# Patient Record
Sex: MALE | Race: BLACK OR AFRICAN AMERICAN | NOT HISPANIC OR LATINO | Employment: OTHER | URBAN - METROPOLITAN AREA
[De-identification: names, ages, dates, MRNs, and addresses within clinical notes are randomized per-mention and may not be internally consistent; named-entity substitution may affect disease eponyms.]

---

## 2017-01-03 ENCOUNTER — TELEPHONE (OUTPATIENT)
Dept: FAMILY MEDICINE CLINIC | Facility: CLINIC | Age: 42
End: 2017-01-03

## 2017-01-03 NOTE — TELEPHONE ENCOUNTER
Pt needs refill on adderall. Would like to pick this up Wednesday.    They did make an appt to see your Thursday. Last seen 9/16. Altaf said you don't like for him to be without his meds. So she will  Wednesday but he will come see you Thursday. Thanks    Call altaf when its ready [phone]    Altaf notified

## 2017-01-04 RX ORDER — DEXTROAMPHETAMINE SACCHARATE, AMPHETAMINE ASPARTATE, DEXTROAMPHETAMINE SULFATE AND AMPHETAMINE SULFATE 7.5; 7.5; 7.5; 7.5 MG/1; MG/1; MG/1; MG/1
30 TABLET ORAL DAILY
Qty: 30 TABLET | Refills: 0 | Status: SHIPPED | OUTPATIENT
Start: 2017-01-04 | End: 2017-02-14 | Stop reason: SDUPTHER

## 2017-01-05 ENCOUNTER — OFFICE VISIT (OUTPATIENT)
Dept: FAMILY MEDICINE CLINIC | Facility: CLINIC | Age: 42
End: 2017-01-05

## 2017-01-05 VITALS
HEIGHT: 72 IN | HEART RATE: 78 BPM | DIASTOLIC BLOOD PRESSURE: 80 MMHG | SYSTOLIC BLOOD PRESSURE: 118 MMHG | RESPIRATION RATE: 18 BRPM | OXYGEN SATURATION: 98 % | WEIGHT: 192.7 LBS | TEMPERATURE: 98.4 F | BODY MASS INDEX: 26.1 KG/M2

## 2017-01-05 DIAGNOSIS — I10 ESSENTIAL HYPERTENSION: Primary | ICD-10-CM

## 2017-01-05 DIAGNOSIS — E78.2 MIXED HYPERLIPIDEMIA: ICD-10-CM

## 2017-01-05 PROCEDURE — 99213 OFFICE O/P EST LOW 20 MIN: CPT | Performed by: FAMILY MEDICINE

## 2017-01-05 NOTE — PROGRESS NOTES
Subjective   Tye JONES Junior is a 41 y.o. male presenting with   Chief Complaint   Patient presents with   • ADD        HPI Comments: This is a 41-year-old nonsmoker who is here for routine follow-up for traumatic brain injury and high blood pressure and a history of seizure disorder who is apparently doing very well.  He tells me he exercises regularly and also that the diarrhea he had in the past is better.  His blood pressure is very well controlled.  He does not describe any side effects from his medication.    ADD          The following portions of the patient's history were reviewed and updated as appropriate: current medications, past family history, past medical history, past social history, past surgical history and problem list.    Review of Systems   All other systems reviewed and are negative.      Objective   Physical Exam   Constitutional: He is oriented to person, place, and time. He appears well-developed and well-nourished.   HENT:   Head: Normocephalic.   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   Neck: Normal range of motion and phonation normal. Neck supple. No tracheal tenderness present. No tracheal deviation present. No thyromegaly present.       Cardiovascular: Normal rate, regular rhythm, normal heart sounds and intact distal pulses.  Exam reveals no friction rub.    No murmur heard.  Pulmonary/Chest: Effort normal and breath sounds normal. No stridor. No respiratory distress. He has no wheezes.   Abdominal: Soft. Bowel sounds are normal. He exhibits no distension. There is no tenderness.   Musculoskeletal: Normal range of motion. He exhibits no edema or tenderness.   Lymphadenopathy:     He has no cervical adenopathy.   Neurological: He is alert and oriented to person, place, and time. No cranial nerve deficit. Coordination normal.   Skin: Skin is warm and dry.   Psychiatric: He has a normal mood and affect. His behavior is normal.   Nursing note and vitals  reviewed.      Assessment/Plan   Tye was seen today for add.    Diagnoses and all orders for this visit:    Essential hypertension    Mixed hyperlipidemia                   I would like him to return for another visit in 3 month(s)

## 2017-01-05 NOTE — MR AVS SNAPSHOT
Tye ROBERT Christine   1/5/2017 8:15 AM   Office Visit    Dept Phone:  [phone]   Encounter #:  [id number]    Provider:  Efren Martínez MD   Department:  [facility]                Your Full Care Plan              Your Updated Medication List          This list is accurate as of: 1/5/17  8:35 AM.  Always use your most recent med list.                amphetamine-dextroamphetamine 30 MG tablet   Commonly known as:  ADDERALL   Take 1 tablet by mouth Daily.       lisinopril 20 MG tablet   Commonly known as:  PRINIVIL,ZESTRIL   Take 1 tablet by mouth Daily.       lovastatin 20 MG tablet   Commonly known as:  MEVACOR   Take 1 tablet by mouth Every Night.       phenytoin 100 MG ER capsule   Commonly known as:  DILANTIN   TAKE ONE CAPSULE BY MOUTH TWICE DAILY       topiramate 25 MG tablet   Commonly known as:  TOPAMAX   Take 1 tablet by mouth Daily.       vitamin D 53560 UNITS capsule capsule   Commonly known as:  ERGOCALCIFEROL               You Were Diagnosed With        Codes Comments    Essential hypertension    -  Primary ICD-10-CM: I10  ICD-9-CM: 401.9     Mixed hyperlipidemia     ICD-10-CM: E78.2  ICD-9-CM: 272.2       Instructions    This is an extremely nice 41-year-old who is here for routine follow-up.  He appears to be doing well and he just had blood work a few months ago so I will request additional blood work on the next visit.     Patient Instructions History      Upcoming Appointments     Visit Type Date Time Department    OFFICE VISIT 1/5/2017  8:15 AM [employer] Signup     [facility] setObject allows you to send messages to your doctor, view your test results, renew your prescriptions, schedule appointments, and more. To sign up, go to [email or web address] and click on the Sign Up Now link in the New User? box. Enter your [employer] Activation Code exactly as it appears below along with the last four digits of your Social  "Security Number and your Date of Birth () to complete the sign-up process. If you do not sign up before the expiration date, you must request a new code.    [employer] Activation Code: [email or web address]  Expires: 2017  8:35 AM    If you have questions, you can email [email or web address] or call [phone] to talk to our [employer] staff. Remember, [employer] is NOT to be used for urgent needs. For medical emergencies, dial 911.               Other Info from Your Visit           Allergies     No Known Allergies      Reason for Visit     ADD           Vital Signs     Blood Pressure Pulse Temperature Respirations Height Weight    118/80 (BP Location: Left arm) 78 98.4 °F (36.9 °C) (Oral) 18 72\" (182.9 cm) 192 lb 11.2 oz (87.4 kg)    Oxygen Saturation Body Mass Index Smoking Status             98% 26.13 kg/m2 Never Smoker         Problems and Diagnoses Noted     High blood pressure    Mixed hyperlipidemia    Brain injury        "

## 2017-01-05 NOTE — PATIENT INSTRUCTIONS
This is an extremely nice 41-year-old who is here for routine follow-up.  He appears to be doing well and he just had blood work a few months ago so I will request additional blood work on the next visit.

## 2017-02-14 ENCOUNTER — TELEPHONE (OUTPATIENT)
Dept: FAMILY MEDICINE CLINIC | Facility: CLINIC | Age: 42
End: 2017-02-14

## 2017-02-14 RX ORDER — DEXTROAMPHETAMINE SACCHARATE, AMPHETAMINE ASPARTATE, DEXTROAMPHETAMINE SULFATE AND AMPHETAMINE SULFATE 7.5; 7.5; 7.5; 7.5 MG/1; MG/1; MG/1; MG/1
30 TABLET ORAL DAILY
Qty: 30 TABLET | Refills: 0 | Status: SHIPPED | OUTPATIENT
Start: 2017-02-14 | End: 2017-03-20 | Stop reason: SDUPTHER

## 2017-03-17 RX ORDER — TOPIRAMATE 25 MG/1
TABLET ORAL
Qty: 90 TABLET | Refills: 0 | Status: SHIPPED | OUTPATIENT
Start: 2017-03-17 | End: 2017-03-20 | Stop reason: SDUPTHER

## 2017-03-17 RX ORDER — LISINOPRIL 20 MG/1
TABLET ORAL
Qty: 90 TABLET | Refills: 0 | Status: SHIPPED | OUTPATIENT
Start: 2017-03-17 | End: 2017-03-20 | Stop reason: SDUPTHER

## 2017-03-20 ENCOUNTER — OFFICE VISIT (OUTPATIENT)
Dept: FAMILY MEDICINE CLINIC | Facility: CLINIC | Age: 42
End: 2017-03-20

## 2017-03-20 VITALS
HEART RATE: 105 BPM | BODY MASS INDEX: 25.87 KG/M2 | HEIGHT: 72 IN | OXYGEN SATURATION: 96 % | WEIGHT: 191 LBS | TEMPERATURE: 98.1 F | RESPIRATION RATE: 16 BRPM | DIASTOLIC BLOOD PRESSURE: 82 MMHG | SYSTOLIC BLOOD PRESSURE: 110 MMHG

## 2017-03-20 DIAGNOSIS — E78.2 MIXED HYPERLIPIDEMIA: Primary | ICD-10-CM

## 2017-03-20 DIAGNOSIS — I10 ESSENTIAL HYPERTENSION: ICD-10-CM

## 2017-03-20 DIAGNOSIS — S06.9X1D TRAUMATIC BRAIN INJURY, WITH LOSS OF CONSCIOUSNESS OF 30 MINUTES OR LESS, SUBSEQUENT ENCOUNTER: ICD-10-CM

## 2017-03-20 PROCEDURE — 99213 OFFICE O/P EST LOW 20 MIN: CPT | Performed by: FAMILY MEDICINE

## 2017-03-20 RX ORDER — DEXTROAMPHETAMINE SACCHARATE, AMPHETAMINE ASPARTATE, DEXTROAMPHETAMINE SULFATE AND AMPHETAMINE SULFATE 7.5; 7.5; 7.5; 7.5 MG/1; MG/1; MG/1; MG/1
30 TABLET ORAL DAILY
Qty: 30 TABLET | Refills: 0 | Status: SHIPPED | OUTPATIENT
Start: 2017-03-20 | End: 2017-04-18 | Stop reason: SDUPTHER

## 2017-03-20 RX ORDER — LISINOPRIL 20 MG/1
20 TABLET ORAL DAILY
Qty: 90 TABLET | Refills: 1 | Status: SHIPPED | OUTPATIENT
Start: 2017-03-20 | End: 2018-02-05 | Stop reason: SDUPTHER

## 2017-03-20 RX ORDER — TOPIRAMATE 25 MG/1
25 TABLET ORAL DAILY
Qty: 90 TABLET | Refills: 1 | Status: SHIPPED | OUTPATIENT
Start: 2017-03-20 | End: 2018-03-25 | Stop reason: SDUPTHER

## 2017-03-20 RX ORDER — PHENYTOIN SODIUM 100 MG/1
100 CAPSULE, EXTENDED RELEASE ORAL 2 TIMES DAILY
Qty: 60 CAPSULE | Refills: 5 | Status: SHIPPED | OUTPATIENT
Start: 2017-03-20 | End: 2017-06-19

## 2017-03-20 RX ORDER — LOVASTATIN 20 MG/1
20 TABLET ORAL NIGHTLY
Qty: 90 TABLET | Refills: 2 | Status: SHIPPED | OUTPATIENT
Start: 2017-03-20 | End: 2018-05-20 | Stop reason: SDUPTHER

## 2017-03-20 NOTE — PATIENT INSTRUCTIONS
This is an exceedingly nice 41-year-old gentleman who is here for follow-up.  I will request blood work and notify him when the results are available.  I would like him to call if there is a problem.

## 2017-03-20 NOTE — PROGRESS NOTES
Subjective   Tye JONES Junior is a 41 y.o. male presenting with   Chief Complaint   Patient presents with   • Medication Check Up   • Med Refill     written rx for adderall    • Hypertension     check up    • Hyperlipidemia     check up         HPI Comments: This is a 41-year-old gentleman who is here for follow-up for high cholesterol and high blood pressure and traumatic brain injury.  He is doing very well thanks to the care of his sister and his mother.  He is leaving for a two-month stay with his mother in [place].  I understand from talking to his sister that it is an extremely restful place.    He denies any side effects from his medication and he is doing quite well.  His blood pressure is excellent.  He does not have a cough from his lisinopril.    Hypertension     Hyperlipidemia          The following portions of the patient's history were reviewed and updated as appropriate: current medications, past family history, past medical history, past social history, past surgical history and problem list.    Review of Systems   All other systems reviewed and are negative.      Objective   Physical Exam   Constitutional: He is oriented to person, place, and time.   Neck: Normal range of motion. Neck supple. No thyromegaly present.   Cardiovascular: Normal rate, regular rhythm, normal heart sounds and intact distal pulses.  Exam reveals no friction rub.    No murmur heard.  Pulmonary/Chest: Effort normal and breath sounds normal. No respiratory distress. He has no wheezes. He exhibits no tenderness.   Abdominal: Soft. Bowel sounds are normal. He exhibits no distension and no mass. There is no tenderness.   Musculoskeletal: Normal range of motion. He exhibits no edema or tenderness.   Lymphadenopathy:     He has no cervical adenopathy.   Neurological: He is alert and oriented to person, place, and time. 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   Skin: Skin is warm and dry.   Psychiatric: He has a normal mood and affect.  His behavior is normal.   Nursing note and vitals reviewed.      Assessment/Plan   Tye was seen today for medication check up, med refill, hypertension and hyperlipidemia.    Diagnoses and all orders for this visit:    Mixed hyperlipidemia  -     Comprehensive Metabolic Panel    Essential hypertension  -     Comprehensive Metabolic Panel  -     TSH    Traumatic brain injury, with loss of consciousness of 30 minutes or less, subsequent encounter  -     Phenytoin level, total    Other orders  -     amphetamine-dextroamphetamine (ADDERALL) 30 MG tablet; Take 1 tablet by mouth Daily.  -     lisinopril (PRINIVIL,ZESTRIL) 20 MG tablet; Take 1 tablet by mouth Daily.  -     lovastatin (MEVACOR) 20 MG tablet; Take 1 tablet by mouth Every Night.  -     phenytoin (DILANTIN) 100 MG ER capsule; Take 1 capsule by mouth 2 (Two) Times a Day.  -     topiramate (TOPAMAX) 25 MG tablet; Take 1 tablet by mouth Daily.                   I would like him to return for another visit in 3 month(s)

## 2017-03-21 LAB
ALBUMIN SERPL-MCNC: 4.7 G/DL (ref 3.5–5.2)
ALBUMIN/GLOB SERPL: 1.9 G/DL
ALP SERPL-CCNC: 77 U/L (ref 39–117)
ALT SERPL-CCNC: 29 U/L (ref 1–41)
AST SERPL-CCNC: 17 U/L (ref 1–40)
BILIRUB SERPL-MCNC: <0.2 MG/DL (ref 0.1–1.2)
BUN SERPL-MCNC: 9 MG/DL (ref 6–20)
BUN/CREAT SERPL: 9.8 (ref 7–25)
CALCIUM SERPL-MCNC: 9.9 MG/DL (ref 8.6–10.5)
CHLORIDE SERPL-SCNC: 104 MMOL/L (ref 98–107)
CO2 SERPL-SCNC: 27 MMOL/L (ref 22–29)
CREAT SERPL-MCNC: 0.92 MG/DL (ref 0.76–1.27)
GLOBULIN SER CALC-MCNC: 2.5 GM/DL
GLUCOSE SERPL-MCNC: 93 MG/DL (ref 65–99)
PHENYTOIN SERPL-MCNC: 8.6 MCG/ML (ref 10–20)
POTASSIUM SERPL-SCNC: 4.7 MMOL/L (ref 3.5–5.2)
PROT SERPL-MCNC: 7.2 G/DL (ref 6–8.5)
SODIUM SERPL-SCNC: 142 MMOL/L (ref 136–145)
TSH SERPL DL<=0.005 MIU/L-ACNC: 0.88 MIU/ML (ref 0.27–4.2)

## 2017-03-21 NOTE — PROGRESS NOTES
Please call the patient regarding his abnormal result.   UMESHOVBRAYAN INFORMED ROSY OF NANCY'S RESULTS

## 2017-04-18 ENCOUNTER — TELEPHONE (OUTPATIENT)
Dept: FAMILY MEDICINE CLINIC | Facility: CLINIC | Age: 42
End: 2017-04-18

## 2017-04-18 RX ORDER — DEXTROAMPHETAMINE SACCHARATE, AMPHETAMINE ASPARTATE, DEXTROAMPHETAMINE SULFATE AND AMPHETAMINE SULFATE 7.5; 7.5; 7.5; 7.5 MG/1; MG/1; MG/1; MG/1
30 TABLET ORAL DAILY
Qty: 30 TABLET | Refills: 0 | Status: SHIPPED | OUTPATIENT
Start: 2017-04-18 | End: 2017-05-16 | Stop reason: SDUPTHER

## 2017-05-15 ENCOUNTER — TELEPHONE (OUTPATIENT)
Dept: FAMILY MEDICINE CLINIC | Facility: CLINIC | Age: 42
End: 2017-05-15

## 2017-05-16 RX ORDER — DEXTROAMPHETAMINE SACCHARATE, AMPHETAMINE ASPARTATE, DEXTROAMPHETAMINE SULFATE AND AMPHETAMINE SULFATE 7.5; 7.5; 7.5; 7.5 MG/1; MG/1; MG/1; MG/1
30 TABLET ORAL DAILY
Qty: 30 TABLET | Refills: 0 | Status: SHIPPED | OUTPATIENT
Start: 2017-05-16 | End: 2017-05-17 | Stop reason: SDUPTHER

## 2017-05-17 RX ORDER — DEXTROAMPHETAMINE SACCHARATE, AMPHETAMINE ASPARTATE, DEXTROAMPHETAMINE SULFATE AND AMPHETAMINE SULFATE 7.5; 7.5; 7.5; 7.5 MG/1; MG/1; MG/1; MG/1
30 TABLET ORAL DAILY
Qty: 30 TABLET | Refills: 0 | Status: SHIPPED | OUTPATIENT
Start: 2017-05-17 | End: 2017-06-19 | Stop reason: SDUPTHER

## 2017-06-12 RX ORDER — PHENYTOIN SODIUM 100 MG/1
CAPSULE, EXTENDED RELEASE ORAL
Qty: 60 CAPSULE | Refills: 5 | Status: SHIPPED | OUTPATIENT
Start: 2017-06-12 | End: 2017-12-06 | Stop reason: SDUPTHER

## 2017-06-19 ENCOUNTER — OFFICE VISIT (OUTPATIENT)
Dept: FAMILY MEDICINE CLINIC | Facility: CLINIC | Age: 42
End: 2017-06-19

## 2017-06-19 VITALS
HEART RATE: 87 BPM | TEMPERATURE: 98.2 F | HEIGHT: 72 IN | SYSTOLIC BLOOD PRESSURE: 102 MMHG | WEIGHT: 189 LBS | BODY MASS INDEX: 25.6 KG/M2 | DIASTOLIC BLOOD PRESSURE: 80 MMHG | OXYGEN SATURATION: 98 %

## 2017-06-19 DIAGNOSIS — E78.2 MIXED HYPERLIPIDEMIA: Primary | ICD-10-CM

## 2017-06-19 DIAGNOSIS — I10 ESSENTIAL HYPERTENSION: ICD-10-CM

## 2017-06-19 DIAGNOSIS — S06.9X1D TRAUMATIC BRAIN INJURY, WITH LOSS OF CONSCIOUSNESS OF 30 MINUTES OR LESS, SUBSEQUENT ENCOUNTER: ICD-10-CM

## 2017-06-19 PROCEDURE — 99213 OFFICE O/P EST LOW 20 MIN: CPT | Performed by: FAMILY MEDICINE

## 2017-06-19 RX ORDER — DEXTROAMPHETAMINE SACCHARATE, AMPHETAMINE ASPARTATE, DEXTROAMPHETAMINE SULFATE AND AMPHETAMINE SULFATE 7.5; 7.5; 7.5; 7.5 MG/1; MG/1; MG/1; MG/1
30 TABLET ORAL DAILY
Qty: 30 TABLET | Refills: 0 | Status: SHIPPED | OUTPATIENT
Start: 2017-06-19 | End: 2017-07-24 | Stop reason: SDUPTHER

## 2017-06-19 NOTE — PATIENT INSTRUCTIONS
This is an extremely nice 41-year-old who is here for routine follow-up.  He appears to be doing well so I will plan to see him back for another routine visit in 3 months.

## 2017-06-19 NOTE — PROGRESS NOTES
Subjective   Tye JONES Junior is a 41 y.o. male presenting with   Chief Complaint   Patient presents with   • Medication Check Up   • Med Refill     needs written  for ADDERALL         HPI Comments: This is a very pleasant 41-year-old gentleman who is here for follow-up for high blood pressure and high cholesterol and severe traumatic brain injury with blindness.  He continues to have modest loose stools but otherwise is doing extremely well.  He says while he was visiting his mother in [place] his bowel movements were worse, but they have returned to baseline now.  He does not have any known blood in his stool and he does not have any fever or night sweats or weight loss.       The following portions of the patient's history were reviewed and updated as appropriate: current medications, past family history, past medical history, past social history, past surgical history and problem list.    Review of Systems   All other systems reviewed and are negative.      Objective   Physical Exam   Constitutional: He is oriented to person, place, and time. He appears well-developed and well-nourished. No distress.   HENT:   Head: Normocephalic and atraumatic.   Eyes: Right eye exhibits no discharge. Left eye exhibits no discharge.   Neck: Normal range of motion. Neck supple. No thyromegaly present.   Cardiovascular: Normal rate, regular rhythm, normal heart sounds and intact distal pulses.  Exam reveals no friction rub.    No murmur heard.  Pulmonary/Chest: Effort normal and breath sounds normal. No respiratory distress. He has no wheezes.   Abdominal: Soft. Bowel sounds are normal. He exhibits no distension. There is no tenderness.   Musculoskeletal: Normal range of motion. He exhibits no edema or tenderness.   Lymphadenopathy:     He has no cervical adenopathy.   Neurological: He is alert and oriented to person, place, and time.   Skin: Skin is warm and dry. He is not diaphoretic.   Psychiatric: He has a normal mood and  affect. His behavior is normal.   Nursing note and vitals reviewed.      Assessment/Plan   Tye was seen today for medication check up and med refill.    Diagnoses and all orders for this visit:    Mixed hyperlipidemia    Essential hypertension    Traumatic brain injury, with loss of consciousness of 30 minutes or less, subsequent encounter    Other orders  -     amphetamine-dextroamphetamine (ADDERALL) 30 MG tablet; Take 1 tablet by mouth Daily.                   I would like him to return for another visit in 3 month(s)

## 2017-07-24 ENCOUNTER — TELEPHONE (OUTPATIENT)
Dept: FAMILY MEDICINE CLINIC | Facility: CLINIC | Age: 42
End: 2017-07-24

## 2017-07-24 RX ORDER — DEXTROAMPHETAMINE SACCHARATE, AMPHETAMINE ASPARTATE, DEXTROAMPHETAMINE SULFATE AND AMPHETAMINE SULFATE 7.5; 7.5; 7.5; 7.5 MG/1; MG/1; MG/1; MG/1
30 TABLET ORAL DAILY
Qty: 30 TABLET | Refills: 0 | Status: SHIPPED | OUTPATIENT
Start: 2017-07-24 | End: 2017-08-23 | Stop reason: SDUPTHER

## 2017-08-23 ENCOUNTER — TELEPHONE (OUTPATIENT)
Dept: FAMILY MEDICINE CLINIC | Facility: CLINIC | Age: 42
End: 2017-08-23

## 2017-08-23 RX ORDER — DEXTROAMPHETAMINE SACCHARATE, AMPHETAMINE ASPARTATE, DEXTROAMPHETAMINE SULFATE AND AMPHETAMINE SULFATE 7.5; 7.5; 7.5; 7.5 MG/1; MG/1; MG/1; MG/1
30 TABLET ORAL DAILY
Qty: 30 TABLET | Refills: 0 | Status: SHIPPED | OUTPATIENT
Start: 2017-08-23 | End: 2017-09-18 | Stop reason: SDUPTHER

## 2017-09-18 ENCOUNTER — TELEPHONE (OUTPATIENT)
Dept: FAMILY MEDICINE CLINIC | Facility: CLINIC | Age: 42
End: 2017-09-18

## 2017-09-18 RX ORDER — DEXTROAMPHETAMINE SACCHARATE, AMPHETAMINE ASPARTATE, DEXTROAMPHETAMINE SULFATE AND AMPHETAMINE SULFATE 7.5; 7.5; 7.5; 7.5 MG/1; MG/1; MG/1; MG/1
30 TABLET ORAL DAILY
Qty: 30 TABLET | Refills: 0 | Status: SHIPPED | OUTPATIENT
Start: 2017-09-18 | End: 2017-10-23 | Stop reason: SDUPTHER

## 2017-09-22 ENCOUNTER — OFFICE VISIT (OUTPATIENT)
Dept: FAMILY MEDICINE CLINIC | Facility: CLINIC | Age: 42
End: 2017-09-22

## 2017-09-22 VITALS
TEMPERATURE: 98.1 F | BODY MASS INDEX: 26.14 KG/M2 | DIASTOLIC BLOOD PRESSURE: 70 MMHG | WEIGHT: 193 LBS | SYSTOLIC BLOOD PRESSURE: 112 MMHG | HEART RATE: 90 BPM | OXYGEN SATURATION: 98 % | HEIGHT: 72 IN

## 2017-09-22 DIAGNOSIS — E78.2 MIXED HYPERLIPIDEMIA: ICD-10-CM

## 2017-09-22 DIAGNOSIS — I10 ESSENTIAL HYPERTENSION: Primary | ICD-10-CM

## 2017-09-22 DIAGNOSIS — S06.9X1D TRAUMATIC BRAIN INJURY, WITH LOSS OF CONSCIOUSNESS OF 30 MINUTES OR LESS, SUBSEQUENT ENCOUNTER: ICD-10-CM

## 2017-09-22 PROCEDURE — 99213 OFFICE O/P EST LOW 20 MIN: CPT | Performed by: FAMILY MEDICINE

## 2017-09-22 NOTE — PROGRESS NOTES
Subjective   Tye JONES Junior is a 42 y.o. male presenting with   Chief Complaint   Patient presents with   • Medication Check Up     he does NOT need a rx    • Injections     flu shot and pneumonia shot        HPI Comments: Mike is here for routine follow-up for his traumatic brain injury and hyperlipidemia and hypertension.  He tells me that he still has loose bowel movements but they are getting better.  I suggested that he start taking a probiotic.  His father is here with him and will transmit that to his caregivers.    Otherwise he is doing quite well without any current problems.  We have given him his flu shot today.       The following portions of the patient's history were reviewed and updated as appropriate: current medications, past family history, past medical history, past social history, past surgical history and problem list.    Review of Systems   All other systems reviewed and are negative.      Objective   Physical Exam   Constitutional: He is oriented to person, place, and time. He appears well-developed and well-nourished.   HENT:   Head: Normocephalic.   Neck: Normal range of motion. Neck supple. No thyromegaly present.   Cardiovascular: Normal rate, regular rhythm, normal heart sounds and intact distal pulses.  Exam reveals no friction rub.    No murmur heard.  Pulmonary/Chest: Effort normal and breath sounds normal. No respiratory distress. He has no wheezes.   Abdominal: Soft. Bowel sounds are normal. He exhibits no distension. There is no tenderness.   Musculoskeletal: Normal range of motion. He exhibits no edema or tenderness.   Lymphadenopathy:     He has no cervical adenopathy.   Neurological: He is alert and oriented to person, place, and time. Cranial nerve deficit: blind.   Skin: Skin is warm and dry.   Psychiatric: He has a normal mood and affect. His behavior is normal.   Nursing note and vitals reviewed.      Assessment/Plan   Tye was seen today for medication check up and  injections.    Diagnoses and all orders for this visit:    Essential hypertension  -     Comprehensive Metabolic Panel  -     TSH    Mixed hyperlipidemia  -     Comprehensive Metabolic Panel    Traumatic brain injury, with loss of consciousness of 30 minutes or less, subsequent encounter  -     Phenytoin level, free                   I would like him to return for another visit in 3 month(s)

## 2017-09-22 NOTE — PATIENT INSTRUCTIONS
This is an extremely nice 42-year-old who is here for routine follow-up.  I will request blood work and notify him when the results are available.  I would like him to call if there is a problem.

## 2017-09-25 LAB
ALBUMIN SERPL-MCNC: 4.4 G/DL (ref 3.5–5.2)
ALBUMIN/GLOB SERPL: 1.5 G/DL
ALP SERPL-CCNC: 76 U/L (ref 39–117)
ALT SERPL-CCNC: 34 U/L (ref 1–41)
AST SERPL-CCNC: 21 U/L (ref 1–40)
BILIRUB SERPL-MCNC: <0.2 MG/DL (ref 0.1–1.2)
BUN SERPL-MCNC: 8 MG/DL (ref 6–20)
BUN/CREAT SERPL: 8 (ref 7–25)
CALCIUM SERPL-MCNC: 10.1 MG/DL (ref 8.6–10.5)
CHLORIDE SERPL-SCNC: 102 MMOL/L (ref 98–107)
CO2 SERPL-SCNC: 27.2 MMOL/L (ref 22–29)
CREAT SERPL-MCNC: 1 MG/DL (ref 0.76–1.27)
GLOBULIN SER CALC-MCNC: 3 GM/DL
GLUCOSE SERPL-MCNC: 100 MG/DL (ref 65–99)
PHENYTOIN FREE SERPL-MCNC: 0.7 UG/ML (ref 1–2)
POTASSIUM SERPL-SCNC: 4 MMOL/L (ref 3.5–5.2)
PROT SERPL-MCNC: 7.4 G/DL (ref 6–8.5)
SODIUM SERPL-SCNC: 143 MMOL/L (ref 136–145)
TSH SERPL DL<=0.005 MIU/L-ACNC: 1.45 MIU/ML (ref 0.27–4.2)

## 2017-09-28 NOTE — PROGRESS NOTES
Please call the patient regarding his abnormal result.  I spoke to patient's sister Jada informed her of choco's results

## 2017-10-20 ENCOUNTER — TELEPHONE (OUTPATIENT)
Dept: FAMILY MEDICINE CLINIC | Facility: CLINIC | Age: 42
End: 2017-10-20

## 2017-10-23 RX ORDER — DEXTROAMPHETAMINE SACCHARATE, AMPHETAMINE ASPARTATE, DEXTROAMPHETAMINE SULFATE AND AMPHETAMINE SULFATE 7.5; 7.5; 7.5; 7.5 MG/1; MG/1; MG/1; MG/1
30 TABLET ORAL DAILY
Qty: 30 TABLET | Refills: 0 | Status: SHIPPED | OUTPATIENT
Start: 2017-10-23 | End: 2017-11-22 | Stop reason: SDUPTHER

## 2017-11-22 ENCOUNTER — TELEPHONE (OUTPATIENT)
Dept: FAMILY MEDICINE CLINIC | Facility: CLINIC | Age: 42
End: 2017-11-22

## 2017-11-22 RX ORDER — DEXTROAMPHETAMINE SACCHARATE, AMPHETAMINE ASPARTATE, DEXTROAMPHETAMINE SULFATE AND AMPHETAMINE SULFATE 7.5; 7.5; 7.5; 7.5 MG/1; MG/1; MG/1; MG/1
30 TABLET ORAL DAILY
Qty: 30 TABLET | Refills: 0 | Status: SHIPPED | OUTPATIENT
Start: 2017-11-22 | End: 2017-12-18 | Stop reason: SDUPTHER

## 2017-12-06 RX ORDER — PHENYTOIN SODIUM 100 MG/1
CAPSULE, EXTENDED RELEASE ORAL
Qty: 60 CAPSULE | Refills: 5 | Status: SHIPPED | OUTPATIENT
Start: 2017-12-06 | End: 2018-05-20 | Stop reason: SDUPTHER

## 2017-12-18 ENCOUNTER — TELEPHONE (OUTPATIENT)
Dept: FAMILY MEDICINE CLINIC | Facility: CLINIC | Age: 42
End: 2017-12-18

## 2017-12-18 RX ORDER — DEXTROAMPHETAMINE SACCHARATE, AMPHETAMINE ASPARTATE, DEXTROAMPHETAMINE SULFATE AND AMPHETAMINE SULFATE 7.5; 7.5; 7.5; 7.5 MG/1; MG/1; MG/1; MG/1
30 TABLET ORAL DAILY
Qty: 30 TABLET | Refills: 0 | Status: SHIPPED | OUTPATIENT
Start: 2017-12-18 | End: 2018-01-17 | Stop reason: SDUPTHER

## 2018-01-17 ENCOUNTER — OFFICE VISIT (OUTPATIENT)
Dept: FAMILY MEDICINE CLINIC | Facility: CLINIC | Age: 43
End: 2018-01-17

## 2018-01-17 VITALS
HEART RATE: 82 BPM | BODY MASS INDEX: 25.47 KG/M2 | SYSTOLIC BLOOD PRESSURE: 110 MMHG | OXYGEN SATURATION: 96 % | TEMPERATURE: 98.5 F | DIASTOLIC BLOOD PRESSURE: 70 MMHG | WEIGHT: 188 LBS | HEIGHT: 72 IN

## 2018-01-17 DIAGNOSIS — E78.2 MIXED HYPERLIPIDEMIA: ICD-10-CM

## 2018-01-17 DIAGNOSIS — I10 ESSENTIAL HYPERTENSION: ICD-10-CM

## 2018-01-17 DIAGNOSIS — S06.9X1D TRAUMATIC BRAIN INJURY, WITH LOSS OF CONSCIOUSNESS OF 30 MINUTES OR LESS, SUBSEQUENT ENCOUNTER: Primary | ICD-10-CM

## 2018-01-17 PROCEDURE — 99213 OFFICE O/P EST LOW 20 MIN: CPT | Performed by: FAMILY MEDICINE

## 2018-01-17 RX ORDER — DEXTROAMPHETAMINE SACCHARATE, AMPHETAMINE ASPARTATE, DEXTROAMPHETAMINE SULFATE AND AMPHETAMINE SULFATE 7.5; 7.5; 7.5; 7.5 MG/1; MG/1; MG/1; MG/1
30 TABLET ORAL DAILY
Qty: 30 TABLET | Refills: 0 | Status: SHIPPED | OUTPATIENT
Start: 2018-01-17 | End: 2018-02-21 | Stop reason: SDUPTHER

## 2018-01-17 NOTE — PROGRESS NOTES
"Subjective   Tye JONES Junior is a 42 y.o. male presenting with   Chief Complaint   Patient presents with   • ADD   • Hypertension   • Hyperlipidemia   • Vitamin D Deficiency        HPI Comments: 42-year-old nonsmoker here for follow-up for traumatic brain injury with blindness and for high blood pressure and high cholesterol.  He tells me he is doing very well without any problems or side effects from his medication.  [facility]  He is here with his grandmother who is visiting from [place].    ADD     Hypertension     Hyperlipidemia          The following portions of the patient's history were reviewed and updated as appropriate: current medications, past family history, past medical history, past social history, past surgical history and problem list.    Review of Systems   All other systems reviewed and are negative.      Objective   Physical Exam   Constitutional: He is oriented to person, place, and time. He appears well-developed and well-nourished. No distress.   HENT:   Head: Normocephalic.   Mouth/Throat: Oropharynx is clear and moist.   Eyes: Right eye exhibits no discharge. Left eye exhibits no discharge.   Neck: Normal range of motion. Neck supple. No JVD present. No thyromegaly present.   Cardiovascular: Normal rate, regular rhythm and normal heart sounds.    Pulmonary/Chest: Effort normal and breath sounds normal. No respiratory distress.   Musculoskeletal: Normal range of motion. He exhibits no edema or tenderness.   Lymphadenopathy:     He has no cervical adenopathy.   Neurological: He is alert and oriented to person, place, and time.   Skin: Skin is warm and dry. He is not diaphoretic.   Psychiatric: He has a normal mood and affect. His behavior is normal.   Nursing note and vitals reviewed.      Assessment/Plan   Tye was seen today for add, hypertension, hyperlipidemia and vitamin d deficiency.    Diagnoses and all orders for this visit:    Traumatic brain " injury, with loss of consciousness of 30 minutes or less, subsequent encounter    Essential hypertension  -     Comprehensive Metabolic Panel  -     TSH    Mixed hyperlipidemia  -     Comprehensive Metabolic Panel    Other orders  -     amphetamine-dextroamphetamine (ADDERALL) 30 MG tablet; Take 1 tablet by mouth Daily.                   I would like him to return for another visit in 3 month(s)

## 2018-01-18 LAB
ALBUMIN SERPL-MCNC: 4.5 G/DL (ref 3.5–5.2)
ALBUMIN/GLOB SERPL: 1.4 G/DL
ALP SERPL-CCNC: 76 U/L (ref 39–117)
ALT SERPL-CCNC: 26 U/L (ref 1–41)
AST SERPL-CCNC: 17 U/L (ref 1–40)
BILIRUB SERPL-MCNC: <0.2 MG/DL (ref 0.1–1.2)
BUN SERPL-MCNC: 7 MG/DL (ref 6–20)
BUN/CREAT SERPL: 8.6 (ref 7–25)
CALCIUM SERPL-MCNC: 9.8 MG/DL (ref 8.6–10.5)
CHLORIDE SERPL-SCNC: 102 MMOL/L (ref 98–107)
CO2 SERPL-SCNC: 27.5 MMOL/L (ref 22–29)
CREAT SERPL-MCNC: 0.81 MG/DL (ref 0.76–1.27)
GLOBULIN SER CALC-MCNC: 3.2 GM/DL
GLUCOSE SERPL-MCNC: 78 MG/DL (ref 65–99)
POTASSIUM SERPL-SCNC: 4.3 MMOL/L (ref 3.5–5.2)
PROT SERPL-MCNC: 7.7 G/DL (ref 6–8.5)
SODIUM SERPL-SCNC: 142 MMOL/L (ref 136–145)
TSH SERPL DL<=0.005 MIU/L-ACNC: 1.22 MIU/ML (ref 0.27–4.2)

## 2018-02-05 RX ORDER — LISINOPRIL 20 MG/1
TABLET ORAL
Qty: 90 TABLET | Refills: 0 | Status: SHIPPED | OUTPATIENT
Start: 2018-02-05 | End: 2018-05-20 | Stop reason: SDUPTHER

## 2018-02-20 ENCOUNTER — TELEPHONE (OUTPATIENT)
Dept: FAMILY MEDICINE CLINIC | Facility: CLINIC | Age: 43
End: 2018-02-20

## 2018-02-21 RX ORDER — DEXTROAMPHETAMINE SACCHARATE, AMPHETAMINE ASPARTATE, DEXTROAMPHETAMINE SULFATE AND AMPHETAMINE SULFATE 7.5; 7.5; 7.5; 7.5 MG/1; MG/1; MG/1; MG/1
30 TABLET ORAL DAILY
Qty: 30 TABLET | Refills: 0 | Status: SHIPPED | OUTPATIENT
Start: 2018-02-21 | End: 2018-03-19 | Stop reason: SDUPTHER

## 2018-03-19 ENCOUNTER — TELEPHONE (OUTPATIENT)
Dept: FAMILY MEDICINE CLINIC | Facility: CLINIC | Age: 43
End: 2018-03-19

## 2018-03-19 RX ORDER — DEXTROAMPHETAMINE SACCHARATE, AMPHETAMINE ASPARTATE, DEXTROAMPHETAMINE SULFATE AND AMPHETAMINE SULFATE 7.5; 7.5; 7.5; 7.5 MG/1; MG/1; MG/1; MG/1
30 TABLET ORAL DAILY
Qty: 30 TABLET | Refills: 0 | Status: SHIPPED | OUTPATIENT
Start: 2018-03-19 | End: 2018-04-09 | Stop reason: SDUPTHER

## 2018-03-26 RX ORDER — TOPIRAMATE 25 MG/1
TABLET ORAL
Qty: 90 TABLET | Refills: 1 | Status: SHIPPED | OUTPATIENT
Start: 2018-03-26 | End: 2018-06-25 | Stop reason: SDUPTHER

## 2018-04-09 ENCOUNTER — OFFICE VISIT (OUTPATIENT)
Dept: FAMILY MEDICINE CLINIC | Facility: CLINIC | Age: 43
End: 2018-04-09

## 2018-04-09 VITALS
BODY MASS INDEX: 25.47 KG/M2 | HEART RATE: 76 BPM | TEMPERATURE: 98 F | SYSTOLIC BLOOD PRESSURE: 136 MMHG | HEIGHT: 72 IN | DIASTOLIC BLOOD PRESSURE: 80 MMHG | WEIGHT: 188 LBS | RESPIRATION RATE: 16 BRPM

## 2018-04-09 DIAGNOSIS — S06.9X1D TRAUMATIC BRAIN INJURY, WITH LOSS OF CONSCIOUSNESS OF 30 MINUTES OR LESS, SUBSEQUENT ENCOUNTER: ICD-10-CM

## 2018-04-09 DIAGNOSIS — I10 ESSENTIAL HYPERTENSION: ICD-10-CM

## 2018-04-09 DIAGNOSIS — E78.2 MIXED HYPERLIPIDEMIA: Primary | ICD-10-CM

## 2018-04-09 PROCEDURE — 99213 OFFICE O/P EST LOW 20 MIN: CPT | Performed by: FAMILY MEDICINE

## 2018-04-09 RX ORDER — DEXTROAMPHETAMINE SACCHARATE, AMPHETAMINE ASPARTATE, DEXTROAMPHETAMINE SULFATE AND AMPHETAMINE SULFATE 7.5; 7.5; 7.5; 7.5 MG/1; MG/1; MG/1; MG/1
30 TABLET ORAL DAILY
Qty: 30 TABLET | Refills: 0 | Status: SHIPPED | OUTPATIENT
Start: 2018-04-09 | End: 2018-05-23 | Stop reason: SDUPTHER

## 2018-04-09 NOTE — PATIENT INSTRUCTIONS
This is an extremely nice 42-year-old who is here for routine follow-up.  He appears to be doing fairly well so I have given him a prescription.  I would like him to call if there is a problem.

## 2018-04-09 NOTE — PROGRESS NOTES
Subjective   Tye JONES Junior is a 42 y.o. male presenting with   Chief Complaint   Patient presents with   • Hyperlipidemia   • Hypertension   • ADD   • PPD Placement     pt need ppd placement        This is an extremely nice 42-year-old gentleman who suffered a significant traumatic brain injury and has been blind ever since.  He is on Adderall because it was felt by his neurologist that this might help with his ability to focus and to maximize his performance.  He also is on Dilantin to prevent seizures and topiramate.  Because he has shown a tendency towards high blood pressure in the past he is on lisinopril.  He does not have any side effects from his current medication and his blood pressure is normally very well controlled.  He is here for routine 3 month follow-up so he can get his Adderall prescription.    He does need a TB skin test since he is enrolled in an adult Facility for his TBI      Hyperlipidemia     Hypertension     ADD          The following portions of the patient's history were reviewed and updated as appropriate: current medications, past family history, past medical history, past social history, past surgical history and problem list.    Review of Systems   All other systems reviewed and are negative.      Objective   Physical Exam   Constitutional: He is oriented to person, place, and time. He appears well-developed and well-nourished. No distress.   HENT:   Head: Normocephalic.   Right Ear: External ear normal.   Left Ear: External ear normal.   Neck: Normal range of motion. Neck supple.   Cardiovascular: Normal rate, regular rhythm and intact distal pulses.  Exam reveals no gallop and no friction rub.    No murmur heard.  Pulmonary/Chest: Effort normal and breath sounds normal. No respiratory distress. He has no wheezes.   Musculoskeletal: Normal range of motion. He exhibits no edema or tenderness.   Lymphadenopathy:     He has no cervical adenopathy.   Neurological: He is alert and  oriented to person, place, and time.   Skin: Skin is warm and dry. He is not diaphoretic.   Psychiatric: He has a normal mood and affect. His behavior is normal.   Nursing note and vitals reviewed.      Assessment/Plan   Tye was seen today for hyperlipidemia, hypertension, add and ppd placement.    Diagnoses and all orders for this visit:    Mixed hyperlipidemia    Essential hypertension    Traumatic brain injury, with loss of consciousness of 30 minutes or less, subsequent encounter    Other orders  -     amphetamine-dextroamphetamine (ADDERALL) 30 MG tablet; Take 1 tablet by mouth Daily.                   I would like him to return for another visit in 3 month(s)

## 2018-05-21 RX ORDER — LOVASTATIN 20 MG/1
TABLET ORAL
Qty: 90 TABLET | Refills: 2 | Status: SHIPPED | OUTPATIENT
Start: 2018-05-21 | End: 2018-10-11 | Stop reason: SDUPTHER

## 2018-05-21 RX ORDER — PHENYTOIN SODIUM 100 MG/1
CAPSULE, EXTENDED RELEASE ORAL
Qty: 60 CAPSULE | Refills: 5 | Status: SHIPPED | OUTPATIENT
Start: 2018-05-21 | End: 2018-10-11 | Stop reason: SDUPTHER

## 2018-05-21 RX ORDER — LISINOPRIL 20 MG/1
TABLET ORAL
Qty: 90 TABLET | Refills: 3 | Status: SHIPPED | OUTPATIENT
Start: 2018-05-21 | End: 2018-10-11 | Stop reason: SDUPTHER

## 2018-05-21 RX ORDER — TOPIRAMATE 25 MG/1
TABLET ORAL
Qty: 90 TABLET | Refills: 1 | Status: SHIPPED | OUTPATIENT
Start: 2018-05-21 | End: 2018-10-11 | Stop reason: SDUPTHER

## 2018-05-23 RX ORDER — DEXTROAMPHETAMINE SACCHARATE, AMPHETAMINE ASPARTATE, DEXTROAMPHETAMINE SULFATE AND AMPHETAMINE SULFATE 7.5; 7.5; 7.5; 7.5 MG/1; MG/1; MG/1; MG/1
30 TABLET ORAL DAILY
Qty: 30 TABLET | Refills: 0 | Status: SHIPPED | OUTPATIENT
Start: 2018-05-23 | End: 2018-06-25 | Stop reason: SDUPTHER

## 2018-06-25 ENCOUNTER — TELEPHONE (OUTPATIENT)
Dept: FAMILY MEDICINE CLINIC | Facility: CLINIC | Age: 43
End: 2018-06-25

## 2018-06-25 RX ORDER — DEXTROAMPHETAMINE SACCHARATE, AMPHETAMINE ASPARTATE, DEXTROAMPHETAMINE SULFATE AND AMPHETAMINE SULFATE 7.5; 7.5; 7.5; 7.5 MG/1; MG/1; MG/1; MG/1
30 TABLET ORAL DAILY
Qty: 30 TABLET | Refills: 0 | Status: SHIPPED | OUTPATIENT
Start: 2018-06-25 | End: 2018-07-06 | Stop reason: SDUPTHER

## 2018-07-06 ENCOUNTER — TELEPHONE (OUTPATIENT)
Dept: FAMILY MEDICINE CLINIC | Facility: CLINIC | Age: 43
End: 2018-07-06

## 2018-07-06 ENCOUNTER — OFFICE VISIT (OUTPATIENT)
Dept: FAMILY MEDICINE CLINIC | Facility: CLINIC | Age: 43
End: 2018-07-06

## 2018-07-06 VITALS
BODY MASS INDEX: 25.04 KG/M2 | WEIGHT: 184.9 LBS | HEIGHT: 72 IN | HEART RATE: 71 BPM | OXYGEN SATURATION: 99 % | DIASTOLIC BLOOD PRESSURE: 60 MMHG | SYSTOLIC BLOOD PRESSURE: 104 MMHG | TEMPERATURE: 97.8 F

## 2018-07-06 DIAGNOSIS — E78.2 MIXED HYPERLIPIDEMIA: Primary | ICD-10-CM

## 2018-07-06 DIAGNOSIS — I10 ESSENTIAL HYPERTENSION: ICD-10-CM

## 2018-07-06 DIAGNOSIS — J30.9 ACUTE ALLERGIC RHINITIS, UNSPECIFIED SEASONALITY, UNSPECIFIED TRIGGER: ICD-10-CM

## 2018-07-06 PROCEDURE — 99213 OFFICE O/P EST LOW 20 MIN: CPT | Performed by: FAMILY MEDICINE

## 2018-07-06 RX ORDER — DEXTROAMPHETAMINE SACCHARATE, AMPHETAMINE ASPARTATE, DEXTROAMPHETAMINE SULFATE AND AMPHETAMINE SULFATE 7.5; 7.5; 7.5; 7.5 MG/1; MG/1; MG/1; MG/1
30 TABLET ORAL DAILY
Qty: 30 TABLET | Refills: 0 | Status: SHIPPED | OUTPATIENT
Start: 2018-07-06 | End: 2018-09-04 | Stop reason: SDUPTHER

## 2018-07-06 RX ORDER — DEXTROAMPHETAMINE SACCHARATE, AMPHETAMINE ASPARTATE, DEXTROAMPHETAMINE SULFATE AND AMPHETAMINE SULFATE 7.5; 7.5; 7.5; 7.5 MG/1; MG/1; MG/1; MG/1
30 TABLET ORAL DAILY
Qty: 30 TABLET | Refills: 0 | Status: SHIPPED | OUTPATIENT
Start: 2018-07-06 | End: 2018-07-06 | Stop reason: SDUPTHER

## 2018-07-06 NOTE — PATIENT INSTRUCTIONS
This is an extremely nice 42-year-old who is here for follow-up for his blood pressure and also for traumatic brain injury.  I have given him a refill on his prescription but would like him to call if there is any problem.    Additionally he is suffering with hayfever symptoms.  I would like him to try Allegra or Claritin.

## 2018-07-06 NOTE — PROGRESS NOTES
Subjective   Tye JONES Junior is a 42 y.o. male presenting with   Chief Complaint   Patient presents with   • Follow-up     medication follow up   • Cough     congestion, sneezing        This is a 42-year-old gentleman who is here for follow-up for high blood pressure and high cholesterol and traumatic brain injury.  He appears to be doing quite well but has been complaining about a lot of sneezing and head congestion.  He is not running any fever.  He does not have any chest pain or cough or shortness of breath.      Cough   Associated symptoms include postnasal drip and rhinorrhea.        The following portions of the patient's history were reviewed and updated as appropriate: current medications, past family history, past medical history, past social history, past surgical history and problem list.    Review of Systems   HENT: Positive for congestion, postnasal drip and rhinorrhea.    Respiratory: Negative for cough.    All other systems reviewed and are negative.      Objective   Physical Exam   Constitutional: He is oriented to person, place, and time. He appears well-developed and well-nourished. No distress.   HENT:   Head: Normocephalic and atraumatic.   Eyes: EOM are normal. Pupils are equal, round, and reactive to light.   Neck: Normal range of motion. Neck supple. No thyromegaly present.   Cardiovascular: Normal rate and regular rhythm.    Pulmonary/Chest: Effort normal and breath sounds normal.   Musculoskeletal: Normal range of motion.   Lymphadenopathy:     He has no cervical adenopathy.   Neurological: He is alert and oriented to person, place, and time.   Skin: Skin is warm and dry. He is not diaphoretic.   Psychiatric: He has a normal mood and affect. His behavior is normal.   Nursing note and vitals reviewed.      Assessment/Plan   Tye was seen today for follow-up and cough.    Diagnoses and all orders for this visit:    Mixed hyperlipidemia  -     Comprehensive Metabolic Panel    Essential  hypertension  -     Comprehensive Metabolic Panel  -     TSH    Acute allergic rhinitis, unspecified seasonality, unspecified trigger                   I would like him to return for another visit in 3 month(s)

## 2018-07-06 NOTE — TELEPHONE ENCOUNTER
Pt has appointment this afternoon. His sister, Jada is calling to let us know some information because she is unable to come with him to his appointment today. Pt has been sneezing and coughing. He will also be gone for 2 months so will need enough refills on his medications to last until he returns.

## 2018-07-07 LAB
ALBUMIN SERPL-MCNC: 5 G/DL (ref 3.5–5.2)
ALBUMIN/GLOB SERPL: 1.7 G/DL
ALP SERPL-CCNC: 102 U/L (ref 39–117)
ALT SERPL-CCNC: 24 U/L (ref 1–41)
AST SERPL-CCNC: 17 U/L (ref 1–40)
BILIRUB SERPL-MCNC: <0.2 MG/DL (ref 0.1–1.2)
BUN SERPL-MCNC: 11 MG/DL (ref 6–20)
BUN/CREAT SERPL: 11.6 (ref 7–25)
CALCIUM SERPL-MCNC: 9.4 MG/DL (ref 8.6–10.5)
CHLORIDE SERPL-SCNC: 101 MMOL/L (ref 98–107)
CO2 SERPL-SCNC: 30.9 MMOL/L (ref 22–29)
CREAT SERPL-MCNC: 0.95 MG/DL (ref 0.76–1.27)
GLOBULIN SER CALC-MCNC: 3 GM/DL
GLUCOSE SERPL-MCNC: 88 MG/DL (ref 65–99)
POTASSIUM SERPL-SCNC: 4.6 MMOL/L (ref 3.5–5.2)
PROT SERPL-MCNC: 8 G/DL (ref 6–8.5)
SODIUM SERPL-SCNC: 139 MMOL/L (ref 136–145)
TSH SERPL DL<=0.005 MIU/L-ACNC: 1.82 MIU/ML (ref 0.27–4.2)

## 2018-09-04 ENCOUNTER — TELEPHONE (OUTPATIENT)
Dept: FAMILY MEDICINE CLINIC | Facility: CLINIC | Age: 43
End: 2018-09-04

## 2018-09-04 RX ORDER — DEXTROAMPHETAMINE SACCHARATE, AMPHETAMINE ASPARTATE, DEXTROAMPHETAMINE SULFATE AND AMPHETAMINE SULFATE 7.5; 7.5; 7.5; 7.5 MG/1; MG/1; MG/1; MG/1
30 TABLET ORAL DAILY
Qty: 30 TABLET | Refills: 0 | Status: SHIPPED | OUTPATIENT
Start: 2018-09-04 | End: 2018-09-10 | Stop reason: SDUPTHER

## 2018-09-04 NOTE — TELEPHONE ENCOUNTER
Patient is calling to have a prescription for adderall faxed to pharmacy.  Last visit was 7/6/2018  azar up to date     Now uses [facility] at [address]- I have changed it in his chart

## 2018-09-10 RX ORDER — DEXTROAMPHETAMINE SACCHARATE, AMPHETAMINE ASPARTATE, DEXTROAMPHETAMINE SULFATE AND AMPHETAMINE SULFATE 7.5; 7.5; 7.5; 7.5 MG/1; MG/1; MG/1; MG/1
30 TABLET ORAL DAILY
Qty: 30 TABLET | Refills: 0 | Status: SHIPPED | OUTPATIENT
Start: 2018-09-10 | End: 2018-11-29 | Stop reason: SDUPTHER

## 2018-09-12 RX ORDER — DEXTROAMPHETAMINE SACCHARATE, AMPHETAMINE ASPARTATE, DEXTROAMPHETAMINE SULFATE AND AMPHETAMINE SULFATE 7.5; 7.5; 7.5; 7.5 MG/1; MG/1; MG/1; MG/1
30 TABLET ORAL DAILY
Qty: 30 TABLET | Refills: 0 | OUTPATIENT
Start: 2018-11-02

## 2018-10-11 ENCOUNTER — OFFICE VISIT (OUTPATIENT)
Dept: FAMILY MEDICINE CLINIC | Facility: CLINIC | Age: 43
End: 2018-10-11

## 2018-10-11 VITALS
WEIGHT: 186.4 LBS | DIASTOLIC BLOOD PRESSURE: 80 MMHG | SYSTOLIC BLOOD PRESSURE: 120 MMHG | HEART RATE: 75 BPM | HEIGHT: 72 IN | BODY MASS INDEX: 25.25 KG/M2 | TEMPERATURE: 98.5 F | OXYGEN SATURATION: 99 %

## 2018-10-11 DIAGNOSIS — Z23 NEED FOR IMMUNIZATION AGAINST INFLUENZA: ICD-10-CM

## 2018-10-11 DIAGNOSIS — Z79.899 HIGH RISK MEDICATION USE: ICD-10-CM

## 2018-10-11 DIAGNOSIS — S06.9X9S TRAUMATIC BRAIN INJURY WITH LOSS OF CONSCIOUSNESS, SEQUELA (HCC): Primary | ICD-10-CM

## 2018-10-11 DIAGNOSIS — E78.2 MIXED HYPERLIPIDEMIA: ICD-10-CM

## 2018-10-11 DIAGNOSIS — I10 ESSENTIAL HYPERTENSION: ICD-10-CM

## 2018-10-11 PROCEDURE — 90674 CCIIV4 VAC NO PRSV 0.5 ML IM: CPT | Performed by: NURSE PRACTITIONER

## 2018-10-11 PROCEDURE — 99213 OFFICE O/P EST LOW 20 MIN: CPT | Performed by: NURSE PRACTITIONER

## 2018-10-11 PROCEDURE — G0008 ADMIN INFLUENZA VIRUS VAC: HCPCS | Performed by: NURSE PRACTITIONER

## 2018-10-11 RX ORDER — LISINOPRIL 20 MG/1
20 TABLET ORAL DAILY
Qty: 90 TABLET | Refills: 2 | Status: SHIPPED | OUTPATIENT
Start: 2018-10-11 | End: 2019-03-25 | Stop reason: SDUPTHER

## 2018-10-11 RX ORDER — LOVASTATIN 20 MG/1
20 TABLET ORAL
Qty: 90 TABLET | Refills: 2 | Status: SHIPPED | OUTPATIENT
Start: 2018-10-11 | End: 2019-02-15 | Stop reason: SDUPTHER

## 2018-10-11 RX ORDER — TOPIRAMATE 25 MG/1
25 TABLET ORAL DAILY
Qty: 90 TABLET | Refills: 2 | Status: SHIPPED | OUTPATIENT
Start: 2018-10-11 | End: 2019-06-27 | Stop reason: SDUPTHER

## 2018-10-11 RX ORDER — PHENYTOIN SODIUM 100 MG/1
100 CAPSULE, EXTENDED RELEASE ORAL 2 TIMES DAILY
Qty: 180 CAPSULE | Refills: 2 | Status: SHIPPED | OUTPATIENT
Start: 2018-10-11 | End: 2019-03-25 | Stop reason: SDUPTHER
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
## 2018-10-16 LAB
ALBUMIN SERPL-MCNC: 4.8 G/DL (ref 3.5–5.2)
ALBUMIN/GLOB SERPL: 1.5 G/DL
ALP SERPL-CCNC: 80 U/L (ref 39–117)
ALT SERPL-CCNC: 22 U/L (ref 1–41)
AST SERPL-CCNC: 15 U/L (ref 1–40)
BASOPHILS # BLD AUTO: 0.01 10*3/MM3 (ref 0–0.2)
BASOPHILS NFR BLD AUTO: 0.2 % (ref 0–1.5)
BILIRUB SERPL-MCNC: 0.2 MG/DL (ref 0.1–1.2)
BUN SERPL-MCNC: 14 MG/DL (ref 6–20)
BUN/CREAT SERPL: 13.6 (ref 7–25)
CALCIUM SERPL-MCNC: 10.1 MG/DL (ref 8.6–10.5)
CHLORIDE SERPL-SCNC: 103 MMOL/L (ref 98–107)
CO2 SERPL-SCNC: 30.1 MMOL/L (ref 22–29)
CREAT SERPL-MCNC: 1.03 MG/DL (ref 0.76–1.27)
EOSINOPHIL # BLD AUTO: 0.11 10*3/MM3 (ref 0–0.7)
EOSINOPHIL NFR BLD AUTO: 1.8 % (ref 0.3–6.2)
ERYTHROCYTE [DISTWIDTH] IN BLOOD BY AUTOMATED COUNT: 13.2 % (ref 11.5–14.5)
GLOBULIN SER CALC-MCNC: 3.1 GM/DL
GLUCOSE SERPL-MCNC: 99 MG/DL (ref 65–99)
HCT VFR BLD AUTO: 41.8 % (ref 40.4–52.2)
HGB BLD-MCNC: 14.2 G/DL (ref 13.7–17.6)
IMM GRANULOCYTES # BLD: 0.01 10*3/MM3 (ref 0–0.03)
IMM GRANULOCYTES NFR BLD: 0.2 % (ref 0–0.5)
LYMPHOCYTES # BLD AUTO: 2.26 10*3/MM3 (ref 0.9–4.8)
LYMPHOCYTES NFR BLD AUTO: 37.2 % (ref 19.6–45.3)
MCH RBC QN AUTO: 29.7 PG (ref 27–32.7)
MCHC RBC AUTO-ENTMCNC: 34 G/DL (ref 32.6–36.4)
MCV RBC AUTO: 87.4 FL (ref 79.8–96.2)
MONOCYTES # BLD AUTO: 0.48 10*3/MM3 (ref 0.2–1.2)
MONOCYTES NFR BLD AUTO: 7.9 % (ref 5–12)
NEUTROPHILS # BLD AUTO: 3.21 10*3/MM3 (ref 1.9–8.1)
NEUTROPHILS NFR BLD AUTO: 52.9 % (ref 42.7–76)
PHENYTOIN FREE SERPL-MCNC: 1 UG/ML (ref 1–2)
PLATELET # BLD AUTO: 208 10*3/MM3 (ref 140–500)
POTASSIUM SERPL-SCNC: 5 MMOL/L (ref 3.5–5.2)
PROT SERPL-MCNC: 7.9 G/DL (ref 6–8.5)
RBC # BLD AUTO: 4.78 10*6/MM3 (ref 4.6–6)
SODIUM SERPL-SCNC: 141 MMOL/L (ref 136–145)
WBC # BLD AUTO: 6.07 10*3/MM3 (ref 4.5–10.7)

## 2018-11-29 ENCOUNTER — OFFICE VISIT (OUTPATIENT)
Dept: FAMILY MEDICINE CLINIC | Facility: CLINIC | Age: 43
End: 2018-11-29

## 2018-11-29 VITALS
HEART RATE: 89 BPM | OXYGEN SATURATION: 98 % | BODY MASS INDEX: 25.87 KG/M2 | DIASTOLIC BLOOD PRESSURE: 76 MMHG | HEIGHT: 72 IN | TEMPERATURE: 99.6 F | WEIGHT: 191 LBS | SYSTOLIC BLOOD PRESSURE: 112 MMHG | RESPIRATION RATE: 14 BRPM

## 2018-11-29 DIAGNOSIS — Z87.820 HISTORY OF TRAUMATIC BRAIN INJURY: ICD-10-CM

## 2018-11-29 DIAGNOSIS — Z98.2 S/P VENTRICULOPERITONEAL SHUNT: ICD-10-CM

## 2018-11-29 DIAGNOSIS — R51.9 NONINTRACTABLE HEADACHE, UNSPECIFIED CHRONICITY PATTERN, UNSPECIFIED HEADACHE TYPE: Primary | ICD-10-CM

## 2018-11-29 PROCEDURE — 99214 OFFICE O/P EST MOD 30 MIN: CPT | Performed by: FAMILY MEDICINE

## 2018-11-29 RX ORDER — DEXTROAMPHETAMINE SACCHARATE, AMPHETAMINE ASPARTATE, DEXTROAMPHETAMINE SULFATE AND AMPHETAMINE SULFATE 7.5; 7.5; 7.5; 7.5 MG/1; MG/1; MG/1; MG/1
30 TABLET ORAL DAILY
Qty: 30 TABLET | Refills: 0 | Status: SHIPPED | OUTPATIENT
Start: 2018-11-29 | End: 2018-12-21 | Stop reason: SDUPTHER

## 2018-11-29 NOTE — PATIENT INSTRUCTIONS
We will contact you about getting the CT to check on your shunt    Adderall prescription sent to pharmacy    See Dr Martínez in 3 months    See your pharmacist about the second hepatitis a shot

## 2018-11-29 NOTE — PROGRESS NOTES
Subjective   Tye JONES Junior is a 43 y.o. male.     Mr. Christine has been having problems with increasing frequency of headaches for the last few months.  He in the past has had 2 or 3 headaches a month he's getting 2 or 3 headaches weekly now.  He describes them as stabbing frontal pain.  He does have a  shunt after his TBI.  Adderall has been very useful with him and he has no problems with it.  Dr. Martínez has managed his medications.  He no longer follows with a neurologist.  Had his flu shot.  Has had his first hepatitis A vaccine.         The following portions of the patient's history were reviewed and updated as appropriate: allergies, current medications, past family history, past medical history, past social history, past surgical history and problem list.    Review of Systems   Constitutional: Negative for chills and fever.   HENT: Negative for congestion, rhinorrhea and sore throat.    Eyes: Negative for discharge and visual disturbance.   Respiratory: Negative for cough and shortness of breath.    Cardiovascular: Negative for chest pain.   Gastrointestinal: Negative for abdominal pain, constipation, diarrhea, nausea and vomiting.   Endocrine: Negative for polydipsia.   Genitourinary: Negative for dysuria and hematuria.   Musculoskeletal: Negative for arthralgias and myalgias.   Skin: Negative for rash.   Allergic/Immunologic: Negative.    Neurological: Positive for headaches. Negative for weakness and numbness.   Hematological: Does not bruise/bleed easily.   Psychiatric/Behavioral: Negative for dysphoric mood. The patient is not nervous/anxious.    All other systems reviewed and are negative.      Objective   Physical Exam   Constitutional: He is oriented to person, place, and time. He appears well-developed and well-nourished.   HENT:   Head: Normocephalic and atraumatic.   Right Ear: External ear normal.   Left Ear: External ear normal.   Mouth/Throat: No oropharyngeal exudate.   Eyes: Conjunctivae  and lids are normal. Pupils are equal, round, and reactive to light. Right eye exhibits no discharge. Left eye exhibits no discharge. No scleral icterus. Right eye exhibits nystagmus. Left eye exhibits nystagmus.   He is blind.  He has spontaneous nystagmus.   Neck: Trachea normal, normal range of motion and phonation normal. Neck supple. No thyromegaly present.   Palpable shunt right neck   Cardiovascular: Normal rate, regular rhythm and normal heart sounds. Exam reveals no gallop and no friction rub.   No murmur heard.  Pulmonary/Chest: Effort normal and breath sounds normal. No stridor. He has no wheezes. He has no rales.   Abdominal: Soft. He exhibits no distension. There is no tenderness.   Musculoskeletal: Normal range of motion. He exhibits no edema.   Lymphadenopathy:     He has no cervical adenopathy.   Neurological: He is alert and oriented to person, place, and time. He has normal strength. No cranial nerve deficit.   Skin: Skin is warm, dry and intact. No petechiae and no rash noted. No cyanosis. Nails show no clubbing.   Psychiatric: He has a normal mood and affect. His speech is normal and behavior is normal. Judgment and thought content normal. Cognition and memory are normal.   Nursing note and vitals reviewed.    Think first order of business is to make sure that he doesn't have shunt malfunction.  Will order CT  ELMER reviewed; ok; Adderall refilled    Assessment/Plan   Tye was seen today for headache and med refill.    Diagnoses and all orders for this visit:    Nonintractable headache, unspecified chronicity pattern, unspecified headache type  -     CT Head Without Contrast; Future    S/P ventriculoperitoneal shunt  -     CT Head Without Contrast; Future    History of traumatic brain injury  -     amphetamine-dextroamphetamine (ADDERALL) 30 MG tablet; Take 1 tablet by mouth Daily. Do not fill until 10/4/18      Patient Instructions   We will contact you about getting the CT to check on your  shunt    Adderall prescription sent to pharmacy    See Dr Martínez in 3 months    See your pharmacist about the second hepatitis a shot          EMR Dragon/Transcription disclaimer:   Much of this encounter note is an electronic transcription/translation of spoken language to printed text. The electronic translation of spoken language may permit erroneous, or at times, nonsensical words or phrases to be inadvertently transcribed; Although I have reviewed the note for such errors, some may still exist. Please contact me with any questions or concerns about the conduct of this encounter note.

## 2018-12-21 ENCOUNTER — TELEPHONE (OUTPATIENT)
Dept: FAMILY MEDICINE CLINIC | Facility: CLINIC | Age: 43
End: 2018-12-21

## 2018-12-21 DIAGNOSIS — Z87.820 HISTORY OF TRAUMATIC BRAIN INJURY: ICD-10-CM

## 2018-12-21 RX ORDER — DEXTROAMPHETAMINE SACCHARATE, AMPHETAMINE ASPARTATE, DEXTROAMPHETAMINE SULFATE AND AMPHETAMINE SULFATE 7.5; 7.5; 7.5; 7.5 MG/1; MG/1; MG/1; MG/1
30 TABLET ORAL DAILY
Qty: 30 TABLET | Refills: 0 | Status: SHIPPED | OUTPATIENT
Start: 2018-12-21 | End: 2019-01-18 | Stop reason: SDUPTHER

## 2018-12-27 ENCOUNTER — HOSPITAL ENCOUNTER (OUTPATIENT)
Dept: CT IMAGING | Facility: HOSPITAL | Age: 43
Discharge: HOME | End: 2018-12-27
Admitting: FAMILY MEDICINE

## 2018-12-27 DIAGNOSIS — Z98.2 S/P VENTRICULOPERITONEAL SHUNT: ICD-10-CM

## 2018-12-27 DIAGNOSIS — R51.9 NONINTRACTABLE HEADACHE, UNSPECIFIED CHRONICITY PATTERN, UNSPECIFIED HEADACHE TYPE: ICD-10-CM

## 2018-12-27 PROCEDURE — 70450 CT HEAD/BRAIN W/O DYE: CPT

## 2019-01-18 ENCOUNTER — TELEPHONE (OUTPATIENT)
Dept: FAMILY MEDICINE CLINIC | Facility: CLINIC | Age: 44
End: 2019-01-18

## 2019-01-18 DIAGNOSIS — Z87.820 HISTORY OF TRAUMATIC BRAIN INJURY: ICD-10-CM

## 2019-01-18 RX ORDER — DEXTROAMPHETAMINE SACCHARATE, AMPHETAMINE ASPARTATE, DEXTROAMPHETAMINE SULFATE AND AMPHETAMINE SULFATE 7.5; 7.5; 7.5; 7.5 MG/1; MG/1; MG/1; MG/1
30 TABLET ORAL DAILY
Qty: 30 TABLET | Refills: 0 | Status: SHIPPED | OUTPATIENT
Start: 2019-01-18 | End: 2019-02-21 | Stop reason: SDUPTHER

## 2019-02-13 ENCOUNTER — TELEPHONE (OUTPATIENT)
Dept: FAMILY MEDICINE CLINIC | Facility: CLINIC | Age: 44
End: 2019-02-13

## 2019-02-13 NOTE — TELEPHONE ENCOUNTER
Save for dr montanez. Ms. Ugalde is aware of this and that he is not returning until later in Feb    The testing doesn't say anything about ADD or ADHD so Dr King was not able to write the script.  thanks

## 2019-02-13 NOTE — TELEPHONE ENCOUNTER
Pt needs adderall    Last seen 10/18 avi askew 11/18      This is tarik's pt.    His testing is on media the very last one 10/16/2007    I am to call sister altaf garza when ready to  [phone]

## 2019-02-13 NOTE — TELEPHONE ENCOUNTER
I have reviewed neuropsych testing and don't see where ADD or ADHD was diagnosed.  Therefore I can't refill this today.

## 2019-02-15 RX ORDER — LOVASTATIN 20 MG/1
TABLET ORAL
Qty: 90 TABLET | Refills: 0 | Status: SHIPPED | OUTPATIENT
Start: 2019-02-15 | End: 2019-03-25 | Stop reason: SDUPTHER

## 2019-02-19 ENCOUNTER — TELEPHONE (OUTPATIENT)
Dept: FAMILY MEDICINE CLINIC | Facility: CLINIC | Age: 44
End: 2019-02-19

## 2019-02-19 NOTE — TELEPHONE ENCOUNTER
Hold for tarik garza [phone]   Requesting refill for adderall.    The testing does not say has add so stephenie said it would need to wait for you to prescribe

## 2019-02-21 DIAGNOSIS — Z87.820 HISTORY OF TRAUMATIC BRAIN INJURY: ICD-10-CM

## 2019-02-21 RX ORDER — DEXTROAMPHETAMINE SACCHARATE, AMPHETAMINE ASPARTATE, DEXTROAMPHETAMINE SULFATE AND AMPHETAMINE SULFATE 7.5; 7.5; 7.5; 7.5 MG/1; MG/1; MG/1; MG/1
30 TABLET ORAL DAILY
Qty: 30 TABLET | Refills: 0 | Status: SHIPPED | OUTPATIENT
Start: 2019-02-21 | End: 2019-03-13 | Stop reason: SDUPTHER

## 2019-02-25 ENCOUNTER — OFFICE VISIT (OUTPATIENT)
Dept: FAMILY MEDICINE CLINIC | Facility: CLINIC | Age: 44
End: 2019-02-25

## 2019-02-25 VITALS
HEIGHT: 72 IN | OXYGEN SATURATION: 99 % | RESPIRATION RATE: 15 BRPM | SYSTOLIC BLOOD PRESSURE: 104 MMHG | BODY MASS INDEX: 26.14 KG/M2 | DIASTOLIC BLOOD PRESSURE: 72 MMHG | TEMPERATURE: 97.6 F | HEART RATE: 73 BPM | WEIGHT: 193 LBS

## 2019-02-25 DIAGNOSIS — S06.9X9S TRAUMATIC BRAIN INJURY WITH LOSS OF CONSCIOUSNESS, SEQUELA (HCC): Primary | ICD-10-CM

## 2019-02-25 DIAGNOSIS — I10 ESSENTIAL HYPERTENSION: ICD-10-CM

## 2019-02-25 DIAGNOSIS — E78.2 MIXED HYPERLIPIDEMIA: ICD-10-CM

## 2019-02-25 PROCEDURE — 99213 OFFICE O/P EST LOW 20 MIN: CPT | Performed by: FAMILY MEDICINE

## 2019-02-25 NOTE — PROGRESS NOTES
Subjective   Tye JONES Junior is a 43 y.o. male presenting with   Chief Complaint   Patient presents with   • Med Management        43-year-old single nonsmoker comes in today for follow-up for his blood pressure and cholesterol and seizure disorder and traumatic brain injury.  His blood pressure is very well controlled and he has no stated side effects from his medication.  His sister Jada is here with him.  She says that he appears to be doing very well also.    When he came to me several years ago he was already on Adderall for his traumatic brain injury with very poor focus.  This had been started by his psychiatrist that he formally saw.  His sister confirms that without it he is very unfocused but he does not have a formal diagnosis of attention deficit disorder.  Since I will not be here to take care of his medicine needs in the future, I will request psychiatry so that his medication can be adequately addressed.         The following portions of the patient's history were reviewed and updated as appropriate: current medications, past family history, past medical history, past social history, past surgical history and problem list.    Review of Systems   All other systems reviewed and are negative.      Objective   Physical Exam   Constitutional: He is oriented to person, place, and time. He appears well-developed and well-nourished.   HENT:   Mouth/Throat: Oropharynx is clear and moist.   Neck: Normal range of motion. Neck supple.   Cardiovascular: Normal rate, regular rhythm, normal heart sounds and intact distal pulses.   Pulmonary/Chest: Effort normal and breath sounds normal.   Musculoskeletal: Normal range of motion.   Neurological: He is alert and oriented to person, place, and time. Cranial nerve deficit: blind.   Skin: Skin is warm and dry.   Psychiatric: He has a normal mood and affect. His behavior is normal.   Nursing note and vitals reviewed.      Assessment/Plan   Tye was seen today for  med management.    Diagnoses and all orders for this visit:    Traumatic brain injury with loss of consciousness, sequela (CMS/HCC)  -     Ambulatory Referral to Psychiatry    Mixed hyperlipidemia  -     Comprehensive Metabolic Panel    Essential hypertension  -     Comprehensive Metabolic Panel  -     TSH                   I would like him to return for another visit in 6 month(s)

## 2019-02-25 NOTE — PATIENT INSTRUCTIONS
This is an extremely nice 43-year-old who is here for follow-up.  I will request blood work and notify him when the results are available.

## 2019-02-26 LAB
ALBUMIN SERPL-MCNC: 4.5 G/DL (ref 3.5–5.2)
ALBUMIN/GLOB SERPL: 1.7 G/DL
ALP SERPL-CCNC: 73 U/L (ref 39–117)
ALT SERPL-CCNC: 26 U/L (ref 1–41)
AST SERPL-CCNC: 16 U/L (ref 1–40)
BILIRUB SERPL-MCNC: 0.2 MG/DL (ref 0.1–1.2)
BUN SERPL-MCNC: 11 MG/DL (ref 6–20)
BUN/CREAT SERPL: 12.5 (ref 7–25)
CALCIUM SERPL-MCNC: 9.7 MG/DL (ref 8.6–10.5)
CHLORIDE SERPL-SCNC: 104 MMOL/L (ref 98–107)
CO2 SERPL-SCNC: 25.6 MMOL/L (ref 22–29)
CREAT SERPL-MCNC: 0.88 MG/DL (ref 0.76–1.27)
GLOBULIN SER CALC-MCNC: 2.6 GM/DL
GLUCOSE SERPL-MCNC: 88 MG/DL (ref 65–99)
POTASSIUM SERPL-SCNC: 4.5 MMOL/L (ref 3.5–5.2)
PROT SERPL-MCNC: 7.1 G/DL (ref 6–8.5)
SODIUM SERPL-SCNC: 141 MMOL/L (ref 136–145)
TSH SERPL DL<=0.005 MIU/L-ACNC: 0.99 MIU/ML (ref 0.27–4.2)

## 2019-03-11 ENCOUNTER — TELEPHONE (OUTPATIENT)
Dept: FAMILY MEDICINE CLINIC | Facility: CLINIC | Age: 44
End: 2019-03-11

## 2019-03-13 DIAGNOSIS — Z87.820 HISTORY OF TRAUMATIC BRAIN INJURY: ICD-10-CM

## 2019-03-13 RX ORDER — DEXTROAMPHETAMINE SACCHARATE, AMPHETAMINE ASPARTATE, DEXTROAMPHETAMINE SULFATE AND AMPHETAMINE SULFATE 7.5; 7.5; 7.5; 7.5 MG/1; MG/1; MG/1; MG/1
30 TABLET ORAL DAILY
Qty: 30 TABLET | Refills: 0 | Status: SHIPPED | OUTPATIENT
Start: 2019-03-13 | End: 2019-05-23 | Stop reason: SDUPTHER

## 2019-03-13 NOTE — TELEPHONE ENCOUNTER
Jada ( sister) Would like you to refill adderall rx . States pt is leaving on the 29th of this month for two months and would like to have consults before he leaves so there will be no problem when rx needs refills. Pt sister would like to be informed who he will referred to . ( Not in the [facility] area )

## 2019-03-25 ENCOUNTER — TELEPHONE (OUTPATIENT)
Dept: FAMILY MEDICINE CLINIC | Facility: CLINIC | Age: 44
End: 2019-03-25

## 2019-03-25 DIAGNOSIS — Z87.820 HISTORY OF TRAUMATIC BRAIN INJURY: ICD-10-CM

## 2019-03-25 RX ORDER — DEXTROAMPHETAMINE SACCHARATE, AMPHETAMINE ASPARTATE, DEXTROAMPHETAMINE SULFATE AND AMPHETAMINE SULFATE 7.5; 7.5; 7.5; 7.5 MG/1; MG/1; MG/1; MG/1
30 TABLET ORAL DAILY
Qty: 30 TABLET | Refills: 0 | Status: CANCELLED | OUTPATIENT
Start: 2019-03-25

## 2019-03-25 RX ORDER — LISINOPRIL 20 MG/1
20 TABLET ORAL DAILY
Qty: 90 TABLET | Refills: 0 | Status: SHIPPED | OUTPATIENT
Start: 2019-03-25 | End: 2019-03-25 | Stop reason: SDUPTHER

## 2019-03-25 RX ORDER — LOVASTATIN 20 MG/1
20 TABLET ORAL EVERY EVENING
Qty: 90 TABLET | Refills: 0 | Status: SHIPPED | OUTPATIENT
Start: 2019-03-25 | End: 2019-10-17 | Stop reason: SDUPTHER

## 2019-03-25 RX ORDER — LISINOPRIL 20 MG/1
20 TABLET ORAL DAILY
Qty: 90 TABLET | Refills: 1 | Status: SHIPPED | OUTPATIENT
Start: 2019-03-25 | End: 2020-01-24 | Stop reason: HOSPADM

## 2019-03-25 RX ORDER — PHENYTOIN SODIUM 100 MG/1
100 CAPSULE, EXTENDED RELEASE ORAL 2 TIMES DAILY
Qty: 180 CAPSULE | Refills: 0 | Status: SHIPPED | OUTPATIENT
Start: 2019-03-25 | End: 2019-09-29 | Stop reason: SDUPTHER

## 2019-04-29 ENCOUNTER — TELEPHONE (OUTPATIENT)
Dept: FAMILY MEDICINE CLINIC | Facility: CLINIC | Age: 44
End: 2019-04-29

## 2019-04-29 NOTE — TELEPHONE ENCOUNTER
I understand. I cannot write adderall, [place] law allows me to write 3 days. I recommend establishing care with Dr. Laurent.

## 2019-04-29 NOTE — TELEPHONE ENCOUNTER
Dr montanez pt last seen 2/19 azar 2/19  Sister is calling needs adderall refilled.  He is coming to see you in June they are trying to get in with psychic but will take a little while.    Will you prescribe?

## 2019-04-29 NOTE — TELEPHONE ENCOUNTER
I cannot fill adderall and I am not having Dr. Laurent fill any more of my patients except the patients that are established already. I would recommend patient establish with Dr. Laurent, or psychiatry. Dr. Laurent may be agreeable to fill this in the interim.

## 2019-04-29 NOTE — TELEPHONE ENCOUNTER
anny Edge is working with altaf to get appt also altaf will call and try and get in with Anastasiia. This is not for ADD meds its for brain trauma

## 2019-05-20 ENCOUNTER — OFFICE VISIT (OUTPATIENT)
Dept: FAMILY MEDICINE CLINIC | Facility: CLINIC | Age: 44
End: 2019-05-20

## 2019-05-20 VITALS
TEMPERATURE: 98.8 F | HEART RATE: 87 BPM | BODY MASS INDEX: 25.33 KG/M2 | HEIGHT: 72 IN | RESPIRATION RATE: 19 BRPM | OXYGEN SATURATION: 99 % | DIASTOLIC BLOOD PRESSURE: 88 MMHG | SYSTOLIC BLOOD PRESSURE: 110 MMHG | WEIGHT: 187 LBS

## 2019-05-20 DIAGNOSIS — Z12.11 SCREENING FOR COLON CANCER: Primary | ICD-10-CM

## 2019-05-20 DIAGNOSIS — Z80.42 FAMILY HISTORY OF PROSTATE CANCER IN FATHER: ICD-10-CM

## 2019-05-20 DIAGNOSIS — R56.9 SEIZURE (HCC): ICD-10-CM

## 2019-05-20 DIAGNOSIS — E78.2 MIXED HYPERLIPIDEMIA: ICD-10-CM

## 2019-05-20 DIAGNOSIS — Z12.5 SCREENING FOR PROSTATE CANCER: ICD-10-CM

## 2019-05-20 DIAGNOSIS — I10 ESSENTIAL HYPERTENSION: ICD-10-CM

## 2019-05-20 DIAGNOSIS — S06.9X9S TRAUMATIC BRAIN INJURY WITH LOSS OF CONSCIOUSNESS, SEQUELA (HCC): ICD-10-CM

## 2019-05-20 DIAGNOSIS — Z79.899 MEDICATION MANAGEMENT: ICD-10-CM

## 2019-05-20 PROCEDURE — 99214 OFFICE O/P EST MOD 30 MIN: CPT | Performed by: FAMILY MEDICINE

## 2019-05-20 RX ORDER — ASPIRIN 81 MG/1
81 TABLET, CHEWABLE ORAL DAILY
COMMUNITY

## 2019-05-20 RX ORDER — VITAMIN E 268 MG
400 CAPSULE ORAL DAILY
COMMUNITY
End: 2019-09-25
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
## 2019-05-23 ENCOUNTER — TELEPHONE (OUTPATIENT)
Dept: FAMILY MEDICINE CLINIC | Facility: CLINIC | Age: 44
End: 2019-05-23

## 2019-05-23 DIAGNOSIS — Z87.820 HISTORY OF TRAUMATIC BRAIN INJURY: ICD-10-CM

## 2019-05-23 RX ORDER — DEXTROAMPHETAMINE SACCHARATE, AMPHETAMINE ASPARTATE, DEXTROAMPHETAMINE SULFATE AND AMPHETAMINE SULFATE 7.5; 7.5; 7.5; 7.5 MG/1; MG/1; MG/1; MG/1
30 TABLET ORAL DAILY
Qty: 30 TABLET | Refills: 0 | Status: SHIPPED | OUTPATIENT
Start: 2019-05-23 | End: 2019-06-24 | Stop reason: SDUPTHER

## 2019-05-23 NOTE — TELEPHONE ENCOUNTER
Pt sister called office today r/t his Adderall not being at the pharmacy. According to chart, the order printed and not e sent to pharm. Please resend this medication to the [facility] pharmacy.

## 2019-06-24 DIAGNOSIS — Z87.820 HISTORY OF TRAUMATIC BRAIN INJURY: ICD-10-CM

## 2019-06-24 RX ORDER — DEXTROAMPHETAMINE SACCHARATE, AMPHETAMINE ASPARTATE, DEXTROAMPHETAMINE SULFATE AND AMPHETAMINE SULFATE 7.5; 7.5; 7.5; 7.5 MG/1; MG/1; MG/1; MG/1
30 TABLET ORAL DAILY
Qty: 30 TABLET | Refills: 0 | Status: SHIPPED | OUTPATIENT
Start: 2019-06-24 | End: 2019-07-25 | Stop reason: SDUPTHER

## 2019-06-27 RX ORDER — TOPIRAMATE 25 MG/1
TABLET ORAL
Qty: 90 TABLET | Refills: 1 | Status: SHIPPED | OUTPATIENT
Start: 2019-06-27 | End: 2019-09-29 | Stop reason: SDUPTHER

## 2019-07-01 DIAGNOSIS — I10 ESSENTIAL HYPERTENSION: ICD-10-CM

## 2019-07-01 DIAGNOSIS — R56.9 SEIZURE (HCC): ICD-10-CM

## 2019-07-01 DIAGNOSIS — E78.2 MIXED HYPERLIPIDEMIA: ICD-10-CM

## 2019-07-01 DIAGNOSIS — Z79.899 MEDICATION MANAGEMENT: ICD-10-CM

## 2019-07-02 LAB
ALBUMIN SERPL-MCNC: 4.9 G/DL (ref 3.5–5.2)
ALBUMIN/GLOB SERPL: 2 G/DL
ALP SERPL-CCNC: 76 U/L (ref 39–117)
ALT SERPL-CCNC: 24 U/L (ref 1–41)
AST SERPL-CCNC: 17 U/L (ref 1–40)
BASOPHILS # BLD AUTO: 0.01 10*3/MM3 (ref 0–0.2)
BASOPHILS NFR BLD AUTO: 0.2 % (ref 0–1.5)
BILIRUB SERPL-MCNC: 0.3 MG/DL (ref 0.2–1.2)
BUN SERPL-MCNC: 13 MG/DL (ref 6–20)
BUN/CREAT SERPL: 13.3 (ref 7–25)
CALCIUM SERPL-MCNC: 9.2 MG/DL (ref 8.6–10.5)
CHLORIDE SERPL-SCNC: 102 MMOL/L (ref 98–107)
CHOLEST SERPL-MCNC: 171 MG/DL (ref 0–200)
CO2 SERPL-SCNC: 28.3 MMOL/L (ref 22–29)
CREAT SERPL-MCNC: 0.98 MG/DL (ref 0.76–1.27)
EOSINOPHIL # BLD AUTO: 0.06 10*3/MM3 (ref 0–0.4)
EOSINOPHIL NFR BLD AUTO: 1.5 % (ref 0.3–6.2)
ERYTHROCYTE [DISTWIDTH] IN BLOOD BY AUTOMATED COUNT: 13.3 % (ref 12.3–15.4)
GLOBULIN SER CALC-MCNC: 2.5 GM/DL
GLUCOSE SERPL-MCNC: 87 MG/DL (ref 65–99)
HCT VFR BLD AUTO: 46 % (ref 37.5–51)
HDLC SERPL-MCNC: 67 MG/DL (ref 40–60)
HGB BLD-MCNC: 15 G/DL (ref 13–17.7)
IMM GRANULOCYTES # BLD AUTO: 0.01 10*3/MM3 (ref 0–0.05)
IMM GRANULOCYTES NFR BLD AUTO: 0.2 % (ref 0–0.5)
LDLC SERPL CALC-MCNC: 89 MG/DL (ref 0–100)
LYMPHOCYTES # BLD AUTO: 1.59 10*3/MM3 (ref 0.7–3.1)
LYMPHOCYTES NFR BLD AUTO: 38.6 % (ref 19.6–45.3)
MCH RBC QN AUTO: 29.2 PG (ref 26.6–33)
MCHC RBC AUTO-ENTMCNC: 32.6 G/DL (ref 31.5–35.7)
MCV RBC AUTO: 89.5 FL (ref 79–97)
MONOCYTES # BLD AUTO: 0.51 10*3/MM3 (ref 0.1–0.9)
MONOCYTES NFR BLD AUTO: 12.4 % (ref 5–12)
NEUTROPHILS # BLD AUTO: 1.94 10*3/MM3 (ref 1.7–7)
NEUTROPHILS NFR BLD AUTO: 47.1 % (ref 42.7–76)
NRBC BLD AUTO-RTO: 0 /100 WBC (ref 0–0.2)
PHENYTOIN SERPL-MCNC: 13.8 MCG/ML (ref 10–20)
PLATELET # BLD AUTO: 207 10*3/MM3 (ref 140–450)
POTASSIUM SERPL-SCNC: 4.3 MMOL/L (ref 3.5–5.2)
PROT SERPL-MCNC: 7.4 G/DL (ref 6–8.5)
RBC # BLD AUTO: 5.14 10*6/MM3 (ref 4.14–5.8)
SODIUM SERPL-SCNC: 143 MMOL/L (ref 136–145)
TRIGL SERPL-MCNC: 75 MG/DL (ref 0–150)
VLDLC SERPL CALC-MCNC: 15 MG/DL
WBC # BLD AUTO: 4.12 10*3/MM3 (ref 3.4–10.8)

## 2019-07-25 ENCOUNTER — PREP FOR SURGERY (OUTPATIENT)
Dept: OTHER | Facility: HOSPITAL | Age: 44
End: 2019-07-25

## 2019-07-25 DIAGNOSIS — Z12.11 SCREEN FOR COLON CANCER: Primary | ICD-10-CM

## 2019-07-25 DIAGNOSIS — Z87.820 HISTORY OF TRAUMATIC BRAIN INJURY: ICD-10-CM

## 2019-07-25 RX ORDER — DEXTROAMPHETAMINE SACCHARATE, AMPHETAMINE ASPARTATE, DEXTROAMPHETAMINE SULFATE AND AMPHETAMINE SULFATE 7.5; 7.5; 7.5; 7.5 MG/1; MG/1; MG/1; MG/1
30 TABLET ORAL DAILY
Qty: 30 TABLET | Refills: 0 | Status: SHIPPED | OUTPATIENT
Start: 2019-07-25 | End: 2019-09-11 | Stop reason: SDUPTHER

## 2019-08-23 PROBLEM — Z12.11 SCREEN FOR COLON CANCER: Status: ACTIVE | Noted: 2019-08-23

## 2019-09-11 DIAGNOSIS — Z87.820 HISTORY OF TRAUMATIC BRAIN INJURY: ICD-10-CM

## 2019-09-11 RX ORDER — DEXTROAMPHETAMINE SACCHARATE, AMPHETAMINE ASPARTATE, DEXTROAMPHETAMINE SULFATE AND AMPHETAMINE SULFATE 7.5; 7.5; 7.5; 7.5 MG/1; MG/1; MG/1; MG/1
30 TABLET ORAL DAILY
Qty: 30 TABLET | Refills: 0 | Status: SHIPPED | OUTPATIENT
Start: 2019-09-11 | End: 2019-10-04 | Stop reason: SDUPTHER

## 2019-09-20 ENCOUNTER — TELEPHONE (OUTPATIENT)
Dept: FAMILY MEDICINE CLINIC | Facility: CLINIC | Age: 44
End: 2019-09-20

## 2019-09-20 NOTE — TELEPHONE ENCOUNTER
Pt sister would like a call to discuss when brother needs to return for labs and appt with Dr. Laurent, she wants to make sure his meds will not be disrupted due to not having labs  since July.

## 2019-09-23 NOTE — TELEPHONE ENCOUNTER
I need to see Mr. Christine before the end of November to be in line with contract. We will only need a couple of labs since he just had all his labs in July. Thanks.

## 2019-09-23 NOTE — TELEPHONE ENCOUNTER
Can you contact this patients sister and schedule him an appt before the end of November and remind her that he needs to be seen every 3 mths to stay in compliance with contract.

## 2019-09-25 RX ORDER — OMEGA-3S/DHA/EPA/FISH OIL/D3 300MG-1000
400 CAPSULE ORAL DAILY
COMMUNITY

## 2019-09-26 ENCOUNTER — ANESTHESIA EVENT (OUTPATIENT)
Dept: GASTROENTEROLOGY | Facility: HOSPITAL | Age: 44
End: 2019-09-26

## 2019-09-26 ENCOUNTER — HOSPITAL ENCOUNTER (OUTPATIENT)
Facility: HOSPITAL | Age: 44
Setting detail: HOSPITAL OUTPATIENT SURGERY
Discharge: HOME | End: 2019-09-26
Attending: SURGERY | Admitting: SURGERY

## 2019-09-26 ENCOUNTER — ANESTHESIA (OUTPATIENT)
Dept: GASTROENTEROLOGY | Facility: HOSPITAL | Age: 44
End: 2019-09-26

## 2019-09-26 VITALS
RESPIRATION RATE: 16 BRPM | WEIGHT: 186.38 LBS | HEIGHT: 72 IN | SYSTOLIC BLOOD PRESSURE: 130 MMHG | DIASTOLIC BLOOD PRESSURE: 95 MMHG | TEMPERATURE: 99.2 F | HEART RATE: 69 BPM | BODY MASS INDEX: 25.24 KG/M2 | OXYGEN SATURATION: 100 %

## 2019-09-26 PROCEDURE — S0260 H&P FOR SURGERY: HCPCS | Performed by: SURGERY

## 2019-09-26 PROCEDURE — G0121 COLON CA SCRN NOT HI RSK IND: HCPCS | Performed by: SURGERY

## 2019-09-26 PROCEDURE — 25010000002 PROPOFOL 10 MG/ML EMULSION: Performed by: NURSE ANESTHETIST, CERTIFIED REGISTERED

## 2019-09-26 RX ORDER — LIDOCAINE HYDROCHLORIDE 20 MG/ML
INJECTION, SOLUTION INFILTRATION; PERINEURAL AS NEEDED
Status: DISCONTINUED | OUTPATIENT
Start: 2019-09-26 | End: 2019-09-26 | Stop reason: SURG

## 2019-09-26 RX ORDER — PROPOFOL 10 MG/ML
VIAL (ML) INTRAVENOUS AS NEEDED
Status: DISCONTINUED | OUTPATIENT
Start: 2019-09-26 | End: 2019-09-26 | Stop reason: SURG

## 2019-09-26 RX ORDER — PROPOFOL 10 MG/ML
VIAL (ML) INTRAVENOUS CONTINUOUS PRN
Status: DISCONTINUED | OUTPATIENT
Start: 2019-09-26 | End: 2019-09-26 | Stop reason: SURG

## 2019-09-26 RX ORDER — SODIUM CHLORIDE, SODIUM LACTATE, POTASSIUM CHLORIDE, CALCIUM CHLORIDE 600; 310; 30; 20 MG/100ML; MG/100ML; MG/100ML; MG/100ML
30 INJECTION, SOLUTION INTRAVENOUS CONTINUOUS PRN
Status: DISCONTINUED | OUTPATIENT
Start: 2019-09-26 | End: 2019-09-26 | Stop reason: HOSPADM

## 2019-09-26 RX ADMIN — LIDOCAINE HYDROCHLORIDE 100 MG: 20 INJECTION, SOLUTION INFILTRATION; PERINEURAL at 08:51

## 2019-09-26 RX ADMIN — SODIUM CHLORIDE, POTASSIUM CHLORIDE, SODIUM LACTATE AND CALCIUM CHLORIDE 30 ML/HR: 600; 310; 30; 20 INJECTION, SOLUTION INTRAVENOUS at 08:12

## 2019-09-26 RX ADMIN — PROPOFOL 80 MG: 10 INJECTION, EMULSION INTRAVENOUS at 08:51

## 2019-09-26 RX ADMIN — PROPOFOL 200 MCG/KG/MIN: 10 INJECTION, EMULSION INTRAVENOUS at 08:51

## 2019-09-26 NOTE — ANESTHESIA POSTPROCEDURE EVALUATION
Patient: Tye JONES Junior    Procedure Summary     Date:  09/26/19 Room / Location:   [facility]    Anesthesia Start:  0847 Anesthesia Stop:  0914    Procedure:  COLONOSCOPY TO ILEOCECAL ANASTOMOSIS (N/A ) Diagnosis:       Screen for colon cancer      (Screen for colon cancer [Z12.11])    Surgeon:  Omar Catalan MD Provider:  Janusz Almaguer MD    Anesthesia Type:  MAC ASA Status:  3          Anesthesia Type: MAC  Last vitals  BP   130/95 (09/26/19 0934)   Temp   37.3 °C (99.2 °F) (09/26/19 0751)   Pulse   69 (09/26/19 0934)   Resp   16 (09/26/19 0934)     SpO2   100 % (09/26/19 0934)     Post Anesthesia Care and Evaluation    Patient location during evaluation: bedside  Patient participation: complete - patient participated  Level of consciousness: awake and alert  Pain score: 0  Pain management: adequate  Airway patency: patent  Anesthetic complications: No anesthetic complications  PONV Status: none  Cardiovascular status: acceptable  Respiratory status: acceptable  Hydration status: acceptable  Post Neuraxial Block status: Motor and sensory function returned to baseline

## 2019-09-26 NOTE — ANESTHESIA PREPROCEDURE EVALUATION
Anesthesia Evaluation     Patient summary reviewed   NPO Solid Status: > 8 hours  NPO Liquid Status: > 8 hours           Airway   Mallampati: II  TM distance: >3 FB  Neck ROM: full  No difficulty expected  Dental - normal exam     Pulmonary     breath sounds clear to auscultation    PE comment: Sp trach  Cardiovascular   Exercise tolerance: good (4-7 METS)    Rhythm: regular  Rate: normal        Neuro/Psych  GI/Hepatic/Renal/Endo      Musculoskeletal     Abdominal    Substance History      OB/GYN          Other                        Anesthesia Plan    ASA 3     MAC     intravenous induction   Anesthetic plan, all risks, benefits, and alternatives have been provided, discussed and informed consent has been obtained with: patient.

## 2019-09-30 RX ORDER — PHENYTOIN SODIUM 100 MG/1
CAPSULE, EXTENDED RELEASE ORAL
Qty: 180 CAPSULE | Refills: 0 | Status: SHIPPED | OUTPATIENT
Start: 2019-09-30 | End: 2019-10-17 | Stop reason: SDUPTHER

## 2019-09-30 RX ORDER — TOPIRAMATE 25 MG/1
TABLET ORAL
Qty: 90 TABLET | Refills: 1 | Status: SHIPPED | OUTPATIENT
Start: 2019-09-30

## 2019-10-04 DIAGNOSIS — Z87.820 HISTORY OF TRAUMATIC BRAIN INJURY: ICD-10-CM

## 2019-10-09 RX ORDER — DEXTROAMPHETAMINE SACCHARATE, AMPHETAMINE ASPARTATE, DEXTROAMPHETAMINE SULFATE AND AMPHETAMINE SULFATE 7.5; 7.5; 7.5; 7.5 MG/1; MG/1; MG/1; MG/1
30 TABLET ORAL DAILY
Qty: 30 TABLET | Refills: 0 | Status: SHIPPED | OUTPATIENT
Start: 2019-10-09 | End: 2019-11-12 | Stop reason: SDUPTHER

## 2019-10-10 NOTE — TELEPHONE ENCOUNTER
Pt needs to be seen every 6 months per the controlled substance agreement so he needs to get scheduled for november. Thanks.

## 2019-10-17 NOTE — TELEPHONE ENCOUNTER
Have we been able to get ahold of the sister yet. Can you please reach out to the sister again. Thanks

## 2019-10-18 RX ORDER — PHENYTOIN SODIUM 100 MG/1
CAPSULE, EXTENDED RELEASE ORAL
Qty: 180 CAPSULE | Refills: 0 | Status: ON HOLD | OUTPATIENT
Start: 2019-10-18 | End: 2020-01-23 | Stop reason: SDUPTHER

## 2019-10-18 RX ORDER — LOVASTATIN 20 MG/1
TABLET ORAL
Qty: 90 TABLET | Refills: 0 | Status: SHIPPED | OUTPATIENT
Start: 2019-10-18 | End: 2020-05-11

## 2019-11-12 ENCOUNTER — TELEPHONE (OUTPATIENT)
Dept: FAMILY MEDICINE CLINIC | Facility: CLINIC | Age: 44
End: 2019-11-12

## 2019-11-12 DIAGNOSIS — Z87.820 HISTORY OF TRAUMATIC BRAIN INJURY: ICD-10-CM

## 2019-11-12 RX ORDER — DEXTROAMPHETAMINE SACCHARATE, AMPHETAMINE ASPARTATE, DEXTROAMPHETAMINE SULFATE AND AMPHETAMINE SULFATE 7.5; 7.5; 7.5; 7.5 MG/1; MG/1; MG/1; MG/1
30 TABLET ORAL DAILY
Qty: 30 TABLET | Refills: 0 | Status: SHIPPED | OUTPATIENT
Start: 2019-11-12 | End: 2019-12-20 | Stop reason: SDUPTHER

## 2019-11-12 NOTE — TELEPHONE ENCOUNTER
Pt sister calling in for refill on patient's amphetamine-dextroamphetamine (ADDERALL) 30 MG tablet.  Confirmed pharmacy: [address]. Patient advised refills are sent to the pharmacy within 24 hours, and to check with their pharmacy within 24 hrs.

## 2019-11-15 ENCOUNTER — TELEPHONE (OUTPATIENT)
Dept: FAMILY MEDICINE CLINIC | Facility: CLINIC | Age: 44
End: 2019-11-15

## 2019-11-15 NOTE — TELEPHONE ENCOUNTER
I'll just do this when they are in the office and they can take it with them. Then hopefully it will all be worded appropriately for him to get it without delay. Thanks.

## 2019-11-15 NOTE — TELEPHONE ENCOUNTER
----- Message from Antonietta Osei sent at 11/15/2019  8:38 AM EST -----  Pts sister is wanting to know if you can write a letter for the pt to get a new color identifier he uses it to pick out his clothes she says it will help him be more independent pt has an appt with you next week       Pt contact : Jada [phone]

## 2019-11-19 ENCOUNTER — OFFICE VISIT (OUTPATIENT)
Dept: FAMILY MEDICINE CLINIC | Facility: CLINIC | Age: 44
End: 2019-11-19

## 2019-11-19 VITALS
TEMPERATURE: 98.5 F | HEART RATE: 91 BPM | DIASTOLIC BLOOD PRESSURE: 76 MMHG | RESPIRATION RATE: 12 BRPM | HEIGHT: 72 IN | BODY MASS INDEX: 26.72 KG/M2 | SYSTOLIC BLOOD PRESSURE: 112 MMHG | OXYGEN SATURATION: 97 % | WEIGHT: 197.3 LBS

## 2019-11-19 DIAGNOSIS — S06.9X9S TRAUMATIC BRAIN INJURY WITH LOSS OF CONSCIOUSNESS, SEQUELA (HCC): Primary | ICD-10-CM

## 2019-11-19 PROCEDURE — 99213 OFFICE O/P EST LOW 20 MIN: CPT | Performed by: FAMILY MEDICINE

## 2019-11-19 PROCEDURE — 90674 CCIIV4 VAC NO PRSV 0.5 ML IM: CPT | Performed by: FAMILY MEDICINE

## 2019-11-19 PROCEDURE — G0008 ADMIN INFLUENZA VIRUS VAC: HCPCS | Performed by: FAMILY MEDICINE

## 2019-11-19 NOTE — PROGRESS NOTES
"Chief Complaint   Patient presents with   • Med Management     controlled med appt needs contract        Subjective   Tye JONES Junior is a 44 y.o. male.     History of Present Illness   Med management  He is doing well.   BP good. Weight is up.   Wants letter.     The following portions of the patient's history were reviewed and updated as appropriate: allergies, current medications, past medical history, past social history and problem list.    Review of Systems   Respiratory: Negative.    Cardiovascular: Negative.    Neurological: Negative.        /76 (BP Location: Left arm, Patient Position: Sitting, Cuff Size: Adult)   Pulse 91   Temp 98.5 °F (36.9 °C) (Oral)   Resp 12   Ht 182.9 cm (72\")   Wt 89.5 kg (197 lb 4.8 oz)   SpO2 97%   BMI 26.76 kg/m²       Objective   Physical Exam   Constitutional: He is oriented to person, place, and time. He appears well-nourished. No distress.   Eyes: Conjunctivae are normal. Right eye exhibits no discharge. Left eye exhibits no discharge. No scleral icterus.   Cardiovascular: Normal rate, regular rhythm, normal heart sounds and intact distal pulses. Exam reveals no gallop and no friction rub.   No murmur heard.  Pulmonary/Chest: Effort normal and breath sounds normal. No respiratory distress. He has no wheezes.   Musculoskeletal: He exhibits no edema.   Neurological: He is alert and oriented to person, place, and time.   Psychiatric: He has a normal mood and affect. His behavior is normal.   Vitals reviewed.      Assessment/Plan   Tye was seen today for med management.    Diagnoses and all orders for this visit:    Traumatic brain injury with loss of consciousness, sequela (CMS/[facility])               "

## 2019-12-18 ENCOUNTER — TELEPHONE (OUTPATIENT)
Dept: FAMILY MEDICINE CLINIC | Facility: CLINIC | Age: 44
End: 2019-12-18

## 2019-12-20 ENCOUNTER — TELEPHONE (OUTPATIENT)
Dept: FAMILY MEDICINE CLINIC | Facility: CLINIC | Age: 44
End: 2019-12-20

## 2019-12-20 DIAGNOSIS — Z87.820 HISTORY OF TRAUMATIC BRAIN INJURY: ICD-10-CM

## 2019-12-20 RX ORDER — DEXTROAMPHETAMINE SACCHARATE, AMPHETAMINE ASPARTATE, DEXTROAMPHETAMINE SULFATE AND AMPHETAMINE SULFATE 7.5; 7.5; 7.5; 7.5 MG/1; MG/1; MG/1; MG/1
30 TABLET ORAL DAILY
Qty: 30 TABLET | Refills: 0 | Status: SHIPPED | OUTPATIENT
Start: 2019-12-20 | End: 2020-02-06 | Stop reason: SDUPTHER

## 2019-12-20 NOTE — TELEPHONE ENCOUNTER
Pt needs a meds amphetamine-dextroamphetamine (ADDERALL) 30 MG tablet [60085] (Order [id number])  Sent to [address] [phone] [facility] [phone] [facility]

## 2020-01-06 ENCOUNTER — HOSPITAL ENCOUNTER (INPATIENT)
Facility: HOSPITAL | Age: 45
LOS: 18 days | Discharge: HOME HEALTH | End: 2020-01-24
Attending: PHYSICAL MEDICINE & REHABILITATION | Admitting: PHYSICAL MEDICINE & REHABILITATION

## 2020-01-06 DIAGNOSIS — Z74.09 IMPAIRED MOBILITY: Primary | ICD-10-CM

## 2020-01-06 DIAGNOSIS — R56.9 SEIZURE (HCC): ICD-10-CM

## 2020-01-06 PROBLEM — Z87.820 H/O TRAUMATIC BRAIN INJURY: Status: ACTIVE | Noted: 2020-01-06

## 2020-01-06 PROCEDURE — 25010000002 HEPARIN (PORCINE) PER 1000 UNITS: Performed by: PHYSICAL MEDICINE & REHABILITATION

## 2020-01-06 PROCEDURE — 25010000003 CEFTRIAXONE PER 250 MG: Performed by: PHYSICAL MEDICINE & REHABILITATION

## 2020-01-06 RX ORDER — OMEGA-3S/DHA/EPA/FISH OIL/D3 300MG-1000
400 CAPSULE ORAL DAILY
Status: DISCONTINUED | OUTPATIENT
Start: 2020-01-07 | End: 2020-01-24 | Stop reason: HOSPADM

## 2020-01-06 RX ORDER — HEPARIN SODIUM 5000 [USP'U]/ML
5000 INJECTION, SOLUTION INTRAVENOUS; SUBCUTANEOUS EVERY 8 HOURS SCHEDULED
Status: DISCONTINUED | OUTPATIENT
Start: 2020-01-06 | End: 2020-01-17

## 2020-01-06 RX ORDER — LACOSAMIDE 100 MG/1
200 TABLET ORAL EVERY 12 HOURS SCHEDULED
Status: DISCONTINUED | OUTPATIENT
Start: 2020-01-06 | End: 2020-01-24 | Stop reason: HOSPADM

## 2020-01-06 RX ORDER — LEVETIRACETAM 500 MG/1
2000 TABLET ORAL 2 TIMES DAILY
Status: DISCONTINUED | OUTPATIENT
Start: 2020-01-06 | End: 2020-01-06

## 2020-01-06 RX ORDER — CEFTRIAXONE SODIUM 2 G/50ML
2 INJECTION, SOLUTION INTRAVENOUS EVERY 12 HOURS
Status: COMPLETED | OUTPATIENT
Start: 2020-01-06 | End: 2020-01-15

## 2020-01-06 RX ORDER — ATORVASTATIN CALCIUM 10 MG/1
10 TABLET, FILM COATED ORAL DAILY
Status: DISCONTINUED | OUTPATIENT
Start: 2020-01-06 | End: 2020-01-24 | Stop reason: HOSPADM

## 2020-01-06 RX ORDER — METOPROLOL TARTRATE 50 MG/1
50 TABLET, FILM COATED ORAL EVERY 12 HOURS SCHEDULED
Status: DISCONTINUED | OUTPATIENT
Start: 2020-01-06 | End: 2020-01-24 | Stop reason: HOSPADM

## 2020-01-06 RX ORDER — DEXTROAMPHETAMINE SACCHARATE, AMPHETAMINE ASPARTATE MONOHYDRATE, DEXTROAMPHETAMINE SULFATE AND AMPHETAMINE SULFATE 2.5; 2.5; 2.5; 2.5 MG/1; MG/1; MG/1; MG/1
30 CAPSULE, EXTENDED RELEASE ORAL DAILY
Status: DISCONTINUED | OUTPATIENT
Start: 2020-01-07 | End: 2020-01-24 | Stop reason: HOSPADM

## 2020-01-06 RX ORDER — TOPIRAMATE 25 MG/1
25 TABLET ORAL DAILY
Status: DISCONTINUED | OUTPATIENT
Start: 2020-01-07 | End: 2020-01-24 | Stop reason: HOSPADM

## 2020-01-06 RX ORDER — POLYETHYLENE GLYCOL 3350 17 G/17G
17 POWDER, FOR SOLUTION ORAL DAILY
Status: DISCONTINUED | OUTPATIENT
Start: 2020-01-07 | End: 2020-01-24 | Stop reason: HOSPADM

## 2020-01-06 RX ORDER — ACETAMINOPHEN 325 MG/1
650 TABLET ORAL EVERY 6 HOURS PRN
Status: DISCONTINUED | OUTPATIENT
Start: 2020-01-06 | End: 2020-01-24 | Stop reason: HOSPADM

## 2020-01-06 RX ORDER — PHENYTOIN 50 MG/1
100 TABLET, CHEWABLE ORAL EVERY 8 HOURS SCHEDULED
Status: DISCONTINUED | OUTPATIENT
Start: 2020-01-06 | End: 2020-01-13 | Stop reason: SDUPTHER

## 2020-01-06 RX ORDER — CLONAZEPAM 0.5 MG/1
1 TABLET ORAL EVERY 8 HOURS SCHEDULED
Status: DISCONTINUED | OUTPATIENT
Start: 2020-01-06 | End: 2020-01-24 | Stop reason: HOSPADM

## 2020-01-06 RX ORDER — LEVETIRACETAM 100 MG/ML
2000 SOLUTION ORAL EVERY 12 HOURS SCHEDULED
Status: DISCONTINUED | OUTPATIENT
Start: 2020-01-06 | End: 2020-01-09

## 2020-01-06 RX ADMIN — LACOSAMIDE 200 MG: 100 TABLET, FILM COATED ORAL at 21:46

## 2020-01-06 RX ADMIN — CEFTRIAXONE SODIUM 2 G: 2 INJECTION, SOLUTION INTRAVENOUS at 22:29

## 2020-01-06 RX ADMIN — PHENYTOIN 100 MG: 50 TABLET, CHEWABLE ORAL at 21:46

## 2020-01-06 RX ADMIN — LEVETIRACETAM 2000 MG: 100 SOLUTION ORAL at 21:46

## 2020-01-06 RX ADMIN — CLONAZEPAM 1 MG: 0.5 TABLET ORAL at 21:46

## 2020-01-06 RX ADMIN — ATORVASTATIN CALCIUM 10 MG: 10 TABLET, FILM COATED ORAL at 21:46

## 2020-01-06 RX ADMIN — HEPARIN SODIUM 5000 UNITS: 5000 INJECTION INTRAVENOUS; SUBCUTANEOUS at 21:46

## 2020-01-06 RX ADMIN — METOPROLOL TARTRATE 50 MG: 50 TABLET, FILM COATED ORAL at 21:46

## 2020-01-07 LAB
ANION GAP SERPL CALCULATED.3IONS-SCNC: 14.5 MMOL/L (ref 5–15)
BUN BLD-MCNC: 57 MG/DL (ref 6–20)
BUN/CREAT SERPL: 26 (ref 7–25)
CALCIUM SPEC-SCNC: 9.4 MG/DL (ref 8.6–10.5)
CHLORIDE SERPL-SCNC: 107 MMOL/L (ref 98–107)
CO2 SERPL-SCNC: 26.5 MMOL/L (ref 22–29)
CREAT BLD-MCNC: 2.19 MG/DL (ref 0.76–1.27)
GFR SERPL CREATININE-BSD FRML MDRD: 39 ML/MIN/1.73
GLUCOSE BLD-MCNC: 112 MG/DL (ref 65–99)
POTASSIUM BLD-SCNC: 3.7 MMOL/L (ref 3.5–5.2)
SODIUM BLD-SCNC: 148 MMOL/L (ref 136–145)

## 2020-01-07 PROCEDURE — 97112 NEUROMUSCULAR REEDUCATION: CPT | Performed by: OCCUPATIONAL THERAPIST

## 2020-01-07 PROCEDURE — 97535 SELF CARE MNGMENT TRAINING: CPT | Performed by: OCCUPATIONAL THERAPIST

## 2020-01-07 PROCEDURE — 97110 THERAPEUTIC EXERCISES: CPT

## 2020-01-07 PROCEDURE — 97166 OT EVAL MOD COMPLEX 45 MIN: CPT | Performed by: OCCUPATIONAL THERAPIST

## 2020-01-07 PROCEDURE — 80048 BASIC METABOLIC PNL TOTAL CA: CPT | Performed by: PHYSICAL MEDICINE & REHABILITATION

## 2020-01-07 PROCEDURE — 92523 SPEECH SOUND LANG COMPREHEN: CPT | Performed by: SPEECH-LANGUAGE PATHOLOGIST

## 2020-01-07 PROCEDURE — 25010000002 HEPARIN (PORCINE) PER 1000 UNITS: Performed by: PHYSICAL MEDICINE & REHABILITATION

## 2020-01-07 PROCEDURE — 25010000003 CEFTRIAXONE PER 250 MG: Performed by: PHYSICAL MEDICINE & REHABILITATION

## 2020-01-07 PROCEDURE — 97162 PT EVAL MOD COMPLEX 30 MIN: CPT

## 2020-01-07 RX ADMIN — PHENYTOIN 100 MG: 50 TABLET, CHEWABLE ORAL at 20:52

## 2020-01-07 RX ADMIN — LACOSAMIDE 200 MG: 100 TABLET, FILM COATED ORAL at 09:15

## 2020-01-07 RX ADMIN — CLONAZEPAM 1 MG: 0.5 TABLET ORAL at 05:52

## 2020-01-07 RX ADMIN — DEXTROAMPHETAMINE SACCHARATE, AMPHETAMINE ASPARTATE MONOHYDRATE, DEXTROAMPHETAMINE SULFATE, AND AMPHETAMINE SULFATE 30 MG: 2.5; 2.5; 2.5; 2.5 CAPSULE, EXTENDED RELEASE ORAL at 09:15

## 2020-01-07 RX ADMIN — HEPARIN SODIUM 5000 UNITS: 5000 INJECTION INTRAVENOUS; SUBCUTANEOUS at 20:53

## 2020-01-07 RX ADMIN — METOPROLOL TARTRATE 50 MG: 50 TABLET, FILM COATED ORAL at 20:51

## 2020-01-07 RX ADMIN — LACOSAMIDE 200 MG: 100 TABLET, FILM COATED ORAL at 20:52

## 2020-01-07 RX ADMIN — PHENYTOIN 100 MG: 50 TABLET, CHEWABLE ORAL at 14:02

## 2020-01-07 RX ADMIN — POLYETHYLENE GLYCOL 3350 17 G: 17 POWDER, FOR SOLUTION ORAL at 09:24

## 2020-01-07 RX ADMIN — TOPIRAMATE 25 MG: 25 TABLET, FILM COATED ORAL at 09:16

## 2020-01-07 RX ADMIN — LEVETIRACETAM 2000 MG: 100 SOLUTION ORAL at 20:55

## 2020-01-07 RX ADMIN — LEVETIRACETAM 2000 MG: 100 SOLUTION ORAL at 09:20

## 2020-01-07 RX ADMIN — CEFTRIAXONE SODIUM 2 G: 2 INJECTION, SOLUTION INTRAVENOUS at 22:24

## 2020-01-07 RX ADMIN — ACETAMINOPHEN 650 MG: 325 TABLET, FILM COATED ORAL at 20:52

## 2020-01-07 RX ADMIN — HEPARIN SODIUM 5000 UNITS: 5000 INJECTION INTRAVENOUS; SUBCUTANEOUS at 14:03

## 2020-01-07 RX ADMIN — PHENYTOIN 100 MG: 50 TABLET, CHEWABLE ORAL at 05:52

## 2020-01-07 RX ADMIN — HEPARIN SODIUM 5000 UNITS: 5000 INJECTION INTRAVENOUS; SUBCUTANEOUS at 06:05

## 2020-01-07 RX ADMIN — CLONAZEPAM 1 MG: 0.5 TABLET ORAL at 20:52

## 2020-01-07 RX ADMIN — CHOLECALCIFEROL TAB 10 MCG (400 UNIT) 400 UNITS: 10 TAB at 09:16

## 2020-01-07 RX ADMIN — CLONAZEPAM 1 MG: 0.5 TABLET ORAL at 14:03

## 2020-01-07 RX ADMIN — METOPROLOL TARTRATE 50 MG: 50 TABLET, FILM COATED ORAL at 09:17

## 2020-01-07 RX ADMIN — CEFTRIAXONE SODIUM 2 G: 2 INJECTION, SOLUTION INTRAVENOUS at 11:16

## 2020-01-07 NOTE — PROGRESS NOTES
Inpatient Rehabilitation Plan of Care Note    Plan of Care  Updated Problems/Interventions  Mobility    [OT] Toilet Transfers(Active)  Current Status(01/07/2020): TBD  Weekly Goal(01/14/2020): TBD  Discharge Goal: TBD    [OT] Tub/Shower Transfers(Active)  Current Status(01/07/2020): TBD  Weekly Goal(01/14/2020): TBD  Discharge Goal: TBD        Self Care    [OT] Bathing(Active)  Current Status(01/07/2020): MAX UB, DEP LB  Weekly Goal(01/14/2020): MOD UB, MAX LB  Discharge Goal: MIN UB, MOD LB    [OT] Dressing (Lower)(Active)  Current Status(01/07/2020): DEP  Weekly Goal(01/14/2020): MAX  Discharge Goal: MOD    [OT] Dressing (Upper)(Active)  Current Status(01/07/2020): MAX  Weekly Goal(01/14/2020): MOD  Discharge Goal: MIN    [OT] Eating(Active)  Current Status(01/07/2020): MAX  Weekly Goal(01/14/2020): MIN  Discharge Goal: MIN    [OT] Grooming(Active)  Current Status(01/07/2020): MAX  Weekly Goal(01/14/2020): MOD  Discharge Goal: MIN    [OT] Toileting(Active)  Current Status(01/07/2020): DEP  Weekly Goal(01/14/2020): MAX  Discharge Goal: MOD    Signed by: Sudha Marte OTR/L
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
## 2020-01-07 NOTE — PROGRESS NOTES
SECTION GG    Eating Performance: [place] provides verbal cues or touching/steadying assistance  as patient completes activity.    Eating Discharge Goals: [place] sets up or cleans up; patient completes activity.  [place] assists only prior to or following the activity.    Signed by: Sybil Greene RN

## 2020-01-07 NOTE — PROGRESS NOTES
SECTION GG    Mobility Performance:     Roll Left and Right: [place] does all of the effort. Patient does none of the  effort to complete the activity. Or, the assistance of 2 or more helpers is  required for the patient to complete the activity.   Sit to Lying: [place] does all of the effort. Patient does none of the effort to  complete the activity. Or, the assistance of 2 or more helpers is required for  the patient to complete the activity.   Lying to Sitting on Side of Bed: [place] does all of the effort. Patient does  none of the effort to complete the activity. Or, the assistance of 2 or more  helpers is required for the patient to complete the activity.   Sit to Stand: [place] does all of the effort. Patient does none of the effort to  complete the activity. Or, the assistance of 2 or more helpers is required for  the patient to complete the activity.   Chair/Bed to Chair Transfer: [place] does all of the effort. Patient does none  of the effort to complete the activity. Or, the assistance of 2 or more helpers  is required for the patient to complete the activity.   Car Transfer: Not attempted due to medical or safety concerns.   Walk 10 Feet:   Not attempted due to medical or safety concerns.  1 Step Over Curb or Up/Down Stair:   Not attempted due to medical or safety  concerns.  Picking up an Object:   Not attempted due to medical or safety concerns.  Uses Wheelchair/Scooter: No    Mobility Discharge Goals:   Sit to Lying: [place] sets up or cleans up; patient completes activity. [place]  assists only prior to or following the activity.    Signed by: Pita Roth, PT

## 2020-01-07 NOTE — PROGRESS NOTES
Occupational Therapy: Branch    Physical Therapy: Individual: 60 minutes.    Speech Language Pathology:  Branch    Signed by: Pita Roth PT

## 2020-01-07 NOTE — PROGRESS NOTES
Inpatient Rehabilitation Plan of Care Note    Plan of Care  Care Plan Reviewed - No updates at this time.    Sphincter Control    Performed Intervention(s)  Monitor I&O      Safety    Performed Intervention(s)  Safety rounds/bed alarm on  Falls precautio/call light within easy reach      Psychosocial    Performed Intervention(s)  Offer support/Encourage patient to verbalize any concerns      Body Systems    Performed Intervention(s)  Skin care q shift and PRN  Assist to turn patient q 2-3hrs.    Signed by: Alissa Mckeon RN

## 2020-01-07 NOTE — PLAN OF CARE
Problem: Patient Care Overview  Goal: Plan of Care Review  Flowsheets  Taken 1/7/2020 1640 by Alissa Mckeon RN  Outcome Summary: Mike is blind. He answers and processes slowly. He has general weakness, stiffness. need 1-2 assist for turning, max assist 2 for transferring.His skin is intact. He takes meds crushed in as.Drinks thin liquids well with straw.  Progress, Functional Goals: other (see comments) (first day of therapy, needs lots of assistance with all aspects of care.)  Plan of Care Reviewed With: patient;mother  Taken 1/7/2020 0153 by Sybil Greene, RN  IRF Plan of Care Review: progress ongoing, continue

## 2020-01-07 NOTE — PROGRESS NOTES
Occupational Therapy: Branch    Physical Therapy: Branch    Speech Language Pathology:  Individual: 60 minutes.    Signed by: Shivani Smalls MS, CCC-SLP
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
## 2020-01-07 NOTE — PROGRESS NOTES
Inpatient Rehabilitation Plan of Care Note    Plan of Care  Care Plan Reviewed - Updates as Follows    Body Systems    [RN] Integumentary(Active)  Current Status(01/07/2020): Small rash dry skin breakdown to groin/inner thigh.  Buttocks intact with a small spot of peeling area. No open area noted. Head  incision healing.  Weekly Goal(01/14/2020): No further skin breakdown  Discharge Goal: Intact Skin.        Psychosocial    [RN] Coping/Adjustment(Active)  Current Status(01/07/2020): Patient with difficult verbalizing and slow process.  Mother and sister present and very supportive.  Weekly Goal(01/14/2020): Patient will demonstrate appropriate coping mechanisms  Discharge Goal: Patient will demonstrate health coping strategies        Safety    [RN] Potential for Injury(Active)  Current Status(01/07/2020): Patient with generalized weakness. Very weak all  over, at high risk for fall.  Weekly Goal(01/14/2020): No injuries  Discharge Goal: same as weekly        Sphincter Control    [RN] Bladder Management(Active)  Current Status(01/07/2020): Incontient episodes, patient has urgency when  needing to void.  Weekly Goal(01/14/2020): Continent 50%  Discharge Goal: Continent 100%    [RN] Bowel Management(Active)  Current Status(01/07/2020): Continent of bowel 100%  Weekly Goal(01/14/2020): Continent 100%  Discharge Goal: Continent 100%    Signed by: Sybil Greene RN

## 2020-01-07 NOTE — PROGRESS NOTES
"Section B. Hearing, Speech, Vision: Branch    Section C. Cognitive Patterns: Brief Interview for Mental Status (BIMS) was  conducted.  Repetition of Three Words: Three words  Able to report correct year: Missed by more than 5 years or no answer  Able to report correct month: Missed by more than 1 month or no answer  Able to report correct day of the week: Incorrect or no answer  Able to recall \"sock\": Test stopped.  Able to recall \"blue\": Test stopped.  Able to recall \"bed\": Test stopped.  BIMS SUMMARY SCORE: 99 Patient was unable to complete the Brief Interview for  Mental Status  Memory/Recall Ability: Unable to recall current season, location of room, staff  names and faces, or that he/she is in a hospital or hospital unit.    Signed by: Shivani Smalls MS, CCC-SLP    "
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
## 2020-01-07 NOTE — PROGRESS NOTES
LOS: 1 day   Patient Care Team:  Jolene Laurent MD as PCP - General (Family Medicine)  Efren Martínez MD as PCP - Claims Attributed    Chief Complaint:   Status post infected  shunt-removed-CNS infection/pneumocephalus  Status post 12/23/ 2019 - Removal of ventriculoperitoneal shunt intracranial portion, valve, partial peritoneal down to the cervical region  Status post 12/31/2019 endoscopic repair of paranasal sinus defect  ID-ceftriaxone-antibiotics until January 14, 2020  Seizure disorder-multidrug regimen-phenytoin/Vimpat/Keppra/clonazepam/Topamax  Remote traumatic brain injury  Neuro stimulation-Adderall  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  Bilateral SCDs.  Status post acute renal awmuzbktxsieu-WPX-epcdvd creatinine.  Avoid NSAID/ACE inhibitor's.    Subjective     History of Present Illness  Patient seen this morning with family at bedside. Patient reports that he is feeling well this morning and got a good night of sleep. Reports that his first day of therapy is going well. Reports good bowel movements. No new concerns per patient or family.   History taken from: patient chart family    Objective     Vital Signs  Temp:  [97.9 °F (36.6 °C)-98.7 °F (37.1 °C)] 98.5 °F (36.9 °C)  Heart Rate:  [108-114] 110  Resp:  [16-18] 18  BP: (123-144)/(75-89) 129/85    Physical Exam  Mental status: awake and alert  HEENT-craniotomy incision clean dry and intact.   Pulm: CTAB, no wheezing, rales, or rhonchi  Heart: RRR, no MRG  Abdomen: soft, non-tender, non-distended. +BS  Extremities: No cyanosis or edema   Neuro: awake and alert. Slow with responses but following commands. Left head preference but can rotate past midline to the  right.  Strength: BUE: Generally weak at least antigravity with EF, EE, finger flexion       BLE: HF 3/5, KE 4/F, DF 4/5      Results Review:    I reviewed the patient's new clinical results.     Results from last 7 days   Lab Units 01/06/20 0424 01/05/20 0229 01/04/20  0137   WBC 10*3/uL 12.92* 11.44* 12.27*   HEMOGLOBIN g/dL 10.6* 10.6* 10.6*   HEMATOCRIT % 31.8* 32.1* 32.3*   PLATELETS 10*3/uL 295 266 299     Results from last 7 days   Lab Units 01/07/20  0612 01/06/20  0424 01/05/20  1500   SODIUM mmol/L 148* 142 139   POTASSIUM mmol/L 3.7 3.8 3.7   CHLORIDE mmol/L 107 105 99   TOTAL CO2 mmol/L  --  27 29   CO2 mmol/L 26.5  --   --    BUN mg/dL 57* 63* 61*   CREATININE mg/dL 2.19* 2.7* 2.9*   CALCIUM mg/dL 9.4 9.2 9.0   GLUCOSE mg/dL 112*  --   --        Medication Review:   Scheduled Meds:  amphetamine-dextroamphetamine XR 30 mg Oral Daily   atorvastatin 10 mg Oral Daily   cefTRIAXone 2 g Intravenous Q12H   cholecalciferol 400 Units Oral Daily   clonazePAM 1 mg Oral Q8H   heparin (porcine) 5,000 Units Subcutaneous Q8H   lacosamide 200 mg Oral Q12H   levETIRAcetam 2,000 mg Oral Q12H   metoprolol tartrate 50 mg Oral Q12H   phenytoin 100 mg Oral Q8H   polyethylene glycol 17 g Oral Daily   topiramate 25 mg Oral Daily     Continuous Infusions:   PRN Meds:.•  acetaminophen    Assessment/Plan       H/O traumatic brain injury  Status post infected  shunt-removed-CNS infection/pneumocephalus  -Status post 12/23/ 2019 - Removal of ventriculoperitoneal shunt intracranial portion, valve, partial peritoneal down to the cervical region  -Status post 12/31/2019 endoscopic repair of paranasal sinus defect  -ID-ceftriaxone-antibiotics until January 14, 2020    Seizure disorder-multidrug regimen-phenytoin/Vimpat/Keppra/clonazepam/Topamax    Remote traumatic brain injury  -Neuro stimulation-Adderall    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  Bilateral SCDs.    Status post acute renal tgxdxytfnzxql-HQU-wcjlfy creatinine.  Avoid NSAID/ACE inhibitor's.     Now admit for comprehensive acute inpatient rehabilitation .  This would be an interdisciplinary program with physical therapy 1 hour,  occupational therapy 1 hour, and speech therapy 1 hour, 5 days a week.  Rehabilitation nursing for carryover, monitoring of incision and neurologic   status, bowel and bladder, and skin  Ongoing physician follow-up.  Weekly team conferences.  Goals are indeterminate.   Rehabilitation prognosis indeterminate.  Medical prognosis indeterminate.  Estimated length of stay is indeterminate  The patient's functional status and clinical status is unchanged from preadmission assessment and the patient continues appropriate for acute inpatient rehabilitation.  Goal is for home with outpatient   therapies.  Barrier to discharge: Cognition/mobility- work on trunk control, strength, balance to overcome.      I discussed the patients findings and my recommendations with patient, family and nursing staff    Tova Mercedes DO  01/07/20  2:48 PM

## 2020-01-07 NOTE — PROGRESS NOTES
SECTION GG    Self Care Performance:   Oral Hygiene: [place] does more than half the effort. [place] lifts or holds  trunk or limbs and provides more than half the effort.   Toileting Hygiene: [place] does all of the effort. Patient does none of the  effort to complete the activity. Or, the assistance of 2 or more helpers is  required for the patient to complete the activity.   Shower/Bathe Self: [place] does all of the effort. Patient does none of the  effort to complete the activity. Or, the assistance of 2 or more helpers is  required for the patient to complete the activity.   Upper Body Dressing: [place] does more than half the effort. [place] lifts or  holds trunk or limbs and provides more than half the effort.   Lower Body Dressing: [place] does all of the effort. Patient does none of the  effort to complete the activity. Or, the assistance of 2 or more helpers is  required for the patient to complete the activity.   Putting On/Taking Off Footwear: [place] does all of the effort. Patient does  none of the effort to complete the activity. Or, the assistance of 2 or more  helpers is required for the patient to complete the activity.    Self Care Discharge Goals:   Putting On/Taking Off Footwear: [place] does less than half the effort. [place]  lifts, holds or supports trunk or limbs but provides less than half the effort.    Mobility Toilet Transfer Performance: [place] does all of the effort. Patient  does none of the effort to complete the activity. Or, the assistance of 2 or  more helpers is required for the patient to complete the activity.    Mobility Toilet Transfer Discharge Goal: [place] does less than half the effort.  [place] lifts, holds or supports trunk or limbs but provides less than half the  effort.    Signed by: Sudha Marte OTR/L
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
## 2020-01-07 NOTE — PROGRESS NOTES
Occupational Therapy: Individual: 75 minutes.    Physical Therapy: Branch    Speech Language Pathology:  Branch    Signed by: AVA Garrido/ZOË

## 2020-01-08 LAB
ANION GAP SERPL CALCULATED.3IONS-SCNC: 12.7 MMOL/L (ref 5–15)
BUN BLD-MCNC: 55 MG/DL (ref 6–20)
BUN/CREAT SERPL: 25.9 (ref 7–25)
CALCIUM SPEC-SCNC: 9.4 MG/DL (ref 8.6–10.5)
CHLORIDE SERPL-SCNC: 107 MMOL/L (ref 98–107)
CO2 SERPL-SCNC: 27.3 MMOL/L (ref 22–29)
CREAT BLD-MCNC: 2.12 MG/DL (ref 0.76–1.27)
GFR SERPL CREATININE-BSD FRML MDRD: 41 ML/MIN/1.73
GLUCOSE BLD-MCNC: 100 MG/DL (ref 65–99)
POTASSIUM BLD-SCNC: 3.6 MMOL/L (ref 3.5–5.2)
SODIUM BLD-SCNC: 147 MMOL/L (ref 136–145)

## 2020-01-08 PROCEDURE — 92610 EVALUATE SWALLOWING FUNCTION: CPT | Performed by: SPEECH-LANGUAGE PATHOLOGIST

## 2020-01-08 PROCEDURE — 80048 BASIC METABOLIC PNL TOTAL CA: CPT | Performed by: PHYSICAL MEDICINE & REHABILITATION

## 2020-01-08 PROCEDURE — 97110 THERAPEUTIC EXERCISES: CPT

## 2020-01-08 PROCEDURE — 25010000003 CEFTRIAXONE PER 250 MG: Performed by: PHYSICAL MEDICINE & REHABILITATION

## 2020-01-08 PROCEDURE — 97535 SELF CARE MNGMENT TRAINING: CPT

## 2020-01-08 PROCEDURE — 25010000002 HEPARIN (PORCINE) PER 1000 UNITS: Performed by: PHYSICAL MEDICINE & REHABILITATION

## 2020-01-08 PROCEDURE — 97130 THER IVNTJ EA ADDL 15 MIN: CPT | Performed by: SPEECH-LANGUAGE PATHOLOGIST

## 2020-01-08 PROCEDURE — 97129 THER IVNTJ 1ST 15 MIN: CPT | Performed by: SPEECH-LANGUAGE PATHOLOGIST

## 2020-01-08 RX ADMIN — METOPROLOL TARTRATE 50 MG: 50 TABLET, FILM COATED ORAL at 21:19

## 2020-01-08 RX ADMIN — LEVETIRACETAM 2000 MG: 100 SOLUTION ORAL at 21:20

## 2020-01-08 RX ADMIN — CHOLECALCIFEROL TAB 10 MCG (400 UNIT) 400 UNITS: 10 TAB at 08:51

## 2020-01-08 RX ADMIN — PHENYTOIN 100 MG: 50 TABLET, CHEWABLE ORAL at 06:01

## 2020-01-08 RX ADMIN — CLONAZEPAM 1 MG: 0.5 TABLET ORAL at 06:01

## 2020-01-08 RX ADMIN — HEPARIN SODIUM 5000 UNITS: 5000 INJECTION INTRAVENOUS; SUBCUTANEOUS at 06:01

## 2020-01-08 RX ADMIN — CEFTRIAXONE SODIUM 2 G: 2 INJECTION, SOLUTION INTRAVENOUS at 23:11

## 2020-01-08 RX ADMIN — CLONAZEPAM 1 MG: 0.5 TABLET ORAL at 21:19

## 2020-01-08 RX ADMIN — HEPARIN SODIUM 5000 UNITS: 5000 INJECTION INTRAVENOUS; SUBCUTANEOUS at 21:20

## 2020-01-08 RX ADMIN — PHENYTOIN 100 MG: 50 TABLET, CHEWABLE ORAL at 13:31

## 2020-01-08 RX ADMIN — PHENYTOIN 100 MG: 50 TABLET, CHEWABLE ORAL at 21:19

## 2020-01-08 RX ADMIN — TOPIRAMATE 25 MG: 25 TABLET, FILM COATED ORAL at 08:51

## 2020-01-08 RX ADMIN — LEVETIRACETAM 2000 MG: 100 SOLUTION ORAL at 08:53

## 2020-01-08 RX ADMIN — DEXTROAMPHETAMINE SACCHARATE, AMPHETAMINE ASPARTATE MONOHYDRATE, DEXTROAMPHETAMINE SULFATE, AND AMPHETAMINE SULFATE 30 MG: 2.5; 2.5; 2.5; 2.5 CAPSULE, EXTENDED RELEASE ORAL at 08:50

## 2020-01-08 RX ADMIN — LACOSAMIDE 200 MG: 100 TABLET, FILM COATED ORAL at 21:20

## 2020-01-08 RX ADMIN — HEPARIN SODIUM 5000 UNITS: 5000 INJECTION INTRAVENOUS; SUBCUTANEOUS at 13:32

## 2020-01-08 RX ADMIN — CEFTRIAXONE SODIUM 2 G: 2 INJECTION, SOLUTION INTRAVENOUS at 11:12

## 2020-01-08 RX ADMIN — METOPROLOL TARTRATE 50 MG: 50 TABLET, FILM COATED ORAL at 08:50

## 2020-01-08 RX ADMIN — LACOSAMIDE 200 MG: 100 TABLET, FILM COATED ORAL at 08:51

## 2020-01-08 RX ADMIN — ATORVASTATIN CALCIUM 10 MG: 10 TABLET, FILM COATED ORAL at 08:51

## 2020-01-08 RX ADMIN — CLONAZEPAM 1 MG: 0.5 TABLET ORAL at 13:57

## 2020-01-08 NOTE — PROGRESS NOTES
Case Management  Inpatient Rehabilitation Plan of Care and Discharge Plan Note    Rehabilitation Diagnosis:  Branch  Date of Onset:  Joshua    Medical Summary:  Joshua  Past Medical History: Joshua    Plan of Care  Updated Problems/Interventions  Field    Expected Intensity:  Joshua  Interdisciplinary Team:  Joshua  Estimated Length of Stay/Anticipated Discharge Date: Branch  Anticipated Discharge Destination:  Anticipated discharge destination from inpatient rehabilitation is community  discharge with assistance. Patient lives with sister and sister's family in one  story home. 2 steps into home. Patient [facility]  MWF. Mother lives in [place] but here staying with them and will plan to be here  at d/c to help with care.  D/C plan is home with sister and family.      Based on the patient's medical and functional status, their prognosis and  expected level of functional improvement is:  Joshua    Signed by: PARAS Cormier

## 2020-01-08 NOTE — PLAN OF CARE
Problem: Patient Care Overview  Goal: Plan of Care Review  Outcome: Ongoing (interventions implemented as appropriate)  Flowsheets (Taken 1/8/2020 0420)  Outcome Summary: Patient slept well. Is blind, slow to process. Needs assist with turning. Mother at bedside. Incont. of both B&B wears brief skin, dry & peeling on groin, & buttocks. Take meds crushed in applesauce. No unsafe behaviors.     Problem: Skin Injury Risk (Adult)  Goal: Skin Health and Integrity  Outcome: Ongoing (interventions implemented as appropriate)     Problem: Fall Risk (Adult)  Goal: Absence of Fall  Outcome: Ongoing (interventions implemented as appropriate)

## 2020-01-08 NOTE — PLAN OF CARE
Problem: Patient Care Overview  Goal: Plan of Care Review  Flowsheets  Taken 1/8/2020 1714 by Alissa Mckeon, RN  Outcome Summary: Mike was OOB max 2 to  for therapy.He was very tired this afternoon after therapy. He is blind responds verbally ,oriented to person. Small pink intact skin to penis-moisturizing cream applied.  Progress, Functional Goals: demonstrating adequate progress  Plan of Care Reviewed With: patient;mother;sibling  Taken 1/7/2020 0153 by Sybil Greene, RN  IRF Plan of Care Review: progress ongoing, continue

## 2020-01-08 NOTE — THERAPY TREATMENT NOTE
Inpatient Rehabilitation - Speech Language Pathology Treatment Note    [facility]       Patient Name: Tye JONES Junior  : 1975  MRN: [id number]    Today's Date: 2020           Admit Date: 2020      Visit Dx:        ICD-10-CM ICD-9-CM   1. Impaired mobility Z74.09 799.89       Patient Active Problem List   Diagnosis   • Mixed hyperlipidemia   • Essential hypertension   • Traumatic brain injury (CMS/HCC)   • Acute allergic rhinitis   • Seizure (CMS/HCC)   • Screen for colon cancer   • H/O traumatic brain injury          Therapy Treatment    Evaluation/Coping    Evaluation/Treatment Time and Intent  Subjective Information: no complaints (20 1500 : Samuel Smalls MA,CCC-SLP)  Document Type: therapy note (daily note) (20 1500 : Samuel Smalls MA,CCC-SLP)  Mode of Treatment: individual therapy, speech-language pathology (20 1500 : Samuel Smalls MA,CCC-SLP)    Vitals/Pain/Safety    Pain Assessment  Additional Documentation: Pain Scale: Numbers Pre/Post-Treatment (Group) (20 08 : Samuel Smalls MA,CCC-SLP)  Pain Scale: Numbers Pre/Post-Treatment  Pain Scale: Numbers, Pretreatment: 0/10 - no pain (20 : Samuel Smalls MA,CCC-SLP)  Pain Scale: Numbers, Post-Treatment: 0/10 - no pain (20 : Samuel Smalls MA,CCC-SLP)    Cognition/Communication         Oral Motor/Eating    Oral Motor and Function  Dentition Assessment: natural, present and adequate (20 : Samuel Smalls MA,CCC-SLP)  Secretion Management: WNL/WFL (20 : Samuel Smalls MA,CCC-SLP)  Mucosal Quality: moist, healthy (20 : Samuel Smalls MA,CCC-SLP)  Volitional Swallow: WFL (20 : Samuel Smalls MA,CCC-SLP)  Volitional Cough: WFL (20 : Samuel Smalls MA,CCC-SLP)  Oral Musculature and Cranial Nerve Assessment  Oral Motor General Assessment: generalized oral motor weakness (20 0800 : Samuel Smalls MA,CCC-SLP)  General  Eating/Swallowing Observations  Respiratory Support Currently in Use: room air (01/08/20 0800 : Samuel Smalls MA,CCC-SLP)  Eating/Swallowing Skills: fed by staff/caregiver(fed by mother, independent with feeding prior to [facility]) (01/08/20 0800 : Samuel Smalls MA,CCC-SLP)  Positioning During Eating: upright in bed (01/08/20 0800 : Samuel Smalls MA,CCC-SLP)  Utensils Used: spoon, straw (01/08/20 0800 : Samuel Smalls MA,CCC-SLP)  Consistencies Trialed: pureed, thin liquids (01/08/20 0800 : Samuel Smalls MA,CCC-SLP)  Clinical Swallow Eval  Oral Prep Phase: WFL (01/08/20 0800 : Samuel Smalls MA,CCC-SLP)  Oral Transit: impaired (01/08/20 0800 : Samuel Smalls MA,CCC-SLP)  Oral Residue: impaired (01/08/20 0800 : Samuel Smalls MA,CCC-SLP)  Pharyngeal Phase: WFL (01/08/20 0800 : Samuel Smalls MA,CCC-SLP)  Esophageal Phase: unremarkable (01/08/20 0800 : Samuel Smalls MA,CCC-SLP)  Clinical Swallow Evaluation Summary: Patient sitting upright in bed this morning with mother feeding patient. Patient demonstrate prolonged bolus formation with puree at times, delayed a-p transit and oral holding for several seconds at times. No overt s/s of pen/asp with puree. Trace to mild oral residue cleared with liquid wash. Patient's mother did take her time with feeding pt. Timely swallow initiation with thin liquids via straw, x1 throat clear with repetitive sips (out of over 10). SLP educated patient mother on safe swallow precautions, slow rate, check oral cavity for residue/pocketing before presenting next bite, alternate liquids and solids, double swallow. (01/08/20 0800 : Samuel Smalls MA,CCC-SLP)    Mobility/Basic Activities/Instrumental Activities/Motor/Modality                   ROM/MMT                   Sensory/Myotome/Dermatome/Edema               Posture/Balance/Special Tests/Exercise/Transportation/Sexual Function                   Orthotics/Residual Limb/Prosthetic Management              Outcome  Summary         EDUCATION    The patient has been educated in the following areas:     Communication Impairment.    SLP Recommendation and Plan    SLP Diagnosis: Suspected cognitive decline from baseline, recommend ongoing diagnostic assessment of patient cognitive skills. Patient very fatigued in second session with difficulty sustaining alertness to answer questions.     SLP Diagnosis: Suspected cognitive decline from baseline, recommend ongoing diagnostic assessment of patient cognitive skills. Patient very fatigued in second session with difficulty sustaining alertness to answer questions.     Rehab Potential/Prognosis: fair    Swallow Criteria for Skilled Therapeutic Interventions Met: demonstrates skilled criteria    Anticipated Dischage Disposition: home with /7 [facility]         Therapy Frequency (Swallow): 5 days per week    Predicted Duration Therapy Intervention (Days): until discharge                  SLP GOALS     Row Name 01/08/20 1500 01/08/20 0800 01/07/20 0900       Oral Nutrition/Hydration Goal 1 (SLP)    Oral Nutrition/Hydration Goal 1, SLP  --  Patient will tolerate puree and thin liquids without overt s/s of pen/asp with use of safe swallow precautions  -KA  --       Labial Strengthening Goal 1 (SLP)    Activity (Labial Strengthening Goal 1, SLP)  --  increase labial tone  -KA  --    Increase Labial Tone  --  labial resistance exercises  -KA  --    [place]/Accuracy (Labial Strengthening Goal 1, SLP)  --  with minimal cues (75-90% accuracy)  -KA  --    Time Frame (Labial Strengthening Goal 1, SLP)  --  short term goal (STG);by discharge  -KA  --       Lingual Strengthening Goal 1 (SLP)    Activity (Lingual Strengthening Goal 1, SLP)  --  increase tongue back strength  -KA  --    Increase Tongue Back Strength  --  lingual movement exercises  -KA  --    [place]/Accuracy (Lingual Strengthening Goal 1, SLP)  --  with minimal cues (75-90% accuracy)  -KA  --       Pharyngeal Strengthening  Exercise Goal 1 (SLP)    Activity (Pharyngeal Strengthening Goal 1, SLP)  --  increase superior movement of the hyolaryngeal complex;increase anterior movement of the hyolaryngeal complex;increase squeeze/positive pressure generation  -KA  --    Increase Superior Movement of the Hyolaryngeal Complex  --  hard effortful swallow;[place]  -KA  --       Awareness of Basic Personal Information Goal 1 (SLP)    Improve Awareness of Basic Personal Information Goal 1 (SLP)  answering yes/no questions regarding personal/biographical information;90%;with minimal cues (75-90%)  -KA  --  answering yes/no questions regarding personal/biographical information;naming self, family members;answering open-ended questions regarding personal/biographical information;answering questions about cognitive/physical changes;90%;with minimal cues (75-90%)  -KA    Time Frame (Awareness of Basic Personal Information Goal 1, SLP)  --  --  short term goal (STG);by discharge  -KA    Barriers (Awareness of Basic Personal Information Goal 1, SLP)  fatigue  -KA  --  --    Progress (Awareness of Basic Personal Information Goal 1, SLP)  50%;independently (over 90% accuracy)  -KA  --  --    Comment (Awareness of Basic Personal Information Goal 1, SLP)  Multiple repetition for questions  -KA  --  --       Attention Goal 1 (SLP)    Improve Attention by Goal 1 (SLP)  looking at speaker;attending to task;90%;with minimal cues (75-90%)  -KA  --  attending to task;90%;with minimal cues (75-90%)  -KA    Time Frame (Attention Goal 1, SLP)  short term goal (STG)  -KA  --  short term goal (STG);by discharge  -KA    Barriers (Attention Goal 1, SLP)  fatigue  -KA  --  --    Progress (Attention Goal 1, SLP)  0%;independently (over 90% accuracy);50%;with maximum cues (25-49%)  -KA  --  --    Comment (Attention Goal 1, SLP)  mod to max verbal/tactile stimulation required intermittently throughout session due to pt fatigue   -KA  --  --       Orientation Goal 1 (SLP)     Improve Orientation Through Goal 1 (SLP)  demonstrating orientation to day;demonstrating orientation to month;demonstrating orientation to year;demonstrating orientation to place;90%;with minimal cues (75-90%)  -KA  --  demonstrating orientation to month;demonstrating orientation to year;demonstrating orientation to place;90%;with minimal cues (75-90%)  -KA    Time Frame (Orientation Goal 1, SLP)  --  --  short term goal (STG);by discharge  -KA    Progress (Orientation Goal 1, SLP)  0%;independently (over 90% accuracy);30%;with moderate cues (50-74%)  -KA  --  --    Comment (Orientation Goal 1, SLP)  Independent recall of date and location 0%, immediate recall after discussion [id number]=[id number]%, delayed recall after 5 minutes, 2/4 50% (able to recall month and location after delay and date and year after cues) 100% after cues.   -KA  --  --       Memory Skills Goal 1 (SLP)    Improve Memory Skills Through Goal 1 (SLP)  --  --  listen to a paragraph and answer questions;90%;with minimal cues (75-90%)  -KA    Time Frame (Memory Skills Goal 1, SLP)  --  --  short term goal (STG);by discharge  -KA    Barriers (Memory Skills Goal 1, SLP)  fatigue  -KA  --  --    Comment (Memory Skills Goal 1, SLP)  Immediate recall of three unrelated bermn=822%, after 3 minute delay 0% without cues and 1/3 33% with cueing   -KA  --  --      User Key  (r) = Recorded By, (t) = Taken By, (c) = Cosigned By    Initials Name Provider Type    Samuel Rivera MA,CCC-SLP Speech and Language Pathologist             SLP Outcome Measures (last 72 hours)      SLP Outcome Measures     Row Name 01/08/20 0900 01/07/20 1200          SLP Outcome Measures    Outcome Measure Used?  Adult NOMS  -KA  Adult NOMS  -KA        Adult FCM Scores    FCM Chosen  Swallowing  -KA  Memory;Problem Solving  -KA     Swallowing FCM Score  4  -KA  --  -KA     Memory FCM Score  --  4  -KA       User Key  (r) = Recorded By, (t) = Taken By, (c) = Cosigned By    Initials Name  Effective Dates    Samuel Rivera MA,CCC-SLP 06/08/18 -               Time Calculation:       Time Calculation- SLP     Row Name 01/08/20 1600 01/08/20 0935          Time Calculation- SLP    SLP Start Time  1500  -KA  0754  -KA     SLP Stop Time  1530  -KA  0824  -KA     SLP Time Calculation (min)  30 min  -KA  30 min  -KA       User Key  (r) = Recorded By, (t) = Taken By, (c) = Cosigned By    Initials Name Provider Type    Samuel Rivera MA,[facility]SLP Speech and Language Pathologist            Therapy Charges for Today     Code Description Service Date Service Provider Modifiers Qty    36081914628 HC ST EVAL SPEECH AND PROD W LANG  4 1/7/2020 Samuel Smalls MA,[facility]SLP GN 1    74878410075 HC ST EVAL ORAL PHARYNG SWALLOW 2 1/8/2020 Samuel Smalls MA,CCC-SLP GN 1    88732906087 HC ST DEV OF COGN SKILLS INITIAL 15 MIN 1/8/2020 Samuel Smalls MA,[facility]SLP  1    36431362155 HC ST DEV OF COGN SKILLS EACH ADDT'L 15 MIN 1/8/2020 Samuel Smalls MA,[facility]SLP  1               ADULT NOMS (last 72 hours)      Adult NOMS     Row Name 01/08/20 0900 01/07/20 1200                Adult FCM Scores    FCM Chosen  Swallowing  -KA  Memory;Problem Solving  -KA       Swallowing FCM Score  4  -KA  --  -KA       Memory FCM Score  --  4  -KA         User Key  (r) = Recorded By, (t) = Taken By, (c) = Cosigned By    Initials Name Effective Dates    Samuel Rivera MA,CCC-SLP 06/08/18 -                      Samuel Smalls MA,CCC-[facility]  1/8/2020

## 2020-01-08 NOTE — PROGRESS NOTES
Spoke by phone with patient's independent , Joanna Rodriguez, at sister's request. She has been working with patient and family for the last couple of years. She will help coordinate services for patient at d/c as needed and may attend family conference if family wishes for her to be present for this. Will notify her tomorrow about [facility] and patient's current status.

## 2020-01-08 NOTE — THERAPY TREATMENT NOTE
Inpatient Rehabilitation - Physical Therapy Treatment Note  Psychiatric     Patient Name: Tye JONES Junior  : 1975  MRN: [id number]    Today's Date: 2020                 Admit Date: 2020      Visit Dx:      ICD-10-CM ICD-9-CM   1. Impaired mobility Z74.09 799.89       Patient Active Problem List   Diagnosis   • Mixed hyperlipidemia   • Essential hypertension   • Traumatic brain injury (CMS/HCC)   • Acute allergic rhinitis   • Seizure (CMS/HCC)   • Screen for colon cancer   • H/O traumatic brain injury       Therapy Treatment    IRF Treatment Summary     Row Name 20 1410 20 0945          Evaluation/Treatment Time and Intent    Subjective Information  no complaints  -  no complaints  -     Existing Precautions/Restrictions  fall;other (see comments) blind   -  fall  -     Document Type  therapy note (daily note)  -  therapy note (daily note)  -     Mode of Treatment  individual therapy;occupational therapy  -CC  individual therapy;physical therapy  -     Patient/Family Observations  supine in bed am; w/c pm  -  pt supine in bed no acute distress  -     Recorded by [CC] Neris Morales, OTR [LH] Pita Roth, PT     Row Name 20 1410 20 0945          Cognition/Psychosocial- PT/OT    Affect/Mental Status (Cognitive)  --  confused  -     Orientation Status (Cognition)  oriented to;person;place  -  oriented to;person;situation  -     Follows Commands (Cognition)  follows one step commands;delayed response/completion;increased processing time needed;initiation impaired;repetition of directions required  -  follows one step commands;50-74% accuracy;increased processing time needed;initiation impaired;physical/tactile prompts required;repetition of directions required;verbal cues/prompting required  -     Personal Safety Interventions  fall prevention program maintained  -  fall prevention program maintained;gait belt;supervised activity  -      Cognitive Function (Cognitive)  --  executive function deficit  -LH     Recorded by [CC] Neris Morales, OTR [LH] Pita Roth, PT     Row Name 01/08/20 1410 01/08/20 0945          Bed Mobility Assessment/Treatment    Rolling Left [place] (Bed Mobility)  maximum assist (25% patient effort);2 person assist  -  --     Rolling Right [place] (Bed Mobility)  verbal cues;nonverbal cues (demo/gesture);moderate assist (50% patient effort);maximum assist (25% patient effort)  -CC  --     Scooting/Bridging [place] (Bed Mobility)  maximum assist (25% patient effort)  -  --     Supine-Sit [place] (Bed Mobility)  moderate assist (50% patient effort);maximum assist (25% patient effort)  -  maximum assist (25% patient effort);2 person assist;verbal cues;nonverbal cues (demo/gesture);set up  -     Sit-Supine [place] (Bed Mobility)  --  maximum assist (25% patient effort);2 person assist;verbal cues;nonverbal cues (demo/gesture)  -LH2,NM,LH3     Assistive Device (Bed Mobility)  --  bed rails  -     Comment (Bed Mobility)  --  needs hand over hand assist to locate bedrails 2' visual deficits  -     Recorded by [CC] Neris Morales, OTR [LH] Pita Roth, PT  [LH2,NM,LH3] Pita Roth, PT (r) Anmol Joseph PT Student (t) Pita Roth, PT (c)     Row Name 01/08/20 0945             Functional Mobility    Functional Mobility- Ind. Level  moderate assist (50% patient effort);2 person assist required;verbal cues required;nonverbal cues required (demo/gesture)  -LH,NM,LH2      Functional Mobility- Device  -- // bars   -LH,NM,LH2      Functional Mobility-Distance (Feet)  8  -LH,NM,LH2      Functional Mobility- Safety Issues  weight-shifting ability decreased;step length decreased;sequencing ability decreased  -LH,NM,LH2      Functional Mobility- Comment  -- cues/assist for weight shift, BUE/BUE advancement, posture   -LH,NM,LH2      Recorded by [LH,NM,LH2] Pita Roth, PT (r) Jake,  Anmol, PT Student (t) Pita Roth, PT (c)      Row Name 01/08/20 0945             Chair-Bed Transfer    Chair-Bed [place] (Transfers)  maximum assist (25% patient effort);2 person assist;nonverbal cues (demo/gesture);verbal cues  -LH,NM,LH2      Assistive Device (Chair-Bed Transfers)  wheelchair;transfer board sliding board; cues and assist c hand placement   -LH3      Recorded by [LH,NM,LH2] Pita Roth, PT (r) Anmol Joseph PT Student (t) Pita Roth, PT (c)  [LH3] Pita Roth, PT      Row Name 01/08/20 1410 01/08/20 0945          Sit-Stand Transfer    Sit-Stand [place] (Transfers)  maximum assist (25% patient effort);2 person assist;verbal cues;nonverbal cues (demo/gesture);set up  -CC  maximum assist (25% patient effort);2 person assist;verbal cues;nonverbal cues (demo/gesture);set up  -     Assistive Device (Sit-Stand Transfers)  wheelchair x2  -CC  wheelchair pulling up on // bars  -     Recorded by [CC] Neris Morales OTR [LH] Pita Roth PT     Row Name 01/08/20 1410 01/08/20 0945          Stand-Sit Transfer    Stand-Sit [place] (Transfers)  maximum assist (25% patient effort);2 person assist;verbal cues;nonverbal cues (demo/gesture);set up  -CC  maximum assist (25% patient effort);2 person assist;verbal cues;nonverbal cues (demo/gesture);set up  -     Assistive Device (Stand-Sit Transfers)  wheelchair x2  -CC  wheelchair // bars  -     Recorded by [CC] Neris Morales OTR [LH] Pita Roth, PT     Row Name 01/08/20 1410             Toilet Transfer    Type (Toilet Transfer)  squat pivot  -CC      [place] Level (Toilet Transfer)  maximum assist (25% patient effort);2 person assist  -CC      Assistive Device (Toilet Transfer)  grab bars/safety frame  -CC      Recorded by [CC] Neris Morales OTR      Row Name 01/08/20 0945             Gait/Stairs Assessment/Training    [place] Level (Gait)  moderate assist (50% patient effort);2 person assist;verbal  cues;nonverbal cues (demo/gesture)  -      Assistive Device (Gait)  parallel bars  -      Distance in Feet (Gait)  5 steps  -      Pattern (Gait)  step-to  -LH      Deviations/Abnormal Patterns (Gait)  base of support, narrow;stride length decreased;bilateral deviations  -      Bilateral Gait Deviations  forward flexed posture;heel strike decreased;weight shift ability decreased  -      Comment (Gait/Stairs)  assist/VCs to advance BUEs on //  bars, following w WC. nicolas knee flexion however no  gross  buckling  -      Recorded by [] Pita Roth, PT      Row Name 01/08/20 1410             Bathing Assessment/Treatment    Bathing [place] Level  bathing skills;lower body;upper body;verbal cues;nonverbal cues (demo/gesture);minimum assist (75% patient effort);maximum assist (25% patient effort)  -CC      Bathing Position  supine;supported sitting  -CC      Bathing Setup Assistance  adjust water temperature;obtain supplies;open containers  -CC      Comment (Bathing)  LB Supine; UB in bed  -CC      Recorded by [CC] Neris Morales OTR      Row Name 01/08/20 1410             Upper Body Dressing Assessment/Treatment    Upper Body Dressing Task  upper body dressing skills;doff;don;pull over garment;verbal cues;nonverbal cues (demo/gesture);maximal assist (25-49% patient effort)  -CC      Upper Body Dressing Position  supported sitting  -CC      Set-up Assistance (Upper Body Dressing)  obtain clothing  -CC      Comment (Upper Body Dressing)  seated EOM  -CC      Recorded by [CC] Neris Morales OTR      Row Name 01/08/20 1410             Lower Body Dressing Assessment/Treatment    Lower Body Dressing [place] Level  doff;don;pants/bottoms;shoes/slippers;socks;maximum assist (25% patient effort)  -CC      Lower Body Dressing Position  supine;supported sitting;supported standing  -CC      Comment (Lower Body Dressing)  x2  -CC      Recorded by [CC] Neris Morales OTR      Row Name 01/08/20 1410              Grooming Assessment/Treatment    Grooming [place] Level  grooming skills;deodorant application;oral care regimen;wash face, hands;supervision;minimum assist (75% patient effort)  -      Grooming Position  supported sitting  -CC      Recorded by [CC] Neris Morales OTR      Row Name 01/08/20 1410             Toileting Assessment/Treatment    Toileting [place] Level  toileting skills;adjust/manage clothing;dependent (less than 25% patient effort)  -      Toileting Position  supported sitting;supported standing  -      Comment (Toileting)  x2  -CC      Recorded by [CC] Neris Morales OTR      Row Name 01/08/20 0945             Pain Scale: Numbers Pre/Post-Treatment    Pain Scale: Numbers, Pretreatment  0/10 - no pain  -      Pain Scale: Numbers, Post-Treatment  0/10 - no pain  -      Recorded by [] Pita Roth, PT      Row Name 01/08/20 0945             Therapeutic Exercise    Therapeutic Exercise  seated, lower extremities  -      Additional Documentation  Therapeutic Exercise (Row)  -      Recorded by [] Pita Roth, PT      Row Name 01/08/20 0945             Lower Extremity Seated Therapeutic Exercise    Performed, Seated Lower Extremity (Therapeutic Exercise)  LAQ (long arc quad), knee extension;ankle dorsiflexion/plantarflexion;hip flexion/extension;hip abduction/adduction  -      Exercise Type, Seated Lower Extremity (Therapeutic Exercise)  AROM (active range of motion)  -      Sets/Reps Detail, Seated Lower Extremity (Therapeutic Exercise)  1/10  -      Recorded by [] Pita Roth, PT      Row Name 01/08/20 1410 01/08/20 0945          Positioning and Restraints    Pre-Treatment Position  in bed  -CC  in bed  -     Post Treatment Position  wheelchair  -  wheelchair  -     In Bed  --  supine;call light within reach;encouraged to call for assist;with family/caregiver following PM session   -LH2,NM,LH3     In Wheelchair  sitting;with PT  -CC  sitting;call  light within reach;encouraged to call for assist;exit alarm on;with family/caregiver;notified ns  -     Recorded by [CC] Neris Morales, OTR [LH] Pita Roth, PT  [LH2,NM,LH3] Pita Roth, PT (r) Anmol Joseph, PT Student (t) Pita Roth, PT (c)       User Key  (r) = Recorded By, (t) = Taken By, (c) = Cosigned By    Initials Name Effective Dates    CC Neris Morales, OTR 06/08/18 -     LH Pita Roth, PT 04/03/18 -     NM Anmol Joseph, MAILE Student 01/03/20 -         Wound 01/06/20 2200 head Incision (Active)   Dressing Appearance open to air 1/8/2020  8:00 AM   Closure Approximated 1/8/2020  8:00 AM   Base clean;dry 1/8/2020  8:00 AM   Periwound intact 1/8/2020  8:00 AM   Drainage Amount none 1/7/2020  8:51 PM   Dressing Care, Wound open to air 1/7/2020  8:51 PM     Physical Therapy Education                 Title: PT OT SLP Therapies (In Progress)     Topic: Physical Therapy (In Progress)     Point: Mobility training (In Progress)     Description:   Instruct learner(s) on safety and technique for assisting patient out of bed, chair or wheelchair.  Instruct in the proper use of assistive devices, such as walker, crutches, cane or brace.              Patient Friendly Description:   It's important to get you on your feet again, but we need to do so in a way that is safe for you. Falling has serious consequences, and your personal safety is the most important thing of all.        When it's time to get out of bed, one of us or a family member will sit next to you on the bed to give you support.     If your doctor or nurse tells you to use a walker, crutches, a cane, or a brace, be sure you use it every time you get out of bed, even if you think you don't need it.    Learning Progress Summary           Patient Acceptance, E, NR by  at 1/8/2020 0949    Acceptance, E, NR by LH at 1/7/2020 1158   Family Acceptance, E, NR by  at 1/7/2020 1158                   Point: Home exercise program (In  Progress)     Description:   Instruct learner(s) on appropriate technique for monitoring, assisting and/or progressing patient with therapeutic exercises and activities.              Learning Progress Summary           Patient Acceptance, E, NR by  at 1/7/2020 1158   Family Acceptance, E, NR by  at 1/7/2020 1158                   Point: Body mechanics (In Progress)     Description:   Instruct learner(s) on proper positioning and spine alignment for patient and/or caregiver during mobility tasks and/or exercises.              Learning Progress Summary           Patient Acceptance, E, NR by  at 1/7/2020 1158   Family Acceptance, E, NR by  at 1/7/2020 1158                   Point: Precautions (In Progress)     Description:   Instruct learner(s) on prescribed precautions during mobility and gait tasks              Learning Progress Summary           Patient Acceptance, E, NR by  at 1/8/2020 0949                               User Key     Initials Effective Dates Name Provider Type [facility] 04/03/18 -  Pita Roth, PT Physical Therapist PT                  PT Recommendation and Plan                        Time Calculation:     PT Charges     Row Name 01/08/20 1511 01/08/20 0949          Time Calculation    Start Time  1300  - (r) NM (t)  (c)  0900  -     Stop Time  1330  - (r) NM (t)  (c)  0930  -     Time Calculation (min)  30 min  - (r) NM (t)  30 min  -     PT Received On  01/08/20  - (r) NM (t)  (c)  01/08/20  -     PT - Next Appointment  01/09/20  - (r) NM (t)  (c)  01/09/20  -       User Key  (r) = Recorded By, (t) = Taken By, (c) = Cosigned By    Initials Name Provider Type     Pita Roth, PT Physical Therapist    NM Anmol Joseph, PT Student PT Student          Therapy Charges for Today     Code Description Service Date Service Provider Modifiers Qty    [id number] HC PT THER PROC EA 15 MIN 1/8/2020 Anmol Joseph, PT Student GP 4                   Anmol  Jake, PT Student  1/8/2020

## 2020-01-08 NOTE — THERAPY TREATMENT NOTE
Inpatient Rehabilitation - Occupational Therapy Treatment Note    [facility]     Patient Name: Tye JONES Junior  : 1975  MRN: [id number]    Today's Date: 2020                 Admit Date: 2020      Visit Dx:    ICD-10-CM ICD-9-CM   1. Impaired mobility Z74.09 799.89       Patient Active Problem List   Diagnosis   • Mixed hyperlipidemia   • Essential hypertension   • Traumatic brain injury (CMS/HCC)   • Acute allergic rhinitis   • Seizure (CMS/HCC)   • Screen for colon cancer   • H/O traumatic brain injury         Therapy Treatment    IRF Treatment Summary     Row Name 20 1410 20 0945          Evaluation/Treatment Time and Intent    Subjective Information  no complaints  -  no complaints  -     Existing Precautions/Restrictions  fall;other (see comments) blind   -  fall  -     Document Type  therapy note (daily note)  -  therapy note (daily note)  -     Mode of Treatment  individual therapy;occupational therapy  -CC  individual therapy;physical therapy  -     Patient/Family Observations  supine in bed am; w/c pm  -  pt supine in bed no acute distress  -     Recorded by [CC] Neris Morales, OTR [LH] Pita Roth, PT     Row Name 20 1410 20 0945          Cognition/Psychosocial- PT/OT    Affect/Mental Status (Cognitive)  --  confused  -     Orientation Status (Cognition)  oriented to;person;place  -  oriented to;person;situation  -     Follows Commands (Cognition)  follows one step commands;delayed response/completion;increased processing time needed;initiation impaired;repetition of directions required  -  follows one step commands;50-74% accuracy;increased processing time needed;initiation impaired;physical/tactile prompts required;repetition of directions required;verbal cues/prompting required  -     Personal Safety Interventions  fall prevention program maintained  -  fall prevention program maintained;gait belt;supervised activity  -      Cognitive Function (Cognitive)  --  executive function deficit  -LH     Recorded by [CC] Neris Morales OTR [LH] Pita Roth, PT     Row Name 01/08/20 1410 01/08/20 0945          Bed Mobility Assessment/Treatment    Rolling Left [place] (Bed Mobility)  maximum assist (25% patient effort);2 person assist  -  --     Rolling Right [place] (Bed Mobility)  verbal cues;nonverbal cues (demo/gesture);moderate assist (50% patient effort);maximum assist (25% patient effort)  -CC  --     Scooting/Bridging [place] (Bed Mobility)  maximum assist (25% patient effort)  -  --     Supine-Sit [place] (Bed Mobility)  moderate assist (50% patient effort);maximum assist (25% patient effort)  -  maximum assist (25% patient effort);2 person assist;verbal cues;nonverbal cues (demo/gesture);set up  -     Sit-Supine [place] (Bed Mobility)  --  maximum assist (25% patient effort);2 person assist;verbal cues;nonverbal cues (demo/gesture)  (Pended)   -NM     Assistive Device (Bed Mobility)  --  bed rails  -     Comment (Bed Mobility)  --  needs hand over hand assist to locate bedrails 2' visual deficits  -LH     Recorded by [CC] Neris Morales, AVA [] Pita Roth, PT  [NM] Anmol Joseph, PT Student     Row Name 01/08/20 0945             Functional Mobility    Functional Mobility- Ind. Level  moderate assist (50% patient effort);2 person assist required;verbal cues required;nonverbal cues required (demo/gesture)  (Pended)   -NM      Functional Mobility- Device  --  (Pended)  // bars   -NM      Functional Mobility-Distance (Feet)  8  (Pended)   -NM      Functional Mobility- Safety Issues  weight-shifting ability decreased;step length decreased;sequencing ability decreased  (Pended)   -NM      Functional Mobility- Comment  --  (Pended)  cues/assist for weight shift, BUE/BUE advancement, posture   -NM      Recorded by [NM] Anmol Joseph, PT Student      Row Name 01/08/20 0923             Chair-Bed  Transfer    Chair-Bed [place] (Transfers)  maximum assist (25% patient effort);2 person assist;nonverbal cues (demo/gesture);verbal cues  (Pended)   -NM      Assistive Device (Chair-Bed Transfers)  wheelchair  (Pended)  sliding board; cues and assist c hand placement   -NM      Recorded by [NM] Anmol Joseph, MAILE Student      Row Name 01/08/20 1410 01/08/20 0945          Sit-Stand Transfer    Sit-Stand [place] (Transfers)  maximum assist (25% patient effort);2 person assist;verbal cues;nonverbal cues (demo/gesture);set up  -CC  maximum assist (25% patient effort);2 person assist;verbal cues;nonverbal cues (demo/gesture);set up  -LH     Assistive Device (Sit-Stand Transfers)  wheelchair x2  -CC  wheelchair pulling up on // bars  -LH     Recorded by [CC] Neris Morales OTR [LH] Pita Roth, PT     Row Name 01/08/20 1410 01/08/20 0945          Stand-Sit Transfer    Stand-Sit [place] (Transfers)  maximum assist (25% patient effort);2 person assist;verbal cues;nonverbal cues (demo/gesture);set up  -CC  maximum assist (25% patient effort);2 person assist;verbal cues;nonverbal cues (demo/gesture);set up  -LH     Assistive Device (Stand-Sit Transfers)  wheelchair x2  -CC  wheelchair // bars  -LH     Recorded by [CC] Neris Morales OTR [LH] Pita Roth, PT     Row Name 01/08/20 1410             Toilet Transfer    Type (Toilet Transfer)  squat pivot  -CC      [place] Level (Toilet Transfer)  maximum assist (25% patient effort);2 person assist  -CC      Assistive Device (Toilet Transfer)  grab bars/safety frame  -CC      Recorded by [CC] Neris Morales OTR      Row Name 01/08/20 0937             Gait/Stairs Assessment/Training    [place] Level (Gait)  moderate assist (50% patient effort);2 person assist;verbal cues;nonverbal cues (demo/gesture)  -      Assistive Device (Gait)  parallel bars  -      Distance in Feet (Gait)  5 steps  -      Pattern (Gait)  step-to  -LH       Deviations/Abnormal Patterns (Gait)  base of support, narrow;stride length decreased;bilateral deviations  -      Bilateral Gait Deviations  forward flexed posture;heel strike decreased;weight shift ability decreased  -      Comment (Gait/Stairs)  assist/VCs to advance BUEs on //  bars, following w WC. nicolas knee flexion however no  gross  buckling  -      Recorded by [] Pita Roth PT      Row Name 01/08/20 1410             Bathing Assessment/Treatment    Bathing [place] Level  bathing skills;lower body;upper body;verbal cues;nonverbal cues (demo/gesture);minimum assist (75% patient effort);maximum assist (25% patient effort)  -      Bathing Position  supine;supported sitting  -CC      Bathing Setup Assistance  adjust water temperature;obtain supplies;open containers  -CC      Comment (Bathing)  LB Supine; UB in bed  -CC      Recorded by [CC] Neris Morales OTR      Row Name 01/08/20 1410             Upper Body Dressing Assessment/Treatment    Upper Body Dressing Task  upper body dressing skills;doff;don;pull over garment;verbal cues;nonverbal cues (demo/gesture);maximal assist (25-49% patient effort)  -      Upper Body Dressing Position  supported sitting  -CC      Set-up Assistance (Upper Body Dressing)  obtain clothing  -CC      Comment (Upper Body Dressing)  seated EOM  -CC      Recorded by [CC] Neris Morales OTR      Row Name 01/08/20 1410             Lower Body Dressing Assessment/Treatment    Lower Body Dressing [place] Level  doff;don;pants/bottoms;shoes/slippers;socks;maximum assist (25% patient effort)  -CC      Lower Body Dressing Position  supine;supported sitting;supported standing  -CC      Comment (Lower Body Dressing)  x2  -CC      Recorded by [CC] Neris Morales OTR      Row Name 01/08/20 1410             Grooming Assessment/Treatment    Grooming [place] Level  grooming skills;deodorant application;oral care regimen;wash face, hands;supervision;minimum assist  (75% patient effort)  -CC      Grooming Position  supported sitting  -CC      Recorded by [CC] Neris Morales OTR      Row Name 01/08/20 1410             Toileting Assessment/Treatment    Toileting [place] Level  toileting skills;adjust/manage clothing;dependent (less than 25% patient effort)  -      Toileting Position  supported sitting;supported standing  -CC      Comment (Toileting)  x2  -CC      Recorded by [CC] Neris Morales OTR      Row Name 01/08/20 0945             Pain Scale: Numbers Pre/Post-Treatment    Pain Scale: Numbers, Pretreatment  0/10 - no pain  -      Pain Scale: Numbers, Post-Treatment  0/10 - no pain  -      Recorded by [LH] Pita Roth, PT      Row Name 01/08/20 1410 01/08/20 0945          Positioning and Restraints    Pre-Treatment Position  in bed  -CC  in bed  -LH     Post Treatment Position  wheelchair  -CC  wheelchair  -     In Bed  --  supine;call light within reach;encouraged to call for assist;with family/caregiver  (Pended)  following PM session   -NM     In Wheelchair  sitting;with PT  -CC  sitting;call light within reach;encouraged to call for assist;exit alarm on;with family/caregiver;notified nsg  -     Recorded by [CC] Neris Morales, MALIHAR [LH] Pita Roth, PT  [NM] Anmol Joseph, PT Student       User Key  (r) = Recorded By, (t) = Taken By, (c) = Cosigned By    Initials Name Effective Dates    CC Neris Morales OTR 06/08/18 -      Pita Roth, PT 04/03/18 -     NM Anmol Joseph, MAILE Student 01/03/20 -           Wound 01/06/20 2200 head Incision (Active)   Dressing Appearance open to air 1/8/2020  8:00 AM   Closure Approximated 1/8/2020  8:00 AM   Base clean;dry 1/8/2020  8:00 AM   Periwound intact 1/8/2020  8:00 AM   Drainage Amount none 1/7/2020  8:51 PM   Dressing Care, Wound open to air 1/7/2020  8:51 PM         OT Recommendation and Plan                 OT IRF GOALS     Row Name 01/07/20 1527             Transfer Goal 1 (OT-IRF)     Activity/Assistive Device (Transfer Goal 1, OT-IRF)  toilet;commode, bedside without drop arms  -SG      [place] Level (Transfer Goal 1, OT-IRF)  maximum assist (25-49% patient effort)  -SG      Time Frame (Transfer Goal 1, OT-IRF)  short term goal (STG);1 week  -SG      Progress/Outcomes (Transfer Goal 1, OT-IRF)  goal ongoing  -SG         Transfer Goal 2 (OT-IRF)    Activity/Assistive Device (Transfer Goal 2, OT-IRF)  toilet  -SG      [place] Level (Transfer Goal 2, OT-IRF)  minimum assist (75% or more patient effort);moderate assist (50-74% patient effort)  -SG      Time Frame (Transfer Goal 2, OT-IRF)  long term goal (LTG);by discharge  -SG      Progress/Outcomes (Transfer Goal 2, OT-IRF)  goal ongoing  -SG         Transfer Goal 3 (OT-IRF)    Activity/Assistive Device (Transfer Goal 3, OT-IRF)  shower chair  -SG      [place] Level (Transfer Goal 3, OT-IRF)  maximum assist (25-49% patient effort)  -SG      Time Frame (Transfer Goal 3, OT-IRF)  short term goal (STG);1 week  -SG      Progress/Outcomes (Transfer Goal 3, OT-IRF)  goal ongoing  -SG         Transfer Goal 4 (OT-IRF)    Activity/Assistive Device (Transfer Goal 4, OT-IRF)  shower chair  -SG      [place] Level (Transfer Goal 4, OT-IRF)  minimum assist (75% or more patient effort);moderate assist (50-74% patient effort)  -SG      Time Frame (Transfer Goal 4, OT-IRF)  long term goal (LTG);by discharge  -SG      Progress/Outcomes (Transfer Goal 4, OT-IRF)  goal ongoing  -SG         Bathing Goal 1 (OT-IRF)    Activity/Device (Bathing Goal 1, OT-IRF)  bathing skills, all  -SG      [place] Level (Bathing Goal 1, OT-IRF)  moderate assist (50-74% patient effort)  -SG      Time Frame (Bathing Goal 1, OT-IRF)  short term goal (STG);1 week  -SG      Progress/Outcomes (Bathing Goal 1, OT-IRF)  goal ongoing  -SG         Bathing Goal 2 (OT-IRF)    Activity/Device (Bathing Goal 2, OT-IRF)  bathing skills, all  -SG      [place] Level (Bathing  Goal 2, OT-IRF)  minimum assist (75% or more patient effort)  -SG      Time Frame (Bathing Goal 2, OT-IRF)  long term goal (LTG);by discharge  -SG      Progress/Outcomes (Bathing Goal 2, OT-IRF)  goal ongoing  -SG         UB Dressing Goal 1 (OT-IRF)    Activity/Device (UB Dressing Goal 1, OT-IRF)  upper body dressing  -SG      [place] (UB Dress Goal 1, OT-IRF)  moderate assist (50-74% patient effort)  -SG      Time Frame (UB Dressing Goal 1, OT-IRF)  short term goal (STG);1 week  -SG      Progress/Outcomes (UB Dressing Goal 1, OT-IRF)  goal ongoing  -SG         UB Dressing Goal 2 (OT-IRF)    Activity/Device (UB Dressing Goal 2, OT-IRF)  upper body dressing  -SG      [place] (UB Dress Goal 2, OT-IRF)  minimum assist (75% or more patient effort)  -SG      Time Frame (UB Dressing Goal 2, OT-IRF)  long term goal (LTG);by discharge  -SG      Progress/Outcomes (UB Dressing Goal 2, OT-IRF)  goal ongoing  -SG         LB Dressing Goal 1 (OT-IRF)    Activity/Device (LB Dressing Goal 1, OT-IRF)  lower body dressing  -SG      [place] (LB Dressing Goal 1, OT-IRF)  maximum assist (25-49% patient effort)  -SG      Time Frame (LB Dressing Goal 1, OT-IRF)  short term goal (STG);1 week  -SG      Progress/Outcomes (LB Dressing Goal 1, OT-IRF)  goal ongoing  -SG         LB Dressing Goal 2 (OT-IRF)    Activity/Device (LB Dressing Goal 2, OT-IRF)  lower body dressing  -SG      [place] (LB Dressing Goal 2, OT-IRF)  moderate assist (50-74% patient effort)  -SG      Time Frame (LB Dressing Goal 2, OT-IRF)  long term goal (LTG);by discharge  -SG      Progress/Outcomes (LB Dressing Goal 2, OT-IRF)  goal ongoing  -SG         Toileting Goal 1 (OT-IRF)    Activity/Device (Toileting Goal 1, OT-IRF)  toileting skills, all  -SG      [place] Level (Toileting Goal 1, OT-IRF)  moderate assist (50-74% patient effort)  -SG      Time Frame (Toileting Goal 1, OT-IRF)  long term goal (LTG);by discharge  -SG         Strength Goal 1  (OT-IRF)    Strength Goal 1 (OT-IRF)  Pt to tolerate UE strengthing to improve overall ROM and functional use of UE.  -SG      Time Frame (Strength Goal 1, OT-IRF)  long term goal (LTG);by discharge  -SG      Progress/Outcomes (Strength Goal 1, OT-IRF)  goal ongoing  -SG         Balance Goal 1 (OT)    Activity/Assistive Device (Balance Goal 1, OT)  sitting, static  -SG      [place] Level/Cues Needed (Balance Goal 1, OT)  contact guard assist  -SG      Time Frame (Balance Goal 1, OT)  long term goal (LTG);by discharge  -SG      Progress/Outcomes (Balance Goal 1, OT)  goal ongoing  -SG         Caregiver Training Goal 1 (OT-IRF)    Caregiver Training Goal 1 (OT-IRF)  Pt and family to be indep with ADLs, functional transfers, AD/AE, and HEP for safe d/c home.  -SG      Time Frame (Caregiver Training Goal 1, OT-IRF)  long term goal (LTG);by discharge  -SG      Progress/Outcomes (Caregiver Training Goal 1, OT-IRF)  goal ongoing  -SG        User Key  (r) = Recorded By, (t) = Taken By, (c) = Cosigned By    Initials Name Provider Type     Sudha Marte OTR Occupational Therapist          Occupational Therapy Education                 Title: PT OT SLP Therapies (In Progress)     Topic: Occupational Therapy (In Progress)     Point: ADL training (Done)     Description:   Instruct learner(s) on proper safety adaptation and remediation techniques during self care or transfers.   Instruct in proper use of assistive devices.              Learning Progress Summary           Family Acceptance, E,TB, VU by  at 1/7/2020 7719    Comment:  OT role and goals, POC.                               User Key     Initials Effective Dates Name Provider Type Discipline     12/26/18 -  Sudha Marte OTR Occupational Therapist OT                       Time Calculation:     Time Calculation- OT     Row Name 01/08/20 1230 01/08/20 1030          Time Calculation- OT    OT Start Time  1230  -CC  1030  -CC     OT Stop Time  1300  -CC   1130  -CC     OT Time Calculation (min)  30 min  -CC  60 min  -CC     OT Non-Billable Time (min)  30 min tech  -CC  30 min tech  -CC       User Key  (r) = Recorded By, (t) = Taken By, (c) = Cosigned By    Initials Name Provider Type    Neris Linn OTR Occupational Therapist          Therapy Charges for Today     Code Description Service Date Service Provider Modifiers Qty    59175478480 HC OT THER SUPP EA 15 MIN 1/8/2020 Neris Morales OTR GO 4    [id number]  OT SELF CARE/MGMT/TRAIN EA 15 MIN 1/8/2020 Neris Morales OTR GO 6                   AVA Dodd  1/8/2020

## 2020-01-08 NOTE — PROGRESS NOTES
Inpatient Rehabilitation Functional Measures Assessment and Plan of Care    Plan of Care  Updated Problems/Interventions  Field    Functional Measures  JENNIFER Eating:  [facility] Grooming: [facility] Bathing:  [facility] Upper Body Dressing:  [facility] Lower Body Dressing:  [facility] Toileting:  [facility] Bladder Management  Level of Assistance:  [place]  Frequency/Number of Accidents this Shift:  [facility] Bowel Management  Level of Assistance: [place]  Frequency/Number of Accidents this Shift: [facility] Bed/Chair/Wheelchair Transfer:  [facility] Toilet Transfer:  [facility] Tub/Shower Transfer:  [place]    Previously Documented Mode of Locomotion at Discharge: Field  JENNIFER Expected Mode of Locomotion at Discharge: [facility] Walk/Wheelchair:  [facility] Stairs:  [facility] Comprehension:  [facility] Expression:  [facility] Social Interaction:  [facility] Problem Solving:  [facility] Memory:  [place]    Therapy Mode Minutes  Occupational Therapy: Individual: 90 minutes.  Physical Therapy: [place]  Speech Language Pathology:  [place]    Signed by: AVA Dodd/ZOË

## 2020-01-08 NOTE — SIGNIFICANT NOTE
SLP spent 10 minutes trying to engage patient to actively participate in speech therapy session (scheduled 13:30 to 14:00), pt unable to stay awake or answer questions despite max verbal/tactile cueing. Will re attempt session at 15:00.

## 2020-01-08 NOTE — PROGRESS NOTES
Inpatient Rehabilitation Plan of Care Note    Plan of Care  Care Plan Reviewed - Updates as Follows    Body Systems    [RN] Integumentary(Active)  Current Status(01/08/2020): Small rash dry skin breakdown to groin/inner thigh.  Buttocks intact with a small spot of peeling area. Pink areas noted around  penis.No open area noted. Head incision healing.  Weekly Goal(01/15/2020): No further skin breakdown  Discharge Goal: Intact Skin.    Performed Intervention(s)  Skin care q shift and PRN  Assist to turn patient q 2-3hrs.      Psychosocial    [RN] Coping/Adjustment(Active)  Current Status(01/08/2020): Patient with difficult verbalizing and slow process.  Mother and sister present and very supportive.  Weekly Goal(01/15/2020): Patient will demonstrate appropriate coping mechanisms  Discharge Goal: Patient will demonstrate health coping strategies    Performed Intervention(s)  Offer support/Encourage patient to verbalize any concerns      Safety    [RN] Potential for Injury(Active)  Current Status(01/08/2020): Patient with generalized weakness. Very weak all  over, at high risk for fall. Assist of 2 with transfers.  Weekly Goal(01/15/2020): No injuries  Discharge Goal: Pt/family aware of fall/safety in the home setting.    Performed Intervention(s)  Safety rounds/bed alarm/ chair alarm on  Falls precautio/call light within easy reach      Sphincter Control    [RN] Bladder Management(Active)  Current Status(01/08/2020): Incontient episodes, patient has urgency when  needing to void.  Weekly Goal(01/15/2020): Continent 50%  Discharge Goal: Continent 100%    [RN] Bowel Management(Active)  Current Status(01/08/2020): Incontinent of bowel 100%  Weekly Goal(01/15/2020): Continent 50%  Discharge Goal: Continent 100%    Performed Intervention(s)  Monitor I&O  check for incontinence, offer toileting q2hrs  miralax daily-hold if needed.    Signed by: Alissa Mckeon RN

## 2020-01-08 NOTE — PROGRESS NOTES
Occupational Therapy: [place]    Physical Therapy: Individual: 60 minutes.    Speech Language Pathology:  [place]    Signed by: Anmol Joseph PT Student     - CoSigned By: Pita Roth PT 1/8/2020 3:41:20 PM

## 2020-01-08 NOTE — PROGRESS NOTES
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  Bilateral SCDs.  Status post acute renal lcjqusoearyos-SYV-usfgfn creatinine.  Avoid NSAID/ACE  inhibitor's    Plan of Care  Anticipated Discharge Date/Estimated Length of Stay: ELOS: 3 - 4 weeks  Anticipated Discharge Destination: Community discharge with assistance  Discharge Plan : Patient lives with sister and sister's family in one story  home. 2 steps into home. Patient [facility] M[facility].  Mother lives in [place] but here staying with them and will plan to be here at  d/c to help with care.  D/C plan is home with sister and family.  Medical Necessity Expected Level Rationale: MIN with home health therapies  Rehab prognosis: indeterminate  Medical prognosis: indeterminate  Intensity and Duration: an average of 3 hours/5 days per week  Medical Supervision and 24 Hour Rehab Nursing: x  Physical Therapy: x  PT  Intensity/Duration: 60 minutes/day, 5 days/week for approximately 20 days  Occupational Therapy: x  OT Intensity/Duration: 60 minutes/day, 5 days/week for approximately 20 days  Speech and Language Therapy: x  SLP Intensity/Duration: 60 minutes/day, 5 days/week for approximately 20 days  Social Work: x  Therapeutic Recreation: x  Psychology: x  Updated (if changes indicated)  No changes to plan.    Based on the patient's medical and functional status, their prognosis and  expected level of functional improvement is: MIN with home health therapies  Rehab prognosis: indeterminate  Medical prognosis: indeterminate    Interdisciplinary Problem/Goals/Status  Copy from POC    Comments:    Signed by: Janusz Solitario MD

## 2020-01-08 NOTE — PROGRESS NOTES
LOS: 2 days   Patient Care Team:  Jolene Laurent MD as PCP - General (Family Medicine)  Efren Martínez MD as PCP - Claims Attributed    Chief Complaint:   Status post infected  shunt-removed-CNS infection/pneumocephalus  Status post 12/23/ 2019 - Removal of ventriculoperitoneal shunt intracranial portion, valve, partial peritoneal down to the cervical region  Status post 12/31/2019 endoscopic repair of paranasal sinus defect  ID-ceftriaxone-antibiotics until January 14, 2020  Seizure disorder-multidrug regimen-phenytoin/Vimpat/Keppra/clonazepam/Topamax  Remote traumatic brain injury  Neuro stimulation-Adderall  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  Bilateral SCDs.  Status post acute renal ezmishiqvyxjf-UCK-sveczv creatinine.  Avoid NSAID/ACE inhibitor's.    Subjective     History of Present Illness  Patient seen this morning. He reports that he is feeling well this morning and got a good night of sleep. Per nurse had a couple loose bowel movements this morning. No new concerns per patient.   BUN 55, Cr 2.12- improved.      History taken from: patient, nursing    Objective     Vital Signs  Temp:  [98 °F (36.7 °C)-98.8 °F (37.1 °C)] 98 °F (36.7 °C)  Heart Rate:  [] 112  Resp:  [16-20] 16  BP: (129-136)/(77-85) 136/77    Physical Exam  Mental status: awake and alert  HEENT-craniotomy incision clean dry and intact.   Pulm: CTAB, no wheezing, rales, or rhonchi  Heart: slightly tachycardic with regular rhythm, no MRG  Abdomen: soft, non-tender, non-distended. +BS  Extremities: No cyanosis or edema   Neuro: awake and alert. Slow with responses but following commands. Left head preference but can rotate past midline to the  right.  Strength: BUE: Generally weak at least antigravity with EF, EE, finger flexion       BLE: Antigravity with HF, DF, can take some resistance with KE      Results Review:    I reviewed the patient's new clinical results.     Results from last 7 days   Lab Units 01/06/20  0424 01/05/20  0229 01/04/20  0137   WBC 10*3/uL 12.92* 11.44* 12.27*   HEMOGLOBIN g/dL 10.6* 10.6* 10.6*   HEMATOCRIT % 31.8* 32.1* 32.3*   PLATELETS 10*3/uL 295 266 299     Results from last 7 days   Lab Units 01/08/20  0556 01/07/20  0612 01/06/20  0424   SODIUM mmol/L 147* 148* 142   POTASSIUM mmol/L 3.6 3.7 3.8   CHLORIDE mmol/L 107 107 105   TOTAL CO2 mmol/L  --   --  27   CO2 mmol/L 27.3 26.5  --    BUN mg/dL 55* 57* 63*   CREATININE mg/dL 2.12* 2.19* 2.7*   CALCIUM mg/dL 9.4 9.4 9.2   GLUCOSE mg/dL 100* 112*  --        Medication Review:   Scheduled Meds:    amphetamine-dextroamphetamine XR 30 mg Oral Daily   atorvastatin 10 mg Oral Daily   cefTRIAXone 2 g Intravenous Q12H   cholecalciferol 400 Units Oral Daily   clonazePAM 1 mg Oral Q8H   heparin (porcine) 5,000 Units Subcutaneous Q8H   lacosamide 200 mg Oral Q12H   levETIRAcetam 2,000 mg Oral Q12H   metoprolol tartrate 50 mg Oral Q12H   phenytoin 100 mg Oral Q8H   polyethylene glycol 17 g Oral Daily   topiramate 25 mg Oral Daily     Continuous Infusions:   PRN Meds:.•  acetaminophen    Assessment/Plan       H/O traumatic brain injury  Status post infected  shunt-removed-CNS infection/pneumocephalus  -Status post 12/23/ 2019 - Removal of ventriculoperitoneal shunt intracranial portion, valve, partial peritoneal down to the cervical region  -Status post 12/31/2019 endoscopic repair of paranasal sinus defect  -ID-ceftriaxone-antibiotics until January 14, 2020    Seizure disorder-multidrug regimen-phenytoin/Vimpat/Keppra/clonazepam/Topamax    Remote traumatic brain injury  -Neuro stimulation-Adderall    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  Bilateral SCDs.    Status post acute renal xjusrhreqjdjs-VMM-lshgnp creatinine.  Avoid NSAID/ACE inhibitor's.  January 8- BUN 55, Cr 2.12- improved. Will continue to follow     Now admit for comprehensive acute inpatient rehabilitation .  This would be an interdisciplinary program with physical therapy 1 hour,  occupational therapy 1 hour, and speech therapy 1 hour, 5 days a week.  Rehabilitation nursing for carryover, monitoring of incision and neurologic   status, bowel and bladder, and skin  Ongoing physician follow-up.  Weekly team conferences.  Goals are indeterminate.   Rehabilitation prognosis indeterminate.  Medical prognosis indeterminate.  Estimated length of stay is indeterminate  The patient's functional status and clinical status is unchanged from preadmission assessment and the patient continues appropriate for acute inpatient rehabilitation.  Goal is for home with outpatient   therapies.  Barrier to discharge: Cognition/mobility- work on trunk control, strength, balance to overcome.      I discussed the patients findings and my recommendations with patient, family and nursing staff    Tova Mercedes DO  01/08/20  11:17 AM

## 2020-01-08 NOTE — PROGRESS NOTES
Inpatient Rehabilitation Plan of Care Note    Plan of Care  Care Plan Reviewed - No updates at this time.    Sphincter Control    Performed Intervention(s)  Monitor I&O      Safety    Performed Intervention(s)  Safety rounds/bed alarm on  Falls precautio/call light within easy reach      Psychosocial    Performed Intervention(s)  Offer support/Encourage patient to verbalize any concerns      Body Systems    Performed Intervention(s)  Skin care q shift and PRN  Assist to turn patient q 2-3hrs.    Signed by: Kristina Gordon RN
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
## 2020-01-08 NOTE — PROGRESS NOTES
Inpatient Rehabilitation Functional Measures Assessment and Plan of Care    Plan of Care  Updated Problems/Interventions  Swallow Function    [ST] Swallowing(Active)  Current Status(01/08/2020): Mild to moderate oropharyngeal dysphagia, VFSS at  [facility] 1/3/20 recommended puree and thins, meds crushed with puree  Weekly Goal(01/08/2020): Follow safe swallow precautions with supervision with  min cues  Discharge Goal: Independent with safe swallow precautions and tolerating least  restrictive diet    Functional Measures  [facility] Eating:  [facility] Grooming: [facility] Bathing:  [facility] Upper Body Dressing:  [facility] Lower Body Dressing:  [facility] Toileting:  [facility] Bladder Management  Level of Assistance:  [place]  Frequency/Number of Accidents this Shift:  [facility] Bowel Management  Level of Assistance: [place]  Frequency/Number of Accidents this Shift: [facility] Bed/Chair/Wheelchair Transfer:  [facility] Toilet Transfer:  [facility] Tub/Shower Transfer:  [place]    Previously Documented Mode of Locomotion at Discharge: Field  JENNIFER Expected Mode of Locomotion at Discharge: [facility] Walk/Wheelchair:  [facility] Stairs:  [facility] Comprehension:  [facility] Expression:  [facility] Social Interaction:  [facility] Problem Solving:  [facility] Memory:  [place]    Therapy Mode Minutes  Occupational Therapy: Branch  Physical Therapy: Branch  Speech Language Pathology:  Branch    Signed by: Shivani Smalls MS, CCC-SLP

## 2020-01-09 PROCEDURE — 97112 NEUROMUSCULAR REEDUCATION: CPT

## 2020-01-09 PROCEDURE — 97129 THER IVNTJ 1ST 15 MIN: CPT | Performed by: SPEECH-LANGUAGE PATHOLOGIST

## 2020-01-09 PROCEDURE — 97530 THERAPEUTIC ACTIVITIES: CPT

## 2020-01-09 PROCEDURE — 99221 1ST HOSP IP/OBS SF/LOW 40: CPT | Performed by: PSYCHIATRY & NEUROLOGY

## 2020-01-09 PROCEDURE — 25010000003 CEFTRIAXONE PER 250 MG: Performed by: PHYSICAL MEDICINE & REHABILITATION

## 2020-01-09 PROCEDURE — 97535 SELF CARE MNGMENT TRAINING: CPT

## 2020-01-09 PROCEDURE — 97130 THER IVNTJ EA ADDL 15 MIN: CPT | Performed by: SPEECH-LANGUAGE PATHOLOGIST

## 2020-01-09 PROCEDURE — 92526 ORAL FUNCTION THERAPY: CPT | Performed by: SPEECH-LANGUAGE PATHOLOGIST

## 2020-01-09 PROCEDURE — 97110 THERAPEUTIC EXERCISES: CPT

## 2020-01-09 PROCEDURE — 25010000002 HEPARIN (PORCINE) PER 1000 UNITS: Performed by: PHYSICAL MEDICINE & REHABILITATION

## 2020-01-09 PROCEDURE — 97116 GAIT TRAINING THERAPY: CPT

## 2020-01-09 RX ORDER — LEVETIRACETAM 100 MG/ML
500 SOLUTION ORAL EVERY 12 HOURS SCHEDULED
Status: DISCONTINUED | OUTPATIENT
Start: 2020-01-09 | End: 2020-01-23 | Stop reason: SDUPTHER

## 2020-01-09 RX ADMIN — CLONAZEPAM 1 MG: 0.5 TABLET ORAL at 14:21

## 2020-01-09 RX ADMIN — LACOSAMIDE 200 MG: 100 TABLET, FILM COATED ORAL at 10:41

## 2020-01-09 RX ADMIN — CHOLECALCIFEROL TAB 10 MCG (400 UNIT) 400 UNITS: 10 TAB at 10:41

## 2020-01-09 RX ADMIN — PHENYTOIN 100 MG: 50 TABLET, CHEWABLE ORAL at 14:21

## 2020-01-09 RX ADMIN — TOPIRAMATE 25 MG: 25 TABLET, FILM COATED ORAL at 10:41

## 2020-01-09 RX ADMIN — HEPARIN SODIUM 5000 UNITS: 5000 INJECTION INTRAVENOUS; SUBCUTANEOUS at 21:27

## 2020-01-09 RX ADMIN — METOPROLOL TARTRATE 50 MG: 50 TABLET, FILM COATED ORAL at 21:27

## 2020-01-09 RX ADMIN — CEFTRIAXONE SODIUM 2 G: 2 INJECTION, SOLUTION INTRAVENOUS at 23:32

## 2020-01-09 RX ADMIN — CLONAZEPAM 1 MG: 0.5 TABLET ORAL at 06:16

## 2020-01-09 RX ADMIN — DEXTROAMPHETAMINE SACCHARATE, AMPHETAMINE ASPARTATE MONOHYDRATE, DEXTROAMPHETAMINE SULFATE, AND AMPHETAMINE SULFATE 30 MG: 2.5; 2.5; 2.5; 2.5 CAPSULE, EXTENDED RELEASE ORAL at 10:41

## 2020-01-09 RX ADMIN — PHENYTOIN 100 MG: 50 TABLET, CHEWABLE ORAL at 06:16

## 2020-01-09 RX ADMIN — LACOSAMIDE 200 MG: 100 TABLET, FILM COATED ORAL at 21:27

## 2020-01-09 RX ADMIN — HEPARIN SODIUM 5000 UNITS: 5000 INJECTION INTRAVENOUS; SUBCUTANEOUS at 06:16

## 2020-01-09 RX ADMIN — PHENYTOIN 100 MG: 50 TABLET, CHEWABLE ORAL at 21:28

## 2020-01-09 RX ADMIN — CLONAZEPAM 1 MG: 0.5 TABLET ORAL at 21:27

## 2020-01-09 RX ADMIN — LEVETIRACETAM 2000 MG: 100 SOLUTION ORAL at 10:41

## 2020-01-09 RX ADMIN — ATORVASTATIN CALCIUM 10 MG: 10 TABLET, FILM COATED ORAL at 10:41

## 2020-01-09 RX ADMIN — HEPARIN SODIUM 5000 UNITS: 5000 INJECTION INTRAVENOUS; SUBCUTANEOUS at 14:21

## 2020-01-09 RX ADMIN — METOPROLOL TARTRATE 50 MG: 50 TABLET, FILM COATED ORAL at 10:41

## 2020-01-09 RX ADMIN — LEVETIRACETAM 500 MG: 100 SOLUTION ORAL at 21:27

## 2020-01-09 RX ADMIN — CEFTRIAXONE SODIUM 2 G: 2 INJECTION, SOLUTION INTRAVENOUS at 11:01

## 2020-01-09 NOTE — PLAN OF CARE
Problem: Patient Care Overview  Goal: Plan of Care Review  Outcome: Ongoing (interventions implemented as appropriate)  Flowsheets (Taken 1/9/2020 7579)  Outcome Summary: mostly drowsy this shift but easily arousable. incontinent b&b. neuro adjusted and decresaed seizure meds d/t drowsiness. worked with therapies. no c/o pain. will continue to monitor.  Progress, Functional Goals: demonstrating adequate progress  Plan of Care Reviewed With: patient  IRF Plan of Care Review: progress ongoing, continue

## 2020-01-09 NOTE — PROGRESS NOTES
LOS: 3 days   Patient Care Team:  Jolene Laurent MD as PCP - General (Family Medicine)  Efren Martínez MD as PCP - Claims Attributed    Chief Complaint:   Status post infected  shunt-removed-CNS infection/pneumocephalus  Status post 12/23/ 2019 - Removal of ventriculoperitoneal shunt intracranial portion, valve, partial peritoneal down to the cervical region  Status post 12/31/2019 endoscopic repair of paranasal sinus defect  ID-ceftriaxone-antibiotics until January 14, 2020  Seizure disorder-multidrug regimen-phenytoin/Vimpat/Keppra/clonazepam/Topamax  Remote traumatic brain injury  Neuro stimulation-Adderall  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  Bilateral SCDs.  Status post acute renal umofvyzgbmxbf-VXU-wmfyyn creatinine.  Avoid NSAID/ACE inhibitor's.    Subjective     History of Present Illness  Patient without any headache complaint.  He does get fatigued after therapy sessions and requires a rest break.  No changes in his strength.    History taken from: patient chart family    Objective     Vital Signs  Temp:  [97.6 °F (36.4 °C)-98.2 °F (36.8 °C)] 97.6 °F (36.4 °C)  Heart Rate:  [104-112] 109  Resp:  [18] 18  BP: (141-160)/(94-97) 158/94    Physical Exam  Mental status: awake, fatigued but then arouses  HEENT-craniotomy incision clean dry and intact.   Pulm: CTAB, no wheezing, rales, or rhonchi  Heart: RRR, no MRG  Abdomen: soft, non-tender, non-distended. +BS  Extremities: No cyanosis or edema   Neuro: awake   Slow with responses but following commands. Left head preference but can rotate past midline to the right.  Strength: Mild weakness and incoordination on the right side compared to the left.      Results Review:    I  reviewed the patient's new clinical results.     Results from last 7 days   Lab Units 01/06/20  0424 01/05/20  0229 01/04/20  0137   WBC 10*3/uL 12.92* 11.44* 12.27*   HEMOGLOBIN g/dL 10.6* 10.6* 10.6*   HEMATOCRIT % 31.8* 32.1* 32.3*   PLATELETS 10*3/uL 295 266 299     Results from last 7 days   Lab Units 01/08/20  0556 01/07/20  0612 01/06/20  0424   SODIUM mmol/L 147* 148* 142   POTASSIUM mmol/L 3.6 3.7 3.8   CHLORIDE mmol/L 107 107 105   TOTAL CO2 mmol/L  --   --  27   CO2 mmol/L 27.3 26.5  --    BUN mg/dL 55* 57* 63*   CREATININE mg/dL 2.12* 2.19* 2.7*   CALCIUM mg/dL 9.4 9.4 9.2   GLUCOSE mg/dL 100* 112*  --        Medication Review:   Scheduled Meds:    amphetamine-dextroamphetamine XR 30 mg Oral Daily   atorvastatin 10 mg Oral Daily   cefTRIAXone 2 g Intravenous Q12H   cholecalciferol 400 Units Oral Daily   clonazePAM 1 mg Oral Q8H   heparin (porcine) 5,000 Units Subcutaneous Q8H   lacosamide 200 mg Oral Q12H   levETIRAcetam 2,000 mg Oral Q12H   metoprolol tartrate 50 mg Oral Q12H   phenytoin 100 mg Oral Q8H   polyethylene glycol 17 g Oral Daily   topiramate 25 mg Oral Daily     Continuous Infusions:   PRN Meds:.•  acetaminophen    Assessment/Plan       H/O traumatic brain injury  Status post infected  shunt-removed-CNS infection/pneumocephalus  -Status post 12/23/ 2019 - Removal of ventriculoperitoneal shunt intracranial portion, valve, partial peritoneal down to the cervical region  -Status post 12/31/2019 endoscopic repair of paranasal sinus defect  -ID-ceftriaxone-antibiotics until January 14, 2020    Seizure disorder-multidrug regimen-phenytoin/Vimpat/Keppra/clonazepam/Topamax  January 9-patient was on phenytoin at home which was increased at the outside hospital, on Topamax but less than antiepileptic dose.  He had Vimpat, Keppra, clonazepam added at the outside hospital.  Consulted neurology for input patient has fatigue felt possibly related to his increased antiepileptic medication.  Reviewed with  neurology and Keppra dose being decreased.    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  Bilateral SCDs.    Status post acute renal ifanabpftputz-SMA-awxvmf creatinine.  Avoid NSAID/ACE inhibitor's.     Now admit for comprehensive acute inpatient rehabilitation .  This would be an interdisciplinary program with physical therapy 1 hour,  occupational therapy 1 hour, and speech therapy 1 hour, 5 days a week.  Rehabilitation nursing for carryover, monitoring of incision and neurologic   status, bowel and bladder, and skin  Ongoing physician follow-up.  Weekly team conferences.  Goals are indeterminate.   Rehabilitation prognosis indeterminate.  Medical prognosis indeterminate.  Estimated length of stay is indeterminate  The patient's functional status and clinical status is unchanged from preadmission assessment and the patient continues appropriate for acute inpatient rehabilitation.  Goal is for home with outpatient   therapies.  Barrier to discharge: Cognition/mobility- work on trunk control, strength, balance to overcome.     TEAM CONF - JAN 9 - BED MAX 2.  TRANSFERS MAX 2.  GAIT 8 FEET PARALLEL BARS MOD 2. TOILET TRANSFERS MAX 2.  BLIND.  SLOW PROCESSING.  GROOMING MOD. UBD MAX. LBD DEP. DYSPHAGIA - PUREE/THINS.  FATIGUES WITH COGNITIVE  THERAPY.  ELOS :  4 WEEKS       I discussed the patients findings and my recommendations with patient, family and nursing staff    Janusz Solitario MD  01/09/20  8:24 AM

## 2020-01-09 NOTE — PROGRESS NOTES
Reviewed ELOS, current status, d/c and weekly goals with patient, mother, and sister, by phone. They are agreeable to plan. Sister stated patient was on Protonix at [facility] and she talked to nurse yesterday here as patient is not on this medicine currently. Nurse has left note for MD to address. Contacted [facility] office as patient was scheduled for post op follow up appointment with nurse practitioner on Monday. Talked with Laurita, Medical Assistant, who checked to see if visit could be postponed. Laurita called back to say she would cancel Monday appointment and we can call when patient being d/c to reschedule follow up. She stated patient does have 2 sutures that are dissolvable and wants nurse to look at on Monday and can remove them if not dissolved. Will discuss with nurse and . Sister also questioned whether Boost supplement upset patient's stomach. She is also concerned about his intake--didn't feel he got much food last night to eat. She may also puree food at home and bring in for patient. She understands need for pureed diet at this time.

## 2020-01-09 NOTE — THERAPY TREATMENT NOTE
Inpatient Rehabilitation - Physical Therapy Treatment Note  [facility]     Patient Name: Tye JONES Junior  : 1975  MRN: [id number]    Today's Date: 2020                 Admit Date: 2020      Visit Dx:      ICD-10-CM ICD-9-CM   1. Impaired mobility Z74.09 799.89       Patient Active Problem List   Diagnosis   • Mixed hyperlipidemia   • Essential hypertension   • Traumatic brain injury (CMS/HCC)   • Acute allergic rhinitis   • Seizure (CMS/HCC)   • Screen for colon cancer   • H/O traumatic brain injury       Therapy Treatment    IRF Treatment Summary     Row Name 20 1421 20 1000 20 0900       Evaluation/Treatment Time and Intent    Subjective Information  no complaints  -CC  no complaints states didn't sleep much last night; stated ready to get up   -JACI FLORES JK2  no complaints  -ROSALES    Existing Precautions/Restrictions  fall;other (see comments) blind   -CC  --  --    Document Type  therapy note (daily note)  -CC  therapy note (daily note)  -JACI FLORES JK2  therapy note (daily note)  -ROSALES    Mode of Treatment  occupational therapy  -JOSÉ  individual therapy;physical therapy  -MARK,YASIR BEATTY  individual therapy;speech-language pathology  -KA    Patient/Family Observations  up in w/c am; supine pm  -CC  supine in bed; no acute distress; mother present   -JACI FLORES JK2  --    Recorded by [CC] Neris Morales, OTR [MARK,NM,YASIR] Binta Hartman, PT (r) Anmol Joseph PT Student (t) Binta Hartman, PT (c) [KA] Samuel Smalls MA,CCC-SLP    Row Name 20 1421             Cognition/Psychosocial- PT/OT    Orientation Status (Cognition)  oriented to;person;place  -CC      Follows Commands (Cognition)  follows one step commands;delayed response/completion;increased processing time needed;initiation impaired;repetition of directions required  -CC      Personal Safety Interventions  fall prevention program maintained;gait belt;nonskid shoes/slippers when out of bed  -CC      Recorded by [CC] Andrew  AVA Cordon      Row Name 01/09/20 1421 01/09/20 1000          Bed Mobility Assessment/Treatment    Rolling Left [place] (Bed Mobility)  maximum assist (25% patient effort);2 person assist  -CC  verbal cues;nonverbal cues (demo/gesture);maximum assist (25% patient effort)  -JACI FLORES JK2     Rolling Right [place] (Bed Mobility)  verbal cues;nonverbal cues (demo/gesture);moderate assist (50% patient effort);maximum assist (25% patient effort)  -CC  --     Scooting/Bridging [place] (Bed Mobility)  moderate assist (50% patient effort)  -CC  maximum assist (25% patient effort);verbal cues;nonverbal cues (demo/gesture)  -JACI FLORES JK2     Supine-Sit [place] (Bed Mobility)  verbal cues;nonverbal cues (demo/gesture);moderate assist (50% patient effort)  -CC  moderate assist (50% patient effort);2 person assist;verbal cues;nonverbal cues (demo/gesture)  -JACI FLORES JK2     Sit-Supine [place] (Bed Mobility)  maximum assist (25% patient effort)  -CC  --     Bed Mobility, Safety Issues  --  decreased use of arms for pushing/pulling;decreased use of legs for bridging/pushing;impaired trunk control for bed mobility  -JACI FLORES JK2     Assistive Device (Bed Mobility)  bed rails  -CC  bed rails  -JACI FLORES JK2     Comment (Bed Mobility)  SB for ack to bed. Pt very lethargic  -CC  tactile cues for hand placement and LE management   -JACI FLORES JK2     Recorded by [CC] Neris Morales OTR [JACI FLORES,ALEMK2] Binta Hartman, PT (r) Anmol Joseph, MAILE Student (t) Binta Hartman, PT (c)     Row Name 01/09/20 1000             Transfer Assessment/Treatment    Transfer Assessment/Treatment  sit-stand transfer;stand-sit transfer  -JACI FLORES JK2      Comment (Transfers)  in PM, performed sit<>stands inside // bars c Manuel x 2 and modA x 2 using a walker  -JACI FLORES,YASIR      Recorded by [JACI FLORES,ALEMK2] Binta Hartman, PT (r) Anmol Joseph, PT Student (t) Binta Hartman PT (c)      Row Name 01/09/20 1000             Bed-Chair Transfer    Bed-Chair  [place] (Transfers)  moderate assist (50% patient effort);2 person assist;verbal cues;nonverbal cues (demo/gesture) squat pivot bed to w/; cues to lean forward, foot placement   -JACI FLORES JK2      Assistive Device (Bed-Chair Transfers)  wheelchair  -MARK,NM,JK2      Recorded by [MARK,NM,JK2] Binta Hartman, PT (r) Anmol Joseph, PT Student (t) Binta Hartman, PT (c)      Row Name 01/09/20 1421 01/09/20 1000          Chair-Bed Transfer    Chair-Bed [place] (Transfers)  maximum assist (25% patient effort);1 person to manage equipment  -CC  moderate assist (50% patient effort);2 person assist  -AJCI FLORES JK2     Assistive Device (Chair-Bed Transfers)  transfer board;wheelchair  -CC  wheelchair bedrail   -JACI FLORES,ALEMK2     Recorded by [CC] Neris Morales, AVA [MARK,NM,JK2] Binta Hartman PT (r) Anmol Joseph, PT Student (t) Binta Hartman, PT (c)     Row Name 01/09/20 1421 01/09/20 1000          Sit-Stand Transfer    Sit-Stand [place] (Transfers)  verbal cues;nonverbal cues (demo/gesture);maximum assist (25% patient effort)  -CC  moderate assist (50% patient effort);2 person assist;verbal cues;nonverbal cues (demo/gesture) cues to scoot, lean forward and place hands/feet   -JACI FLORES JK2     Assistive Device (Sit-Stand Transfers)  wheelchair  -CC  wheelchair BUE on // bars  -JACI FLORES JK2     Recorded by [CC] Neris Morales OTR [MARK,NM,JK2] Binta Hartman, PT (r) Anmol Joseph, PT Student (t) Binta Hartman, PT (c)     Row Name 01/09/20 1421 01/09/20 1000          Stand-Sit Transfer    Stand-Sit [place] (Transfers)  maximum assist (25% patient effort);2 person assist;verbal cues;nonverbal cues (demo/gesture);set up  -CC  moderate assist (50% patient effort);2 person assist;verbal cues;nonverbal cues (demo/gesture)  -JACI FLORES JK2     Assistive Device (Stand-Sit Transfers)  wheelchair  -JOSÉ  wheelchair // bars  -JACI FLORES JK2     Recorded by [CC] Neris Morales OTR [JACI FLORES JK2] Binta Hartman, PT (r) Jake,  Anmol, PT Student (t) Binta Hartman, PT (c)     Row Name 01/09/20 1000             Gait/Stairs Assessment/Training    [place] Level (Gait)  moderate assist (50% patient effort);2 person assist;verbal cues;nonverbal cues (demo/gesture)  -JACI FLORESJMALA      Assistive Device (Gait)  parallel bars  -MARK,NM,JK2      Distance in Feet (Gait)  8' x 3   -JACI FLORES JK2      Pattern (Gait)  step-to  -JACI FLORES JK2      Deviations/Abnormal Patterns (Gait)  bilateral deviations;base of support, narrow;gait speed decreased;stride length decreased  -MARK,NM,JK2      Bilateral Gait Deviations  forward flexed posture;heel strike decreased  -JACI FLORESJMALA      Comment (Gait/Stairs)  Assist c hand placement on // bars ; cues for knee ext nicolas and upright posture   -MARK,NM,JK2      Recorded by [MARK,NM,JK2] Binta Hartman, PT (r) Anmol Joseph, PT Student (t) Binta Hartman, PT (c)      Row Name 01/09/20 1000             Safety Issues, Functional Mobility    Safety Issues Affecting Function (Mobility)  safety precaution awareness;problem solving  -JACI FLORES,JMALA      Impairments Affecting Function (Mobility)  visual/perceptual;strength;motor planning  -MARK,NM,JK2      Recorded by [MARK,NM,JK2] Binta Hartman, PT (r) Anmol Joseph, PT Student (t) Binta Hartman, PT (c)      Row Name 01/09/20 1421             Bathing Assessment/Treatment    Bathing [place] Level  bathing skills;lower body;upper body;verbal cues;nonverbal cues (demo/gesture);minimum assist (75% patient effort);maximum assist (25% patient effort)  -CC      Bathing Position  sink side;supported sitting;supported standing  -CC      Bathing Setup Assistance  adjust water temperature;obtain supplies;open containers  -CC      Comment (Bathing)  assist x 2 with standing. Min UB; Max LB  -CC      Recorded by [CC] Neris Morales OTR      Row Name 01/09/20 1421             Upper Body Dressing Assessment/Treatment    Upper Body Dressing Task  upper body dressing skills;doff;don;front  opening garment;pull over garment;clothes fastener management;verbal cues;nonverbal cues (demo/gesture);moderate assist (50-74% patient effort);maximal assist (25-49% patient effort)  -CC      Upper Body Dressing Position  supported sitting  -CC      Set-up Assistance (Upper Body Dressing)  obtain clothing  -CC      Recorded by [CC] Neris Morales OTR      Row Name 01/09/20 1421             Lower Body Dressing Assessment/Treatment    Lower Body Dressing [place] Level  doff;don;pants/bottoms;shoes/slippers;socks;verbal cues;nonverbal cues (demo/gesture);maximum assist (25% patient effort)  -CC      Lower Body Dressing Position  supported sitting;supported standing  -CC      Comment (Lower Body Dressing)  x 2 for standing  -CC      Recorded by [CC] Neris Morales OTR      Row Name 01/09/20 1421             Grooming Assessment/Treatment    Grooming [place] Level  grooming skills;deodorant application;oral care regimen;wash face, hands;supervision;minimum assist (75% patient effort)  -CC      Grooming Position  supported sitting  -CC      Recorded by [CC] Neris Morales OTR      Row Name 01/09/20 1421             Gross Motor Coordination    Gross Motor Coordination Interventions  other (see comments) max vc for grasp/relaese. Hand over hand secondary to vision  -CC      Recorded by [CC] Neris Morales OTR      Row Name 01/09/20 1421 01/09/20 1030          Pain Scale: Numbers Pre/Post-Treatment    Pain Scale: Numbers, Pretreatment  0/10 - no pain  -CC  0/10 - no pain  (Pended)   -NM     Pain Scale: Numbers, Post-Treatment  0/10 - no pain  -CC  0/10 - no pain  (Pended)   -NM     Recorded by [CC] Neris Morales OTR [NM] Anmol Joseph, PT Student     Row Name 01/09/20 1000             Balance    Balance  static standing balance;dynamic standing balance  -JK,NM,JK2      Recorded by [ALEMK,NM,JK2] Binta Hartman, PT (r) Anmol Joseph, MAILE Student (t) Binta Hartman, PT (c)      Row Name 01/09/20  1421             Static Sitting Balance    Level of [place] (Unsupported Sitting, Static Balance)  minimal assist, 75% patient effort  -CC      Sitting Position (Unsupported Sitting, Static Balance)  sitting edge of mat  -CC      Time Able to Maintain Position (Unsupported Sitting, Static Balance)  more than 5 minutes  -CC      Comment (Unsupported Sitting, Static Balance)  hand over hand reaching task. Min assist w balance during functional task.   -CC      Level of [place] (Supported Sitting, Static Balance)  contact guard assist;minimal assist, 75% patient effort  -CC      Sitting Position (Supported Sitting, Static Balance)  sitting edge of mat  -CC      Recorded by [CC] Neris Morales OTR      Row Name 01/09/20 1000             Static Standing Balance    Level of [place] (Supported Standing, Static Balance)  contact guard assist;minimal assist, 75% patient effort;2 person assist  -JACI FLORES JK2      Time Able to Maintain Position (Supported Standing, Static Balance)  2 to 3 minutes  -JACI FLORES JK2      Assistive Device Utilized (Supported Standing, Static Balance)  parallel bars;walker, rolling  -JACI FLORES JK2      Comment (Supported Standing, Static Balance)  static standing inside // bars c verbal and tactile cues for midline orientation, hip and trunk extension, and anterior weight shift to decrease retropulsion   -JACI FLORES JK2      Recorded by [JACI FLORES,JK2] Binta Hartman, PT (r) Anmol Joseph PT Student (t) Binta Hartman, PT (c)      Row Name 01/09/20 1000             Dynamic Standing Balance    Level of [place], Reaches Outside Midline (Standing, Dynamic Balance)  minimal assist, 75% patient effort;2 person assist  -JACI FLORES JK2      Time Able to Maintain Position, Reaches Outside Midline (Standing, Dynamic Balance)  2 to 3 minutes  -JACI FLORES JK2      Comment, Reaches Outside Midline (Standing, Dynamic Balance)  bilateral weight shifting; cues for L knee control during extension of knee  -JACI FLORES JK2       Recorded by [MARK,NM,ALEMK2] Binta Hartman, PT (r) Anmol Joseph, MAILE Student (t) Binta Hartman PT (c)      Row Name 01/09/20 1421 01/09/20 1000          Positioning and Restraints    Pre-Treatment Position  in bed  -CC  in bed  -JACI FLORESJK2     Post Treatment Position  wheelchair  -CC  wheelchair  -JACI FLORES,YASIR     In Bed  --  supine;notified nsg;call light within reach;with family/caregiver PM treatment   -JACI FLORES JK2     In Wheelchair  sitting;with PT  -CC  sitting;with family/caregiver;call light within reach pt's mother present  -MARK,NM,YASIR     Recorded by [CC] Neris Morales OTR [MARK,NM,YASIR] Binta Hartman, PT (r) Anmol Joseph PT Student (t) Binta Hartman PT (c)       User Key  (r) = Recorded By, (t) = Taken By, (c) = Cosigned By    Initials Name Effective Dates    CC Neris Morales, OTROCÍO 06/08/18 -     Samuel Rivera MA,[facility]-SLP 06/08/18 -     Binta Villa PT 04/03/18 -     Anmol Cordero, MAILE Student 01/03/20 -         Wound 01/06/20 2200 head Incision (Active)   Dressing Appearance open to air 1/9/2020 10:30 AM   Closure Approximated 1/9/2020 10:30 AM   Base clean;dry 1/9/2020 10:30 AM   Periwound intact 1/9/2020 10:30 AM   Drainage Amount none 1/9/2020 10:30 AM     Physical Therapy Education                 Title: PT OT SLP Therapies (In Progress)     Topic: Physical Therapy (In Progress)     Point: Mobility training (In Progress)     Description:   Instruct learner(s) on safety and technique for assisting patient out of bed, chair or wheelchair.  Instruct in the proper use of assistive devices, such as walker, crutches, cane or brace.              Patient Friendly Description:   It's important to get you on your feet again, but we need to do so in a way that is safe for you. Falling has serious consequences, and your personal safety is the most important thing of all.        When it's time to get out of bed, one of us or a family member will sit next to you on the bed to give you  support.     If your doctor or nurse tells you to use a walker, crutches, a cane, or a brace, be sure you use it every time you get out of bed, even if you think you don't need it.    Learning Progress Summary           Patient Acceptance, E, NR by  at 1/9/2020 1449    Acceptance, E, NR by  at 1/8/2020 0949    Acceptance, E, NR by  at 1/7/2020 1158   Family Acceptance, E, NR by  at 1/7/2020 1158                   Point: Home exercise program (In Progress)     Description:   Instruct learner(s) on appropriate technique for monitoring, assisting and/or progressing patient with therapeutic exercises and activities.              Learning Progress Summary           Patient Acceptance, E, NR by  at 1/7/2020 1158   Family Acceptance, E, NR by  at 1/7/2020 1158                   Point: Body mechanics (In Progress)     Description:   Instruct learner(s) on proper positioning and spine alignment for patient and/or caregiver during mobility tasks and/or exercises.              Learning Progress Summary           Patient Acceptance, E, NR by  at 1/7/2020 1158   Family Acceptance, E, NR by  at 1/7/2020 1158                   Point: Precautions (In Progress)     Description:   Instruct learner(s) on prescribed precautions during mobility and gait tasks              Learning Progress Summary           Patient Acceptance, E, NR by  at 1/8/2020 0949                               User Key     Initials Effective Dates Name Provider Type Discipline     04/03/18 -  Pita Roth, PT Physical Therapist PT    JK 04/03/18 -  Binta Hartman, PT Physical Therapist PT                  PT Recommendation and Plan                        Time Calculation:     PT Charges     Row Name 01/09/20 1446 01/09/20 1105          Time Calculation    Start Time  1300  -JK (r) NM (t) JK (c)  0900  -JK (r) NM (t) JK (c)     Stop Time  1330  -JK (r) NM (t) JK (c)  0930  -JK (r) NM (t) JK (c)     Time Calculation (min)  30 min  -JK (r) NM  (t)  30 min  -MARK (r) NM (t)     PT Received On  --  01/09/20  -MARK (r) NM (t) MARK (c)     PT - Next Appointment  --  01/10/20  -MARK (r) NM (t) MARK (c)       User Key  (r) = Recorded By, (t) = Taken By, (c) = Cosigned By    Initials Name Provider Type    Binta Villa, PT Physical Therapist    NM Anmol Joseph, PT Student PT Student          Therapy Charges for Today     Code Description Service Date Service Provider Modifiers Qty    39709983635 HC PT THER PROC EA 15 MIN 1/8/2020 Anmol Joseph, PT Student GP 4    52788197430 HC PT THER PROC EA 15 MIN 1/9/2020 Anmol Joseph, PT Student GP 1    45978921052 HC GAIT TRAINING EA 15 MIN 1/9/2020 Anmol Joseph, PT Student GP 1    [id number]  PT THERAPEUTIC ACT EA 15 MIN 1/9/2020 Anmol Joseph, PT Student GP 2                   Anmol Joseph, PT Student  1/9/2020

## 2020-01-09 NOTE — PLAN OF CARE
Problem: Patient Care Overview  Goal: Plan of Care Review  Outcome: Ongoing (interventions implemented as appropriate)  Flowsheets (Taken 1/9/2020 0402)  Outcome Summary: Alert, but confused, answered question correctly that he was in hospital. Meds crushed in applesauce. Slept fair. Mother at bedside. Incontinent of B&B.     Problem: Fall Risk (Adult)  Goal: Absence of Fall  Outcome: Ongoing (interventions implemented as appropriate)     Problem: Skin Injury Risk (Adult)  Goal: Skin Health and Integrity  Outcome: Ongoing (interventions implemented as appropriate)

## 2020-01-09 NOTE — PROGRESS NOTES
Inpatient Rehabilitation Plan of Care Note    Plan of Care  Care Plan Reviewed - Updates as Follows    Sphincter Control    Performed Intervention(s)  Monitor I&O  check for incontinence, offer toileting q2hrs  miralax daily-hold if needed.      Safety    Performed Intervention(s)  Safety rounds/bed alarm/ chair alarm on  Falls precautio/call light within easy reach      Psychosocial    Performed Intervention(s)  Offer support/Encourage patient to verbalize any concerns      Body Systems    Performed Intervention(s)  Skin care q shift and PRN  Assist to turn patient q 2-3hrs.    Signed by: Polo Rtohman RN

## 2020-01-09 NOTE — PROGRESS NOTES
Occupational Therapy: Branch    Physical Therapy: Individual: 60 minutes.    Speech Language Pathology:  Branch    Signed by: Binta Hartman PT

## 2020-01-09 NOTE — PROGRESS NOTES
Inpatient Rehabilitation Plan of Care Note    Plan of Care  Care Plan Reviewed - Updates as Follows    Body Systems    [RN] Integumentary(Active)  Current Status(01/09/2020): Small rash dry skin breakdown to groin/inner thigh.  Buttocks intact with a small spot of peeling area. Pink areas noted around  penis.No open area noted. Head incision healing.  Weekly Goal(01/15/2020): No further skin breakdown  Discharge Goal: Intact Skin.    Performed Intervention(s)  Skin care q shift and PRN  Assist to turn patient q 2-3hrs.      Psychosocial    [RN] Coping/Adjustment(Active)  Current Status(01/09/2020): Patient with difficult verbalizing and slow process.  Mother and sister present and very supportive.  Weekly Goal(01/15/2020): Patient will demonstrate appropriate coping mechanisms  Discharge Goal: Patient will demonstrate health coping strategies    Performed Intervention(s)  Offer support/Encourage patient to verbalize any concerns      Safety    [RN] Potential for Injury(Active)  Current Status(01/09/2020): Patient with generalized weakness. Very weak all  over, at high risk for fall. Assist of 2 with transfers.  Weekly Goal(01/15/2020): No injuries  Discharge Goal: Pt/family aware of fall/safety in the home setting.    Performed Intervention(s)  Safety rounds/bed alarm/ chair alarm on  Falls precautio/call light within easy reach      Sphincter Control    [RN] Bladder Management(Active)  Current Status(01/09/2020): Incontient episodes, patient has urgency when  needing to void.  Weekly Goal(01/15/2020): Continent 50%  Discharge Goal: Continent 100%    [RN] Bowel Management(Active)  Current Status(01/09/2020): Incontinent of bowel 100%  Weekly Goal(01/15/2020): Continent 50%  Discharge Goal: Continent 100%    Performed Intervention(s)  Monitor I&O  check for incontinence, offer toileting q2hrs  miralax daily-hold if needed.    Signed by: Kristina Gordon RN
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
## 2020-01-09 NOTE — PROGRESS NOTES
Inpatient Rehabilitation Functional Measures Assessment and Plan of Care    Plan of Care  Updated Problems/Interventions  Field    Functional Measures  JENNIFER Eating:  [facility] Grooming: [facility] Bathing:  [facility] Upper Body Dressing:  [facility] Lower Body Dressing:  [facility] Toileting:  [facility] Bladder Management  Level of Assistance:  [place]  Frequency/Number of Accidents this Shift:  [facility] Bowel Management  Level of Assistance: [place]  Frequency/Number of Accidents this Shift: [facility] Bed/Chair/Wheelchair Transfer:  [facility] Toilet Transfer:  [facility] Tub/Shower Transfer:  [place]    Previously Documented Mode of Locomotion at Discharge: Field  JENNIFER Expected Mode of Locomotion at Discharge: [facility] Walk/Wheelchair:  [facility] Stairs:  [facility] Comprehension:  [facility] Expression:  [facility] Social Interaction:  [facility] Problem Solving:  [facility] Memory:  [place]    Therapy Mode Minutes  Occupational Therapy: Individual: 60 minutes.  Physical Therapy: [place]  Speech Language Pathology:  [place]    Signed by: AVA Dodd/ZOË

## 2020-01-09 NOTE — PROGRESS NOTES
Inpatient Rehabilitation Functional Measures Assessment and Plan of Care    Plan of Care  Updated Problems/Interventions  Mobility    [OT] Toilet Transfers(Active)  Current Status(01/09/2020): Max x 2  Weekly Goal(01/14/2020): mod/max  Discharge Goal: CGA/Min    [OT] Tub/Shower Transfers(Active)  Current Status(01/07/2020): TBD  Weekly Goal(01/14/2020): mod/max x 2  Discharge Goal: min        Self Care    [OT] Bathing(Active)  Current Status(01/09/2020): Min UB, DEP LB  Weekly Goal(01/14/2020): SBAUB, mod LB  Discharge Goal: MIN UB, MOD LB    [OT] Dressing (Lower)(Active)  Current Status(01/09/2020): Dep  Weekly Goal(01/14/2020): MAX  Discharge Goal: Min    [OT] Dressing (Upper)(Active)  Current Status(01/09/2020): MAX  Weekly Goal(01/14/2020): MOD  Discharge Goal: MIN    [OT] Eating(Active)  Current Status(01/09/2020): MAX  Weekly Goal(01/14/2020): MIN  Discharge Goal: MIN    [OT] Grooming(Active)  Current Status(01/09/2020): Mod  Weekly Goal(01/16/2020): Min  Discharge Goal: SBA    [OT] Toileting(Active)  Current Status(01/09/2020): DEP x2  Weekly Goal(01/17/2020): MAX  Discharge Goal: MOD    Functional Measures  JENNIFER Eating:  Branch  JENNIFER Grooming: Branch  JENNIFER Bathing:  Branch  JENNIFER Upper Body Dressing:  Branch  JENNIFER Lower Body Dressing:  Branch  JENNIFER Toileting:  Branch    JENNIFER Bladder Management  Level of Assistance:  Branch  Frequency/Number of Accidents this Shift:  Branch    JENNIFER Bowel Management  Level of Assistance: Branch  Frequency/Number of Accidents this Shift: Branch    JENNIFER Bed/Chair/Wheelchair Transfer:  Branch  JENNIFER Toilet Transfer:  Branch  JENNIFER Tub/Shower Transfer:  Branch    Previously Documented Mode of Locomotion at Discharge: Field  JENNIFER Expected Mode of Locomotion at Discharge: Branch  JENNIFER Walk/Wheelchair:  Branch  JENNIFER Stairs:  Branch    JENNIFER Comprehension:  Branch  JENNIFER Expression:  Branch  JENNIFER Social Interaction:  Branch  JENNIFER Problem Solving:  Branch  JENNIFER Memory:  Branch    Therapy Mode  Minutes  Occupational Therapy: Branch  Physical Therapy: Branch  Speech Language Pathology:  Branch    Signed by: AVA Dodd/ZOË
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
## 2020-01-09 NOTE — THERAPY TREATMENT NOTE
Inpatient Rehabilitation - Speech Language Pathology Treatment Note    [facility]       Patient Name: Tye JONES Junior  : 1975  MRN: [id number]    Today's Date: 2020           Admit Date: 2020      Visit Dx:        ICD-10-CM ICD-9-CM   1. Impaired mobility Z74.09 799.89       Patient Active Problem List   Diagnosis   • Mixed hyperlipidemia   • Essential hypertension   • Traumatic brain injury (CMS/HCC)   • Acute allergic rhinitis   • Seizure (CMS/HCC)   • Screen for colon cancer   • H/O traumatic brain injury          Therapy Treatment    Evaluation/Coping    Evaluation/Treatment Time and Intent  Subjective Information: no complaints (20 : Samuel Smalls MA,CCC-SLP)  Document Type: therapy note (daily note) (20 : Samuel Smalls MA,CCC-SLP)  Mode of Treatment: individual therapy, speech-language pathology (20 : Samuel Smalls MA,CCC-SLP)    Vitals/Pain/Safety         Cognition/Communication         Oral Motor/Eating         Mobility/Basic Activities/Instrumental Activities/Motor/Modality                   ROM/MMT                   Sensory/Myotome/Dermatome/Edema               Posture/Balance/Special Tests/Exercise/Transportation/Sexual Function                   Orthotics/Residual Limb/Prosthetic Management              Outcome Summary         EDUCATION    The patient has been educated in the following areas:     Cognitive Impairment.    SLP Recommendation and Plan    SLP Diagnosis: Suspected cognitive decline from baseline, recommend ongoing diagnostic assessment of patient cognitive skills. Patient very fatigued in second session with difficulty sustaining alertness to answer questions.     SLP Diagnosis: Suspected cognitive decline from baseline, recommend ongoing diagnostic assessment of patient cognitive skills. Patient very fatigued in second session with difficulty sustaining alertness to answer questions.     Rehab Potential/Prognosis: fair    Swallow  Criteria for Skilled Therapeutic Interventions Met: demonstrates skilled criteria    Anticipated Dischage Disposition: home with /7 care         Therapy Frequency (Swallow): 5 days per week    Predicted Duration Therapy Intervention (Days): until discharge                  SLP GOALS     Row Name 01/09/20 1000 01/08/20 1500 01/08/20 0800       Oral Nutrition/Hydration Goal 1 (SLP)    Oral Nutrition/Hydration Goal 1, SLP  Patient will tolerate puree and thin liquids without overt s/s of pen/asp with use of safe swallow precautions  -KA  --  Patient will tolerate puree and thin liquids without overt s/s of pen/asp with use of safe swallow precautions  -KA    Time Frame (Oral Nutrition/Hydration Goal 1, SLP)  short term goal (STG);by discharge  -KA  --  --    Barriers (Oral Nutrition/Hydration Goal 1, SLP)  Patient alert at 8:30 for breakfast, patient mother feeding patient and SLP reviewed safe swallow precautions, mother independently alternated liquids and solids, small bites and sips, pt unable to perform double swallows with verbal cues. No overt s/s of pen/asp with puree and thins via straw. No oral holding, delayed a-p transit however no verbal cues required to swallow. Trace oral residue clears with liquid wash. Educated mother pt not ready for diet upgrade yet until alertness improves, and pt able to sustain consistent alertness. Mother voiced understanding of all education.   -KA  --  --    Progress/Outcomes (Oral Nutrition/Hydration Goal 1, SLP)  goal ongoing  -KA  --  --       Labial Strengthening Goal 1 (SLP)    Activity (Labial Strengthening Goal 1, SLP)  --  --  increase labial tone  -KA    Increase Labial Tone  --  --  labial resistance exercises  -KA    [place]/Accuracy (Labial Strengthening Goal 1, SLP)  --  --  with minimal cues (75-90% accuracy)  -KA    Time Frame (Labial Strengthening Goal 1, SLP)  --  --  short term goal (STG);by discharge  -KA       Lingual Strengthening Goal 1 (SLP)     Activity (Lingual Strengthening Goal 1, SLP)  --  --  increase tongue back strength  -KA    Increase Tongue Back Strength  --  --  lingual movement exercises  -KA    [place]/Accuracy (Lingual Strengthening Goal 1, SLP)  --  --  with minimal cues (75-90% accuracy)  -KA       Pharyngeal Strengthening Exercise Goal 1 (SLP)    Activity (Pharyngeal Strengthening Goal 1, SLP)  --  --  increase superior movement of the hyolaryngeal complex;increase anterior movement of the hyolaryngeal complex;increase squeeze/positive pressure generation  -KA    Increase Superior Movement of the Hyolaryngeal Complex  --  --  hard effortful swallow;[place]  -KA       Comprehend Questions Goal 1 (SLP)    Improve Ability to Comprehend Questions Goal 1 (SLP)  simple yes/no questions;90%;with minimal cues (75-90%);with moderate cues (50-74%)  -KA  --  --    Time Frame (Comprehend Questions Goal 1, SLP)  short term goal (STG);by discharge  -KA  --  --    Barriers (Comprehend Questions Goal 1, SLP)  fatigue   -KA  --  --    Comment (Comprehend Questions Goal 1, SLP)  Patient responded to four y/n questions out of 6 with 100% accuracy on four questions, response time ranged from 5 to 60 seconds with one to three repetitions.   -KA  --  --       Word Retrieval Skills Goal 1 (SLP)    Improve Word Retrieval Skills By Goal 1 (SLP)  completing a divergent task;completing a convergent task;90%;with minimal cues (75-90%);with moderate cues (50-74%)  -KA  --  --    Time Frame (Word Retrieval Goal 1, SLP)  short term goal (STG);by discharge  -KA  --  --    Comment (Word Retrieval Goal 1, SLP)  Across four concrete divergent categories patient named average of 4 within 120 seconds without cueing   -KA  --  --       Awareness of Basic Personal Information Goal 1 (SLP)    Improve Awareness of Basic Personal Information Goal 1 (SLP)  --  answering yes/no questions regarding personal/biographical information;90%;with minimal cues (75-90%)  -KA  --     Barriers (Awareness of Basic Personal Information Goal 1, SLP)  --  fatigue  -KA  --    Progress (Awareness of Basic Personal Information Goal 1, SLP)  --  50%;independently (over 90% accuracy)  -KA  --    Comment (Awareness of Basic Personal Information Goal 1, SLP)  --  Multiple repetition for questions  -KA  --       Attention Goal 1 (SLP)    Improve Attention by Goal 1 (SLP)  looking at speaker;attending to task;90%;with minimal cues (75-90%)  -KA  looking at speaker;attending to task;90%;with minimal cues (75-90%)  -KA  --    Time Frame (Attention Goal 1, SLP)  short term goal (STG)  -KA  short term goal (STG)  -KA  --    Barriers (Attention Goal 1, SLP)  fatigue  -KA  fatigue  -KA  --    Progress (Attention Goal 1, SLP)  --  0%;independently (over 90% accuracy);50%;with maximum cues (25-49%)  -KA  --    Comment (Attention Goal 1, SLP)  Patient required mod to max verbal/tactile stimulation, pt able to sustain attention for 30 seconds for auditory tasks. Dr Solitario present for session and stated he would consult Neuro to adjust meds to help improve alertness.   -KA  mod to max verbal/tactile stimulation required intermittently throughout session due to pt fatigue   -KA  --       Orientation Goal 1 (SLP)    Improve Orientation Through Goal 1 (SLP)  demonstrating orientation to day;demonstrating orientation to month;demonstrating orientation to year;demonstrating orientation to place;90%;with minimal cues (75-90%)  -KA  demonstrating orientation to day;demonstrating orientation to month;demonstrating orientation to year;demonstrating orientation to place;90%;with minimal cues (75-90%)  -KA  --    Progress (Orientation Goal 1, SLP)  0%;independently (over 90% accuracy);50%;with maximum cues (25-49%)  -KA  0%;independently (over 90% accuracy);30%;with moderate cues (50-74%)  -KA  --    Progress/Outcomes (Orientation Goal 1, SLP)  goal ongoing  -KA  --  --    Comment (Orientation Goal 1, SLP)  Patient required cues  for date (stated it was December 2010), pt able to immediate recall January 2020 after review and education and delayed recall after 2 minutes 50%. max cueing for location.   -KA  Independent recall of date and location 0%, immediate recall after discussion [id number]%, delayed recall after 5 minutes, 2/4 50% (able to recall month and location after delay and date and year after cues) 100% after cues.   -KA  --       Memory Skills Goal 1 (SLP)    Barriers (Memory Skills Goal 1, SLP)  --  fatigue  -KA  --    Comment (Memory Skills Goal 1, SLP)  Delayed recall of month/year after 2 minute delay 50% without cues  -KA  Immediate recall of three unrelated [id number]=[id number]%, after 3 minute delay 0% without cues and 1/3 33% with cueing   -KA  --    Row Name 01/07/20 0900             Awareness of Basic Personal Information Goal 1 (SLP)    Improve Awareness of Basic Personal Information Goal 1 (SLP)  answering yes/no questions regarding personal/biographical information;naming self, family members;answering open-ended questions regarding personal/biographical information;answering questions about cognitive/physical changes;90%;with minimal cues (75-90%)  -KA      Time Frame (Awareness of Basic Personal Information Goal 1, SLP)  short term goal (STG);by discharge  -         Attention Goal 1 (SLP)    Improve Attention by Goal 1 (SLP)  attending to task;90%;with minimal cues (75-90%)  -KA      Time Frame (Attention Goal 1, SLP)  short term goal (STG);by discharge  -         Orientation Goal 1 (SLP)    Improve Orientation Through Goal 1 (SLP)  demonstrating orientation to month;demonstrating orientation to year;demonstrating orientation to place;90%;with minimal cues (75-90%)  -KA      Time Frame (Orientation Goal 1, SLP)  short term goal (STG);by discharge  -KA         Memory Skills Goal 1 (SLP)    Improve Memory Skills Through Goal 1 (SLP)  listen to a paragraph and answer questions;90%;with minimal cues (75-90%)  -KA      Time  Frame (Memory Skills Goal 1, SLP)  short term goal (STG);by discharge  -        User Key  (r) = Recorded By, (t) = Taken By, (c) = Cosigned By    Initials Name Provider Type    Samuel Rivera MA,CCC-SLP Speech and Language Pathologist             SLP Outcome Measures (last 72 hours)      SLP Outcome Measures     Row Name 01/08/20 0900 01/07/20 1200          SLP Outcome Measures    Outcome Measure Used?  Adult NOMS  -KA  Adult NOMS  -KA        Adult FCM Scores    FCM Chosen  Swallowing  -KA  Memory;Problem Solving  -KA     Swallowing FCM Score  4  -KA  --  -KA     Memory FCM Score  --  4  -KA       User Key  (r) = Recorded By, (t) = Taken By, (c) = Cosigned By    Initials Name Effective Dates    Samuel Rivera MA,CCC-SLP 06/08/18 -               Time Calculation:       Time Calculation- SLP     Row Name 01/09/20 1050 01/09/20 1041          Time Calculation- SLP    SLP Start Time  1000  -KA  0830  -KA     SLP Stop Time  1030  -KA  0900  -KA     SLP Time Calculation (min)  30 min  -KA  30 min  -KA       User Key  (r) = Recorded By, (t) = Taken By, (c) = Cosigned By    Initials Name Provider Type    Samuel Rivera MA,CCC-SLP Speech and Language Pathologist            Therapy Charges for Today     Code Description Service Date Service Provider Modifiers Qty    68368143159 HC ST EVAL ORAL PHARYNG SWALLOW 2 1/8/2020 Samuel Smalls MA,CCC-SLP GN 1    29942436470 HC ST DEV OF COGN SKILLS INITIAL 15 MIN 1/8/2020 Samuel Smalls MA,CCC-SLP  1    89878200551 HC ST DEV OF COGN SKILLS EACH ADDT'L 15 MIN 1/8/2020 Samuel Smalls MA,CCC-SLP  1    90249276239 HC ST TREATMENT SWALLOW 2 1/9/2020 Samuel Smalls MA,CCC-SLP GN 1    [id number] HC ST DEV OF COGN SKILLS INITIAL 15 MIN 1/9/2020 Samuel Smalls MA,CCC-SLP  1    28430698349 HC ST DEV OF COGN SKILLS EACH ADDT'L 15 MIN 1/9/2020 Samuel Smalls MA,CCC-SLP  1               ADULT NOMS (last 72 hours)      Adult NOMS     Row Name 01/08/20 0900 01/07/20 1200                 Adult FCM Scores    FCM Chosen  Swallowing  -KA  Memory;Problem Solving  -KA       Swallowing FCM Score  4  -KA  --  -KA       Memory FCM Score  --  4  -KA         User Key  (r) = Recorded By, (t) = Taken By, (c) = Cosigned By    Initials Name Effective Dates    Samuel Rivera MA,CCC-SLP 06/08/18 -                      Samuel Smalls MA,CCC-SLP  1/9/2020

## 2020-01-09 NOTE — THERAPY TREATMENT NOTE
Inpatient Rehabilitation - Occupational Therapy Treatment Note    [facility]     Patient Name: Tye JONES Junior  : 1975  MRN: [id number]    Today's Date: 2020                 Admit Date: 2020      Visit Dx:    ICD-10-CM ICD-9-CM   1. Impaired mobility Z74.09 799.89       Patient Active Problem List   Diagnosis   • Mixed hyperlipidemia   • Essential hypertension   • Traumatic brain injury (CMS/HCC)   • Acute allergic rhinitis   • Seizure (CMS/HCC)   • Screen for colon cancer   • H/O traumatic brain injury         Therapy Treatment    IRF Treatment Summary     Row Name 20 1421 20 1000 20 0900       Evaluation/Treatment Time and Intent    Subjective Information  no complaints  -CC  no complaints  (Pended)  states didn't sleep much last night; stated ready to get up   -NM  no complaints  -KA    Existing Precautions/Restrictions  fall;other (see comments) blind   -CC  --  --    Document Type  therapy note (daily note)  -CC  therapy note (daily note)  (Pended)   -NM  therapy note (daily note)  -KA    Mode of Treatment  occupational therapy  -CC  individual therapy;physical therapy  (Pended)   -NM  individual therapy;speech-language pathology  -KA    Patient/Family Observations  up in w/c am; supine pm  -CC  --  (Pended)  supine in bed; no acute distress; mother present  -NM  --    Recorded by [CC] Neris Morales OTR [NM] Anmol Joseph, PT Student [KA] Samuel Smalls MA,CCC-SLP    Row Name 20 142             Cognition/Psychosocial- PT/OT    Orientation Status (Cognition)  oriented to;person;place  -CC      Follows Commands (Cognition)  follows one step commands;delayed response/completion;increased processing time needed;initiation impaired;repetition of directions required  -CC      Personal Safety Interventions  fall prevention program maintained;gait belt;nonskid shoes/slippers when out of bed  -CC      Recorded by [CC] Neris Morales OTR      Row Name 20 142  01/09/20 1000          Bed Mobility Assessment/Treatment    Rolling Left [place] (Bed Mobility)  maximum assist (25% patient effort);2 person assist  -CC  verbal cues;nonverbal cues (demo/gesture);maximum assist (25% patient effort)  (Pended)   -NM     Rolling Right [place] (Bed Mobility)  verbal cues;nonverbal cues (demo/gesture);moderate assist (50% patient effort);maximum assist (25% patient effort)  -CC  --     Scooting/Bridging [place] (Bed Mobility)  moderate assist (50% patient effort)  -CC  maximum assist (25% patient effort);verbal cues;nonverbal cues (demo/gesture)  (Pended)   -NM     Supine-Sit [place] (Bed Mobility)  verbal cues;nonverbal cues (demo/gesture);moderate assist (50% patient effort)  -CC  moderate assist (50% patient effort);2 person assist;verbal cues;nonverbal cues (demo/gesture)  (Pended)   -NM     Sit-Supine [place] (Bed Mobility)  maximum assist (25% patient effort)  -CC  --     Bed Mobility, Safety Issues  --  decreased use of arms for pushing/pulling;decreased use of legs for bridging/pushing;impaired trunk control for bed mobility  (Pended)   -NM     Assistive Device (Bed Mobility)  bed rails  -CC  bed rails  (Pended)   -NM     Comment (Bed Mobility)  SB for ack to bed. Pt very lethargic  -CC  tactile cues for hand placement and LE management   (Pended)   -NM     Recorded by [CC] Neris Morales OTR [NM] Anmol Joseph, MAILE Student     Row Name 01/09/20 1000             Bed-Chair Transfer    Bed-Chair [place] (Transfers)  moderate assist (50% patient effort);2 person assist;verbal cues;nonverbal cues (demo/gesture)  (Pended)  squat pivot bed to w/; cues to lean forward, foot placement   -NM      Assistive Device (Bed-Chair Transfers)  wheelchair  (Pended)   -NM      Recorded by [NM] Anmol Joseph, MAILE Student      Row Name 01/09/20 1421             Chair-Bed Transfer    Chair-Bed [place] (Transfers)  maximum assist (25% patient effort);1 person  to manage equipment  -CC      Assistive Device (Chair-Bed Transfers)  transfer board;wheelchair  -CC      Recorded by [CC] Neris Morales OTR      Row Name 01/09/20 1421 01/09/20 1000          Sit-Stand Transfer    Sit-Stand [place] (Transfers)  verbal cues;nonverbal cues (demo/gesture);maximum assist (25% patient effort)  -CC  moderate assist (50% patient effort);2 person assist;verbal cues;nonverbal cues (demo/gesture)  (Pended)  cues to scoot, lean forward and place hands/feet   -NM     Assistive Device (Sit-Stand Transfers)  wheelchair  -CC  wheelchair  (Pended)  BUE on // bars  -NM     Recorded by [CC] Neris Morales OTR [NM] Anmol Joseph, PT Student     Row Name 01/09/20 1421 01/09/20 1000          Stand-Sit Transfer    Stand-Sit [place] (Transfers)  maximum assist (25% patient effort);2 person assist;verbal cues;nonverbal cues (demo/gesture);set up  -CC  moderate assist (50% patient effort);2 person assist;verbal cues;nonverbal cues (demo/gesture)  (Pended)   -NM     Assistive Device (Stand-Sit Transfers)  wheelchair  -CC  wheelchair  (Pended)  // bars  -NM     Recorded by [CC] Neris Morales OTR [NM] Anmol Joseph, PT Student     Row Name 01/09/20 1000             Gait/Stairs Assessment/Training    [place] Level (Gait)  moderate assist (50% patient effort);2 person assist;verbal cues;nonverbal cues (demo/gesture)  (Pended)   -NM      Assistive Device (Gait)  parallel bars  (Pended)   -NM      Distance in Feet (Gait)  8' x 3   (Pended)   -NM      Pattern (Gait)  step-to  (Pended)   -NM      Deviations/Abnormal Patterns (Gait)  bilateral deviations;base of support, narrow;gait speed decreased;stride length decreased  (Pended)   -NM      Bilateral Gait Deviations  forward flexed posture;heel strike decreased  (Pended)   -NM      Comment (Gait/Stairs)  Assist c hand placement on // bars ; cues for knee ext nicolas and upright posture   (Pended)   -NM      Recorded by [NM] Jake  Anmol, PT Student      Row Name 01/09/20 1000             Safety Issues, Functional Mobility    Safety Issues Affecting Function (Mobility)  safety precaution awareness;problem solving  (Pended)   -NM      Impairments Affecting Function (Mobility)  visual/perceptual;strength;motor planning  (Pended)   -NM      Recorded by [NM] Anmol Joseph, PT Student      Row Name 01/09/20 1421             Bathing Assessment/Treatment    Bathing [place] Level  bathing skills;lower body;upper body;verbal cues;nonverbal cues (demo/gesture);minimum assist (75% patient effort);maximum assist (25% patient effort)  -CC      Bathing Position  sink side;supported sitting;supported standing  -CC      Bathing Setup Assistance  adjust water temperature;obtain supplies;open containers  -CC      Comment (Bathing)  assist x 2 with standing. Min UB; Max LB  -CC      Recorded by [CC] Neris Morales OTR      Row Name 01/09/20 1421             Upper Body Dressing Assessment/Treatment    Upper Body Dressing Task  upper body dressing skills;doff;don;front opening garment;pull over garment;clothes fastener management;verbal cues;nonverbal cues (demo/gesture);moderate assist (50-74% patient effort);maximal assist (25-49% patient effort)  -CC      Upper Body Dressing Position  supported sitting  -CC      Set-up Assistance (Upper Body Dressing)  obtain clothing  -CC      Recorded by [CC] Neris Morales OTR      Row Name 01/09/20 1421             Lower Body Dressing Assessment/Treatment    Lower Body Dressing [place] Level  doff;don;pants/bottoms;shoes/slippers;socks;verbal cues;nonverbal cues (demo/gesture);maximum assist (25% patient effort)  -CC      Lower Body Dressing Position  supported sitting;supported standing  -CC      Comment (Lower Body Dressing)  x 2 for standing  -CC      Recorded by [CC] Neris Morales OTR      Row Name 01/09/20 1421             Grooming Assessment/Treatment    Grooming [place] Level  grooming  skills;deodorant application;oral care regimen;wash face, hands;supervision;minimum assist (75% patient effort)  -CC      Grooming Position  supported sitting  -CC      Recorded by [CC] Neris Morales OTR      Row Name 01/09/20 1421             Gross Motor Coordination    Gross Motor Coordination Interventions  other (see comments) max vc for grasp/relaese. Hand over hand secondary to vision  -CC      Recorded by [CC] Neris Morales OTR      Row Name 01/09/20 1421             Pain Scale: Numbers Pre/Post-Treatment    Pain Scale: Numbers, Pretreatment  0/10 - no pain  -CC      Pain Scale: Numbers, Post-Treatment  0/10 - no pain  -CC      Recorded by [CC] Neris Morales OTR      Row Name 01/09/20 1421             Static Sitting Balance    Level of [place] (Unsupported Sitting, Static Balance)  minimal assist, 75% patient effort  -CC      Sitting Position (Unsupported Sitting, Static Balance)  sitting edge of mat  -CC      Time Able to Maintain Position (Unsupported Sitting, Static Balance)  more than 5 minutes  -CC      Comment (Unsupported Sitting, Static Balance)  hand over hand reaching task. Min assist w balance during functional task.   -CC      Level of [place] (Supported Sitting, Static Balance)  contact guard assist;minimal assist, 75% patient effort  -CC      Sitting Position (Supported Sitting, Static Balance)  sitting edge of mat  -CC      Recorded by [CC] Neris Morales OTR      Row Name 01/09/20 1421 01/09/20 1000          Positioning and Restraints    Pre-Treatment Position  in bed  -CC  in bed  (Pended)   -NM     Post Treatment Position  wheelchair  -CC  wheelchair  (Pended)   -NM     In Wheelchair  sitting;with PT  -CC  sitting;with family/caregiver;call light within reach  (Pended)  pt's mother present  -NM     Recorded by [CC] Neris Morales OTR [NM] Anmol Joseph, MAILE Student       User Key  (r) = Recorded By, (t) = Taken By, (c) = Cosigned By    Initials Name Effective  Dates    CC Neris Morales, OTR 06/08/18 -     Samuel Rivera MA,CCC-SLP 06/08/18 -     Anmol Cordero, PT Student 01/03/20 -           Wound 01/06/20 2200 head Incision (Active)   Dressing Appearance open to air 1/9/2020 10:30 AM   Closure Approximated 1/9/2020 10:30 AM   Base clean;dry 1/9/2020 10:30 AM   Periwound intact 1/9/2020 10:30 AM   Drainage Amount none 1/9/2020 10:30 AM         OT Recommendation and Plan                 OT IRF GOALS     Row Name 01/07/20 1527             Transfer Goal 1 (OT-IRF)    Activity/Assistive Device (Transfer Goal 1, OT-IRF)  toilet;commode, bedside without drop arms  -SG      [place] Level (Transfer Goal 1, OT-IRF)  maximum assist (25-49% patient effort)  -SG      Time Frame (Transfer Goal 1, OT-IRF)  short term goal (STG);1 week  -SG      Progress/Outcomes (Transfer Goal 1, OT-IRF)  goal ongoing  -SG         Transfer Goal 2 (OT-IRF)    Activity/Assistive Device (Transfer Goal 2, OT-IRF)  toilet  -SG      [place] Level (Transfer Goal 2, OT-IRF)  minimum assist (75% or more patient effort);moderate assist (50-74% patient effort)  -SG      Time Frame (Transfer Goal 2, OT-IRF)  long term goal (LTG);by discharge  -SG      Progress/Outcomes (Transfer Goal 2, OT-IRF)  goal ongoing  -SG         Transfer Goal 3 (OT-IRF)    Activity/Assistive Device (Transfer Goal 3, OT-IRF)  shower chair  -SG      [place] Level (Transfer Goal 3, OT-IRF)  maximum assist (25-49% patient effort)  -SG      Time Frame (Transfer Goal 3, OT-IRF)  short term goal (STG);1 week  -SG      Progress/Outcomes (Transfer Goal 3, OT-IRF)  goal ongoing  -SG         Transfer Goal 4 (OT-IRF)    Activity/Assistive Device (Transfer Goal 4, OT-IRF)  shower chair  -SG      [place] Level (Transfer Goal 4, OT-IRF)  minimum assist (75% or more patient effort);moderate assist (50-74% patient effort)  -SG      Time Frame (Transfer Goal 4, OT-IRF)  long term goal (LTG);by discharge  -SG       Progress/Outcomes (Transfer Goal 4, OT-IRF)  goal ongoing  -SG         Bathing Goal 1 (OT-IRF)    Activity/Device (Bathing Goal 1, OT-IRF)  bathing skills, all  -SG      [place] Level (Bathing Goal 1, OT-IRF)  moderate assist (50-74% patient effort)  -SG      Time Frame (Bathing Goal 1, OT-IRF)  short term goal (STG);1 week  -SG      Progress/Outcomes (Bathing Goal 1, OT-IRF)  goal ongoing  -SG         Bathing Goal 2 (OT-IRF)    Activity/Device (Bathing Goal 2, OT-IRF)  bathing skills, all  -SG      [place] Level (Bathing Goal 2, OT-IRF)  minimum assist (75% or more patient effort)  -SG      Time Frame (Bathing Goal 2, OT-IRF)  long term goal (LTG);by discharge  -SG      Progress/Outcomes (Bathing Goal 2, OT-IRF)  goal ongoing  -SG         UB Dressing Goal 1 (OT-IRF)    Activity/Device (UB Dressing Goal 1, OT-IRF)  upper body dressing  -SG      [place] (UB Dress Goal 1, OT-IRF)  moderate assist (50-74% patient effort)  -SG      Time Frame (UB Dressing Goal 1, OT-IRF)  short term goal (STG);1 week  -SG      Progress/Outcomes (UB Dressing Goal 1, OT-IRF)  goal ongoing  -SG         UB Dressing Goal 2 (OT-IRF)    Activity/Device (UB Dressing Goal 2, OT-IRF)  upper body dressing  -SG      [place] (UB Dress Goal 2, OT-IRF)  minimum assist (75% or more patient effort)  -SG      Time Frame (UB Dressing Goal 2, OT-IRF)  long term goal (LTG);by discharge  -SG      Progress/Outcomes (UB Dressing Goal 2, OT-IRF)  goal ongoing  -SG         LB Dressing Goal 1 (OT-IRF)    Activity/Device (LB Dressing Goal 1, OT-IRF)  lower body dressing  -SG      [place] (LB Dressing Goal 1, OT-IRF)  maximum assist (25-49% patient effort)  -SG      Time Frame (LB Dressing Goal 1, OT-IRF)  short term goal (STG);1 week  -SG      Progress/Outcomes (LB Dressing Goal 1, OT-IRF)  goal ongoing  -SG         LB Dressing Goal 2 (OT-IRF)    Activity/Device (LB Dressing Goal 2, OT-IRF)  lower body dressing  -SG      [place] (LB  Dressing Goal 2, OT-IRF)  moderate assist (50-74% patient effort)  -SG      Time Frame (LB Dressing Goal 2, OT-IRF)  long term goal (LTG);by discharge  -SG      Progress/Outcomes (LB Dressing Goal 2, OT-IRF)  goal ongoing  -SG         Toileting Goal 1 (OT-IRF)    Activity/Device (Toileting Goal 1, OT-IRF)  toileting skills, all  -SG      [place] Level (Toileting Goal 1, OT-IRF)  moderate assist (50-74% patient effort)  -SG      Time Frame (Toileting Goal 1, OT-IRF)  long term goal (LTG);by discharge  -SG         Strength Goal 1 (OT-IRF)    Strength Goal 1 (OT-IRF)  Pt to tolerate UE strengthing to improve overall ROM and functional use of UE.  -SG      Time Frame (Strength Goal 1, OT-IRF)  long term goal (LTG);by discharge  -SG      Progress/Outcomes (Strength Goal 1, OT-IRF)  goal ongoing  -SG         Balance Goal 1 (OT)    Activity/Assistive Device (Balance Goal 1, OT)  sitting, static  -SG      [place] Level/Cues Needed (Balance Goal 1, OT)  contact guard assist  -SG      Time Frame (Balance Goal 1, OT)  long term goal (LTG);by discharge  -SG      Progress/Outcomes (Balance Goal 1, OT)  goal ongoing  -SG         Caregiver Training Goal 1 (OT-IRF)    Caregiver Training Goal 1 (OT-IRF)  Pt and family to be indep with ADLs, functional transfers, AD/AE, and HEP for safe d/c home.  -SG      Time Frame (Caregiver Training Goal 1, OT-IRF)  long term goal (LTG);by discharge  -SG      Progress/Outcomes (Caregiver Training Goal 1, OT-IRF)  goal ongoing  -SG        User Key  (r) = Recorded By, (t) = Taken By, (c) = Cosigned By    Initials Name Provider Type    Sudha Cho OTR Occupational Therapist          Occupational Therapy Education                 Title: PT OT SLP Therapies (In Progress)     Topic: Occupational Therapy (In Progress)     Point: ADL training (Done)     Description:   Instruct learner(s) on proper safety adaptation and remediation techniques during self care or transfers.   Instruct in  proper use of assistive devices.              Learning Progress Summary           Family Acceptance, E,TB, VU by  at 1/7/2020 4111    Comment:  OT role and goals, POC.                               User Key     Initials Effective Dates Name Provider Type Discipline     12/26/18 -  Sudha Marte OTR Occupational Therapist OT                       Time Calculation:     Time Calculation- OT     Row Name 01/09/20 1230 01/09/20 1030          Time Calculation- OT    OT Start Time  1230  -CC  1030  -CC     OT Stop Time  1300  -CC  1100  -CC     OT Time Calculation (min)  30 min  -CC  30 min  -CC     OT Non-Billable Time (min)  30 min tech  -CC  30 min tech  -CC       User Key  (r) = Recorded By, (t) = Taken By, (c) = Cosigned By    Initials Name Provider Type    CC Neris Morales OTR Occupational Therapist          Therapy Charges for Today     Code Description Service Date Service Provider Modifiers Qty    37887510932 HC OT THER SUPP EA 15 MIN 1/8/2020 Neris Morales OTR GO 4    94077379262 HC OT SELF CARE/MGMT/TRAIN EA 15 MIN 1/8/2020 Neris Morales OTR GO 6    67488101743 HC OT THER SUPP EA 15 MIN 1/9/2020 Neris Morales OTR GO 2    83209624549 HC OT NEUROMUSC RE EDUCATION EA 15 MIN 1/9/2020 Neris Morales OTR GO 2    58034285484 HC OT SELF CARE/MGMT/TRAIN EA 15 MIN 1/9/2020 Neris Morales OTR GO 2                   AVA Dodd  1/9/2020

## 2020-01-09 NOTE — PROGRESS NOTES
[facility]PS CMG Coordinator  Inpatient Rehabilitation Admission    Ethnic Group: Black/.  Marital Status:  Marital Status: Never .    IRF Admission Date:  01/06/2020  Admission Class: Initial Rehab.  Admit From:  [facility]    Pre-Hospital Living: Home. Pre-Hospital Living  With: (2) Family/Relatives.    Payment Sources: Primary: Medicare Fee for Service  Secondary: Not Listed.  Impairment Group: 02.9 Other brain  Date of Onset of Impairment: 12/22/2019    Etiologic Diagnosis Code(s):  Rank Code      Description  1    G93.89    Other specified disorders of brain    Comorbidities:      Are there any arthritis conditions recorded for Impairment Group, Etiologic  Diagnosis, or Comorbid Conditions that meet all of the regulatory requirements  for IRF classification (in 42 .29(b)(2)(x), (xi), and xii))? No    Presence of Pressure Ulcer:  No observed/documented pressure ulcers.    MEDICAL NEEDS  Height on Admission:  72 inches.  Weight on Admission:  184 pounds.    QUALITY INDICATORS  Prior Functioning:  Self Care: Patient needed partial assistance from another person to complete  activities.  Indoor Mobility: Patient completed the activities by him/herself, with or  without an assistive device, with no assistance from a helper.  Stairs: Patient needed partial assistance from another person to complete  activities.  Functional Cognition: Patient needed partial assistance from another person to  complete activities.  Prior Device Use: Patient does not use manual or motorized wheelchair or  scooter, mechanical lift, walker, or an orthotic/prosthesis.    Bladder and Bowel: Bladder Continence: Incontinent daily.  Bowel Continence: Frequently incontinent (2 or more episodes of bowel  incontinence, but at least one continent bowel movement).  Swallowing/Nutritional Status: Modiified food consistency/supervision (patient  requires modified food or liquid consistency and/or needs supervision  during  eating for safety).  Special Conditions: Patient did not receive total parenteral nutrition treatment  at the time of admission.    Section I. Active Diagnosis: Comorbidities and Co-existing Conditions:   Patient  does not have PAD, PVD, or Diabetes Mellitus  Section J. Health Conditions: Patient has not had any falls in the past year.  Patient has had major surgery during the 100 days prior to admission.  Section M. Skin Conditions  Unhealed Pressure Ulcer/Injuries at Stage 1 or  Higher on Admission:  No.  Section N. Medication:  Potential Clinically Significant Medication Issues: No issues found during  review    Signed by: Esteban Rodriguez RN

## 2020-01-09 NOTE — THERAPY TREATMENT NOTE
Inpatient Rehabilitation - Speech Language Pathology   Swallow Treatment Note [facility]     Patient Name: Tye JONES Junior  : 1975  MRN: [id number]  Today's Date: 2020               Admit Date: 2020    Visit Dx:      ICD-10-CM ICD-9-CM   1. Impaired mobility Z74.09 799.89     Patient Active Problem List   Diagnosis   • Mixed hyperlipidemia   • Essential hypertension   • Traumatic brain injury (CMS/HCC)   • Acute allergic rhinitis   • Seizure (CMS/HCC)   • Screen for colon cancer   • H/O traumatic brain injury       Therapy Treatment      Outcome Summary         SLP GOALS     Row Name 20 1000 20 1500 20 0800       Oral Nutrition/Hydration Goal 1 (SLP)    Oral Nutrition/Hydration Goal 1, SLP  Patient will tolerate puree and thin liquids without overt s/s of pen/asp with use of safe swallow precautions  -KA  --  Patient will tolerate puree and thin liquids without overt s/s of pen/asp with use of safe swallow precautions  -KA    Time Frame (Oral Nutrition/Hydration Goal 1, SLP)  short term goal (STG);by discharge  -KA  --  --    Barriers (Oral Nutrition/Hydration Goal 1, SLP)  Patient alert at 8:30 for breakfast, patient mother feeding patient and SLP reviewed safe swallow precautions, mother independently alternated liquids and solids, small bites and sips, pt unable to perform double swallows with verbal cues. No overt s/s of pen/asp with puree and thins via straw. No oral holding, delayed a-p transit however no verbal cues required to swallow. Trace oral residue clears with liquid wash. Educated mother pt not ready for diet upgrade yet until alertness improves, and pt able to sustain consistent alertness. Mother voiced understanding of all education.   -KA  --  --    Progress/Outcomes (Oral Nutrition/Hydration Goal 1, SLP)  goal ongoing  -KA  --  --       Labial Strengthening Goal 1 (SLP)    Activity (Labial Strengthening Goal 1, SLP)  --  --  increase labial tone  -KA     Increase Labial Tone  --  --  labial resistance exercises  -KA    [place]/Accuracy (Labial Strengthening Goal 1, SLP)  --  --  with minimal cues (75-90% accuracy)  -KA    Time Frame (Labial Strengthening Goal 1, SLP)  --  --  short term goal (STG);by discharge  -KA       Lingual Strengthening Goal 1 (SLP)    Activity (Lingual Strengthening Goal 1, SLP)  --  --  increase tongue back strength  -KA    Increase Tongue Back Strength  --  --  lingual movement exercises  -KA    [place]/Accuracy (Lingual Strengthening Goal 1, SLP)  --  --  with minimal cues (75-90% accuracy)  -KA       Pharyngeal Strengthening Exercise Goal 1 (SLP)    Activity (Pharyngeal Strengthening Goal 1, SLP)  --  --  increase superior movement of the hyolaryngeal complex;increase anterior movement of the hyolaryngeal complex;increase squeeze/positive pressure generation  -KA    Increase Superior Movement of the Hyolaryngeal Complex  --  --  hard effortful swallow;Mendelsohn  -KA       Awareness of Basic Personal Information Goal 1 (SLP)    Improve Awareness of Basic Personal Information Goal 1 (SLP)  --  answering yes/no questions regarding personal/biographical information;90%;with minimal cues (75-90%)  -KA  --    Barriers (Awareness of Basic Personal Information Goal 1, SLP)  --  fatigue  -KA  --    Progress (Awareness of Basic Personal Information Goal 1, SLP)  --  50%;independently (over 90% accuracy)  -KA  --    Comment (Awareness of Basic Personal Information Goal 1, SLP)  --  Multiple repetition for questions  -KA  --       Attention Goal 1 (SLP)    Improve Attention by Goal 1 (SLP)  --  looking at speaker;attending to task;90%;with minimal cues (75-90%)  -KA  --    Time Frame (Attention Goal 1, SLP)  --  short term goal (STG)  -KA  --    Barriers (Attention Goal 1, SLP)  --  fatigue  -KA  --    Progress (Attention Goal 1, SLP)  --  0%;independently (over 90% accuracy);50%;with maximum cues (25-49%)  -KA  --    Comment (Attention  Goal 1, SLP)  --  mod to max verbal/tactile stimulation required intermittently throughout session due to pt fatigue   -KA  --       Orientation Goal 1 (SLP)    Improve Orientation Through Goal 1 (SLP)  --  demonstrating orientation to day;demonstrating orientation to month;demonstrating orientation to year;demonstrating orientation to place;90%;with minimal cues (75-90%)  -KA  --    Progress (Orientation Goal 1, SLP)  --  0%;independently (over 90% accuracy);30%;with moderate cues (50-74%)  -KA  --    Comment (Orientation Goal 1, SLP)  --  Independent recall of date and location 0%, immediate recall after discussion [id number]%, delayed recall after 5 minutes, 2/4 50% (able to recall month and location after delay and date and year after cues) 100% after cues.   -KA  --       Memory Skills Goal 1 (SLP)    Barriers (Memory Skills Goal 1, SLP)  --  fatigue  -KA  --    Comment (Memory Skills Goal 1, SLP)  --  Immediate recall of three unrelated [id number]%, after 3 minute delay 0% without cues and 1/3 33% with cueing   -KA  --    Row Name 01/07/20 0900             Awareness of Basic Personal Information Goal 1 (SLP)    Improve Awareness of Basic Personal Information Goal 1 (SLP)  answering yes/no questions regarding personal/biographical information;naming self, family members;answering open-ended questions regarding personal/biographical information;answering questions about cognitive/physical changes;90%;with minimal cues (75-90%)  -KA      Time Frame (Awareness of Basic Personal Information Goal 1, SLP)  short term goal (STG);by discharge  -KA         Attention Goal 1 (SLP)    Improve Attention by Goal 1 (SLP)  attending to task;90%;with minimal cues (75-90%)  -KA      Time Frame (Attention Goal 1, SLP)  short term goal (STG);by discharge  -KA         Orientation Goal 1 (SLP)    Improve Orientation Through Goal 1 (SLP)  demonstrating orientation to month;demonstrating orientation to year;demonstrating orientation to  place;90%;with minimal cues (75-90%)  -      Time Frame (Orientation Goal 1, SLP)  short term goal (STG);by discharge  -KA         Memory Skills Goal 1 (SLP)    Improve Memory Skills Through Goal 1 (SLP)  listen to a paragraph and answer questions;90%;with minimal cues (75-90%)  -KA      Time Frame (Memory Skills Goal 1, SLP)  short term goal (STG);by discharge  -        User Key  (r) = Recorded By, (t) = Taken By, (c) = Cosigned By    Initials Name Provider Type    Samuel Rivera MA,CCC-SLP Speech and Language Pathologist          EDUCATION  The patient has been educated in the following areas:   Dysphagia (Swallowing Impairment).    SLP Recommendation and Plan                            SLP Outcome Measures (last 72 hours)      SLP Outcome Measures     Row Name 01/08/20 0900 01/07/20 1200          SLP Outcome Measures    Outcome Measure Used?  Adult NOMS  -KA  Adult NOMS  -KA        Adult FCM Scores    FCM Chosen  Swallowing  -KA  Memory;Problem Solving  -KA     Swallowing FCM Score  4  -KA  --  -KA     Memory FCM Score  --  4  -KA       User Key  (r) = Recorded By, (t) = Taken By, (c) = Cosigned By    Initials Name Effective Dates    Samuel Rivera MA,CCC-SLP 06/08/18 -              Time Calculation:   Time Calculation- SLP     Row Name 01/09/20 1041             Time Calculation- SLP    SLP Start Time  0830  -      SLP Stop Time  0900  -      SLP Time Calculation (min)  30 min  -        User Key  (r) = Recorded By, (t) = Taken By, (c) = Cosigned By    Initials Name Provider Type    Samuel Rivera MA,CCC-SLP Speech and Language Pathologist          Therapy Charges for Today     Code Description Service Date Service Provider Modifiers Qty    76493467600 HC ST EVAL ORAL PHARYNG SWALLOW 2 1/8/2020 Samuel Smalls MA,CCC-SLP GN 1    73172594210 HC ST DEV OF COGN SKILLS INITIAL 15 MIN 1/8/2020 Samuel Smalls MA,CCC-SLP  1    85058599763 HC ST DEV OF COGN SKILLS EACH ADDT'L 15 MIN 1/8/2020 Slim  ALMA Baker,CCC-SLP  1    13245093718 HC ST TREATMENT SWALLOW 2 1/9/2020 Samuel Smalls MA,CCC-SLP GN 1                 Samuel Smalls MA,KATIE-SLP  1/9/2020

## 2020-01-10 LAB
ANION GAP SERPL CALCULATED.3IONS-SCNC: 14.4 MMOL/L (ref 5–15)
BASOPHILS # BLD AUTO: 0.03 10*3/MM3 (ref 0–0.2)
BASOPHILS NFR BLD AUTO: 0.4 % (ref 0–1.5)
BUN BLD-MCNC: 38 MG/DL (ref 6–20)
BUN/CREAT SERPL: 22.8 (ref 7–25)
CALCIUM SPEC-SCNC: 9.3 MG/DL (ref 8.6–10.5)
CHLORIDE SERPL-SCNC: 103 MMOL/L (ref 98–107)
CO2 SERPL-SCNC: 26.6 MMOL/L (ref 22–29)
CREAT BLD-MCNC: 1.67 MG/DL (ref 0.76–1.27)
DEPRECATED RDW RBC AUTO: 36.4 FL (ref 37–54)
EOSINOPHIL # BLD AUTO: 0.36 10*3/MM3 (ref 0–0.4)
EOSINOPHIL NFR BLD AUTO: 4.3 % (ref 0.3–6.2)
ERYTHROCYTE [DISTWIDTH] IN BLOOD BY AUTOMATED COUNT: 12 % (ref 12.3–15.4)
GFR SERPL CREATININE-BSD FRML MDRD: 54 ML/MIN/1.73
GLUCOSE BLD-MCNC: 113 MG/DL (ref 65–99)
HCT VFR BLD AUTO: 28.2 % (ref 37.5–51)
HGB BLD-MCNC: 9.4 G/DL (ref 13–17.7)
IMM GRANULOCYTES # BLD AUTO: 0.04 10*3/MM3 (ref 0–0.05)
IMM GRANULOCYTES NFR BLD AUTO: 0.5 % (ref 0–0.5)
LYMPHOCYTES # BLD AUTO: 1.97 10*3/MM3 (ref 0.7–3.1)
LYMPHOCYTES NFR BLD AUTO: 23.7 % (ref 19.6–45.3)
MCH RBC QN AUTO: 28.2 PG (ref 26.6–33)
MCHC RBC AUTO-ENTMCNC: 33.3 G/DL (ref 31.5–35.7)
MCV RBC AUTO: 84.7 FL (ref 79–97)
MONOCYTES # BLD AUTO: 0.82 10*3/MM3 (ref 0.1–0.9)
MONOCYTES NFR BLD AUTO: 9.9 % (ref 5–12)
NEUTROPHILS # BLD AUTO: 5.08 10*3/MM3 (ref 1.7–7)
NEUTROPHILS NFR BLD AUTO: 61.2 % (ref 42.7–76)
NRBC BLD AUTO-RTO: 0 /100 WBC (ref 0–0.2)
PHENYTOIN SERPL-MCNC: 12.4 MCG/ML (ref 10–20)
PLATELET # BLD AUTO: 387 10*3/MM3 (ref 140–450)
PMV BLD AUTO: 9.5 FL (ref 6–12)
POTASSIUM BLD-SCNC: 3.3 MMOL/L (ref 3.5–5.2)
RBC # BLD AUTO: 3.33 10*6/MM3 (ref 4.14–5.8)
SODIUM BLD-SCNC: 144 MMOL/L (ref 136–145)
WBC NRBC COR # BLD: 8.3 10*3/MM3 (ref 3.4–10.8)

## 2020-01-10 PROCEDURE — 85025 COMPLETE CBC W/AUTO DIFF WBC: CPT | Performed by: PHYSICAL MEDICINE & REHABILITATION

## 2020-01-10 PROCEDURE — 97130 THER IVNTJ EA ADDL 15 MIN: CPT | Performed by: SPEECH-LANGUAGE PATHOLOGIST

## 2020-01-10 PROCEDURE — 80185 ASSAY OF PHENYTOIN TOTAL: CPT | Performed by: PHYSICAL MEDICINE & REHABILITATION

## 2020-01-10 PROCEDURE — 97129 THER IVNTJ 1ST 15 MIN: CPT | Performed by: SPEECH-LANGUAGE PATHOLOGIST

## 2020-01-10 PROCEDURE — 99232 SBSQ HOSP IP/OBS MODERATE 35: CPT | Performed by: NURSE PRACTITIONER

## 2020-01-10 PROCEDURE — 25010000003 CEFTRIAXONE PER 250 MG: Performed by: PHYSICAL MEDICINE & REHABILITATION

## 2020-01-10 PROCEDURE — 97535 SELF CARE MNGMENT TRAINING: CPT

## 2020-01-10 PROCEDURE — 80048 BASIC METABOLIC PNL TOTAL CA: CPT | Performed by: PHYSICAL MEDICINE & REHABILITATION

## 2020-01-10 PROCEDURE — 25010000002 HEPARIN (PORCINE) PER 1000 UNITS: Performed by: PHYSICAL MEDICINE & REHABILITATION

## 2020-01-10 PROCEDURE — 97110 THERAPEUTIC EXERCISES: CPT

## 2020-01-10 PROCEDURE — 92526 ORAL FUNCTION THERAPY: CPT | Performed by: SPEECH-LANGUAGE PATHOLOGIST

## 2020-01-10 RX ADMIN — METOPROLOL TARTRATE 50 MG: 50 TABLET, FILM COATED ORAL at 08:40

## 2020-01-10 RX ADMIN — LACOSAMIDE 200 MG: 100 TABLET, FILM COATED ORAL at 21:36

## 2020-01-10 RX ADMIN — POLYETHYLENE GLYCOL 3350 17 G: 17 POWDER, FOR SOLUTION ORAL at 08:40

## 2020-01-10 RX ADMIN — CEFTRIAXONE SODIUM 2 G: 2 INJECTION, SOLUTION INTRAVENOUS at 22:41

## 2020-01-10 RX ADMIN — CLONAZEPAM 1 MG: 0.5 TABLET ORAL at 05:56

## 2020-01-10 RX ADMIN — PHENYTOIN 100 MG: 50 TABLET, CHEWABLE ORAL at 05:56

## 2020-01-10 RX ADMIN — LEVETIRACETAM 500 MG: 100 SOLUTION ORAL at 21:36

## 2020-01-10 RX ADMIN — LACOSAMIDE 200 MG: 100 TABLET, FILM COATED ORAL at 08:40

## 2020-01-10 RX ADMIN — HEPARIN SODIUM 5000 UNITS: 5000 INJECTION INTRAVENOUS; SUBCUTANEOUS at 15:12

## 2020-01-10 RX ADMIN — CLONAZEPAM 1 MG: 0.5 TABLET ORAL at 15:13

## 2020-01-10 RX ADMIN — HEPARIN SODIUM 5000 UNITS: 5000 INJECTION INTRAVENOUS; SUBCUTANEOUS at 05:56

## 2020-01-10 RX ADMIN — ATORVASTATIN CALCIUM 10 MG: 10 TABLET, FILM COATED ORAL at 08:40

## 2020-01-10 RX ADMIN — METOPROLOL TARTRATE 50 MG: 50 TABLET, FILM COATED ORAL at 21:36

## 2020-01-10 RX ADMIN — CEFTRIAXONE SODIUM 2 G: 2 INJECTION, SOLUTION INTRAVENOUS at 12:47

## 2020-01-10 RX ADMIN — DEXTROAMPHETAMINE SACCHARATE, AMPHETAMINE ASPARTATE MONOHYDRATE, DEXTROAMPHETAMINE SULFATE, AND AMPHETAMINE SULFATE 30 MG: 2.5; 2.5; 2.5; 2.5 CAPSULE, EXTENDED RELEASE ORAL at 08:40

## 2020-01-10 RX ADMIN — HEPARIN SODIUM 5000 UNITS: 5000 INJECTION INTRAVENOUS; SUBCUTANEOUS at 21:36

## 2020-01-10 RX ADMIN — TOPIRAMATE 25 MG: 25 TABLET, FILM COATED ORAL at 08:40

## 2020-01-10 RX ADMIN — CHOLECALCIFEROL TAB 10 MCG (400 UNIT) 400 UNITS: 10 TAB at 08:40

## 2020-01-10 RX ADMIN — PHENYTOIN 100 MG: 50 TABLET, CHEWABLE ORAL at 15:12

## 2020-01-10 RX ADMIN — CLONAZEPAM 1 MG: 0.5 TABLET ORAL at 21:36

## 2020-01-10 RX ADMIN — LEVETIRACETAM 500 MG: 100 SOLUTION ORAL at 08:40

## 2020-01-10 RX ADMIN — PHENYTOIN 100 MG: 50 TABLET, CHEWABLE ORAL at 21:36

## 2020-01-10 NOTE — PROGRESS NOTES
Inpatient Rehabilitation Plan of Care Note    Plan of Care  Care Plan Reviewed - No updates at this time.    Sphincter Control    Performed Intervention(s)  Monitor I&O  check for incontinence, offer toileting q2hrs  miralax daily-hold if needed.      Safety    Performed Intervention(s)  Safety rounds/bed alarm/ chair alarm on  Falls precautio/call light within easy reach      Psychosocial    Performed Intervention(s)  Offer support/Encourage patient to verbalize any concerns      Body Systems    Performed Intervention(s)  Skin care q shift and PRN  Assist to turn patient q 2-3hrs.    Signed by: Angela Haider RN

## 2020-01-10 NOTE — THERAPY TREATMENT NOTE
Inpatient Rehabilitation - Speech Language Pathology   Swallow Treatment Note [facility]     Patient Name: Tye JONES Junior  : 1975  MRN: [id number]  Today's Date: 1/10/2020               Admit Date: 2020    Visit Dx:      ICD-10-CM ICD-9-CM   1. Impaired mobility Z74.09 799.89     Patient Active Problem List   Diagnosis   • Mixed hyperlipidemia   • Essential hypertension   • Traumatic brain injury (CMS/HCC)   • Acute allergic rhinitis   • Seizure (CMS/HCC)   • Screen for colon cancer   • H/O traumatic brain injury       Therapy Treatment      Outcome Summary         SLP GOALS     Row Name 01/10/20 0800 20 1000 20 1500       Oral Nutrition/Hydration Goal 1 (SLP)    Oral Nutrition/Hydration Goal 1, SLP  Patient will tolerate puree and thin liquids without overt s/s of pen/asp with use of safe swallow precautions  -KA  Patient will tolerate puree and thin liquids without overt s/s of pen/asp with use of safe swallow precautions  -KA  --    Time Frame (Oral Nutrition/Hydration Goal 1, SLP)  short term goal (STG);by discharge  -KA  short term goal (STG);by discharge  -KA  --    Barriers (Oral Nutrition/Hydration Goal 1, SLP)  Patient alert at 8am for breakfast this AM, sitting upright in bed, pt did attempt to self feed this morning and more vocal, requested a cup of coffee. Patient did become more fatigued last 10 minutes requiring min verbal cues. No overt s/s of pen/asp with thins via straw and puree, pt mother independently implemented alternate liquids/solids, slow rate, small bites and sips. Patient has difficulty following directions for double swallow. Trace to mild oral residue clears with liquid wash. Delayed a-p transit, and prolonged mastication with puree at times, min verbal cues end of the meal due to fatigue to swallow. Reviewed with mother will continue to monitor for diet upgrade readiness.   -KA  Patient alert at 8:30 for breakfast, patient mother feeding patient and SLP  reviewed safe swallow precautions, mother independently alternated liquids and solids, small bites and sips, pt unable to perform double swallows with verbal cues. No overt s/s of pen/asp with puree and thins via straw. No oral holding, delayed a-p transit however no verbal cues required to swallow. Trace oral residue clears with liquid wash. Educated mother pt not ready for diet upgrade yet until alertness improves, and pt able to sustain consistent alertness. Mother voiced understanding of all education.   -KA  --    Progress/Outcomes (Oral Nutrition/Hydration Goal 1, SLP)  --  goal ongoing  -KA  --       Comprehend Questions Goal 1 (SLP)    Improve Ability to Comprehend Questions Goal 1 (SLP)  --  simple yes/no questions;90%;with minimal cues (75-90%);with moderate cues (50-74%)  -KA  --    Time Frame (Comprehend Questions Goal 1, SLP)  --  short term goal (STG);by discharge  -KA  --    Barriers (Comprehend Questions Goal 1, SLP)  --  fatigue   -KA  --    Comment (Comprehend Questions Goal 1, SLP)  --  Patient responded to four y/n questions out of 6 with 100% accuracy on four questions, response time ranged from 5 to 60 seconds with one to three repetitions.   -KA  --       Word Retrieval Skills Goal 1 (SLP)    Improve Word Retrieval Skills By Goal 1 (SLP)  --  completing a divergent task;completing a convergent task;90%;with minimal cues (75-90%);with moderate cues (50-74%)  -KA  --    Time Frame (Word Retrieval Goal 1, SLP)  --  short term goal (STG);by discharge  -KA  --    Comment (Word Retrieval Goal 1, SLP)  --  Across four concrete divergent categories patient named average of 4 within 120 seconds without cueing   -KA  --       Awareness of Basic Personal Information Goal 1 (SLP)    Improve Awareness of Basic Personal Information Goal 1 (SLP)  --  --  answering yes/no questions regarding personal/biographical information;90%;with minimal cues (75-90%)  -KA    Barriers (Awareness of Basic Personal  Information Goal 1, SLP)  --  --  fatigue  -KA    Progress (Awareness of Basic Personal Information Goal 1, SLP)  --  --  50%;independently (over 90% accuracy)  -KA    Comment (Awareness of Basic Personal Information Goal 1, SLP)  --  --  Multiple repetition for questions  -KA       Attention Goal 1 (SLP)    Improve Attention by Goal 1 (SLP)  --  looking at speaker;attending to task;90%;with minimal cues (75-90%)  -KA  looking at speaker;attending to task;90%;with minimal cues (75-90%)  -KA    Time Frame (Attention Goal 1, SLP)  --  short term goal (STG)  -KA  short term goal (STG)  -KA    Barriers (Attention Goal 1, SLP)  --  fatigue  -KA  fatigue  -KA    Progress (Attention Goal 1, SLP)  --  --  0%;independently (over 90% accuracy);50%;with maximum cues (25-49%)  -KA    Comment (Attention Goal 1, SLP)  --  Patient required mod to max verbal/tactile stimulation, pt able to sustain attention for 30 seconds for auditory tasks. Dr Solitario present for session and stated he would consult Neuro to adjust meds to help improve alertness.   -KA  mod to max verbal/tactile stimulation required intermittently throughout session due to pt fatigue   -KA       Orientation Goal 1 ([facility])    Improve Orientation Through Goal 1 (SLP)  --  demonstrating orientation to day;demonstrating orientation to month;demonstrating orientation to year;demonstrating orientation to place;90%;with minimal cues (75-90%)  -KA  demonstrating orientation to day;demonstrating orientation to month;demonstrating orientation to year;demonstrating orientation to place;90%;with minimal cues (75-90%)  -KA    Progress (Orientation Goal 1, SLP)  --  0%;independently (over 90% accuracy);50%;with maximum cues (25-49%)  -KA  0%;independently (over 90% accuracy);30%;with moderate cues (50-74%)  -KA    Progress/Outcomes (Orientation Goal 1, SLP)  --  goal ongoing  -KA  --    Comment (Orientation Goal 1, SLP)  --  Patient required cues for date (stated it was December  2010), pt able to immediate recall January 2020 after review and education and delayed recall after 2 minutes 50%. max cueing for location.   -KA  Independent recall of date and location 0%, immediate recall after discussion [id number]%, delayed recall after 5 minutes, 2/4 50% (able to recall month and location after delay and date and year after cues) 100% after cues.   -KA       Memory Skills Goal 1 (SLP)    Barriers (Memory Skills Goal 1, SLP)  --  --  fatigue  -KA    Comment (Memory Skills Goal 1, SLP)  --  Delayed recall of month/year after 2 minute delay 50% without cues  -KA  Immediate recall of three unrelated [id number]=223%, after 3 minute delay 0% without cues and 1/3 33% with cueing   -KA    Row Name 01/08/20 0800 01/07/20 0900          Oral Nutrition/Hydration Goal 1 (SLP)    Oral Nutrition/Hydration Goal 1, SLP  Patient will tolerate puree and thin liquids without overt s/s of pen/asp with use of safe swallow precautions  -KA  --        Labial Strengthening Goal 1 (SLP)    Activity (Labial Strengthening Goal 1, SLP)  increase labial tone  -KA  --     Increase Labial Tone  labial resistance exercises  -KA  --     [place]/Accuracy (Labial Strengthening Goal 1, SLP)  with minimal cues (75-90% accuracy)  -KA  --     Time Frame (Labial Strengthening Goal 1, SLP)  short term goal (STG);by discharge  -KA  --        Lingual Strengthening Goal 1 (SLP)    Activity (Lingual Strengthening Goal 1, SLP)  increase tongue back strength  -KA  --     Increase Tongue Back Strength  lingual movement exercises  -KA  --     [place]/Accuracy (Lingual Strengthening Goal 1, SLP)  with minimal cues (75-90% accuracy)  -KA  --        Pharyngeal Strengthening Exercise Goal 1 (SLP)    Activity (Pharyngeal Strengthening Goal 1, SLP)  increase superior movement of the hyolaryngeal complex;increase anterior movement of the hyolaryngeal complex;increase squeeze/positive pressure generation  -KA  --     Increase Superior Movement  of the Hyolaryngeal Complex  hard effortful swallow;Mendelsohn  -  --        Awareness of Basic Personal Information Goal 1 (SLP)    Improve Awareness of Basic Personal Information Goal 1 (SLP)  --  answering yes/no questions regarding personal/biographical information;naming self, family members;answering open-ended questions regarding personal/biographical information;answering questions about cognitive/physical changes;90%;with minimal cues (75-90%)  -KA     Time Frame (Awareness of Basic Personal Information Goal 1, SLP)  --  short term goal (STG);by discharge  -        Attention Goal 1 (SLP)    Improve Attention by Goal 1 (SLP)  --  attending to task;90%;with minimal cues (75-90%)  -KA     Time Frame (Attention Goal 1, SLP)  --  short term goal (STG);by discharge  -        Orientation Goal 1 (SLP)    Improve Orientation Through Goal 1 (SLP)  --  demonstrating orientation to month;demonstrating orientation to year;demonstrating orientation to place;90%;with minimal cues (75-90%)  -KA     Time Frame (Orientation Goal 1, SLP)  --  short term goal (STG);by discharge  -        Memory Skills Goal 1 (SLP)    Improve Memory Skills Through Goal 1 (SLP)  --  listen to a paragraph and answer questions;90%;with minimal cues (75-90%)  -KA     Time Frame (Memory Skills Goal 1, SLP)  --  short term goal (STG);by discharge  -       User Key  (r) = Recorded By, (t) = Taken By, (c) = Cosigned By    Initials Name Provider Type    Samuel Rivera MA,CCC-SLP Speech and Language Pathologist          EDUCATION  The patient has been educated in the following areas:   Dysphagia (Swallowing Impairment).    SLP Recommendation and Plan                            SLP Outcome Measures (last 72 hours)      SLP Outcome Measures     Row Name 01/08/20 0900 01/07/20 1200          SLP Outcome Measures    Outcome Measure Used?  Adult NOMS  -KA  Adult NOMS  -KA        Adult FCM Scores    FCM Chosen  Swallowing  -KA  Memory;Problem  Solving  -KA     Swallowing FCM Score  4  -KA  --  -     Memory FCM Score  --  4  -KA       User Key  (r) = Recorded By, (t) = Taken By, (c) = Cosigned By    Initials Name Effective Dates    Samuel Rivera MA,CCC-SLP 06/08/18 -              Time Calculation:   Time Calculation- SLP     Row Name 01/10/20 0841             Time Calculation- SLP    SLP Start Time  0755  -      SLP Stop Time  0825  -      SLP Time Calculation (min)  30 min  -        User Key  (r) = Recorded By, (t) = Taken By, (c) = Cosigned By    Initials Name Provider Type    Samuel Rivera MA,CCC-SLP Speech and Language Pathologist          Therapy Charges for Today     Code Description Service Date Service Provider Modifiers Qty    12146586743 HC ST TREATMENT SWALLOW 2 1/9/2020 Samuel Smalls MA,CCC-SLP GN 1    50202461399 HC ST DEV OF COGN SKILLS INITIAL 15 MIN 1/9/2020 Samuel Smalls MA,CCC-SLP  1    94729651977 HC ST DEV OF COGN SKILLS EACH ADDT'L 15 MIN 1/9/2020 Samuel Smalls MA,CCC-SLP  1    72145708662 HC ST TREATMENT SWALLOW 2 1/10/2020 Samuel Smalls MA,CCC-SLP GN 1                 Samuel Smalls MA,CCC-SLP  1/10/2020

## 2020-01-10 NOTE — PROGRESS NOTES
LOS: 4 days   Patient Care Team:  Jolene Laurent MD as PCP - General (Family Medicine)  Efren Martínez MD as PCP - Claims Attributed    Chief Complaint:   Status post infected  shunt-removed-CNS infection/pneumocephalus  Status post 12/23/ 2019 - Removal of ventriculoperitoneal shunt intracranial portion, valve, partial peritoneal down to the cervical region  Status post 12/31/2019 endoscopic repair of paranasal sinus defect  ID-ceftriaxone-antibiotics until January 14, 2020  Seizure disorder-multidrug regimen-phenytoin/Vimpat/Keppra/clonazepam/Topamax  Remote traumatic brain injury  Neuro stimulation-Adderall  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  Bilateral SCDs.  Status post acute renal gzizvvbejjlga-UFQ-qhqiin creatinine.  Avoid NSAID/ACE inhibitor's.    Subjective     History of Present Illness   Patient seen and examined this morning. No acute events overnight. He is more alert today compared to yesterday. Denies any pain. No CP or SOB. No reported n/v/d. No new concerns reported.  Seen by neurology and Keppra was decreased to 500 mg BID with improved alertness.     History taken from: patient chart family    Objective     Vital Signs  Temp:  [97.7 °F (36.5 °C)-98.2 °F (36.8 °C)] 97.7 °F (36.5 °C)  Heart Rate:  [101-107] 107  Resp:  [16-20] 20  BP: (131-144)/(86-97) 140/92    Physical Exam  Mental status: awake and alert.  HEENT-craniotomy incision clean dry and intact.   Pulm: CTAB, no wheezing, rales, or rhonchi  Heart: RRR, no MRG  Abdomen: soft, non-tender, non-distended. +BS  Extremities: No cyanosis or edema   Neuro: awake   Slow with responses but following commands. Left head preference but can rotate past midline to the  right.  Strength: Mild weakness and incoordination on the right side compared to the left.      Results Review:    I reviewed the patient's new clinical results.     Results from last 7 days   Lab Units 01/10/20  0712 01/06/20  0424 01/05/20  0229   WBC 10*3/mm3 8.30 12.92* 11.44*   HEMOGLOBIN g/dL 9.4* 10.6* 10.6*   HEMATOCRIT % 28.2* 31.8* 32.1*   PLATELETS 10*3/mm3 387 295 266     Results from last 7 days   Lab Units 01/10/20  0712 01/08/20  0556 01/07/20  0612   SODIUM mmol/L 144 147* 148*   POTASSIUM mmol/L 3.3* 3.6 3.7   CHLORIDE mmol/L 103 107 107   CO2 mmol/L 26.6 27.3 26.5   BUN mg/dL 38* 55* 57*   CREATININE mg/dL 1.67* 2.12* 2.19*   CALCIUM mg/dL 9.3 9.4 9.4   GLUCOSE mg/dL 113* 100* 112*       Medication Review:   Scheduled Meds:    amphetamine-dextroamphetamine XR 30 mg Oral Daily   atorvastatin 10 mg Oral Daily   cefTRIAXone 2 g Intravenous Q12H   cholecalciferol 400 Units Oral Daily   clonazePAM 1 mg Oral Q8H   heparin (porcine) 5,000 Units Subcutaneous Q8H   lacosamide 200 mg Oral Q12H   levETIRAcetam 500 mg Oral Q12H   metoprolol tartrate 50 mg Oral Q12H   phenytoin 100 mg Oral Q8H   polyethylene glycol 17 g Oral Daily   topiramate 25 mg Oral Daily     Continuous Infusions:   PRN Meds:.•  acetaminophen    Assessment/Plan       H/O traumatic brain injury  Status post infected  shunt-removed-CNS infection/pneumocephalus  -Status post 12/23/ 2019 - Removal of ventriculoperitoneal shunt intracranial portion, valve, partial peritoneal down to the cervical region  -Status post 12/31/2019 endoscopic repair of paranasal sinus defect  -ID-ceftriaxone-antibiotics until January 14, 2020    Seizure disorder-multidrug regimen-phenytoin/Vimpat/Keppra/clonazepam/Topamax  -January 9-patient was on phenytoin at home which was increased at the outside hospital, on Topamax but less than antiepileptic dose.  He had Vimpat, Keppra, clonazepam added at the outside hospital.  Consulted neurology for input patient has  fatigue felt possibly related to his increased antiepileptic medication.  Reviewed with neurology and Keppra dose being decreased.  -January 10- Keppra decreased from 2000 mg BID to 50 mg BID with improved alertness. Neurology continues to follow.     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  Bilateral SCDs.    Status post acute renal afgrhxbvjfvpm-WZM-cqorxg creatinine.  Avoid NSAID/ACE inhibitor's.     Now admit for comprehensive acute inpatient rehabilitation .  This would be an interdisciplinary program with physical therapy 1 hour,  occupational therapy 1 hour, and speech therapy 1 hour, 5 days a week.  Rehabilitation nursing for carryover, monitoring of incision and neurologic   status, bowel and bladder, and skin  Ongoing physician follow-up.  Weekly team conferences.  Goals are indeterminate.   Rehabilitation prognosis indeterminate.  Medical prognosis indeterminate.  Estimated length of stay is indeterminate  The patient's functional status and clinical status is unchanged from preadmission assessment and the patient continues appropriate for acute inpatient rehabilitation.  Goal is for home with outpatient   therapies.  Barrier to discharge: Cognition/mobility- work on trunk control, strength, balance to overcome.     TEAM CONF - JAN 9 - BED MAX 2.  TRANSFERS MAX 2.  GAIT 8 FEET PARALLEL BARS MOD 2. TOILET TRANSFERS MAX 2.  BLIND.  SLOW PROCESSING.  GROOMING MOD. UBD MAX. LBD DEP. DYSPHAGIA - PUREE/THINS.  FATIGUES WITH COGNITIVE  THERAPY.  ELOS :  4 WEEKS       I discussed the patients findings and my recommendations with patient, family and nursing staff    Tova  DO Daren  01/10/20  1:38 PM

## 2020-01-10 NOTE — PLAN OF CARE
Problem: Patient Care Overview  Goal: Plan of Care Review  Outcome: Ongoing (interventions implemented as appropriate)  Flowsheets (Taken 1/10/2020 7866)  Outcome Summary: Awakens easily at times, more difficult at times. During night was awake taking off gown, thinking it was daytime. Reoriented pt, able to get back to sleep. Mother stayed with pt. Incont. large amounts urine. Assist with turns in bed. Meds crushed with applesauce. No pain.  Progress, Functional Goals: demonstrating adequate progress  Plan of Care Reviewed With: patient  IRF Plan of Care Review: progress ongoing, continue

## 2020-01-10 NOTE — THERAPY TREATMENT NOTE
Inpatient Rehabilitation - Speech Language Pathology Treatment Note  [facility]     Patient Name: Tye JONES Junior  : 1975  MRN: [id number]  Today's Date: 1/10/2020         Admit Date: 2020    Visit Dx:      ICD-10-CM ICD-9-CM   1. Impaired mobility Z74.09 799.89     Patient Active Problem List   Diagnosis   • Mixed hyperlipidemia   • Essential hypertension   • Traumatic brain injury (CMS/HCC)   • Acute allergic rhinitis   • Seizure (CMS/HCC)   • Screen for colon cancer   • H/O traumatic brain injury        Therapy Treatment      EDUCATION  The patient has been educated in the following areas:   Communication Impairment.    SLP Recommendation and Plan                     Improvement with alertness today, especially with first half of session with faster response times. SLP continued diagnostic assessment, verbal sequencing of functional tasks (getting dressed and brushing teeth) was WNL, appropriate in correct sequence and pt provided detailed responses. Third sequence task on step by step process of him managing meds pt became more fatigued/closing eyes and unable to complete task.     SLP GOALS     Row Name 01/10/20 1100 01/10/20 0800 20 1000       Oral Nutrition/Hydration Goal 1 (SLP)    Oral Nutrition/Hydration Goal 1, SLP  --  Patient will tolerate puree and thin liquids without overt s/s of pen/asp with use of safe swallow precautions  -KA  Patient will tolerate puree and thin liquids without overt s/s of pen/asp with use of safe swallow precautions  -KA    Time Frame (Oral Nutrition/Hydration Goal 1, SLP)  --  short term goal (STG);by discharge  -KA  short term goal (STG);by discharge  -KA    Barriers (Oral Nutrition/Hydration Goal 1, SLP)  --  Patient alert at 8am for breakfast this AM, sitting upright in bed, pt did attempt to self feed this morning and more vocal, requested a cup of coffee. Patient did become more fatigued last 10 minutes requiring min verbal cues. No overt s/s of  pen/asp with thins via straw and puree, pt mother independently implemented alternate liquids/solids, slow rate, small bites and sips. Patient has difficulty following directions for double swallow. Trace to mild oral residue clears with liquid wash. Delayed a-p transit, and prolonged mastication with puree at times, min verbal cues end of the meal due to fatigue to swallow. Reviewed with mother will continue to monitor for diet upgrade readiness.   -KA  Patient alert at 8:30 for breakfast, patient mother feeding patient and SLP reviewed safe swallow precautions, mother independently alternated liquids and solids, small bites and sips, pt unable to perform double swallows with verbal cues. No overt s/s of pen/asp with puree and thins via straw. No oral holding, delayed a-p transit however no verbal cues required to swallow. Trace oral residue clears with liquid wash. Educated mother pt not ready for diet upgrade yet until alertness improves, and pt able to sustain consistent alertness. Mother voiced understanding of all education.   -KA    Progress/Outcomes (Oral Nutrition/Hydration Goal 1, SLP)  --  --  goal ongoing  -KA       Comprehend Questions Goal 1 (SLP)    Improve Ability to Comprehend Questions Goal 1 (SLP)  simple general questions  -KA  --  simple yes/no questions;90%;with minimal cues (75-90%);with moderate cues (50-74%)  -KA    Time Frame (Comprehend Questions Goal 1, SLP)  short term goal (STG)  -KA  --  short term goal (STG);by discharge  -KA    Barriers (Comprehend Questions Goal 1, SLP)  --  --  fatigue   -KA    Progress/Outcomes (Comprehend Questions Goal 1, SLP)  goal ongoing  -KA  --  --    Comment (Comprehend Questions Goal 1, SLP)  answering open ended questions today with 75% without cues   -KA  --  Patient responded to four y/n questions out of 6 with 100% accuracy on four questions, response time ranged from 5 to 60 seconds with one to three repetitions.   -KA       Word Retrieval Skills  Goal 1 (SLP)    Improve Word Retrieval Skills By Goal 1 (SLP)  --  --  completing a divergent task;completing a convergent task;90%;with minimal cues (75-90%);with moderate cues (50-74%)  -KA    Time Frame (Word Retrieval Goal 1, SLP)  --  --  short term goal (STG);by discharge  -KA    Comment (Word Retrieval Goal 1, SLP)  --  --  Across four concrete divergent categories patient named average of 4 within 120 seconds without cueing   -KA       Attention Goal 1 (SLP)    Improve Attention by Goal 1 (SLP)  attending to task  -KA  --  looking at speaker;attending to task;90%;with minimal cues (75-90%)  -KA    Time Frame (Attention Goal 1, SLP)  --  --  short term goal (STG)  -KA    Barriers (Attention Goal 1, SLP)  Improvement with attention today, pt still fatigued however fast response time and able to actively participate majority of the session  -KA  --  fatigue  -KA    Progress/Outcomes (Attention Goal 1, SLP)  goal ongoing  -KA  --  --    Comment (Attention Goal 1, SLP)  Attended to task one to two minutes at a time without cueing   -KA  --  Patient required mod to max verbal/tactile stimulation, pt able to sustain attention for 30 seconds for auditory tasks. Dr Solitario present for session and stated he would consult Neuro to adjust meds to help improve alertness.   -KA       Orientation Goal 1 (SLP)    Improve Orientation Through Goal 1 (SLP)  demonstrating orientation to day;demonstrating orientation to month;demonstrating orientation to year;demonstrating orientation to place;90%;with minimal cues (75-90%)  -KA  --  demonstrating orientation to day;demonstrating orientation to month;demonstrating orientation to year;demonstrating orientation to place;90%;with minimal cues (75-90%)  -KA    Time Frame (Orientation Goal 1, SLP)  short term goal (STG);by discharge  -KA  --  --    Progress (Orientation Goal 1, SLP)  --  --  0%;independently (over 90% accuracy);50%;with maximum cues (25-49%)  -KA    Progress/Outcomes  (Orientation Goal 1, SLP)  goal ongoing  -KA  --  goal ongoing  -KA    Comment (Orientation Goal 1, SLP)  Recall of date (without cueing) 1/3 33% (able to recall month) and after 15 minute delay 2/3 66% (unable to recall year after delay). Orientation to place patient stated he was at [facility], unable to recall location after delay.   -KA  --  Patient required cues for date (stated it was December 2010), pt able to immediate recall January 2020 after review and education and delayed recall after 2 minutes 50%. max cueing for location.   -KA       Memory Skills Goal 1 (SLP)    Comment (Memory Skills Goal 1, SLP)  --  --  Delayed recall of month/year after 2 minute delay 50% without cues  -KA    Row Name 01/08/20 1500 01/08/20 0800          Oral Nutrition/Hydration Goal 1 (SLP)    Oral Nutrition/Hydration Goal 1, SLP  --  Patient will tolerate puree and thin liquids without overt s/s of pen/asp with use of safe swallow precautions  -KA        Labial Strengthening Goal 1 (SLP)    Activity (Labial Strengthening Goal 1, SLP)  --  increase labial tone  -KA     Increase Labial Tone  --  labial resistance exercises  -KA     Johnson/Accuracy (Labial Strengthening Goal 1, SLP)  --  with minimal cues (75-90% accuracy)  -KA     Time Frame (Labial Strengthening Goal 1, SLP)  --  short term goal (STG);by discharge  -KA        Lingual Strengthening Goal 1 (SLP)    Activity (Lingual Strengthening Goal 1, SLP)  --  increase tongue back strength  -KA     Increase Tongue Back Strength  --  lingual movement exercises  -KA     Johnson/Accuracy (Lingual Strengthening Goal 1, SLP)  --  with minimal cues (75-90% accuracy)  -KA        Pharyngeal Strengthening Exercise Goal 1 (SLP)    Activity (Pharyngeal Strengthening Goal 1, SLP)  --  increase superior movement of the hyolaryngeal complex;increase anterior movement of the hyolaryngeal complex;increase squeeze/positive pressure generation  -KA     Increase Superior Movement of  the Hyolaryngeal Complex  --  hard effortful swallow;Mendelsohn  -KA        Awareness of Basic Personal Information Goal 1 (SLP)    Improve Awareness of Basic Personal Information Goal 1 (SLP)  answering yes/no questions regarding personal/biographical information;90%;with minimal cues (75-90%)  -KA  --     Barriers (Awareness of Basic Personal Information Goal 1, SLP)  fatigue  -KA  --     Progress (Awareness of Basic Personal Information Goal 1, SLP)  50%;independently (over 90% accuracy)  -KA  --     Comment (Awareness of Basic Personal Information Goal 1, SLP)  Multiple repetition for questions  -KA  --        Attention Goal 1 (SLP)    Improve Attention by Goal 1 (SLP)  looking at speaker;attending to task;90%;with minimal cues (75-90%)  -KA  --     Time Frame (Attention Goal 1, SLP)  short term goal (STG)  -KA  --     Barriers (Attention Goal 1, SLP)  fatigue  -KA  --     Progress (Attention Goal 1, SLP)  0%;independently (over 90% accuracy);50%;with maximum cues (25-49%)  -KA  --     Comment (Attention Goal 1, SLP)  mod to max verbal/tactile stimulation required intermittently throughout session due to pt fatigue   -KA  --        Orientation Goal 1 (SLP)    Improve Orientation Through Goal 1 (SLP)  demonstrating orientation to day;demonstrating orientation to month;demonstrating orientation to year;demonstrating orientation to place;90%;with minimal cues (75-90%)  -KA  --     Progress (Orientation Goal 1, SLP)  0%;independently (over 90% accuracy);30%;with moderate cues (50-74%)  -KA  --     Comment (Orientation Goal 1, SLP)  Independent recall of date and location 0%, immediate recall after discussion [id number]=670%, delayed recall after 5 minutes, 2/4 50% (able to recall month and location after delay and date and year after cues) 100% after cues.   -KA  --        Memory Skills Goal 1 (SLP)    Barriers (Memory Skills Goal 1, SLP)  fatigue  -KA  --     Comment (Memory Skills Goal 1, SLP)  Immediate recall of  three unrelated hlrjs=985%, after 3 minute delay 0% without cues and 1/3 33% with cueing   -KA  --       User Key  (r) = Recorded By, (t) = Taken By, (c) = Cosigned By    Initials Name Provider Type    Samuel Rivera MA,CCC-SLP Speech and Language Pathologist           SLP Outcome Measures (last 72 hours)      SLP Outcome Measures     Row Name 01/08/20 0900             SLP Outcome Measures    Outcome Measure Used?  Adult NOMS  -KA         Adult FCM Scores    FCM Chosen  Swallowing  -KA      Swallowing FCM Score  4  -KA        User Key  (r) = Recorded By, (t) = Taken By, (c) = Cosigned By    Initials Name Effective Dates    Samuel Rivera MA,CCC-SLP 06/08/18 -             Time Calculation:     Time Calculation- SLP     Row Name 01/10/20 1158 01/10/20 0841          Time Calculation- SLP    SLP Start Time  1130  -KA  0755  -KA     SLP Stop Time  1200  -KA  0825  -KA     SLP Time Calculation (min)  30 min  -KA  30 min  -KA       User Key  (r) = Recorded By, (t) = Taken By, (c) = Cosigned By    Initials Name Provider Type    Samuel Rivera MA,CCC-SLP Speech and Language Pathologist          Therapy Charges for Today     Code Description Service Date Service Provider Modifiers Qty    23799100365 HC ST TREATMENT SWALLOW 2 1/9/2020 Samuel Smalls MA,CCC-SLP GN 1    50398906132 HC ST DEV OF COGN SKILLS INITIAL 15 MIN 1/9/2020 Samuel Smalls MA,CCC-SLP  1    08795569693 HC ST DEV OF COGN SKILLS EACH ADDT'L 15 MIN 1/9/2020 Samuel Smalls MA,CCC-SLP  1    45747956877 HC ST TREATMENT SWALLOW 2 1/10/2020 Samuel Smalls MA,CCC-SLP GN 1    15124550040 HC ST DEV OF COGN SKILLS INITIAL 15 MIN 1/10/2020 Samuel Smalls MA,CCC-SLP  1    10955336470 HC ST DEV OF COGN SKILLS EACH ADDT'L 15 MIN 1/10/2020 Samuel Smalls MA,CCC-SLP  1             ADULT NOMS (last 72 hours)      Adult NOMS     Row Name 01/08/20 0900                   Adult FCM Scores    FCM Chosen  Swallowing  -KA        Swallowing FCM Score  4  -KA           User Key  (r) = Recorded By, (t) = Taken By, (c) = Cosigned By    Initials Name Effective Dates    Samuel Rivera MA,[facility]-SLP 06/08/18 -                  Samuel Smalls MA,[facility]-SLP  1/10/2020

## 2020-01-10 NOTE — PROGRESS NOTES
Occupational Therapy: Individual: 60 minutes.    Physical Therapy: Branch    Speech Language Pathology:  Branch    Signed by: Laurita Wheeler OT

## 2020-01-10 NOTE — PLAN OF CARE
Problem: Patient Care Overview  Goal: Plan of Care Review  Outcome: Ongoing (interventions implemented as appropriate)  Flowsheets (Taken 1/10/2020 9574)  Outcome Summary: pt alert and with confusion. does answer some of the questions right at times, but not always. has been mostly continent today and using the toilet. working with therapies. x2 assist. will monitor.  Progress, Functional Goals: demonstrating adequate progress  Plan of Care Reviewed With: patient  IRF Plan of Care Review: progress ongoing, continue

## 2020-01-10 NOTE — PROGRESS NOTES
Inpatient Rehabilitation Plan of Care Note    Plan of Care  Care Plan Reviewed - Updates as Follows    Sphincter Control    Performed Intervention(s)  Monitor I&O  check for incontinence, offer toileting q2hrs  miralax daily-hold if needed.      Safety    Performed Intervention(s)  Safety rounds/bed alarm/ chair alarm on  Falls precautio/call light within easy reach      Psychosocial    Performed Intervention(s)  Offer support/Encourage patient to verbalize any concerns      Body Systems    Performed Intervention(s)  Skin care q shift and PRN  Assist to turn patient q 2-3hrs.    Signed by: Polo Rothman RN

## 2020-01-10 NOTE — THERAPY TREATMENT NOTE
Inpatient Rehabilitation - Physical Therapy Treatment Note  [facility]     Patient Name: Tye JONES Junior  : 1975  MRN: [id number]    Today's Date: 1/10/2020                 Admit Date: 2020      Visit Dx:      ICD-10-CM ICD-9-CM   1. Impaired mobility Z74.09 799.89       Patient Active Problem List   Diagnosis   • Mixed hyperlipidemia   • Essential hypertension   • Traumatic brain injury (CMS/HCC)   • Acute allergic rhinitis   • Seizure (CMS/HCC)   • Screen for colon cancer   • H/O traumatic brain injury       Therapy Treatment    IRF Treatment Summary     Row Name 01/10/20 1022 01/10/20 1000          Evaluation/Treatment Time and Intent    Subjective Information  no complaints  -  no complaints  -RD     Existing Precautions/Restrictions  fall  -  fall  -RD     Document Type  therapy note (daily note)  -  therapy note (daily note)  -RD     Mode of Treatment  individual therapy;physical therapy  -  occupational therapy  -RD     Patient/Family Observations  pt supine in bed no acute distress, mother bedside  -  pt seated in w/c w/ no signs of acute distress  -RD     Recorded by [LH] Pita Roth, PT [RD] Laurita Wayne OT     Row Name 01/10/20 1022 01/10/20 1000          Cognition/Psychosocial- PT/OT    Orientation Status (Cognition)  oriented to;person;situation  -  oriented to;person;situation  -RD     Follows Commands (Cognition)  follows one step commands;75-90% accuracy;increased processing time needed;initiation impaired;repetition of directions required;physical/tactile prompts required  -  follows one step commands;75-90% accuracy;verbal cues/prompting required;repetition of directions required  -RD     Personal Safety Interventions  fall prevention program maintained;gait belt;supervised activity  -  fall prevention program maintained;gait belt;nonskid shoes/slippers when out of bed  -RD     Cognitive Function (Cognitive)  executive function deficit  -  --     Recorded  by [] Pita Roth, PT [RD] Laurita Wayne, OT     Row Name 01/10/20 1022             Bed Mobility Assessment/Treatment    Supine-Sit [place] (Bed Mobility)  moderate assist (50% patient effort);verbal cues;nonverbal cues (demo/gesture)  -      Sit-Supine [place] (Bed Mobility)  moderate assist (50% patient effort);2 person assist;verbal cues;nonverbal cues (demo/gesture)  -      Assistive Device (Bed Mobility)  bed rails  -      Comment (Bed Mobility)  assist to guide UEs to bedrail 2' visual deficit  -LH      Recorded by [] Pita Roth, PT      Row Name 01/10/20 1000             Transfer Assessment/Treatment    Transfer Assessment/Treatment  sit-stand transfer;stand-sit transfer;toilet transfer  -RD      Recorded by [RD] Laurita Wayne, OT      Row Name 01/10/20 1022             Bed-Chair Transfer    Bed-Chair [place] (Transfers)  moderate assist (50% patient effort);2 person assist;verbal cues;nonverbal cues (demo/gesture)  -      Assistive Device (Bed-Chair Transfers)  walker, front-wheeled  -      Recorded by [] Pita Roth, PT      Row Name 01/10/20 1022             Chair-Bed Transfer    Chair-Bed [place] (Transfers)  moderate assist (50% patient effort);2 person assist;verbal cues;nonverbal cues (demo/gesture)  -      Assistive Device (Chair-Bed Transfers)  walker, front-wheeled  -LH      Recorded by [] Pita Roth, PT      Row Name 01/10/20 1022 01/10/20 1000          Sit-Stand Transfer    Sit-Stand [place] (Transfers)  moderate assist (50% patient effort);2 person assist;verbal cues;nonverbal cues (demo/gesture)  -  moderate assist (50% patient effort);2 person assist;verbal cues  -RD     Assistive Device (Sit-Stand Transfers)  walker, front-wheeled  -  wheelchair  -RD     Recorded by [] Pita Roth, PT [RD] Laurita Wayne, OT     Row Name 01/10/20 1022 01/10/20 1000          Stand-Sit Transfer    Stand-Sit [place] (Transfers)   moderate assist (50% patient effort);2 person assist;verbal cues;nonverbal cues (demo/gesture)  -  moderate assist (50% patient effort);2 person assist;verbal cues  -RD     Assistive Device (Stand-Sit Transfers)  walker, front-wheeled  -  wheelchair  -RD     Recorded by [LH] Pita Roth, PT [RD] Laurita Wayne, OT     Row Name 01/10/20 1022 01/10/20 1000          Toilet Transfer    Type (Toilet Transfer)  sit-stand;stand-sit;stand pivot/stand step  -  stand pivot/stand step;sit-stand;stand-sit  -RD     [place] Level (Toilet Transfer)  moderate assist (50% patient effort);2 person assist  -  moderate assist (50% patient effort);2 person assist;verbal cues  -RD     Assistive Device (Toilet Transfer)  wheelchair;grab bars/safety frame;raised toilet seat  -  wheelchair;grab bars/safety frame  -RD     Recorded by [] Pita Roth, PT [RD] Laurita Wayne, OT     Row Name 01/10/20 1022             Gait/Stairs Assessment/Training    [place] Level (Gait)  minimum assist (75% patient effort);moderate assist (50% patient effort);2 person assist  -      Assistive Device (Gait)  walker, front-wheeled  -      Distance in Feet (Gait)  25, 30x2  -      Pattern (Gait)  step-to  -LH      Deviations/Abnormal Patterns (Gait)  stride length decreased;bilateral deviations  -      Bilateral Gait Deviations  forward flexed posture;weight shift ability decreased  -      Comment (Gait/Stairs)  assist for UE placement to rwx 2' visual deficits, pt head/neck positions to L- guiding rwx to L - pt needs assist for fwd steering of rwx  -LH      Recorded by [LH] Pita Roth, PT      Row Name 01/10/20 1000             Basic Activities of Daily Living (BADLs)    Basic Activities of Daily Living  bathing;upper body dressing;lower body dressing;grooming;toileting  -RD      Recorded by [RD] Laurita Wayne, OT      Row Name 01/10/20 1000             Bathing Assessment/Treatment    Bathing [place]  Level  bathing skills;upper body;lower body;moderate assist (50% patient effort);verbal cues  -RD      Bathing Position  sink side;supported sitting;supported standing  -RD      Bathing Setup Assistance  obtain supplies;adjust water temperature  -RD      Comment (Bathing)  A x2 when standing to wash [place]  -RD      Recorded by [RD] Laurita Wayne, OT      Row Name 01/10/20 1000             Upper Body Dressing Assessment/Treatment    Upper Body Dressing Task  upper body dressing skills;doff;don;pull over garment;moderate assist (50-74% patient effort);maximal assist (25-49% patient effort);verbal cues  -RD      Upper Body Dressing Position  supported sitting  -RD      Set-up Assistance (Upper Body Dressing)  obtain clothing  -RD      Recorded by [RD] Laurita Wayne, OT      Row Name 01/10/20 1000             Lower Body Dressing Assessment/Treatment    Lower Body Dressing [place] Level  doff;don;pants/bottoms;shoes/slippers;dependent (less than 25% patient effort)  -RD      Lower Body Dressing Position  supported sitting;supported standing  -RD      Lower Body Dressing Setup Assistance  obtain clothing  -RD      Comment (Lower Body Dressing)  A x2 when standing to pull up pants  -RD      Recorded by [RD] Laurita Wayne, OT      Row Name 01/10/20 1000             Grooming Assessment/Treatment    Grooming [place] Level  grooming skills;deodorant application;oral care regimen;wash face, hands;minimum assist (75% patient effort);verbal cues  -RD      Grooming Position  sink side;supported sitting  -RD      Grooming Setup Assistance  obtain supplies  -RD      Recorded by [RD] Laurita Wayne, OT      Row Name 01/10/20 1000             Toileting Assessment/Treatment    Toileting [place] Level  toileting skills;adjust/manage clothing;perform perineal hygiene;maximum assist (25% patient effort);verbal cues  -RD      Assistive Device Use (Toileting)  grab bar/safety frame  -RD       Toileting Position  supported sitting;supported standing  -RD      Toileting Setup Assistance  obtain supplies  -RD      Comment (Toileting)  A x2 for standing during toileting hygiene and clothing mgnt  -RD      Recorded by [RD] Laurita Wayne OT      Row Name 01/10/20 1022 01/10/20 1000          Pain Scale: Numbers Pre/Post-Treatment    Pain Scale: Numbers, Pretreatment  0/10 - no pain  -LH  0/10 - no pain  -RD     Pain Scale: Numbers, Post-Treatment  0/10 - no pain  -LH  0/10 - no pain  -RD     Recorded by [] Pita Roth, PT [RD] Laurita Wayne OT     Row Name 01/10/20 1022             Therapeutic Exercise    Therapeutic Exercise  standing, lower extremities  -LH      Recorded by [] Pita Roth, PT      Row Name 01/10/20 1022             Lower Extremity Standing Therapeutic Exercise    Comment, Standing Lower Extremity (Therapeutic Exercise)  attempted MIP BUE supported at hemibars, Min A- difficulty flexing  R  knee, 5 reps  -LH      Recorded by [] Pita Roth, PT      Row Name 01/10/20 1022 01/10/20 1000          Positioning and Restraints    Pre-Treatment Position  in bed  -LH  sitting in chair/recliner  -RD     Post Treatment Position  wheelchair  -  wheelchair  -RD     In Wheelchair  sitting;call light within reach;exit alarm on;encouraged to call for assist;with family/caregiver  -  sitting;with SLP  -RD     Recorded by [] Pita Roth, PT [RD] Laurita Wayne, OT       User Key  (r) = Recorded By, (t) = Taken By, (c) = Cosigned By    Initials Name Effective Dates     Pita Roth, PT 04/03/18 -     RD Laurita Wayne OT 10/14/19 -         Wound 01/06/20 2200 head Incision (Active)   Dressing Appearance open to air 1/10/2020  8:41 AM   Closure Approximated;Sutures 1/10/2020  8:41 AM   Base clean;dry 1/10/2020  8:41 AM   Periwound dry;intact 1/10/2020  8:41 AM   Drainage Amount none 1/10/2020  8:41 AM     Physical Therapy Education                 Title: PT OT SLP  Therapies (In Progress)     Topic: Physical Therapy (In Progress)     Point: Mobility training (In Progress)     Description:   Instruct learner(s) on safety and technique for assisting patient out of bed, chair or wheelchair.  Instruct in the proper use of assistive devices, such as walker, crutches, cane or brace.              Patient Friendly Description:   It's important to get you on your feet again, but we need to do so in a way that is safe for you. Falling has serious consequences, and your personal safety is the most important thing of all.        When it's time to get out of bed, one of us or a family member will sit next to you on the bed to give you support.     If your doctor or nurse tells you to use a walker, crutches, a cane, or a brace, be sure you use it every time you get out of bed, even if you think you don't need it.    Learning Progress Summary           Patient Acceptance, E, VU,NR by  at 1/10/2020 1033    Acceptance, E, NR by  at 1/9/2020 1449    Acceptance, E, NR by  at 1/8/2020 0949    Acceptance, E, NR by  at 1/7/2020 1158   Family Acceptance, E, NR by  at 1/7/2020 1158                   Point: Home exercise program (In Progress)     Description:   Instruct learner(s) on appropriate technique for monitoring, assisting and/or progressing patient with therapeutic exercises and activities.              Learning Progress Summary           Patient Acceptance, E, VU,NR by  at 1/10/2020 1033    Acceptance, E, NR by  at 1/7/2020 1158   Family Acceptance, E, NR by  at 1/7/2020 1158                   Point: Body mechanics (In Progress)     Description:   Instruct learner(s) on proper positioning and spine alignment for patient and/or caregiver during mobility tasks and/or exercises.              Learning Progress Summary           Patient Acceptance, E, NR by  at 1/7/2020 1158   Family Acceptance, E, NR by  at 1/7/2020 1158                   Point: Precautions (In Progress)      Description:   Instruct learner(s) on prescribed precautions during mobility and gait tasks              Learning Progress Summary           Patient Acceptance, E, NR by  at 1/8/2020 0949                               User Key     Initials Effective Dates Name Provider Type Discipline     04/03/18 -  Pita Roth, PT Physical Therapist PT    JK 04/03/18 -  Binta Hartman PT Physical Therapist PT                  PT Recommendation and Plan  Planned Therapy Interventions (PT Eval): balance training, bed mobility training, gait training, home exercise program, ROM (range of motion), stretching, strengthening, stair training, transfer training, wheelchair management/propulsion training  Frequency of Treatment (PT Eval): 60 minutes per session                     Time Calculation:     PT Charges     Row Name 01/10/20 1451 01/10/20 1035          Time Calculation    Start Time  1300  -  0900  -     Stop Time  1330  -  0930  -     Time Calculation (min)  30 min  -  30 min  -     PT Received On  --  01/10/20  -     PT - Next Appointment  --  01/11/20  -       User Key  (r) = Recorded By, (t) = Taken By, (c) = Cosigned By    Initials Name Provider Type     Pita Roth, PT Physical Therapist          Therapy Charges for Today     Code Description Service Date Service Provider Modifiers Qty    16202709194 HC PT THER PROC EA 15 MIN 1/10/2020 Pita Roth, PT GP 4                   Pita Roth, PT  1/10/2020

## 2020-01-10 NOTE — THERAPY TREATMENT NOTE
Inpatient Rehabilitation - Occupational Therapy Treatment Note    [facility]     Patient Name: Tye JONES Junior  : 1975  MRN: [id number]    Today's Date: 1/10/2020                 Admit Date: 2020      Visit Dx:    ICD-10-CM ICD-9-CM   1. Impaired mobility Z74.09 799.89       Patient Active Problem List   Diagnosis   • Mixed hyperlipidemia   • Essential hypertension   • Traumatic brain injury (CMS/HCC)   • Acute allergic rhinitis   • Seizure (CMS/HCC)   • Screen for colon cancer   • H/O traumatic brain injury         Therapy Treatment    IRF Treatment Summary     Row Name 01/10/20 1022 01/10/20 1000          Evaluation/Treatment Time and Intent    Subjective Information  no complaints  -  no complaints  -RD     Existing Precautions/Restrictions  fall  -  fall  -RD     Document Type  therapy note (daily note)  -  therapy note (daily note)  -RD     Mode of Treatment  individual therapy;physical therapy  -  occupational therapy  -RD     Patient/Family Observations  pt supine in bed no acute distress, mother bedside  -  pt seated in w/c w/ no signs of acute distress  -RD     Recorded by [LH] Pita Roth, PT [RD] Laurita Wayne OT     Row Name 01/10/20 1022 01/10/20 1000          Cognition/Psychosocial- PT/OT    Orientation Status (Cognition)  oriented to;person;situation  -  oriented to;person;situation  -     Follows Commands (Cognition)  follows one step commands;75-90% accuracy;increased processing time needed;initiation impaired;repetition of directions required;physical/tactile prompts required  -  follows one step commands;75-90% accuracy;verbal cues/prompting required;repetition of directions required  -RD     Personal Safety Interventions  fall prevention program maintained;gait belt;supervised activity  -  fall prevention program maintained;gait belt;nonskid shoes/slippers when out of bed  -RD     Cognitive Function (Cognitive)  executive function deficit  -  --      Recorded by [] Pita Roth, PT [RD] Laurita Wayne, OT     Row Name 01/10/20 1022 01/10/20 1000          Bed Mobility Assessment/Treatment    Supine-Sit [place] (Bed Mobility)  moderate assist (50% patient effort);verbal cues;nonverbal cues (demo/gesture)  -  --     Sit-Supine [place] (Bed Mobility)  moderate assist (50% patient effort);2 person assist;verbal cues;nonverbal cues (demo/gesture)  -  --     Assistive Device (Bed Mobility)  bed rails  -  --     Comment (Bed Mobility)  assist to guide UEs to bedrail 2' visual deficit  -  NT- up in chair  -RD     Recorded by [] Pita Roth, PT [RD] Laurita Wayne, OT     Row Name 01/10/20 1000             Transfer Assessment/Treatment    Transfer Assessment/Treatment  sit-stand transfer;stand-sit transfer;toilet transfer  -RD      Recorded by [RD] Laurita Wayne, OT      Row Name 01/10/20 1022             Bed-Chair Transfer    Bed-Chair [place] (Transfers)  moderate assist (50% patient effort);2 person assist;verbal cues;nonverbal cues (demo/gesture)  -      Assistive Device (Bed-Chair Transfers)  walker, front-wheeled  -      Recorded by [] Pita Roth, PT      Row Name 01/10/20 1022             Chair-Bed Transfer    Chair-Bed [place] (Transfers)  moderate assist (50% patient effort);2 person assist;verbal cues;nonverbal cues (demo/gesture)  -      Assistive Device (Chair-Bed Transfers)  walker, front-wheeled  -LH      Recorded by [] Pita Roth, PT      Row Name 01/10/20 1022 01/10/20 1000          Sit-Stand Transfer    Sit-Stand [place] (Transfers)  moderate assist (50% patient effort);2 person assist;verbal cues;nonverbal cues (demo/gesture)  -  moderate assist (50% patient effort);2 person assist;verbal cues  -     Assistive Device (Sit-Stand Transfers)  walker, front-wheeled  -  wheelchair  -RD     Recorded by [] Pita Roth, PT [RD] Laurita Wayne, OT     Row Name 01/10/20 1022  01/10/20 1000          Stand-Sit Transfer    Stand-Sit [place] (Transfers)  moderate assist (50% patient effort);2 person assist;verbal cues;nonverbal cues (demo/gesture)  -  moderate assist (50% patient effort);2 person assist;verbal cues  -     Assistive Device (Stand-Sit Transfers)  walker, front-wheeled  -  wheelchair  -RD     Recorded by [] Pita Roth, PT [RD] Laurita Wayne, OT     Row Name 01/10/20 1022 01/10/20 1000          Toilet Transfer    Type (Toilet Transfer)  sit-stand;stand-sit;stand pivot/stand step  -  stand pivot/stand step;sit-stand;stand-sit  -RD     [place] Level (Toilet Transfer)  moderate assist (50% patient effort);2 person assist  -  moderate assist (50% patient effort);2 person assist;verbal cues  -RD     Assistive Device (Toilet Transfer)  wheelchair;grab bars/safety frame;raised toilet seat  -  wheelchair;grab bars/safety frame  -RD     Recorded by [] Pita Roth, PT [RD] Laurita Wayne, OT     Row Name 01/10/20 1022             Gait/Stairs Assessment/Training    [place] Level (Gait)  minimum assist (75% patient effort);moderate assist (50% patient effort);2 person assist  -      Assistive Device (Gait)  walker, front-wheeled  -      Distance in Feet (Gait)  25, 30x2  -      Pattern (Gait)  step-to  -      Deviations/Abnormal Patterns (Gait)  stride length decreased;bilateral deviations  -      Bilateral Gait Deviations  forward flexed posture;weight shift ability decreased  -      Comment (Gait/Stairs)  assist for UE placement to rwx 2' visual deficits, pt head/neck positions to L- guiding rwx to L - pt needs assist for fwd steering of rwx  -LH      Recorded by [] Pita Roth, PT      Row Name 01/10/20 1000             Basic Activities of Daily Living (BADLs)    Basic Activities of Daily Living  bathing;upper body dressing;lower body dressing;grooming;toileting  -RD      Recorded by [RD] Lauriat Wayne, OT      Row Name  01/10/20 1000             Bathing Assessment/Treatment    Bathing [place] Level  bathing skills;upper body;lower body;moderate assist (50% patient effort);verbal cues  -RD      Bathing Position  sink side;supported sitting;supported standing  -RD      Bathing Setup Assistance  obtain supplies;adjust water temperature  -RD      Comment (Bathing)  A x2 when standing to wash ritika regions  -RD      Recorded by [RD] Laurita Wayne, OT      Row Name 01/10/20 1000             Upper Body Dressing Assessment/Treatment    Upper Body Dressing Task  upper body dressing skills;doff;don;pull over garment;moderate assist (50-74% patient effort);maximal assist (25-49% patient effort);verbal cues  -RD      Upper Body Dressing Position  supported sitting  -RD      Set-up Assistance (Upper Body Dressing)  obtain clothing  -RD      Recorded by [RD] Laurita Wayne, OT      Row Name 01/10/20 1000             Lower Body Dressing Assessment/Treatment    Lower Body Dressing [place] Level  doff;don;pants/bottoms;shoes/slippers;dependent (less than 25% patient effort)  -RD      Lower Body Dressing Position  supported sitting;supported standing  -RD      Lower Body Dressing Setup Assistance  obtain clothing  -RD      Comment (Lower Body Dressing)  A x2 when standing to pull up pants  -RD      Recorded by [RD] Laurita Wayne, OT      Row Name 01/10/20 1000             Grooming Assessment/Treatment    Grooming [place] Level  grooming skills;deodorant application;oral care regimen;wash face, hands;minimum assist (75% patient effort);verbal cues  -RD      Grooming Position  sink side;supported sitting  -RD      Grooming Setup Assistance  obtain supplies  -RD      Recorded by [RD] Laurita Wayne, OT      Row Name 01/10/20 1000             Toileting Assessment/Treatment    Toileting [place] Level  toileting skills;adjust/manage clothing;perform perineal hygiene;maximum assist (25% patient effort);verbal cues   -RD      Assistive Device Use (Toileting)  grab bar/safety frame  -RD      Toileting Position  supported sitting;supported standing  -RD      Toileting Setup Assistance  obtain supplies  -RD      Comment (Toileting)  A x2 for standing during toileting hygiene and clothing mgnt  -RD      Recorded by [RD] Laurita Wayne OT      Row Name 01/10/20 1022 01/10/20 1000          Pain Scale: Numbers Pre/Post-Treatment    Pain Scale: Numbers, Pretreatment  0/10 - no pain  -LH  0/10 - no pain  -RD     Pain Scale: Numbers, Post-Treatment  0/10 - no pain  -LH  0/10 - no pain  -RD     Recorded by [] Pita Roth, PT [RD] Laurita Wayne OT     Row Name 01/10/20 1022             Therapeutic Exercise    Therapeutic Exercise  standing, lower extremities  -LH      Recorded by [] Pita Roth, PT      Row Name 01/10/20 1022             Lower Extremity Standing Therapeutic Exercise    Comment, Standing Lower Extremity (Therapeutic Exercise)  attempted MIP BUE supported at hemibars, Min A- difficulty flexing  R  knee, 5 reps  -LH      Recorded by [] Pita Roth, PT      Row Name 01/10/20 1022 01/10/20 1000          Positioning and Restraints    Pre-Treatment Position  in bed  -  sitting in chair/recliner  -RD     Post Treatment Position  wheelchair  -  wheelchair  -RD     In Wheelchair  sitting;call light within reach;exit alarm on;encouraged to call for assist;with family/caregiver  -  sitting;with SLP  -RD     Recorded by [] Pita Roth, PT [RD] Laurita Wayne, OT       User Key  (r) = Recorded By, (t) = Taken By, (c) = Cosigned By    Initials Name Effective Dates     Pita Roth, PT 04/03/18 -     RD Laurita Wayne OT 10/14/19 -           Wound 01/06/20 2200 head Incision (Active)   Dressing Appearance open to air 1/10/2020  8:41 AM   Closure Approximated;Sutures 1/10/2020  8:41 AM   Base clean;dry 1/10/2020  8:41 AM   Periwound dry;intact 1/10/2020  8:41 AM   Drainage Amount none  1/10/2020  8:41 AM         OT Recommendation and Plan                 OT IRF GOALS     Row Name 01/07/20 1527             Transfer Goal 1 (OT-IRF)    Activity/Assistive Device (Transfer Goal 1, OT-IRF)  toilet;commode, bedside without drop arms  -SG      [place] Level (Transfer Goal 1, OT-IRF)  maximum assist (25-49% patient effort)  -SG      Time Frame (Transfer Goal 1, OT-IRF)  short term goal (STG);1 week  -SG      Progress/Outcomes (Transfer Goal 1, OT-IRF)  goal ongoing  -SG         Transfer Goal 2 (OT-IRF)    Activity/Assistive Device (Transfer Goal 2, OT-IRF)  toilet  -SG      [place] Level (Transfer Goal 2, OT-IRF)  minimum assist (75% or more patient effort);moderate assist (50-74% patient effort)  -SG      Time Frame (Transfer Goal 2, OT-IRF)  long term goal (LTG);by discharge  -SG      Progress/Outcomes (Transfer Goal 2, OT-IRF)  goal ongoing  -SG         Transfer Goal 3 (OT-IRF)    Activity/Assistive Device (Transfer Goal 3, OT-IRF)  shower chair  -SG      [place] Level (Transfer Goal 3, OT-IRF)  maximum assist (25-49% patient effort)  -SG      Time Frame (Transfer Goal 3, OT-IRF)  short term goal (STG);1 week  -SG      Progress/Outcomes (Transfer Goal 3, OT-IRF)  goal ongoing  -SG         Transfer Goal 4 (OT-IRF)    Activity/Assistive Device (Transfer Goal 4, OT-IRF)  shower chair  -SG      [place] Level (Transfer Goal 4, OT-IRF)  minimum assist (75% or more patient effort);moderate assist (50-74% patient effort)  -SG      Time Frame (Transfer Goal 4, OT-IRF)  long term goal (LTG);by discharge  -SG      Progress/Outcomes (Transfer Goal 4, OT-IRF)  goal ongoing  -SG         Bathing Goal 1 (OT-IRF)    Activity/Device (Bathing Goal 1, OT-IRF)  bathing skills, all  -SG      [place] Level (Bathing Goal 1, OT-IRF)  moderate assist (50-74% patient effort)  -SG      Time Frame (Bathing Goal 1, OT-IRF)  short term goal (STG);1 week  -SG      Progress/Outcomes (Bathing Goal 1, OT-IRF)   goal ongoing  -SG         Bathing Goal 2 (OT-IRF)    Activity/Device (Bathing Goal 2, OT-IRF)  bathing skills, all  -SG      [place] Level (Bathing Goal 2, OT-IRF)  minimum assist (75% or more patient effort)  -SG      Time Frame (Bathing Goal 2, OT-IRF)  long term goal (LTG);by discharge  -SG      Progress/Outcomes (Bathing Goal 2, OT-IRF)  goal ongoing  -SG         UB Dressing Goal 1 (OT-IRF)    Activity/Device (UB Dressing Goal 1, OT-IRF)  upper body dressing  -SG      [place] (UB Dress Goal 1, OT-IRF)  moderate assist (50-74% patient effort)  -SG      Time Frame (UB Dressing Goal 1, OT-IRF)  short term goal (STG);1 week  -SG      Progress/Outcomes (UB Dressing Goal 1, OT-IRF)  goal ongoing  -SG         UB Dressing Goal 2 (OT-IRF)    Activity/Device (UB Dressing Goal 2, OT-IRF)  upper body dressing  -SG      [place] (UB Dress Goal 2, OT-IRF)  minimum assist (75% or more patient effort)  -SG      Time Frame (UB Dressing Goal 2, OT-IRF)  long term goal (LTG);by discharge  -SG      Progress/Outcomes (UB Dressing Goal 2, OT-IRF)  goal ongoing  -SG         LB Dressing Goal 1 (OT-IRF)    Activity/Device (LB Dressing Goal 1, OT-IRF)  lower body dressing  -SG      [place] (LB Dressing Goal 1, OT-IRF)  maximum assist (25-49% patient effort)  -SG      Time Frame (LB Dressing Goal 1, OT-IRF)  short term goal (STG);1 week  -SG      Progress/Outcomes (LB Dressing Goal 1, OT-IRF)  goal ongoing  -SG         LB Dressing Goal 2 (OT-IRF)    Activity/Device (LB Dressing Goal 2, OT-IRF)  lower body dressing  -SG      [place] (LB Dressing Goal 2, OT-IRF)  moderate assist (50-74% patient effort)  -SG      Time Frame (LB Dressing Goal 2, OT-IRF)  long term goal (LTG);by discharge  -SG      Progress/Outcomes (LB Dressing Goal 2, OT-IRF)  goal ongoing  -SG         Toileting Goal 1 (OT-IRF)    Activity/Device (Toileting Goal 1, OT-IRF)  toileting skills, all  -SG      [place] Level (Toileting Goal 1, OT-IRF)   moderate assist (50-74% patient effort)  -SG      Time Frame (Toileting Goal 1, OT-IRF)  long term goal (LTG);by discharge  -SG         Strength Goal 1 (OT-IRF)    Strength Goal 1 (OT-IRF)  Pt to tolerate UE strengthing to improve overall ROM and functional use of UE.  -SG      Time Frame (Strength Goal 1, OT-IRF)  long term goal (LTG);by discharge  -SG      Progress/Outcomes (Strength Goal 1, OT-IRF)  goal ongoing  -SG         Balance Goal 1 (OT)    Activity/Assistive Device (Balance Goal 1, OT)  sitting, static  -SG      [place] Level/Cues Needed (Balance Goal 1, OT)  contact guard assist  -SG      Time Frame (Balance Goal 1, OT)  long term goal (LTG);by discharge  -SG      Progress/Outcomes (Balance Goal 1, OT)  goal ongoing  -SG         Caregiver Training Goal 1 (OT-IRF)    Caregiver Training Goal 1 (OT-IRF)  Pt and family to be indep with ADLs, functional transfers, AD/AE, and HEP for safe d/c home.  -SG      Time Frame (Caregiver Training Goal 1, OT-IRF)  long term goal (LTG);by discharge  -SG      Progress/Outcomes (Caregiver Training Goal 1, OT-IRF)  goal ongoing  -SG        User Key  (r) = Recorded By, (t) = Taken By, (c) = Cosigned By    Initials Name Provider Type    Sudha Cho OTR Occupational Therapist          Occupational Therapy Education                 Title: PT OT SLP Therapies (In Progress)     Topic: Occupational Therapy (In Progress)     Point: ADL training (Done)     Description:   Instruct learner(s) on proper safety adaptation and remediation techniques during self care or transfers.   Instruct in proper use of assistive devices.              Learning Progress Summary           Family Acceptance, E,TB, VU by  at 1/7/2020 [id number]    Comment:  OT role and goals, POC.                               User Key     Initials Effective Dates Name Provider Type Discipline     12/26/18 -  Sudha Marte OTR Occupational Therapist OT                       Time Calculation:     Time  Calculation- OT     Row Name 01/10/20 1524             Time Calculation- OT    OT Start Time  1000  -RD      OT Stop Time  1100  -RD      OT Time Calculation (min)  60 min  -RD      OT Received On  01/10/20  -RD        User Key  (r) = Recorded By, (t) = Taken By, (c) = Cosigned By    Initials Name Provider Type    Laurita Lawrence OT Occupational Therapist          Therapy Charges for Today     Code Description Service Date Service Provider Modifiers Qty    84858358010 HC OT SELF CARE/MGMT/TRAIN EA 15 MIN 1/10/2020 Laurita Wayne OT GO 4                   Laurita Wayne OT  1/10/2020

## 2020-01-10 NOTE — PROGRESS NOTES
"DOS: 1/10/2020  NAME: Tey JONES Junior   : 1975  PCP: Jolene Laurent MD  Reason for consult: Lethargy, seizure disorder    Neurology    Subjective: Patient's mother and RN are at the bedside and they state patient is much more alert today.  Patient denies headache.  Pt seen in follow up today, however the problem is new to the examiner.      Objective:  Vital signs: /92 (BP Location: Left arm, Patient Position: Lying)   Pulse 107   Temp 97.7 °F (36.5 °C) (Oral)   Resp 20   Ht 182.9 cm (72\")   Wt 83.2 kg (183 lb 6.8 oz)   SpO2 98%   BMI 24.88 kg/m²       General appearance: Well developed, well nourished, well groomed, alert and cooperative.   HEENT: Normocephalic.   Neck and spine: Normal range of motion. Normal alignment. No mass or tenderness.    Cardiac: Regular rate and rhythm. No murmurs.   Peripheral Vasculature: Radial pulses are equal and symmetric.  Chest Exam: Clear to auscultation bilaterally, no wheezes, no rhonchi.  Extremities: Normal, no edema.   Skin: No rashes or birthmarks.     Higher integrative function: Oriented to self and month.  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  Follows multiple simple commands.  Delayed responses.  Scanning speech, no dysarthria.  No neglect.  CN II: Chronically blind.  CN III IV VI: Extraocular movements are full with nystagmus.  Disconjugate gaze.  Right pupil dilated, nonreactive, left pupil 4 mm, sluggishly reactive.  CN V: Normal facial sensation.  CN VII: Facial movements are symmetric, no weakness.   CN VIII: Auditory acuity is normal.   CN IX & X: Symmetric palatal movement.   CN XI: Sternocleidomastoid and trapezius are normal. No weakness.   CN XII: The tongue is midline. No atrophy or fasciculations.   Motor: Normal muscle strength, bulk, and tone in upper and lower extremities. No abnormal movements.  Sensation: Normal light touch.  Station and gait: N/A  Muscle stretch reflexes: Plantars equivocal.      Scheduled " Meds:  amphetamine-dextroamphetamine XR 30 mg Oral Daily   atorvastatin 10 mg Oral Daily   cefTRIAXone 2 g Intravenous Q12H   cholecalciferol 400 Units Oral Daily   clonazePAM 1 mg Oral Q8H   heparin (porcine) 5,000 Units Subcutaneous Q8H   lacosamide 200 mg Oral Q12H   levETIRAcetam 500 mg Oral Q12H   metoprolol tartrate 50 mg Oral Q12H   phenytoin 100 mg Oral Q8H   polyethylene glycol 17 g Oral Daily   topiramate 25 mg Oral Daily     Continuous Infusions:   PRN Meds:.•  acetaminophen    Laboratory results:  Lab Results   Component Value Date    GLUCOSE 113 (H) 01/10/2020    CALCIUM 9.3 01/10/2020     01/10/2020    K 3.3 (L) 01/10/2020    CO2 26.6 01/10/2020     01/10/2020    BUN 38 (H) 01/10/2020    CREATININE 1.67 (H) 01/10/2020    EGFRIFAFRI 54 (L) 01/10/2020    EGFRIFNONA 83 07/02/2019    BCR 22.8 01/10/2020    ANIONGAP 14.4 01/10/2020     Lab Results   Component Value Date    WBC 8.30 01/10/2020    HGB 9.4 (L) 01/10/2020    HCT 28.2 (L) 01/10/2020    MCV 84.7 01/10/2020     01/10/2020     No results found for: CHOL  Lab Results   Component Value Date    HDL 67 (H) 07/02/2019     Lab Results   Component Value Date    LDL 89 07/02/2019     Lab Results   Component Value Date    TRIG 75 07/02/2019       Review and interpretation of imaging:    Impression:  Patient is a 44-year-old male with history of traumatic brain injury, blindness, epilepsy who was last seizure was many years ago until recent admission at [facility] for  shunt infection and revision during which he had status epilepticus.  Previously he was on Dilantin 300 mg daily as monotherapy.  He also takes Topamax but only at 25 mg daily.  Seizures were difficult to control while admitted at [facility] and he was initiated on max doses of Keppra and Vimpat.  His last seizure was about 10 days ago.  Since arriving to rehab he has had persistent lethargy and difficulty completing therapy due to that.  He has an TRAA.  He seems to be improving  with reduction of Keppra from 4 g daily to 500 mg twice daily.    Diagnosis: Seizure disorder, TBI status post recent  shunt infection and revision, TME related to TARA and seizure medications.  Labs: Phenytoin level 12.4 today, creatinine 1.67, GFR 54    Plan:  Doing better on reduced dose of Keppra to 500 mg twice daily  Continue Vimpat 200 mg twice daily, if patient becomes lethargic or has difficulty getting bleeding therapy can decrease this dose  Continue Dilantin 100 mg every 8h  Discussed with Dr. Isabel today. Will see as needed, please call with questions/concerns.

## 2020-01-11 PROCEDURE — 97129 THER IVNTJ 1ST 15 MIN: CPT

## 2020-01-11 PROCEDURE — 25010000002 HEPARIN (PORCINE) PER 1000 UNITS: Performed by: PHYSICAL MEDICINE & REHABILITATION

## 2020-01-11 PROCEDURE — 97535 SELF CARE MNGMENT TRAINING: CPT | Performed by: OCCUPATIONAL THERAPIST

## 2020-01-11 PROCEDURE — 97130 THER IVNTJ EA ADDL 15 MIN: CPT

## 2020-01-11 PROCEDURE — 92526 ORAL FUNCTION THERAPY: CPT

## 2020-01-11 PROCEDURE — 97110 THERAPEUTIC EXERCISES: CPT

## 2020-01-11 PROCEDURE — 25010000003 CEFTRIAXONE PER 250 MG: Performed by: PHYSICAL MEDICINE & REHABILITATION

## 2020-01-11 PROCEDURE — 97110 THERAPEUTIC EXERCISES: CPT | Performed by: OCCUPATIONAL THERAPIST

## 2020-01-11 RX ORDER — POTASSIUM CHLORIDE 750 MG/1
10 CAPSULE, EXTENDED RELEASE ORAL DAILY
Status: DISCONTINUED | OUTPATIENT
Start: 2020-01-11 | End: 2020-01-12

## 2020-01-11 RX ADMIN — LEVETIRACETAM 500 MG: 100 SOLUTION ORAL at 08:56

## 2020-01-11 RX ADMIN — CEFTRIAXONE SODIUM 2 G: 2 INJECTION, SOLUTION INTRAVENOUS at 22:13

## 2020-01-11 RX ADMIN — ATORVASTATIN CALCIUM 10 MG: 10 TABLET, FILM COATED ORAL at 08:55

## 2020-01-11 RX ADMIN — CLONAZEPAM 1 MG: 0.5 TABLET ORAL at 21:11

## 2020-01-11 RX ADMIN — CLONAZEPAM 1 MG: 0.5 TABLET ORAL at 14:38

## 2020-01-11 RX ADMIN — LACOSAMIDE 200 MG: 100 TABLET, FILM COATED ORAL at 21:11

## 2020-01-11 RX ADMIN — CHOLECALCIFEROL TAB 10 MCG (400 UNIT) 400 UNITS: 10 TAB at 08:55

## 2020-01-11 RX ADMIN — METOPROLOL TARTRATE 50 MG: 50 TABLET, FILM COATED ORAL at 08:55

## 2020-01-11 RX ADMIN — LEVETIRACETAM 500 MG: 100 SOLUTION ORAL at 21:11

## 2020-01-11 RX ADMIN — LACOSAMIDE 200 MG: 100 TABLET, FILM COATED ORAL at 08:55

## 2020-01-11 RX ADMIN — HEPARIN SODIUM 5000 UNITS: 5000 INJECTION INTRAVENOUS; SUBCUTANEOUS at 14:37

## 2020-01-11 RX ADMIN — HEPARIN SODIUM 5000 UNITS: 5000 INJECTION INTRAVENOUS; SUBCUTANEOUS at 06:34

## 2020-01-11 RX ADMIN — HEPARIN SODIUM 5000 UNITS: 5000 INJECTION INTRAVENOUS; SUBCUTANEOUS at 21:11

## 2020-01-11 RX ADMIN — PHENYTOIN 100 MG: 50 TABLET, CHEWABLE ORAL at 21:11

## 2020-01-11 RX ADMIN — POTASSIUM CHLORIDE 10 MEQ: 750 CAPSULE, EXTENDED RELEASE ORAL at 14:38

## 2020-01-11 RX ADMIN — PHENYTOIN 100 MG: 50 TABLET, CHEWABLE ORAL at 06:35

## 2020-01-11 RX ADMIN — CLONAZEPAM 1 MG: 0.5 TABLET ORAL at 06:35

## 2020-01-11 RX ADMIN — DEXTROAMPHETAMINE SACCHARATE, AMPHETAMINE ASPARTATE MONOHYDRATE, DEXTROAMPHETAMINE SULFATE, AND AMPHETAMINE SULFATE 30 MG: 2.5; 2.5; 2.5; 2.5 CAPSULE, EXTENDED RELEASE ORAL at 08:56

## 2020-01-11 RX ADMIN — PHENYTOIN 100 MG: 50 TABLET, CHEWABLE ORAL at 14:38

## 2020-01-11 RX ADMIN — TOPIRAMATE 25 MG: 25 TABLET, FILM COATED ORAL at 08:55

## 2020-01-11 RX ADMIN — POLYETHYLENE GLYCOL 3350 17 G: 17 POWDER, FOR SOLUTION ORAL at 08:56

## 2020-01-11 RX ADMIN — CEFTRIAXONE SODIUM 2 G: 2 INJECTION, SOLUTION INTRAVENOUS at 11:03

## 2020-01-11 RX ADMIN — METOPROLOL TARTRATE 50 MG: 50 TABLET, FILM COATED ORAL at 21:11

## 2020-01-11 NOTE — THERAPY TREATMENT NOTE
Inpatient Rehabilitation - Occupational Therapy Treatment Note    [facility]     Patient Name: Tye JONES Junior  : 1975  MRN: [id number]    Today's Date: 2020                 Admit Date: 2020      Visit Dx:    ICD-10-CM ICD-9-CM   1. Impaired mobility Z74.09 799.89       Patient Active Problem List   Diagnosis   • Mixed hyperlipidemia   • Essential hypertension   • Traumatic brain injury (CMS/HCC)   • Acute allergic rhinitis   • Seizure (CMS/HCC)   • Screen for colon cancer   • H/O traumatic brain injury         Therapy Treatment    IRF Treatment Summary     Row Name 20 1237 20 1139 20 0915       Evaluation/Treatment Time and Intent    Subjective Information  no complaints  -SG  no complaints  -JT  no complaints  -LH    Existing Precautions/Restrictions  fall  -SG  fall  -JT  fall  -LH    Document Type  therapy note (daily note)  -SG  therapy note (daily note)  -JT  therapy note (daily note)  -    Mode of Treatment  occupational therapy  -SG  speech-language pathology;individual therapy  -JT  individual therapy;physical therapy  -LH    Patient/Family Observations  Pt sitting up in w/c  -SG  pt upright in wc, mother supportive  -JT  pt supine in bed no acute distress, mother bedside  -LH    Recorded by [SG] Sudha Marte OTR [JT] Kristen Diego [LH] Pita Roth, PT    Row Name 20 1237 20 0915          Cognition/Psychosocial- PT/OT    Orientation Status (Cognition)  oriented to;person;situation  -SG  oriented to;person;situation  -LH     Follows Commands (Cognition)  follows one step commands;75-90% accuracy;increased processing time needed;initiation impaired;physical/tactile prompts required;repetition of directions required  -SG  follows one step commands;75-90% accuracy;increased processing time needed;initiation impaired;physical/tactile prompts required;repetition of directions required  -     Personal Safety Interventions  gait belt;fall  prevention program maintained  -  fall prevention program maintained;gait belt;supervised activity  -     Cognitive Function (Cognitive)  --  executive function deficit  -     Recorded by [] Sudha Marte OTR [] Pita Roth, PT     Row Name 01/11/20 0915             Bed Mobility Assessment/Treatment    Supine-Sit [place] (Bed Mobility)  moderate assist (50% patient effort);verbal cues;nonverbal cues (demo/gesture)  -      Sit-Supine [place] (Bed Mobility)  not tested  -      Recorded by [] Pita Roth, PT      Row Name 01/11/20 1237             Transfer Assessment/Treatment    Transfer Assessment/Treatment  sit-stand transfer;stand-sit transfer  -      Recorded by [] Sudha Marte OTR      Row Name 01/11/20 0915             Bed-Chair Transfer    Bed-Chair [place] (Transfers)  moderate assist (50% patient effort);verbal cues;nonverbal cues (demo/gesture)  -      Assistive Device (Bed-Chair Transfers)  wheelchair;walker, front-wheeled  -      Recorded by [] Pita Roth, PT      Row Name 01/11/20 1237 01/11/20 0915          Sit-Stand Transfer    Sit-Stand [place] (Transfers)  moderate assist (50% patient effort);verbal cues;nonverbal cues (demo/gesture) At sink level  -  moderate assist (50% patient effort);1 person to manage equipment;verbal cues;nonverbal cues (demo/gesture)  -     Assistive Device (Sit-Stand Transfers)  --  walker, front-wheeled  -     Recorded by [] Sudha Marte OTR [] Pita Roth, PT     Row Name 01/11/20 1237 01/11/20 0915          Stand-Sit Transfer    Stand-Sit [place] (Transfers)  moderate assist (50% patient effort);verbal cues;nonverbal cues (demo/gesture)  -  moderate assist (50% patient effort);1 person to manage equipment;verbal cues;nonverbal cues (demo/gesture)  -     Assistive Device (Stand-Sit Transfers)  --  wheelchair;walker, front-wheeled  -     Recorded by [] Sudha Marte OTR [] Gonzalez  Pita LANCE PT     Row Name 01/11/20 0915             Toilet Transfer    Type (Toilet Transfer)  sit-stand;stand-sit  -      [place] Level (Toilet Transfer)  moderate assist (50% patient effort);2 person assist;verbal cues;nonverbal cues (demo/gesture)  -      Assistive Device (Toilet Transfer)  wheelchair;grab bars/safety frame;raised toilet seat  -LH      Recorded by [] Pita Roth PT      Row Name 01/11/20 0915             Gait/Stairs Assessment/Training    [place] Level (Gait)  minimum assist (75% patient effort);moderate assist (50% patient effort);2 person assist;verbal cues;nonverbal cues (demo/gesture)  -      Assistive Device (Gait)  walker, front-wheeled  -      Distance in Feet (Gait)  35, 30  -      Pattern (Gait)  step-to;step-through  -      Deviations/Abnormal Patterns (Gait)  stride length decreased;bilateral deviations;base of support, narrow  -      Bilateral Gait Deviations  forward flexed posture;heel strike decreased;weight shift ability decreased  -      Comment (Gait/Stairs)  following w , TCs for inc step length  -LH      Recorded by [] Pita Roth PT      Row Name 01/11/20 1237             Bathing Assessment/Treatment    Bathing [place] Level  bathing skills;upper body;lower body;moderate assist (50% patient effort);verbal cues  -      Bathing Position  sink side  -      Bathing Setup Assistance  adjust water temperature;obtain supplies;open containers  -SG      Recorded by [SG] Sudha Marte OTR      Row Name 01/11/20 1237             Upper Body Dressing Assessment/Treatment    Upper Body Dressing Task  doff;don;clothes fastener management;moderate assist (50-74% patient effort);verbal cues;nonverbal cues (demo/gesture)  -      Upper Body Dressing Position  supported sitting  -      Set-up Assistance (Upper Body Dressing)  obtain clothing  -SG      Recorded by [SG] Sudha Marte OTR      Row Name 01/11/20 1237             Lower Body  Dressing Assessment/Treatment    Lower Body Dressing [place] Level  doff;don;pants/bottoms;shoes/slippers;socks;underwear;maximum assist (25% patient effort);verbal cues;nonverbal cues (demo/gesture)  -SG      Lower Body Dressing Position  supported sitting;supported standing  -SG      Lower Body Dressing Setup Assistance  don;doff;obtain clothing  -SG      Recorded by [SG] Sudha Marte OTR      Row Name 01/11/20 1237             Grooming Assessment/Treatment    Grooming [place] Level  grooming skills;deodorant application;oral care regimen;wash face, hands;minimum assist (75% patient effort);verbal cues  -SG      Grooming Position  supported sitting;sink side  -SG      Grooming Setup Assistance  adjust water temperature/flow;obtain supplies;open containers  -SG      Recorded by [SG] Sudha Marte OTR      Row Name 01/11/20 0915             Vision Assessment/Intervention    Visual Impairment/Limitations  legally blind  -      Recorded by [] Pita Roth, PT      Row Name 01/11/20 1139 01/11/20 0915          Pain Scale: Numbers Pre/Post-Treatment    Pain Scale: Numbers, Pretreatment  0/10 - no pain  -JT  0/10 - no pain  -     Pain Scale: Numbers, Post-Treatment  0/10 - no pain  -JT  0/10 - no pain  -     Recorded by [JT] Kristen Diego [] Pita Roth, PT     Row Name 01/11/20 1237             Therapeutic Exercise    Therapeutic Exercise  seated, upper extremities  -SG      Recorded by [SG] Sudha Marte OTR      Row Name 01/11/20 1237             Upper Extremity Seated Therapeutic Exercise    Comment, Seated Upper Extremity (Therapeutic Exercise)  Arm bike seated 2min x2, decreased sitting balance leaning forward in w/c  -SG      Recorded by [SG] Sudha Marte OTR      Row Name 01/11/20 0915             Lower Extremity Seated Therapeutic Exercise    Performed, Seated Lower Extremity (Therapeutic Exercise)  hip flexion/extension;LAQ (long arc quad), knee extension;hip  abduction/adduction;ankle dorsiflexion/plantarflexion  -      Exercise Type, Seated Lower Extremity (Therapeutic Exercise)  AROM (active range of motion)  -      Sets/Reps Detail, Seated Lower Extremity (Therapeutic Exercise)  1/10  -      Recorded by [] Pita Roth, PT      Row Name 01/11/20 1237 01/11/20 0915          Positioning and Restraints    Pre-Treatment Position  sitting in chair/recliner  -  in bed  -     Post Treatment Position  wheelchair  -  wheelchair  -     In Wheelchair  sitting;call light within reach;encouraged to call for assist;exit alarm on;with family/caregiver  -  sitting;call light within reach;encouraged to call for assist;exit alarm on  -     Recorded by [] Sudha Marte OTR [] Pita Roth, PT       User Key  (r) = Recorded By, (t) = Taken By, (c) = Cosigned By    Initials Name Effective Dates     Sudha Marte OTR 12/26/18 -      Pita Roth, PT 04/03/18 -     ALEMT Kristen Diego 03/07/18 -           Wound 01/06/20 2200 head Incision (Active)   Dressing Appearance open to air 1/11/2020  8:00 AM   Closure Approximated;Sutures 1/11/2020  8:00 AM   Base dry;clean 1/11/2020  8:00 AM   Periwound dry;intact 1/11/2020  8:00 AM   Drainage Amount scant 1/10/2020  7:28 PM   Dressing Care, Wound open to air 1/10/2020  7:28 PM         OT Recommendation and Plan    Anticipated Equipment Needs At Discharge (OT Eval): bathing equipment, dressing equipment, shower chair  Planned Therapy Interventions (OT Eval): BADL retraining, cognitive/visual perception retraining, adaptive equipment training, activity tolerance training, functional balance retraining, neuromuscular control/coordination retraining, passive ROM/stretching, strengthening exercise, transfer/mobility retraining, ROM/therapeutic exercise            OT IRF GOALS     Row Name 01/07/20 1527             Transfer Goal 1 (OT-IRF)    Activity/Assistive Device (Transfer Goal 1, OT-IRF)   toilet;commode, bedside without drop arms  -SG      [place] Level (Transfer Goal 1, OT-IRF)  maximum assist (25-49% patient effort)  -SG      Time Frame (Transfer Goal 1, OT-IRF)  short term goal (STG);1 week  -SG      Progress/Outcomes (Transfer Goal 1, OT-IRF)  goal ongoing  -SG         Transfer Goal 2 (OT-IRF)    Activity/Assistive Device (Transfer Goal 2, OT-IRF)  toilet  -SG      [place] Level (Transfer Goal 2, OT-IRF)  minimum assist (75% or more patient effort);moderate assist (50-74% patient effort)  -SG      Time Frame (Transfer Goal 2, OT-IRF)  long term goal (LTG);by discharge  -SG      Progress/Outcomes (Transfer Goal 2, OT-IRF)  goal ongoing  -SG         Transfer Goal 3 (OT-IRF)    Activity/Assistive Device (Transfer Goal 3, OT-IRF)  shower chair  -SG      [place] Level (Transfer Goal 3, OT-IRF)  maximum assist (25-49% patient effort)  -SG      Time Frame (Transfer Goal 3, OT-IRF)  short term goal (STG);1 week  -SG      Progress/Outcomes (Transfer Goal 3, OT-IRF)  goal ongoing  -SG         Transfer Goal 4 (OT-IRF)    Activity/Assistive Device (Transfer Goal 4, OT-IRF)  shower chair  -SG      [place] Level (Transfer Goal 4, OT-IRF)  minimum assist (75% or more patient effort);moderate assist (50-74% patient effort)  -SG      Time Frame (Transfer Goal 4, OT-IRF)  long term goal (LTG);by discharge  -SG      Progress/Outcomes (Transfer Goal 4, OT-IRF)  goal ongoing  -SG         Bathing Goal 1 (OT-IRF)    Activity/Device (Bathing Goal 1, OT-IRF)  bathing skills, all  -SG      [place] Level (Bathing Goal 1, OT-IRF)  moderate assist (50-74% patient effort)  -SG      Time Frame (Bathing Goal 1, OT-IRF)  short term goal (STG);1 week  -SG      Progress/Outcomes (Bathing Goal 1, OT-IRF)  goal ongoing  -SG         Bathing Goal 2 (OT-IRF)    Activity/Device (Bathing Goal 2, OT-IRF)  bathing skills, all  -SG      [place] Level (Bathing Goal 2, OT-IRF)  minimum assist (75% or more patient  effort)  -SG      Time Frame (Bathing Goal 2, OT-IRF)  long term goal (LTG);by discharge  -SG      Progress/Outcomes (Bathing Goal 2, OT-IRF)  goal ongoing  -SG         UB Dressing Goal 1 (OT-IRF)    Activity/Device (UB Dressing Goal 1, OT-IRF)  upper body dressing  -SG      [place] (UB Dress Goal 1, OT-IRF)  moderate assist (50-74% patient effort)  -SG      Time Frame (UB Dressing Goal 1, OT-IRF)  short term goal (STG);1 week  -SG      Progress/Outcomes (UB Dressing Goal 1, OT-IRF)  goal ongoing  -SG         UB Dressing Goal 2 (OT-IRF)    Activity/Device (UB Dressing Goal 2, OT-IRF)  upper body dressing  -SG      [place] (UB Dress Goal 2, OT-IRF)  minimum assist (75% or more patient effort)  -SG      Time Frame (UB Dressing Goal 2, OT-IRF)  long term goal (LTG);by discharge  -SG      Progress/Outcomes (UB Dressing Goal 2, OT-IRF)  goal ongoing  -SG         LB Dressing Goal 1 (OT-IRF)    Activity/Device (LB Dressing Goal 1, OT-IRF)  lower body dressing  -SG      [place] (LB Dressing Goal 1, OT-IRF)  maximum assist (25-49% patient effort)  -SG      Time Frame (LB Dressing Goal 1, OT-IRF)  short term goal (STG);1 week  -SG      Progress/Outcomes (LB Dressing Goal 1, OT-IRF)  goal ongoing  -SG         LB Dressing Goal 2 (OT-IRF)    Activity/Device (LB Dressing Goal 2, OT-IRF)  lower body dressing  -SG      [place] (LB Dressing Goal 2, OT-IRF)  moderate assist (50-74% patient effort)  -SG      Time Frame (LB Dressing Goal 2, OT-IRF)  long term goal (LTG);by discharge  -SG      Progress/Outcomes (LB Dressing Goal 2, OT-IRF)  goal ongoing  -SG         Toileting Goal 1 (OT-IRF)    Activity/Device (Toileting Goal 1, OT-IRF)  toileting skills, all  -SG      [place] Level (Toileting Goal 1, OT-IRF)  moderate assist (50-74% patient effort)  -SG      Time Frame (Toileting Goal 1, OT-IRF)  long term goal (LTG);by discharge  -SG         Strength Goal 1 (OT-IRF)    Strength Goal 1 (OT-IRF)  Pt to tolerate  UE strengthing to improve overall ROM and functional use of UE.  -SG      Time Frame (Strength Goal 1, OT-IRF)  long term goal (LTG);by discharge  -SG      Progress/Outcomes (Strength Goal 1, OT-IRF)  goal ongoing  -SG         Balance Goal 1 (OT)    Activity/Assistive Device (Balance Goal 1, OT)  sitting, static  -SG      [place] Level/Cues Needed (Balance Goal 1, OT)  contact guard assist  -SG      Time Frame (Balance Goal 1, OT)  long term goal (LTG);by discharge  -SG      Progress/Outcomes (Balance Goal 1, OT)  goal ongoing  -SG         Caregiver Training Goal 1 (OT-IRF)    Caregiver Training Goal 1 (OT-IRF)  Pt and family to be indep with ADLs, functional transfers, AD/AE, and HEP for safe d/c home.  -SG      Time Frame (Caregiver Training Goal 1, OT-IRF)  long term goal (LTG);by discharge  -SG      Progress/Outcomes (Caregiver Training Goal 1, OT-IRF)  goal ongoing  -SG        User Key  (r) = Recorded By, (t) = Taken By, (c) = Cosigned By    Initials Name Provider Type     Sudha Marte OTR Occupational Therapist          Occupational Therapy Education                 Title: PT OT SLP Therapies (In Progress)     Topic: Occupational Therapy (In Progress)     Point: ADL training (Done)     Description:   Instruct learner(s) on proper safety adaptation and remediation techniques during self care or transfers.   Instruct in proper use of assistive devices.              Learning Progress Summary           Family Acceptance, E,TB, VU by  at 1/7/2020 5253    Comment:  OT role and goals, POC.                               User Key     Initials Effective Dates Name Provider Type Discipline     12/26/18 -  Sudha Marte OTR Occupational Therapist OT                       Time Calculation:     Time Calculation- OT     Row Name 01/11/20 1243             Time Calculation- OT    OT Start Time  1000  -SG      OT Stop Time  1100  -SG      OT Time Calculation (min)  60 min  -      OT Received On  01/11/20   -JUAN ANTONIO        User Key  (r) = Recorded By, (t) = Taken By, (c) = Cosigned By    Initials Name Provider Type    Sudha Cho OTR Occupational Therapist          Therapy Charges for Today     Code Description Service Date Service Provider Modifiers Qty    63244745885 HC OT SELF CARE/MGMT/TRAIN EA 15 MIN 1/11/2020 Sudha Marte OTR GO 3    71132884118 HC OT THER PROC EA 15 MIN 1/11/2020 Sudha Marte OTR GO 1                   AVA Garrido  1/11/2020

## 2020-01-11 NOTE — PROGRESS NOTES
Occupational Therapy: Individual: 60 minutes.    Physical Therapy: Branch    Speech Language Pathology:  Branch    Signed by: Sudha Marte OTR/ZOË

## 2020-01-11 NOTE — PROGRESS NOTES
Inpatient Rehabilitation Plan of Care Note    Plan of Care  Care Plan Reviewed - No updates at this time.    Sphincter Control    Performed Intervention(s)  Monitor I&O  check for incontinence, offer toileting q2hrs  miralax daily-hold if needed.      Safety    Performed Intervention(s)  Safety rounds/bed alarm/ chair alarm on  Falls precautio/call light within easy reach      Psychosocial    Performed Intervention(s)  Offer support/Encourage patient to verbalize any concerns      Body Systems    Performed Intervention(s)  Skin care q shift and PRN  Assist to turn patient q 2-3hrs.    Signed by: Alissa Mckeon RN

## 2020-01-11 NOTE — PROGRESS NOTES
Occupational Therapy: Branch    Physical Therapy: Branch    Speech Language Pathology:  Individual: 60 minutes.    Signed by: Gladys Diego MS, CCC-SLP

## 2020-01-11 NOTE — PROGRESS NOTES
LOS: 5 days   Patient Care Team:  Jolene Laurent MD as PCP - General (Family Medicine)  Efren Martínez MD as PCP - Claims Attributed    Chief Complaint:   Status post infected  shunt-removed-CNS infection/pneumocephalus  Status post 12/23/ 2019 - Removal of ventriculoperitoneal shunt intracranial portion, valve, partial peritoneal down to the cervical region  Status post 12/31/2019 endoscopic repair of paranasal sinus defect  ID-ceftriaxone-antibiotics until January 14, 2020  Seizure disorder-multidrug regimen-phenytoin/Vimpat/Keppra/clonazepam/Topamax  Remote traumatic brain injury  Neuro stimulation-Adderall  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  Bilateral SCDs.  Status post acute renal cgsoefovjbnzx-MEY-dymlny creatinine.  Avoid NSAID/ACE inhibitor's.    Subjective     History of Present Illness Patient seen and examined this morning.    History taken from: patient chart family    Objective     Vital Signs  Temp:  [98 °F (36.7 °C)-98.2 °F (36.8 °C)] 98.2 °F (36.8 °C)  Heart Rate:  [100-114] 104  Resp:  [16-18] 18  BP: (118-156)/(73-82) 118/73    Physical Exam  Gen: Pleasant, full sentences, NAD. Dark glasses.   Pulm: CTAB; no r/r/w; non-labored   Heart; RRR; no m/r/g appreciated   Abd: soft; NT/ND; +BS   : no Indwelling catheter   Neuro: awake   Oriented.     Results Review:    I reviewed the patient's new clinical results.     Results from last 7 days   Lab Units 01/10/20  0712 01/06/20  0424 01/05/20  0229   WBC 10*3/mm3 8.30 12.92* 11.44*   HEMOGLOBIN g/dL 9.4* 10.6* 10.6*   HEMATOCRIT % 28.2* 31.8* 32.1*   PLATELETS 10*3/mm3 387 295 266     Results from last 7 days   Lab Units 01/10/20  0712 01/08/20  0556 01/07/20  0612    SODIUM mmol/L 144 147* 148*   POTASSIUM mmol/L 3.3* 3.6 3.7   CHLORIDE mmol/L 103 107 107   CO2 mmol/L 26.6 27.3 26.5   BUN mg/dL 38* 55* 57*   CREATININE mg/dL 1.67* 2.12* 2.19*   CALCIUM mg/dL 9.3 9.4 9.4   GLUCOSE mg/dL 113* 100* 112*       Medication Review:   Scheduled Meds:    amphetamine-dextroamphetamine XR 30 mg Oral Daily   atorvastatin 10 mg Oral Daily   cefTRIAXone 2 g Intravenous Q12H   cholecalciferol 400 Units Oral Daily   clonazePAM 1 mg Oral Q8H   heparin (porcine) 5,000 Units Subcutaneous Q8H   lacosamide 200 mg Oral Q12H   levETIRAcetam 500 mg Oral Q12H   metoprolol tartrate 50 mg Oral Q12H   phenytoin 100 mg Oral Q8H   polyethylene glycol 17 g Oral Daily   topiramate 25 mg Oral Daily     Continuous Infusions:   PRN Meds:.•  acetaminophen    Assessment/Plan       H/O traumatic brain injury  Status post infected  shunt-removed-CNS infection/pneumocephalus  -Status post 12/23/ 2019 - Removal of ventriculoperitoneal shunt intracranial portion, valve, partial peritoneal down to the cervical region  -Status post 12/31/2019 endoscopic repair of paranasal sinus defect  -ID-ceftriaxone-antibiotics until January 14, 2020    Seizure disorder-multidrug regimen-phenytoin/Vimpat/Keppra/clonazepam/Topamax  -January 9-patient was on phenytoin at home which was increased at the outside hospital, on Topamax but less than antiepileptic dose.  He had Vimpat, Keppra, clonazepam added at the outside hospital.  Consulted neurology for input patient has fatigue felt possibly related to his increased antiepileptic medication.  Reviewed with neurology and Keppra dose being decreased.  -January 10- Keppra decreased from 2000 mg BID to 50 mg BID with improved alertness. Neurology continues to follow.     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  Bilateral SCDs.    Status post acute renal djzhxepyccrcd-YKA-kcdley creatinine.  Avoid NSAID/ACE inhibitor's.    Hypokalemia- KCL 10 Meq daily.      Now admit for comprehensive acute inpatient rehabilitation .  This would be an interdisciplinary program with physical therapy 1 hour,  occupational therapy 1 hour, and speech therapy 1 hour, 5 days a week.  Rehabilitation nursing for carryover, monitoring of incision and neurologic   status, bowel and bladder, and skin  Ongoing physician follow-up.  Weekly team conferences.  Goals are indeterminate.   Rehabilitation prognosis indeterminate.  Medical prognosis indeterminate.  Estimated length of stay is indeterminate  The patient's functional status and clinical status is unchanged from preadmission assessment and the patient continues appropriate for acute inpatient rehabilitation.  Goal is for home with outpatient   therapies.  Barrier to discharge: Cognition/mobility- work on trunk control, strength, balance to overcome.     TEAM CONF - JAN 9 - BED MAX 2.  TRANSFERS MAX 2.  GAIT 8 FEET PARALLEL BARS MOD 2. TOILET TRANSFERS MAX 2.  BLIND.  SLOW PROCESSING.  GROOMING MOD. UBD MAX. LBD DEP. DYSPHAGIA - PUREE/THINS.  FATIGUES WITH COGNITIVE  THERAPY.  ELOS :  4 WEEKS       I discussed the patients findings and my recommendations with patient, family and nursing staff    Vidal Vidales MD  01/11/20  12:52 PM

## 2020-01-11 NOTE — PLAN OF CARE
Patient is cooperative. Slow speech pattern. Denied pain. 2 person assist with turning and repositioning. Incontinent of bladder this shift. Taking his medication in apple sauce without difficulty. Mother at bedside. No safety issues observed.

## 2020-01-11 NOTE — PROGRESS NOTES
Inpatient Rehabilitation Plan of Care Note    Plan of Care  Care Plan Reviewed - No updates at this time.    Sphincter Control    Performed Intervention(s)  Monitor I&O  check for incontinence, offer toileting q2hrs  miralax daily-hold if needed.      Safety    Performed Intervention(s)  Safety rounds/bed alarm/ chair alarm on  Falls precautio/call light within easy reach      Psychosocial    Performed Intervention(s)  Offer support/Encourage patient to verbalize any concerns      Body Systems    Performed Intervention(s)  Skin care q shift and PRN  Assist to turn patient q 2-3hrs.    Signed by: Zaida Panchal RN

## 2020-01-11 NOTE — THERAPY TREATMENT NOTE
Inpatient Rehabilitation - Physical Therapy Treatment Note  [facility]     Patient Name: Tye JONES Junior  : 1975  MRN: [id number]    Today's Date: 2020                 Admit Date: 2020      Visit Dx:      ICD-10-CM ICD-9-CM   1. Impaired mobility Z74.09 799.89       Patient Active Problem List   Diagnosis   • Mixed hyperlipidemia   • Essential hypertension   • Traumatic brain injury (CMS/HCC)   • Acute allergic rhinitis   • Seizure (CMS/HCC)   • Screen for colon cancer   • H/O traumatic brain injury       Therapy Treatment    IRF Treatment Summary     Row Name 20             Evaluation/Treatment Time and Intent    Subjective Information  no complaints  -      Existing Precautions/Restrictions  fall  -      Document Type  therapy note (daily note)  -      Mode of Treatment  individual therapy;physical therapy  -      Patient/Family Observations  pt supine in bed no acute distress, mother bedside  -      Recorded by [] Pita Roth, PT      Row Name 20             Cognition/Psychosocial- PT/OT    Orientation Status (Cognition)  oriented to;person;situation  -      Follows Commands (Cognition)  follows one step commands;75-90% accuracy;increased processing time needed;initiation impaired;physical/tactile prompts required;repetition of directions required  -      Personal Safety Interventions  fall prevention program maintained;gait belt;supervised activity  -      Cognitive Function (Cognitive)  executive function deficit  -      Recorded by [] Pita Roth, PT      Row Name 20             Bed Mobility Assessment/Treatment    Supine-Sit [place] (Bed Mobility)  moderate assist (50% patient effort);verbal cues;nonverbal cues (demo/gesture)  -      Sit-Supine [place] (Bed Mobility)  not tested  -      Recorded by [] Pita Roth, PT      Row Name 20             Bed-Chair Transfer    Bed-Chair [place] (Transfers)   moderate assist (50% patient effort);verbal cues;nonverbal cues (demo/gesture)  -      Assistive Device (Bed-Chair Transfers)  wheelchair;walker, front-wheeled  -      Recorded by [] Pita Roth, PT      Row Name 01/11/20 0915             Sit-Stand Transfer    Sit-Stand [place] (Transfers)  moderate assist (50% patient effort);1 person to manage equipment;verbal cues;nonverbal cues (demo/gesture)  -      Assistive Device (Sit-Stand Transfers)  walker, front-wheeled  -LH      Recorded by [] Pita Roth, PT      Row Name 01/11/20 0915             Stand-Sit Transfer    Stand-Sit [place] (Transfers)  moderate assist (50% patient effort);1 person to manage equipment;verbal cues;nonverbal cues (demo/gesture)  -      Assistive Device (Stand-Sit Transfers)  wheelchair;walker, front-wheeled  -      Recorded by [] Pita Roth, PT      Row Name 01/11/20 0915             Toilet Transfer    Type (Toilet Transfer)  sit-stand;stand-sit  -      [place] Level (Toilet Transfer)  moderate assist (50% patient effort);2 person assist;verbal cues;nonverbal cues (demo/gesture)  -      Assistive Device (Toilet Transfer)  wheelchair;grab bars/safety frame;raised toilet seat  -      Recorded by [] Pita Roth, PT      Row Name 01/11/20 0915             Gait/Stairs Assessment/Training    [place] Level (Gait)  minimum assist (75% patient effort);moderate assist (50% patient effort);2 person assist;verbal cues;nonverbal cues (demo/gesture)  -      Assistive Device (Gait)  walker, front-wheeled  -      Distance in Feet (Gait)  35, 30  -      Pattern (Gait)  step-to;step-through  -      Deviations/Abnormal Patterns (Gait)  stride length decreased;bilateral deviations;base of support, narrow  -      Bilateral Gait Deviations  forward flexed posture;heel strike decreased;weight shift ability decreased  -      Comment (Gait/Stairs)  following w WC, TCs for inc step length  -       Recorded by [] Pita Roth PT      Row Name 01/11/20 0915             Vision Assessment/Intervention    Visual Impairment/Limitations  legally blind  -      Recorded by [] Pita Roth PT      Row Name 01/11/20 0915             Pain Scale: Numbers Pre/Post-Treatment    Pain Scale: Numbers, Pretreatment  0/10 - no pain  -      Pain Scale: Numbers, Post-Treatment  0/10 - no pain  -      Recorded by [] Pita Roth, PT      Row Name 01/11/20 0915             Lower Extremity Seated Therapeutic Exercise    Performed, Seated Lower Extremity (Therapeutic Exercise)  hip flexion/extension;LAQ (long arc quad), knee extension;hip abduction/adduction;ankle dorsiflexion/plantarflexion  -      Exercise Type, Seated Lower Extremity (Therapeutic Exercise)  AROM (active range of motion)  -      Sets/Reps Detail, Seated Lower Extremity (Therapeutic Exercise)  1/10  -      Recorded by [] Pita Roth PT      Row Name 01/11/20 0915             Positioning and Restraints    Pre-Treatment Position  in bed  -      Post Treatment Position  wheelchair  -      In Wheelchair  sitting;call light within reach;encouraged to call for assist;exit alarm on  -      Recorded by [] Pita Roth PT        User Key  (r) = Recorded By, (t) = Taken By, (c) = Cosigned By    Initials Name Effective Dates     Pita Roth, PT 04/03/18 -         Wound 01/06/20 2200 head Incision (Active)   Dressing Appearance open to air 1/10/2020  7:28 PM   Closure Approximated;Sutures 1/10/2020  7:28 PM   Base dry;clean 1/10/2020  7:28 PM   Periwound dry;intact 1/10/2020  7:28 PM   Drainage Amount scant 1/10/2020  7:28 PM   Dressing Care, Wound open to air 1/10/2020  7:28 PM     Physical Therapy Education                 Title: PT OT SLP Therapies (In Progress)     Topic: Physical Therapy (In Progress)     Point: Mobility training (In Progress)     Description:   Instruct learner(s) on safety and technique for assisting patient out  of bed, chair or wheelchair.  Instruct in the proper use of assistive devices, such as walker, crutches, cane or brace.              Patient Friendly Description:   It's important to get you on your feet again, but we need to do so in a way that is safe for you. Falling has serious consequences, and your personal safety is the most important thing of all.        When it's time to get out of bed, one of us or a family member will sit next to you on the bed to give you support.     If your doctor or nurse tells you to use a walker, crutches, a cane, or a brace, be sure you use it every time you get out of bed, even if you think you don't need it.    Learning Progress Summary           Patient Acceptance, E, NR by  at 1/11/2020 0919    Acceptance, E, VU,NR by  at 1/10/2020 1033    Acceptance, E, NR by  at 1/9/2020 1449    Acceptance, E, NR by  at 1/8/2020 0949    Acceptance, E, NR by  at 1/7/2020 1158   Family Acceptance, E, NR by  at 1/7/2020 1158                   Point: Home exercise program (In Progress)     Description:   Instruct learner(s) on appropriate technique for monitoring, assisting and/or progressing patient with therapeutic exercises and activities.              Learning Progress Summary           Patient Acceptance, E, VU,NR by  at 1/10/2020 1033    Acceptance, E, NR by  at 1/7/2020 1158   Family Acceptance, E, NR by  at 1/7/2020 1158                   Point: Body mechanics (In Progress)     Description:   Instruct learner(s) on proper positioning and spine alignment for patient and/or caregiver during mobility tasks and/or exercises.              Learning Progress Summary           Patient Acceptance, E, NR by  at 1/11/2020 0919    Acceptance, E, NR by  at 1/7/2020 1158   Family Acceptance, E, NR by  at 1/7/2020 1158                   Point: Precautions (In Progress)     Description:   Instruct learner(s) on prescribed precautions during mobility and gait tasks               Learning Progress Summary           Patient Acceptance, E, NR by  at 1/8/2020 0949                               User Key     Initials Effective Dates Name Provider Type Discipline     04/03/18 -  Pita Roth, PT Physical Therapist PT    JK 04/03/18 -  Binta Hratman, PT Physical Therapist PT                  PT Recommendation and Plan  Planned Therapy Interventions (PT Eval): balance training, bed mobility training, gait training, home exercise program, ROM (range of motion), stretching, strengthening, stair training, transfer training, wheelchair management/propulsion training  Frequency of Treatment (PT Eval): 60 minutes per session                     Time Calculation:     PT Charges     Row Name 01/11/20 0919             Time Calculation    Start Time  0900  -      Stop Time  1000  -      Time Calculation (min)  60 min  -      PT Received On  01/11/20  -      PT - Next Appointment  01/13/20  -        User Key  (r) = Recorded By, (t) = Taken By, (c) = Cosigned By    Initials Name Provider Type     Pita Roth, PT Physical Therapist          Therapy Charges for Today     Code Description Service Date Service Provider Modifiers Qty    93433022926 HC PT THER PROC EA 15 MIN 1/10/2020 Pita Roth, PT GP 4    23439897855 HC PT THER PROC EA 15 MIN 1/11/2020 Pita Roth, PT GP 4                   Pita Roth PT  1/11/2020

## 2020-01-11 NOTE — PLAN OF CARE
Problem: Patient Care Overview  Goal: Plan of Care Review  Flowsheets  Taken 1/11/2020 1551 by Alissa Mckeon RN  Outcome Summary: Mike is blind, responds verbally, is oriented to person only. He moves all extremities slow, slightly stiffly. He transfers with mod assist of 1 min assist of 1.He has been incontinent of urine but continent of bowel. His skin is intact.Mother or sister always at bedside.  Plan of Care Reviewed With: patient;sibling;mother  Taken 1/10/2020 1552 by Polo Rothman, CAMPOS  Progress, Functional Goals: demonstrating adequate progress  IRF Plan of Care Review: progress ongoing, continue

## 2020-01-11 NOTE — THERAPY TREATMENT NOTE
Inpatient Rehabilitation - Speech Language Pathology Treatment Note  [facility]     Patient Name: Tye JONES Junior  : 1975  MRN: [id number]  Today's Date: 2020         Admit Date: 2020    Visit Dx:      ICD-10-CM ICD-9-CM   1. Impaired mobility Z74.09 799.89     Patient Active Problem List   Diagnosis   • Mixed hyperlipidemia   • Essential hypertension   • Traumatic brain injury (CMS/HCC)   • Acute allergic rhinitis   • Seizure (CMS/HCC)   • Screen for colon cancer   • H/O traumatic brain injury        Therapy Treatment      EDUCATION  The patient has been educated in the following areas:   Cognitive Impairment Communication Impairment Dysphagia (Swallowing Impairment).    SLP Recommendation and Plan                         SLP GOALS     Row Name 20 1400 20 1100 01/10/20 1100       Oral Nutrition/Hydration Goal 1 (SLP)    Barriers (Oral Nutrition/Hydration Goal 1, SLP)  --  Pt alert at 1130 for lunch w/ his mother feeding  and providing verbal cues for second swallow as needed.   -JT  --    Progress/Outcomes (Oral Nutrition/Hydration Goal 1, SLP)  --  goal ongoing  -JT  --       Comprehend Questions Goal 1 (SLP)    Improve Ability to Comprehend Questions Goal 1 (SLP)  --  --  simple general questions  -KA    Time Frame (Comprehend Questions Goal 1, SLP)  --  --  short term goal (STG)  -KA    Progress (Ability to Comprehend Questions Goal 1, SLP)  80%;90%;independently (over 90% accuracy) general yes no questions;   -JT  --  --    Progress/Outcomes (Comprehend Questions Goal 1, SLP)  goal ongoing  -JT  --  goal ongoing  -KA    Comment (Comprehend Questions Goal 1, SLP)  --  --  answering open ended questions today with 75% without cues   -KA       Word Retrieval Skills Goal 1 (SLP)    Progress (Word Retrieval Skills Goal 1, SLP)  with maximum cues (25-49%);70% naming by category; max cues for alertness  -JT  --  --    Progress/Outcomes (Word Retrieval Goal 1, SLP)  goal ongoing  -JT   --  --       Awareness of Basic Personal Information Goal 1 (SLP)    Progress (Awareness of Basic Personal Information Goal 1, SLP)  independently (over 90% accuracy);80% personal info   -JT  --  --    Progress/Outcomes (Awareness of Basic Personal Information Goal 1, SLP)  goal ongoing  -JT  --  --       Attention Goal 1 (SLP)    Improve Attention by Goal 1 (SLP)  --  --  attending to task  -KA    Barriers (Attention Goal 1, SLP)  --  --  Improvement with attention today, pt still fatigued however fast response time and able to actively participate majority of the session  -KA    Progress (Attention Goal 1, SLP)  10%;20%;with maximum cues (25-49%) difficulty staying alert  -JT  --  --    Progress/Outcomes (Attention Goal 1, SLP)  goal ongoing  -JT  --  goal ongoing  -KA    Comment (Attention Goal 1, SLP)  --  --  Attended to task one to two minutes at a time without cueing   -       Orientation Goal 1 ([facility])    Improve Orientation Through Goal 1 (SLP)  --  --  demonstrating orientation to day;demonstrating orientation to month;demonstrating orientation to year;demonstrating orientation to place;90%;with minimal cues (75-90%)  -    Time Frame (Orientation Goal 1, SLP)  --  --  short term goal (STG);by discharge  -KA    Progress/Outcomes (Orientation Goal 1, SLP)  --  --  goal ongoing  -KA    Comment (Orientation Goal 1, SLP)  --  --  Recall of date (without cueing) 1/3 33% (able to recall month) and after 15 minute delay 2/3 66% (unable to recall year after delay). Orientation to place patient stated he was at [facility], unable to recall location after delay.   -KA       Memory Skills Goal 1 (SLP)    Progress (Memory Skills Goal 1, SLP)  80%;with moderate cues (50-74%);with maximum cues (25-49%) category inclusion w/4 words; cues for alertness; extra time  -JT  --  --    Progress/Outcomes (Memory Skills Goal 1, SLP)  goal ongoing  -JT  --  --    Row Name 01/10/20 0800 01/09/20 1000          Oral  Nutrition/Hydration Goal 1 (SLP)    Oral Nutrition/Hydration Goal 1, SLP  Patient will tolerate puree and thin liquids without overt s/s of pen/asp with use of safe swallow precautions  -KA  Patient will tolerate puree and thin liquids without overt s/s of pen/asp with use of safe swallow precautions  -KA     Time Frame (Oral Nutrition/Hydration Goal 1, SLP)  short term goal (STG);by discharge  -KA  short term goal (STG);by discharge  -KA     Barriers (Oral Nutrition/Hydration Goal 1, SLP)  Patient alert at 8am for breakfast this AM, sitting upright in bed, pt did attempt to self feed this morning and more vocal, requested a cup of coffee. Patient did become more fatigued last 10 minutes requiring min verbal cues. No overt s/s of pen/asp with thins via straw and puree, pt mother independently implemented alternate liquids/solids, slow rate, small bites and sips. Patient has difficulty following directions for double swallow. Trace to mild oral residue clears with liquid wash. Delayed a-p transit, and prolonged mastication with puree at times, min verbal cues end of the meal due to fatigue to swallow. Reviewed with mother will continue to monitor for diet upgrade readiness.   -KA  Patient alert at 8:30 for breakfast, patient mother feeding patient and SLP reviewed safe swallow precautions, mother independently alternated liquids and solids, small bites and sips, pt unable to perform double swallows with verbal cues. No overt s/s of pen/asp with puree and thins via straw. No oral holding, delayed a-p transit however no verbal cues required to swallow. Trace oral residue clears with liquid wash. Educated mother pt not ready for diet upgrade yet until alertness improves, and pt able to sustain consistent alertness. Mother voiced understanding of all education.   -KA     Progress/Outcomes (Oral Nutrition/Hydration Goal 1, SLP)  --  goal ongoing  -KA        Comprehend Questions Goal 1 (SLP)    Improve Ability to  Comprehend Questions Goal 1 (SLP)  --  simple yes/no questions;90%;with minimal cues (75-90%);with moderate cues (50-74%)  -KA     Time Frame (Comprehend Questions Goal 1, SLP)  --  short term goal (STG);by discharge  -KA     Barriers (Comprehend Questions Goal 1, SLP)  --  fatigue   -KA     Comment (Comprehend Questions Goal 1, SLP)  --  Patient responded to four y/n questions out of 6 with 100% accuracy on four questions, response time ranged from 5 to 60 seconds with one to three repetitions.   -KA        Word Retrieval Skills Goal 1 (SLP)    Improve Word Retrieval Skills By Goal 1 (SLP)  --  completing a divergent task;completing a convergent task;90%;with minimal cues (75-90%);with moderate cues (50-74%)  -KA     Time Frame (Word Retrieval Goal 1, SLP)  --  short term goal (STG);by discharge  -KA     Comment (Word Retrieval Goal 1, SLP)  --  Across four concrete divergent categories patient named average of 4 within 120 seconds without cueing   -KA        Attention Goal 1 (SLP)    Improve Attention by Goal 1 (SLP)  --  looking at speaker;attending to task;90%;with minimal cues (75-90%)  -KA     Time Frame (Attention Goal 1, SLP)  --  short term goal (STG)  -KA     Barriers (Attention Goal 1, SLP)  --  fatigue  -KA     Comment (Attention Goal 1, SLP)  --  Patient required mod to max verbal/tactile stimulation, pt able to sustain attention for 30 seconds for auditory tasks. Dr Solitario present for session and stated he would consult Neuro to adjust meds to help improve alertness.   -KA        Orientation Goal 1 (SLP)    Improve Orientation Through Goal 1 (SLP)  --  demonstrating orientation to day;demonstrating orientation to month;demonstrating orientation to year;demonstrating orientation to place;90%;with minimal cues (75-90%)  -KA     Progress (Orientation Goal 1, SLP)  --  0%;independently (over 90% accuracy);50%;with maximum cues (25-49%)  -KA     Progress/Outcomes (Orientation Goal 1, SLP)  --  goal ongoing   -ROSALES     Comment (Orientation Goal 1, SLP)  --  Patient required cues for date (stated it was December 2010), pt able to immediate recall January 2020 after review and education and delayed recall after 2 minutes 50%. max cueing for location.   -ROSALES        Memory Skills Goal 1 (SLP)    Comment (Memory Skills Goal 1, SLP)  --  Delayed recall of month/year after 2 minute delay 50% without cues  -ROSALES       User Key  (r) = Recorded By, (t) = Taken By, (c) = Cosigned By    Initials Name Provider Type    Samuel Rivera MA,[facility]-SLP Speech and Language Pathologist    Kristen Harvey Speech and Language Pathologist              Time Calculation:     Time Calculation- SLP     Row Name 01/11/20 1518             Time Calculation- SLP    SLP Start Time  1130  -JT      SLP Stop Time  1200  -JT      SLP Time Calculation (min)  30 min  -JT        User Key  (r) = Recorded By, (t) = Taken By, (c) = Cosigned By    Initials Name Provider Type    Kristen Harvey Speech and Language Pathologist          Therapy Charges for Today     Code Description Service Date Service Provider Modifiers Qty    44180609908 HC ST DEV OF COGN SKILLS INITIAL 15 MIN 1/11/2020 Kristen Diego  1    21418233319 HC ST DEV OF COGN SKILLS EACH ADDT'L 15 MIN 1/11/2020 Kristen Diego  1    23133092351 HC ST TREATMENT SWALLOW 2 1/11/2020 Kristen Diego GN 1                     Kristen Diego  1/11/2020

## 2020-01-12 LAB
ANION GAP SERPL CALCULATED.3IONS-SCNC: 12.6 MMOL/L (ref 5–15)
BUN BLD-MCNC: 26 MG/DL (ref 6–20)
BUN/CREAT SERPL: 19.5 (ref 7–25)
CALCIUM SPEC-SCNC: 8.8 MG/DL (ref 8.6–10.5)
CHLORIDE SERPL-SCNC: 101 MMOL/L (ref 98–107)
CO2 SERPL-SCNC: 26.4 MMOL/L (ref 22–29)
CREAT BLD-MCNC: 1.33 MG/DL (ref 0.76–1.27)
GFR SERPL CREATININE-BSD FRML MDRD: 70 ML/MIN/1.73
GLUCOSE BLD-MCNC: 106 MG/DL (ref 65–99)
POTASSIUM BLD-SCNC: 3.1 MMOL/L (ref 3.5–5.2)
SODIUM BLD-SCNC: 140 MMOL/L (ref 136–145)

## 2020-01-12 PROCEDURE — 25010000002 HEPARIN (PORCINE) PER 1000 UNITS: Performed by: PHYSICAL MEDICINE & REHABILITATION

## 2020-01-12 PROCEDURE — 25010000003 CEFTRIAXONE PER 250 MG: Performed by: PHYSICAL MEDICINE & REHABILITATION

## 2020-01-12 PROCEDURE — 80048 BASIC METABOLIC PNL TOTAL CA: CPT | Performed by: PHYSICAL MEDICINE & REHABILITATION

## 2020-01-12 RX ORDER — HYDROXYZINE PAMOATE 25 MG/1
25 CAPSULE ORAL NIGHTLY PRN
Status: DISCONTINUED | OUTPATIENT
Start: 2020-01-12 | End: 2020-01-19

## 2020-01-12 RX ORDER — POTASSIUM CHLORIDE 750 MG/1
20 CAPSULE, EXTENDED RELEASE ORAL DAILY
Status: DISCONTINUED | OUTPATIENT
Start: 2020-01-13 | End: 2020-01-23

## 2020-01-12 RX ADMIN — LACOSAMIDE 200 MG: 100 TABLET, FILM COATED ORAL at 21:04

## 2020-01-12 RX ADMIN — HYDROXYZINE PAMOATE 25 MG: 25 CAPSULE ORAL at 21:05

## 2020-01-12 RX ADMIN — DEXTROAMPHETAMINE SACCHARATE, AMPHETAMINE ASPARTATE MONOHYDRATE, DEXTROAMPHETAMINE SULFATE, AND AMPHETAMINE SULFATE 30 MG: 2.5; 2.5; 2.5; 2.5 CAPSULE, EXTENDED RELEASE ORAL at 07:55

## 2020-01-12 RX ADMIN — TOPIRAMATE 25 MG: 25 TABLET, FILM COATED ORAL at 07:55

## 2020-01-12 RX ADMIN — CLONAZEPAM 1 MG: 0.5 TABLET ORAL at 06:31

## 2020-01-12 RX ADMIN — HEPARIN SODIUM 5000 UNITS: 5000 INJECTION INTRAVENOUS; SUBCUTANEOUS at 06:31

## 2020-01-12 RX ADMIN — METOPROLOL TARTRATE 50 MG: 50 TABLET, FILM COATED ORAL at 07:56

## 2020-01-12 RX ADMIN — ATORVASTATIN CALCIUM 10 MG: 10 TABLET, FILM COATED ORAL at 07:56

## 2020-01-12 RX ADMIN — CLONAZEPAM 1 MG: 0.5 TABLET ORAL at 21:04

## 2020-01-12 RX ADMIN — LACOSAMIDE 200 MG: 100 TABLET, FILM COATED ORAL at 07:56

## 2020-01-12 RX ADMIN — CHOLECALCIFEROL TAB 10 MCG (400 UNIT) 400 UNITS: 10 TAB at 07:56

## 2020-01-12 RX ADMIN — LEVETIRACETAM 500 MG: 100 SOLUTION ORAL at 21:04

## 2020-01-12 RX ADMIN — CEFTRIAXONE SODIUM 2 G: 2 INJECTION, SOLUTION INTRAVENOUS at 10:00

## 2020-01-12 RX ADMIN — METOPROLOL TARTRATE 50 MG: 50 TABLET, FILM COATED ORAL at 21:05

## 2020-01-12 RX ADMIN — PHENYTOIN 100 MG: 50 TABLET, CHEWABLE ORAL at 06:31

## 2020-01-12 RX ADMIN — CEFTRIAXONE SODIUM 2 G: 2 INJECTION, SOLUTION INTRAVENOUS at 21:04

## 2020-01-12 RX ADMIN — HEPARIN SODIUM 5000 UNITS: 5000 INJECTION INTRAVENOUS; SUBCUTANEOUS at 21:03

## 2020-01-12 RX ADMIN — HEPARIN SODIUM 5000 UNITS: 5000 INJECTION INTRAVENOUS; SUBCUTANEOUS at 13:58

## 2020-01-12 RX ADMIN — LEVETIRACETAM 500 MG: 100 SOLUTION ORAL at 07:55

## 2020-01-12 RX ADMIN — PHENYTOIN 100 MG: 50 TABLET, CHEWABLE ORAL at 13:58

## 2020-01-12 RX ADMIN — PHENYTOIN 100 MG: 50 TABLET, CHEWABLE ORAL at 21:05

## 2020-01-12 RX ADMIN — CLONAZEPAM 1 MG: 0.5 TABLET ORAL at 13:58

## 2020-01-12 RX ADMIN — POTASSIUM CHLORIDE 10 MEQ: 750 CAPSULE, EXTENDED RELEASE ORAL at 07:56

## 2020-01-12 NOTE — PROGRESS NOTES
LOS: 6 days   Patient Care Team:  Jolene Laurent MD as PCP - General (Family Medicine)  Efren Martínez MD as PCP - Claims Attributed    Chief Complaint:   Status post infected  shunt-removed-CNS infection/pneumocephalus  Status post 12/23/ 2019 - Removal of ventriculoperitoneal shunt intracranial portion, valve, partial peritoneal down to the cervical region  Status post 12/31/2019 endoscopic repair of paranasal sinus defect  ID-ceftriaxone-antibiotics until January 14, 2020  Seizure disorder-multidrug regimen-phenytoin/Vimpat/Keppra/clonazepam/Topamax  Remote traumatic brain injury  Neuro stimulation-Adderall  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  Bilateral SCDs.  Status post acute renal hrmhmtqnoimvf-RGE-ovnkzi creatinine.  Avoid NSAID/ACE inhibitor's.    Subjective     History of Present Illness Patient seen and examined this morning. Family stating that pt is anxious at night.     History taken from: patient chart family    Objective     Vital Signs  Temp:  [97.9 °F (36.6 °C)-98.3 °F (36.8 °C)] 97.9 °F (36.6 °C)  Heart Rate:  [] 96  Resp:  [18] 18  BP: (127-140)/(83-92) 130/83    Physical Exam  Gen: Pleasant, full sentences, NAD. Dark glasses.   Pulm: CTAB; no r/r/w; non-labored   Heart; RRR; no m/r/g appreciated   Abd: soft; NT/ND; +BS   : no Indwelling catheter   Neuro: awake   Oriented.     Results Review:    I reviewed the patient's new clinical results.     Results from last 7 days   Lab Units 01/10/20  0712 01/06/20  0424   WBC 10*3/mm3 8.30 12.92*   HEMOGLOBIN g/dL 9.4* 10.6*   HEMATOCRIT % 28.2* 31.8*   PLATELETS 10*3/mm3 387 295     Results from last 7 days   Lab Units 01/12/20  0704 01/10/20  0712 01/08/20  0556    SODIUM mmol/L 140 144 147*   POTASSIUM mmol/L 3.1* 3.3* 3.6   CHLORIDE mmol/L 101 103 107   CO2 mmol/L 26.4 26.6 27.3   BUN mg/dL 26* 38* 55*   CREATININE mg/dL 1.33* 1.67* 2.12*   CALCIUM mg/dL 8.8 9.3 9.4   GLUCOSE mg/dL 106* 113* 100*       Medication Review:   Scheduled Meds:    amphetamine-dextroamphetamine XR 30 mg Oral Daily   atorvastatin 10 mg Oral Daily   cefTRIAXone 2 g Intravenous Q12H   cholecalciferol 400 Units Oral Daily   clonazePAM 1 mg Oral Q8H   heparin (porcine) 5,000 Units Subcutaneous Q8H   lacosamide 200 mg Oral Q12H   levETIRAcetam 500 mg Oral Q12H   metoprolol tartrate 50 mg Oral Q12H   phenytoin 100 mg Oral Q8H   polyethylene glycol 17 g Oral Daily   potassium chloride 10 mEq Oral Daily   topiramate 25 mg Oral Daily     Continuous Infusions:   PRN Meds:.•  acetaminophen    Assessment/Plan       H/O traumatic brain injury  Status post infected  shunt-removed-CNS infection/pneumocephalus  -Status post 12/23/ 2019 - Removal of ventriculoperitoneal shunt intracranial portion, valve, partial peritoneal down to the cervical region  -Status post 12/31/2019 endoscopic repair of paranasal sinus defect  -ID-ceftriaxone-antibiotics until January 14, 2020    Seizure disorder-multidrug regimen-phenytoin/Vimpat/Keppra/clonazepam/Topamax  -January 9-patient was on phenytoin at home which was increased at the outside hospital, on Topamax but less than antiepileptic dose.  He had Vimpat, Keppra, clonazepam added at the outside hospital.  Consulted neurology for input patient has fatigue felt possibly related to his increased antiepileptic medication.  Reviewed with neurology and Keppra dose being decreased.  -January 10- Keppra decreased from 2000 mg BID to 50 mg BID with improved alertness. Neurology continues to follow.     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  Bilateral SCDs.    Status post acute renal lpbpegfdhalxr-WZY-iraeqt creatinine.  Avoid NSAID/ACE inhibitor's.    Hypokalemia- KCL 10 Meq daily.     1/12/20- K remains low.  increase Kcl 20 meq daily.     Anxiety. Multiple meds. Hydroxyzine PRN for anxiety.      Now admit for comprehensive acute inpatient rehabilitation .  This would be an interdisciplinary program with physical therapy 1 hour,  occupational therapy 1 hour, and speech therapy 1 hour, 5 days a week.  Rehabilitation nursing for carryover, monitoring of incision and neurologic   status, bowel and bladder, and skin  Ongoing physician follow-up.  Weekly team conferences.  Goals are indeterminate.   Rehabilitation prognosis indeterminate.  Medical prognosis indeterminate.  Estimated length of stay is indeterminate  The patient's functional status and clinical status is unchanged from preadmission assessment and the patient continues appropriate for acute inpatient rehabilitation.  Goal is for home with outpatient   therapies.  Barrier to discharge: Cognition/mobility- work on trunk control, strength, balance to overcome.     TEAM CONF - JAN 9 - BED MAX 2.  TRANSFERS MAX 2.  GAIT 8 FEET PARALLEL BARS MOD 2. TOILET TRANSFERS MAX 2.  BLIND.  SLOW PROCESSING.  GROOMING MOD. UBD MAX. LBD DEP. DYSPHAGIA - PUREE/THINS.  FATIGUES WITH COGNITIVE  THERAPY.  ELOS :  4 WEEKS       I discussed the patients findings and my recommendations with patient, family and nursing staff    Vidal Vidales MD  01/12/20  12:21 PM

## 2020-01-12 NOTE — PLAN OF CARE
Patient is calm and cooperative. Oriented to person. Follows simple instructions. Incontinent of bladder. 2 person assist with turning and repositioning. Awakes multiple times during the night.

## 2020-01-12 NOTE — PLAN OF CARE
Problem: Patient Care Overview  Goal: Plan of Care Review  Flowsheets  Taken 1/12/2020 1634 by Alissa Mckeon RN  Outcome Summary: Mike has been pleasant and cooperative. He is oriented to person only. He is blind, transfers mod assist of 1 and verbal cues. his skin is intact. He has been incontinent of urine but continent of bowel. Takes meds whole with as. Mother at .  Plan of Care Reviewed With: patient;sibling;mother  Taken 1/10/2020 1552 by Polo Rothman, RN  Progress, Functional Goals: demonstrating adequate progress  IRF Plan of Care Review: progress ongoing, continue

## 2020-01-13 ENCOUNTER — TELEPHONE (OUTPATIENT)
Dept: FAMILY MEDICINE CLINIC | Facility: CLINIC | Age: 45
End: 2020-01-13

## 2020-01-13 LAB
ALBUMIN SERPL-MCNC: 3.5 G/DL (ref 3.5–5.2)
ALBUMIN/GLOB SERPL: 0.8 G/DL
ALP SERPL-CCNC: 106 U/L (ref 39–117)
ALT SERPL W P-5'-P-CCNC: 20 U/L (ref 1–41)
ANION GAP SERPL CALCULATED.3IONS-SCNC: 12.3 MMOL/L (ref 5–15)
AST SERPL-CCNC: 21 U/L (ref 1–40)
BASOPHILS # BLD AUTO: 0.03 10*3/MM3 (ref 0–0.2)
BASOPHILS NFR BLD AUTO: 0.4 % (ref 0–1.5)
BILIRUB SERPL-MCNC: 0.2 MG/DL (ref 0.2–1.2)
BUN BLD-MCNC: 23 MG/DL (ref 6–20)
BUN/CREAT SERPL: 16.9 (ref 7–25)
CALCIUM SPEC-SCNC: 9.1 MG/DL (ref 8.6–10.5)
CHLORIDE SERPL-SCNC: 105 MMOL/L (ref 98–107)
CO2 SERPL-SCNC: 27.7 MMOL/L (ref 22–29)
CREAT BLD-MCNC: 1.36 MG/DL (ref 0.76–1.27)
DEPRECATED RDW RBC AUTO: 37.7 FL (ref 37–54)
EOSINOPHIL # BLD AUTO: 0.38 10*3/MM3 (ref 0–0.4)
EOSINOPHIL NFR BLD AUTO: 4.8 % (ref 0.3–6.2)
ERYTHROCYTE [DISTWIDTH] IN BLOOD BY AUTOMATED COUNT: 12.7 % (ref 12.3–15.4)
GFR SERPL CREATININE-BSD FRML MDRD: 68 ML/MIN/1.73
GLOBULIN UR ELPH-MCNC: 4.6 GM/DL
GLUCOSE BLD-MCNC: 102 MG/DL (ref 65–99)
HCT VFR BLD AUTO: 25.6 % (ref 37.5–51)
HGB BLD-MCNC: 8.9 G/DL (ref 13–17.7)
IMM GRANULOCYTES # BLD AUTO: 0.06 10*3/MM3 (ref 0–0.05)
IMM GRANULOCYTES NFR BLD AUTO: 0.8 % (ref 0–0.5)
LYMPHOCYTES # BLD AUTO: 1.9 10*3/MM3 (ref 0.7–3.1)
LYMPHOCYTES NFR BLD AUTO: 24.2 % (ref 19.6–45.3)
MCH RBC QN AUTO: 29 PG (ref 26.6–33)
MCHC RBC AUTO-ENTMCNC: 34.8 G/DL (ref 31.5–35.7)
MCV RBC AUTO: 83.4 FL (ref 79–97)
MONOCYTES # BLD AUTO: 0.86 10*3/MM3 (ref 0.1–0.9)
MONOCYTES NFR BLD AUTO: 11 % (ref 5–12)
NEUTROPHILS # BLD AUTO: 4.62 10*3/MM3 (ref 1.7–7)
NEUTROPHILS NFR BLD AUTO: 58.8 % (ref 42.7–76)
NRBC BLD AUTO-RTO: 0 /100 WBC (ref 0–0.2)
PLATELET # BLD AUTO: 429 10*3/MM3 (ref 140–450)
PMV BLD AUTO: 9.3 FL (ref 6–12)
POTASSIUM BLD-SCNC: 3.5 MMOL/L (ref 3.5–5.2)
PROT SERPL-MCNC: 8.1 G/DL (ref 6–8.5)
RBC # BLD AUTO: 3.07 10*6/MM3 (ref 4.14–5.8)
SODIUM BLD-SCNC: 145 MMOL/L (ref 136–145)
WBC NRBC COR # BLD: 7.85 10*3/MM3 (ref 3.4–10.8)

## 2020-01-13 PROCEDURE — 85025 COMPLETE CBC W/AUTO DIFF WBC: CPT | Performed by: PHYSICAL MEDICINE & REHABILITATION

## 2020-01-13 PROCEDURE — 25010000003 CEFTRIAXONE PER 250 MG: Performed by: PHYSICAL MEDICINE & REHABILITATION

## 2020-01-13 PROCEDURE — 25010000002 HEPARIN (PORCINE) PER 1000 UNITS: Performed by: PHYSICAL MEDICINE & REHABILITATION

## 2020-01-13 PROCEDURE — 97110 THERAPEUTIC EXERCISES: CPT

## 2020-01-13 PROCEDURE — 97130 THER IVNTJ EA ADDL 15 MIN: CPT

## 2020-01-13 PROCEDURE — 80053 COMPREHEN METABOLIC PANEL: CPT | Performed by: PHYSICAL MEDICINE & REHABILITATION

## 2020-01-13 PROCEDURE — 97535 SELF CARE MNGMENT TRAINING: CPT

## 2020-01-13 PROCEDURE — 97116 GAIT TRAINING THERAPY: CPT

## 2020-01-13 PROCEDURE — 97129 THER IVNTJ 1ST 15 MIN: CPT

## 2020-01-13 RX ORDER — PANTOPRAZOLE SODIUM 40 MG/1
40 TABLET, DELAYED RELEASE ORAL
Status: DISCONTINUED | OUTPATIENT
Start: 2020-01-14 | End: 2020-01-24 | Stop reason: HOSPADM

## 2020-01-13 RX ORDER — PHENYTOIN SODIUM 100 MG/1
100 CAPSULE, EXTENDED RELEASE ORAL EVERY 8 HOURS SCHEDULED
Status: DISCONTINUED | OUTPATIENT
Start: 2020-01-13 | End: 2020-01-24 | Stop reason: HOSPADM

## 2020-01-13 RX ADMIN — METOPROLOL TARTRATE 50 MG: 50 TABLET, FILM COATED ORAL at 08:42

## 2020-01-13 RX ADMIN — LACOSAMIDE 200 MG: 100 TABLET, FILM COATED ORAL at 21:51

## 2020-01-13 RX ADMIN — PHENYTOIN 100 MG: 50 TABLET, CHEWABLE ORAL at 06:31

## 2020-01-13 RX ADMIN — HEPARIN SODIUM 5000 UNITS: 5000 INJECTION INTRAVENOUS; SUBCUTANEOUS at 21:50

## 2020-01-13 RX ADMIN — CLONAZEPAM 1 MG: 0.5 TABLET ORAL at 13:23

## 2020-01-13 RX ADMIN — PHENYTOIN SODIUM 100 MG: 100 CAPSULE, EXTENDED RELEASE ORAL at 21:51

## 2020-01-13 RX ADMIN — POLYETHYLENE GLYCOL 3350 17 G: 17 POWDER, FOR SOLUTION ORAL at 08:42

## 2020-01-13 RX ADMIN — CEFTRIAXONE SODIUM 2 G: 2 INJECTION, SOLUTION INTRAVENOUS at 11:43

## 2020-01-13 RX ADMIN — DEXTROAMPHETAMINE SACCHARATE, AMPHETAMINE ASPARTATE MONOHYDRATE, DEXTROAMPHETAMINE SULFATE, AND AMPHETAMINE SULFATE 30 MG: 2.5; 2.5; 2.5; 2.5 CAPSULE, EXTENDED RELEASE ORAL at 08:42

## 2020-01-13 RX ADMIN — PHENYTOIN SODIUM 100 MG: 100 CAPSULE, EXTENDED RELEASE ORAL at 16:52

## 2020-01-13 RX ADMIN — CEFTRIAXONE SODIUM 2 G: 2 INJECTION, SOLUTION INTRAVENOUS at 21:50

## 2020-01-13 RX ADMIN — TOPIRAMATE 25 MG: 25 TABLET, FILM COATED ORAL at 08:42

## 2020-01-13 RX ADMIN — ATORVASTATIN CALCIUM 10 MG: 10 TABLET, FILM COATED ORAL at 08:41

## 2020-01-13 RX ADMIN — LEVETIRACETAM 500 MG: 100 SOLUTION ORAL at 08:42

## 2020-01-13 RX ADMIN — CLONAZEPAM 1 MG: 0.5 TABLET ORAL at 21:50

## 2020-01-13 RX ADMIN — CHOLECALCIFEROL TAB 10 MCG (400 UNIT) 400 UNITS: 10 TAB at 08:41

## 2020-01-13 RX ADMIN — POTASSIUM CHLORIDE 20 MEQ: 750 CAPSULE, EXTENDED RELEASE ORAL at 08:42

## 2020-01-13 RX ADMIN — METOPROLOL TARTRATE 50 MG: 50 TABLET, FILM COATED ORAL at 21:51

## 2020-01-13 RX ADMIN — HEPARIN SODIUM 5000 UNITS: 5000 INJECTION INTRAVENOUS; SUBCUTANEOUS at 13:23

## 2020-01-13 RX ADMIN — LACOSAMIDE 200 MG: 100 TABLET, FILM COATED ORAL at 08:42

## 2020-01-13 RX ADMIN — CLONAZEPAM 0.5 MG: 0.5 TABLET ORAL at 06:41

## 2020-01-13 RX ADMIN — LEVETIRACETAM 500 MG: 100 SOLUTION ORAL at 21:50

## 2020-01-13 RX ADMIN — HYDROXYZINE PAMOATE 25 MG: 25 CAPSULE ORAL at 22:18

## 2020-01-13 RX ADMIN — HEPARIN SODIUM 5000 UNITS: 5000 INJECTION INTRAVENOUS; SUBCUTANEOUS at 06:31

## 2020-01-13 RX ADMIN — CLONAZEPAM 0.5 MG: 0.5 TABLET ORAL at 06:31

## 2020-01-13 NOTE — PROGRESS NOTES
LOS: 7 days   Patient Care Team:  Jolene Laurent MD as PCP - General (Family Medicine)  Efren Martínez MD as PCP - Claims Attributed    Chief Complaint:   Status post infected  shunt-removed-CNS infection/pneumocephalus  Status post 12/23/ 2019 - Removal of ventriculoperitoneal shunt intracranial portion, valve, partial peritoneal down to the cervical region  Status post 12/31/2019 endoscopic repair of paranasal sinus defect  ID-ceftriaxone-antibiotics until January 14, 2020  Seizure disorder-multidrug regimen-phenytoin/Vimpat/Keppra/clonazepam/Topamax  Remote traumatic brain injury  Neuro stimulation-Adderall  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  Bilateral SCDs.  Status post acute renal ppnletpeljazc-CWR-etmsvj creatinine.  Avoid NSAID/ACE inhibitor's.    Subjective     History of Present Illness   Patient seen and examined this morning. No acute events overnight. Mom is at bedside and reports improved alertness during the day. However, over the weekend noticed increased restlessness at night. She reports that he did not fall asleep until 2am this morning. Vistaril was added over the weekend but she reports that it did not work last night. Otherwise, patient reports he is doing well. Denies pain. No CP or SOB. No n/v/d    CBC and CMP reviewed.    Therapies:   PT: Ambulated 80 feet x3 with minimum assist x2 using walker.   OT: upper body dressing- min assist, lower body dressing-mod/max assist, grooming-min assist with verbal cues, toileting-mod/max assist       History taken from: patient chart family    Objective     Vital Signs  Temp:  [98.1 °F (36.7 °C)-98.6 °F (37 °C)] 98.1 °F (36.7 °C)  Heart Rate:  [] 96  Resp:  [18]  18  BP: (128-146)/(86-97) 128/86    Physical Exam  Mental status: awake and alert.  HEENT-craniotomy incision clean dry and intact.   Pulm: CTAB, no wheezing, rales, or rhonchi  Heart: RRR, no MRG  Abdomen: soft, non-tender, non-distended. +BS  Extremities: No cyanosis or edema   Neuro: awake   Slow with responses but following commands. Left head preference but can rotate past midline to the right.  Strength: Mild weakness and incoordination on the right side compared to the left.      Results Review:    I reviewed the patient's new clinical results.     Results from last 7 days   Lab Units 01/13/20  0721 01/10/20  0712   WBC 10*3/mm3 7.85 8.30   HEMOGLOBIN g/dL 8.9* 9.4*   HEMATOCRIT % 25.6* 28.2*   PLATELETS 10*3/mm3 429 387     Results from last 7 days   Lab Units 01/13/20  0721 01/12/20  0704 01/10/20  0712   SODIUM mmol/L 145 140 144   POTASSIUM mmol/L 3.5 3.1* 3.3*   CHLORIDE mmol/L 105 101 103   CO2 mmol/L 27.7 26.4 26.6   BUN mg/dL 23* 26* 38*   CREATININE mg/dL 1.36* 1.33* 1.67*   CALCIUM mg/dL 9.1 8.8 9.3   BILIRUBIN mg/dL 0.2  --   --    ALK PHOS U/L 106  --   --    ALT (SGPT) U/L 20  --   --    AST (SGOT) U/L 21  --   --    GLUCOSE mg/dL 102* 106* 113*       Medication Review:   Scheduled Meds:    amphetamine-dextroamphetamine XR 30 mg Oral Daily   atorvastatin 10 mg Oral Daily   cefTRIAXone 2 g Intravenous Q12H   cholecalciferol 400 Units Oral Daily   clonazePAM 1 mg Oral Q8H   heparin (porcine) 5,000 Units Subcutaneous Q8H   lacosamide 200 mg Oral Q12H   levETIRAcetam 500 mg Oral Q12H   metoprolol tartrate 50 mg Oral Q12H   phenytoin 100 mg Oral Q8H   polyethylene glycol 17 g Oral Daily   potassium chloride 20 mEq Oral Daily   topiramate 25 mg Oral Daily     Continuous Infusions:   PRN Meds:.•  acetaminophen  •  hydrOXYzine pamoate    Assessment/Plan       H/O traumatic brain injury  Status post infected  shunt-removed-CNS infection/pneumocephalus  -Status post 12/23/ 2019 - Removal of  ventriculoperitoneal shunt intracranial portion, valve, partial peritoneal down to the cervical region  -Status post 12/31/2019 endoscopic repair of paranasal sinus defect  -ID-ceftriaxone-antibiotics until January 14, 2020    Seizure disorder-multidrug regimen-phenytoin/Vimpat/Keppra/clonazepam/Topamax  -January 9-patient was on phenytoin at home which was increased at the outside hospital, on Topamax but less than antiepileptic dose.  He had Vimpat, Keppra, clonazepam added at the outside hospital.  Consulted neurology for input patient has fatigue felt possibly related to his increased antiepileptic medication.  Reviewed with neurology and Keppra dose being decreased.  -January 10- Keppra decreased from 2000 mg BID to 50 mg BID with improved alertness. Neurology continues to follow.     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  Bilateral SCDs.    Status post acute renal nufzjrangzpru-PGU-cfwrmy creatinine.  Avoid NSAID/ACE inhibitor's.      January 13- Family reports restlessness at night. Vistaril added PRN last night without response. Since vistaril has only been tried one night, will continue with Vistaril PRN at bedtime for now and continue to monitor.      Now admit for comprehensive acute inpatient rehabilitation .  This would be an interdisciplinary program with physical therapy 1 hour,  occupational therapy 1 hour, and speech therapy 1 hour, 5 days a week.  Rehabilitation nursing for carryover, monitoring of incision and neurologic   status, bowel and bladder, and skin  Ongoing physician follow-up.  Weekly team conferences.  Goals are indeterminate.    Rehabilitation prognosis indeterminate.  Medical prognosis indeterminate.  Estimated length of stay is indeterminate  The patient's functional status and clinical status is unchanged from preadmission assessment and the patient continues appropriate for acute inpatient rehabilitation.  Goal is for home with outpatient   therapies.  Barrier to discharge: Cognition/mobility- work on trunk control, strength, balance to overcome.     TEAM CONF - JAN 9 - BED MAX 2.  TRANSFERS MAX 2.  GAIT 8 FEET PARALLEL BARS MOD 2. TOILET TRANSFERS MAX 2.  BLIND.  SLOW PROCESSING.  GROOMING MOD. UBD MAX. LBD DEP. DYSPHAGIA - PUREE/THINS.  FATIGUES WITH COGNITIVE  THERAPY.  ELOS :  4 WEEKS       I discussed the patients findings and my recommendations with patient, family and nursing staff    Tova Mercedes DO  01/13/20  4:54 PM

## 2020-01-13 NOTE — THERAPY TREATMENT NOTE
Inpatient Rehabilitation - Occupational Therapy Treatment Note    [facility]     Patient Name: Tye JONES Junior  : 1975  MRN: [id number]    Today's Date: 2020                 Admit Date: 2020      Visit Dx:    ICD-10-CM ICD-9-CM   1. Impaired mobility Z74.09 799.89       Patient Active Problem List   Diagnosis   • Mixed hyperlipidemia   • Essential hypertension   • Traumatic brain injury (CMS/HCC)   • Acute allergic rhinitis   • Seizure (CMS/HCC)   • Screen for colon cancer   • H/O traumatic brain injury         Therapy Treatment    IRF Treatment Summary     Row Name 20 1051 20 1000          Evaluation/Treatment Time and Intent    Subjective Information  no complaints  -CC  no complaints  (Pended)   -NM     Existing Precautions/Restrictions  fall  -CC  fall  (Pended)  blind   -NM     Document Type  therapy note (daily note)  -CC  therapy note (daily note)  (Pended)   -NM     Mode of Treatment  occupational therapy  -CC  physical therapy  (Pended)   -NM     Patient/Family Observations  in recliner w/c  -CC  pt up in w/c in room; mother present; no acute ditress   (Pended)   -NM     Recorded by [CC] Neris Morales, MALIHAR [NM] Anmol Joseph, PT Student     Row Name 20 1051 20 1000          Cognition/Psychosocial- PT/OT    Affect/Mental Status (Cognitive)  WFL  -CC  confused  (Pended)   -NM     Orientation Status (Cognition)  --  oriented to;person;situation  (Pended)   -NM     Follows Commands (Cognition)  follows one step commands;75-90% accuracy;increased processing time needed;initiation impaired;physical/tactile prompts required;repetition of directions required  -CC  follows one step commands;delayed response/completion  (Pended)   -NM     Personal Safety Interventions  fall prevention program maintained;gait belt;nonskid shoes/slippers when out of bed  -CC  fall prevention program maintained;gait belt  (Pended)   -NM     Recorded by [CC] Neris Morales OTR [NM]  Anmol Joseph, PT Student     Row Name 01/13/20 1051             Bed Mobility Assessment/Treatment    Comment (Bed Mobility)  in w/c  -CC      Recorded by [CC] Neris Morales OTR      Row Name 01/13/20 1000             Transfer Assessment/Treatment    Transfer Assessment/Treatment  sit-stand transfer;stand-sit transfer;toilet transfer;squat pivot transfer  (Pended)   -NM      Recorded by [NM] Anmol Joseph, MAILE Student      Row Name 01/13/20 1051             Sit-Stand Transfer    Sit-Stand [place] (Transfers)  verbal cues;nonverbal cues (demo/gesture);minimum assist (75% patient effort);moderate assist (50% patient effort) At sink level  -CC      Assistive Device (Sit-Stand Transfers)  wheelchair  -CC      Recorded by [CC] Neris Morales OTR      Row Name 01/13/20 1051             Stand-Sit Transfer    Stand-Sit [place] (Transfers)  minimum assist (75% patient effort);moderate assist (50% patient effort)  -CC      Assistive Device (Stand-Sit Transfers)  wheelchair  -CC      Recorded by [CC] Neris Morales OTR      Row Name 01/13/20 1051             Toilet Transfer    Type (Toilet Transfer)  sit-stand;stand-sit  -CC      [place] Level (Toilet Transfer)  minimum assist (75% patient effort);moderate assist (50% patient effort)  -CC      Assistive Device (Toilet Transfer)  commode, 3-in-1;grab bars/safety frame;wheelchair  -CC      Recorded by [CC] Neris Morales OTR Row Name 01/13/20 1051             Bathing Assessment/Treatment    Bathing [place] Level  bathing skills;lower body;upper body;verbal cues;minimum assist (75% patient effort);moderate assist (50% patient effort)  -CC      Bathing Position  sink side  -CC      Bathing Setup Assistance  adjust water temperature;obtain supplies  -CC      Recorded by [CC] Neris Morales OTR Row Name 01/13/20 1051             Upper Body Dressing Assessment/Treatment    Upper Body Dressing Task  upper body dressing  skills;doff;don;pull over garment;verbal cues;nonverbal cues (demo/gesture);minimum assist (75% or more patient effort)  -CC      Upper Body Dressing Position  supported sitting  -CC      Set-up Assistance (Upper Body Dressing)  obtain clothing  -CC      Recorded by [CC] Neris Morales OTR      Row Name 01/13/20 1051             Lower Body Dressing Assessment/Treatment    Lower Body Dressing [place] Level  doff;don;pants/bottoms;shoes/slippers;socks;underwear;verbal cues;nonverbal cues (demo/gesture);moderate assist (50% patient effort);maximum assist (25% patient effort)  -CC      Lower Body Dressing Position  supported sitting;supported standing  -CC      Lower Body Dressing Setup Assistance  obtain clothing  -CC      Comment (Lower Body Dressing)  second person for safety  -CC      Recorded by [CC] Neris Morales, AVA      Row Name 01/13/20 1051             Grooming Assessment/Treatment    Grooming [place] Level  grooming skills;deodorant application;oral care regimen;wash face, hands;verbal cues;minimum assist (75% patient effort)  -CC      Grooming Position  supported sitting;sink side  -CC      Recorded by [CC] Neris Morales OTR      Row Name 01/13/20 1051             Toileting Assessment/Treatment    Toileting [place] Level  toileting skills;adjust/manage clothing;perform perineal hygiene;verbal cues;moderate assist (50% patient effort);maximum assist (25% patient effort)  -      Assistive Device Use (Toileting)  grab bar/safety frame  -CC      Toileting Position  supported sitting;supported standing  -CC      Recorded by [CC] Neris Morales OTR      Row Name 01/13/20 1051             Pain Scale: Numbers Pre/Post-Treatment    Pain Scale: Numbers, Pretreatment  0/10 - no pain  -CC      Pain Scale: Numbers, Post-Treatment  0/10 - no pain  -CC      Recorded by [CC] Neris Morales OTR      Row Name 01/13/20 1051             Positioning and Restraints    Pre-Treatment Position   sitting in chair/recliner  -CC      Post Treatment Position  wheelchair  -CC      In Wheelchair  notified nsg;sitting;call light within reach;encouraged to call for assist;exit alarm on;with family/caregiver  -CC      Recorded by [CC] Neris Morales OTR        User Key  (r) = Recorded By, (t) = Taken By, (c) = Cosigned By    Initials Name Effective Dates    CC Neris Morales OTR 06/08/18 -     NM Anmol Joseph, PT Student 01/03/20 -           Wound 01/06/20 2200 head Incision (Active)   Dressing Appearance open to air 1/12/2020  9:05 PM   Closure Approximated 1/12/2020  9:05 PM   Base clean;dry 1/12/2020  9:05 PM   Periwound dry;intact 1/12/2020  9:05 PM   Drainage Amount none 1/12/2020  9:05 PM         OT Recommendation and Plan                 OT IRF GOALS     Row Name 01/07/20 1527             Transfer Goal 1 (OT-IRF)    Activity/Assistive Device (Transfer Goal 1, OT-IRF)  toilet;commode, bedside without drop arms  -SG      [place] Level (Transfer Goal 1, OT-IRF)  maximum assist (25-49% patient effort)  -SG      Time Frame (Transfer Goal 1, OT-IRF)  short term goal (STG);1 week  -SG      Progress/Outcomes (Transfer Goal 1, OT-IRF)  goal ongoing  -SG         Transfer Goal 2 (OT-IRF)    Activity/Assistive Device (Transfer Goal 2, OT-IRF)  toilet  -SG      [place] Level (Transfer Goal 2, OT-IRF)  minimum assist (75% or more patient effort);moderate assist (50-74% patient effort)  -SG      Time Frame (Transfer Goal 2, OT-IRF)  long term goal (LTG);by discharge  -SG      Progress/Outcomes (Transfer Goal 2, OT-IRF)  goal ongoing  -SG         Transfer Goal 3 (OT-IRF)    Activity/Assistive Device (Transfer Goal 3, OT-IRF)  shower chair  -SG      [place] Level (Transfer Goal 3, OT-IRF)  maximum assist (25-49% patient effort)  -SG      Time Frame (Transfer Goal 3, OT-IRF)  short term goal (STG);1 week  -SG      Progress/Outcomes (Transfer Goal 3, OT-IRF)  goal ongoing  -SG         Transfer Goal 4  (OT-IRF)    Activity/Assistive Device (Transfer Goal 4, OT-IRF)  shower chair  -SG      [place] Level (Transfer Goal 4, OT-IRF)  minimum assist (75% or more patient effort);moderate assist (50-74% patient effort)  -SG      Time Frame (Transfer Goal 4, OT-IRF)  long term goal (LTG);by discharge  -SG      Progress/Outcomes (Transfer Goal 4, OT-IRF)  goal ongoing  -SG         Bathing Goal 1 (OT-IRF)    Activity/Device (Bathing Goal 1, OT-IRF)  bathing skills, all  -SG      [place] Level (Bathing Goal 1, OT-IRF)  moderate assist (50-74% patient effort)  -SG      Time Frame (Bathing Goal 1, OT-IRF)  short term goal (STG);1 week  -SG      Progress/Outcomes (Bathing Goal 1, OT-IRF)  goal ongoing  -SG         Bathing Goal 2 (OT-IRF)    Activity/Device (Bathing Goal 2, OT-IRF)  bathing skills, all  -SG      [place] Level (Bathing Goal 2, OT-IRF)  minimum assist (75% or more patient effort)  -SG      Time Frame (Bathing Goal 2, OT-IRF)  long term goal (LTG);by discharge  -SG      Progress/Outcomes (Bathing Goal 2, OT-IRF)  goal ongoing  -SG         UB Dressing Goal 1 (OT-IRF)    Activity/Device (UB Dressing Goal 1, OT-IRF)  upper body dressing  -SG      [place] (UB Dress Goal 1, OT-IRF)  moderate assist (50-74% patient effort)  -SG      Time Frame (UB Dressing Goal 1, OT-IRF)  short term goal (STG);1 week  -SG      Progress/Outcomes (UB Dressing Goal 1, OT-IRF)  goal ongoing  -SG         UB Dressing Goal 2 (OT-IRF)    Activity/Device (UB Dressing Goal 2, OT-IRF)  upper body dressing  -SG      [place] (UB Dress Goal 2, OT-IRF)  minimum assist (75% or more patient effort)  -SG      Time Frame (UB Dressing Goal 2, OT-IRF)  long term goal (LTG);by discharge  -SG      Progress/Outcomes (UB Dressing Goal 2, OT-IRF)  goal ongoing  -SG         LB Dressing Goal 1 (OT-IRF)    Activity/Device (LB Dressing Goal 1, OT-IRF)  lower body dressing  -SG      [place] (LB Dressing Goal 1, OT-IRF)  maximum assist  (25-49% patient effort)  -SG      Time Frame (LB Dressing Goal 1, OT-IRF)  short term goal (STG);1 week  -SG      Progress/Outcomes (LB Dressing Goal 1, OT-IRF)  goal ongoing  -SG         LB Dressing Goal 2 (OT-IRF)    Activity/Device (LB Dressing Goal 2, OT-IRF)  lower body dressing  -SG      [place] (LB Dressing Goal 2, OT-IRF)  moderate assist (50-74% patient effort)  -SG      Time Frame (LB Dressing Goal 2, OT-IRF)  long term goal (LTG);by discharge  -SG      Progress/Outcomes (LB Dressing Goal 2, OT-IRF)  goal ongoing  -SG         Toileting Goal 1 (OT-IRF)    Activity/Device (Toileting Goal 1, OT-IRF)  toileting skills, all  -SG      [place] Level (Toileting Goal 1, OT-IRF)  moderate assist (50-74% patient effort)  -SG      Time Frame (Toileting Goal 1, OT-IRF)  long term goal (LTG);by discharge  -SG         Strength Goal 1 (OT-IRF)    Strength Goal 1 (OT-IRF)  Pt to tolerate UE strengthing to improve overall ROM and functional use of UE.  -SG      Time Frame (Strength Goal 1, OT-IRF)  long term goal (LTG);by discharge  -SG      Progress/Outcomes (Strength Goal 1, OT-IRF)  goal ongoing  -SG         Balance Goal 1 (OT)    Activity/Assistive Device (Balance Goal 1, OT)  sitting, static  -SG      [place] Level/Cues Needed (Balance Goal 1, OT)  contact guard assist  -SG      Time Frame (Balance Goal 1, OT)  long term goal (LTG);by discharge  -SG      Progress/Outcomes (Balance Goal 1, OT)  goal ongoing  -SG         Caregiver Training Goal 1 (OT-IRF)    Caregiver Training Goal 1 (OT-IRF)  Pt and family to be indep with ADLs, functional transfers, AD/AE, and HEP for safe d/c home.  -SG      Time Frame (Caregiver Training Goal 1, OT-IRF)  long term goal (LTG);by discharge  -SG      Progress/Outcomes (Caregiver Training Goal 1, OT-IRF)  goal ongoing  -SG        User Key  (r) = Recorded By, (t) = Taken By, (c) = Cosigned By    Initials Name Provider Type    Sudha Cho OTR Occupational Therapist           Occupational Therapy Education                 Title: PT OT SLP Therapies (In Progress)     Topic: Occupational Therapy (In Progress)     Point: ADL training (Done)     Description:   Instruct learner(s) on proper safety adaptation and remediation techniques during self care or transfers.   Instruct in proper use of assistive devices.              Learning Progress Summary           Family Acceptance, E,TB, VU by  at 1/7/2020 9748    Comment:  OT role and goals, POC.                               User Key     Initials Effective Dates Name Provider Type Discipline     12/26/18 -  Sudha Marte OTR Occupational Therapist OT                       Time Calculation:     Time Calculation- OT     Row Name 01/13/20 1000             Time Calculation- OT    OT Start Time  1000  -CC      OT Stop Time  1045  -CC      OT Time Calculation (min)  45 min  -CC      OT Non-Billable Time (min)  30 min tech  -CC        User Key  (r) = Recorded By, (t) = Taken By, (c) = Cosigned By    Initials Name Provider Type    CC Neris Morales OTR Occupational Therapist          Therapy Charges for Today     Code Description Service Date Service Provider Modifiers Qty    57094096840  OT THER SUPP EA 15 MIN 1/13/2020 Neris Morales OTR GO 2    18253441257 HC OT SELF CARE/MGMT/TRAIN EA 15 MIN 1/13/2020 Neris Morales OTR GO 3                   AVA Dodd  1/13/2020

## 2020-01-13 NOTE — PLAN OF CARE
Problem: Patient Care Overview  Goal: Plan of Care Review  Outcome: Ongoing (interventions implemented as appropriate)  Flowsheets (Taken 1/13/2020 6925)  Outcome Summary: No c/o pain. Assist with turns/incont. care. Meds whole with applesauce. Disoriented to time of day. Took Vistaril at HS for sleep. Slept at intervals. Mother stayed with pt.  Progress, Functional Goals: demonstrating adequate progress  Plan of Care Reviewed With: patient  IRF Plan of Care Review: progress ongoing, continue

## 2020-01-13 NOTE — TELEPHONE ENCOUNTER
Just checking on [facility] paperwork. Dropped off a couple of weeks ago.  Please advise

## 2020-01-13 NOTE — PROGRESS NOTES
Occupational Therapy: Branch    Physical Therapy: Branch    Speech Language Pathology:  Individual: 60 minutes.    Signed by: SE Rizzo

## 2020-01-13 NOTE — PROGRESS NOTES
Inpatient Rehabilitation Plan of Care Note    Plan of Care  Care Plan Reviewed - Updates as Follows    Sphincter Control    [RN] Bladder Management(Active)  Current Status(01/09/2020): Incontient episodes, patient has urgency when  needing to void. Incont. 100% at night.  Weekly Goal(01/15/2020): Continent 50%  Discharge Goal: Continent 100%    Performed Intervention(s)  Monitor I&O  check for incontinence, offer toileting q2hrs  miralax daily-hold if needed.      Safety    Performed Intervention(s)  Safety rounds/bed alarm/ chair alarm on  Falls precautio/call light within easy reach      Psychosocial    Performed Intervention(s)  Offer support/Encourage patient to verbalize any concerns      Body Systems    Performed Intervention(s)  Skin care q shift and PRN  Assist to turn patient q 2-3hrs.    Signed by: Angela Haider RN

## 2020-01-13 NOTE — PROGRESS NOTES
Inpatient Rehabilitation Functional Measures Assessment and Plan of Care    Plan of Care  Updated Problems/Interventions  Field    Functional Measures  JENNIFER Eating:  [facility] Grooming: [facility] Bathing:  [facility] Upper Body Dressing:  [facility] Lower Body Dressing:  [facility] Toileting:  [facility] Bladder Management  Level of Assistance:  [place]  Frequency/Number of Accidents this Shift:  [facility] Bowel Management  Level of Assistance: [place]  Frequency/Number of Accidents this Shift: [facility] Bed/Chair/Wheelchair Transfer:  [facility] Toilet Transfer:  [facility] Tub/Shower Transfer:  [place]    Previously Documented Mode of Locomotion at Discharge: Field  JENNIFER Expected Mode of Locomotion at Discharge: [facility] Walk/Wheelchair:  [facility] Stairs:  [facility] Comprehension:  [facility] Expression:  [facility] Social Interaction:  [facility] Problem Solving:  [facility] Memory:  [place]    Therapy Mode Minutes  Occupational Therapy: Individual: 15 minutes.  Physical Therapy: [place]  Speech Language Pathology:  [place]    Signed by: AVA Dodd/ZOË

## 2020-01-13 NOTE — NURSING NOTE
Mike was in bathroom, with the nursing assistant, sitting on commode. She was helping him stand up to transfer to  and he needed to sit back down. When he did, he leaned back and hit back of head on commode plumbing at back of commode. He was able to stand back up and get into wc with the nursing assistant's help. No injury was noted to head. No c/o pain. Dr. Vidales was notiifed. No new orders.

## 2020-01-13 NOTE — THERAPY TREATMENT NOTE
Inpatient Rehabilitation - Speech Language Pathology Treatment Note    [facility]       Patient Name: Tye JONES Junior  : 1975  MRN: [id number]    Today's Date: 2020           Admit Date: 2020      Visit Dx:        ICD-10-CM ICD-9-CM   1. Impaired mobility Z74.09 799.89       Patient Active Problem List   Diagnosis   • Mixed hyperlipidemia   • Essential hypertension   • Traumatic brain injury (CMS/HCC)   • Acute allergic rhinitis   • Seizure (CMS/HCC)   • Screen for colon cancer   • H/O traumatic brain injury          Therapy Treatment    Evaluation/Coping    Evaluation/Treatment Time and Intent  Subjective Information: no complaints (20 1130 : Hailey Cote MA,CCC-SLP)  Document Type: therapy note (daily note) (20 1130 : Hailey Cote MA,CCC-SLP)    Vitals/Pain/Safety    Pain Scale: Numbers Pre/Post-Treatment  Pain Scale: Numbers, Pretreatment: 0/10 - no pain (20 1130 : Hailey Cote MA,CCC-SLP)  Pain Scale: Numbers, Post-Treatment: 0/10 - no pain (20 1130 : Hailey Cote MA,CCC-SLP)    Cognition/Communication         Oral Motor/Eating         Mobility/Basic Activities/Instrumental Activities/Motor/Modality                   ROM/MMT                   Sensory/Myotome/Dermatome/Edema               Posture/Balance/Special Tests/Exercise/Transportation/Sexual Function                   Orthotics/Residual Limb/Prosthetic Management              Outcome Summary         EDUCATION    The patient has been educated in the following areas:     Cognitive Impairment.    SLP Recommendation and Plan                                                          SLP GOALS     Row Name 20 1130 20 1400 20 1100       Oral Nutrition/Hydration Goal 1 (SLP)    Barriers (Oral Nutrition/Hydration Goal 1, SLP)  --  --  Pt alert at 1130 for lunch w/ his mother feeding  and providing verbal cues for second swallow as needed.   -JT    Progress/Outcomes (Oral Nutrition/Hydration Goal  1, SLP)  --  --  goal ongoing  -JT       Comprehend Questions Goal 1 (SLP)    Time Frame (Comprehend Questions Goal 1, SLP)  short term goal (STG)  -NR  --  --    Progress (Ability to Comprehend Questions Goal 1, SLP)  50%;90%;independently (over 90% accuracy);with maximum cues (25-49%)  -NR  80%;90%;independently (over 90% accuracy) general yes no questions;   -JT  --    Progress/Outcomes (Comprehend Questions Goal 1, SLP)  goal ongoing  -NR  goal ongoing  -JT  --       Word Retrieval Skills Goal 1 (SLP)    Progress (Word Retrieval Skills Goal 1, SLP)  --  with maximum cues (25-49%);70% naming by category; max cues for alertness  -JT  --    Progress/Outcomes (Word Retrieval Goal 1, SLP)  --  goal ongoing  -JT  --       Awareness of Basic Personal Information Goal 1 (SLP)    Progress (Awareness of Basic Personal Information Goal 1, SLP)  --  independently (over 90% accuracy);80% personal info   -JT  --    Progress/Outcomes (Awareness of Basic Personal Information Goal 1, SLP)  --  goal ongoing  -JT  --       Attention Goal 1 (SLP)    Progress (Attention Goal 1, SLP)  --  10%;20%;with maximum cues (25-49%) difficulty staying alert  -JT  --    Progress/Outcomes (Attention Goal 1, SLP)  --  goal ongoing  -JT  --       Orientation Goal 1 ([facility])    Improve Orientation Through Goal 1 (SLP)  demonstrating orientation to day;demonstrating orientation to month;demonstrating orientation to year;demonstrating orientation to place  -NR  --  --    Time Frame (Orientation Goal 1, SLP)  short term goal (STG)  -NR  --  --    Progress (Orientation Goal 1, SLP)  100%;with maximum cues (25-49%) oriented to month only independently  -NR  --  --    Progress/Outcomes (Orientation Goal 1, SLP)  goal ongoing  -NR  --  --       Memory Skills Goal 1 (SLP)    Improve Memory Skills Through Goal 1 (SLP)  select a word from a list by exclusion  -NR  --  --    Time Frame (Memory Skills Goal 1, SLP)  short term goal (STG)  -NR  --  --    Progress  (Memory Skills Goal 1, SLP)  0%;100%;with moderate cues (50-74%);with maximum cues (25-49%)  -NR  80%;with moderate cues (50-74%);with maximum cues (25-49%) category inclusion w/4 words; cues for alertness; extra time  -JT  --    Progress/Outcomes (Memory Skills Goal 1, SLP)  --  goal ongoing  -JT  --      User Key  (r) = Recorded By, (t) = Taken By, (c) = Cosigned By    Initials Name Provider Type    NR Hailey Cote MA,CCC-SLP Speech and Language Pathologist    Kristen Harvey Speech and Language Pathologist             SLP Outcome Measures (last 72 hours)      SLP Outcome Measures     Row Name 01/13/20 1130             Adult FCM Scores    Memory FCM Score  3  -NR        User Key  (r) = Recorded By, (t) = Taken By, (c) = Cosigned By    Initials Name Effective Dates    NR Hailey Cote MA,CCC-SLP 06/08/18 -               Time Calculation:       Time Calculation- SLP     Row Name 01/13/20 1202 01/13/20 1201          Time Calculation- SLP    SLP Start Time  1100  -NR  0930  -NR     SLP Stop Time  1130  -NR  1000  -NR     SLP Time Calculation (min)  30 min  -NR  30 min  -NR     SLP Received On  01/13/20  -NR  01/13/20  -NR       User Key  (r) = Recorded By, (t) = Taken By, (c) = Cosigned By    Initials Name Provider Type    Hailey Mckeon MA,CCC-SLP Speech and Language Pathologist            Therapy Charges for Today     Code Description Service Date Service Provider Modifiers Qty    86340755477 HC ST DEV OF COGN SKILLS INITIAL 15 MIN 1/13/2020 Hailey Cote MA,CCC-SLP  1    68436881148 HC ST DEV OF COGN SKILLS EACH ADDT'L 15 MIN 1/13/2020 Hailey Cote MA,CCC-SLP  3               ADULT NOMS (last 72 hours)      Adult NOMS     Row Name 01/13/20 1130                   Adult FCM Scores    Memory FCM Score  3  -NR          User Key  (r) = Recorded By, (t) = Taken By, (c) = Cosigned By    Initials Name Effective Dates    Hailey Mckeon MA,CCC-SLP 06/08/18 -                      Hailey Cote  MA,CCC-SLP  1/13/2020

## 2020-01-13 NOTE — PROGRESS NOTES
Inpatient Rehabilitation Functional Measures Assessment and Plan of Care    Plan of Care  Updated Problems/Interventions  Field    Functional Measures  JENNIFER Eating:  [facility] Grooming: [facility] Bathing:  [facility] Upper Body Dressing:  [facility] Lower Body Dressing:  [facility] Toileting:  [facility] Bladder Management  Level of Assistance:  [place]  Frequency/Number of Accidents this Shift:  [facility] Bowel Management  Level of Assistance: [place]  Frequency/Number of Accidents this Shift: [facility] Bed/Chair/Wheelchair Transfer:  [facility] Toilet Transfer:  [facility] Tub/Shower Transfer:  [place]    Previously Documented Mode of Locomotion at Discharge: Field  JENNIFER Expected Mode of Locomotion at Discharge: [facility] Walk/Wheelchair:  [facility] Stairs:  [facility] Comprehension:  [facility] Expression:  [facility] Social Interaction:  [facility] Problem Solving:  [facility] Memory:  [place]    Therapy Mode Minutes  Occupational Therapy: Individual: 30 minutes.  Physical Therapy: [place]  Speech Language Pathology:  [place]    Signed by: AVA Dodd/ZOË

## 2020-01-13 NOTE — PROGRESS NOTES
Inpatient Rehabilitation Plan of Care Note    Plan of Care  Care Plan Reviewed - Updates as Follows    Body Systems    [RN] Integumentary(Active)  Current Status(01/13/2020): Small rash dry skin breakdown to groin/inner thigh.  Buttocks intact with a small spot of peeling area. Pink areas noted around  penis.No open area noted. Head incision healing.  Weekly Goal(01/15/2020): No further skin breakdown  Discharge Goal: Intact Skin.    Performed Intervention(s)  Skin care q shift and PRN  Assist to turn patient q 2-3hrs.      Psychosocial    [RN] Coping/Adjustment(Active)  Current Status(01/13/2020): Patient with difficult verbalizing and slow process.  Mother and sister present and very supportive.  Weekly Goal(01/15/2020): Patient will demonstrate appropriate coping mechanisms  Discharge Goal: Patient will demonstrate health coping strategies    Performed Intervention(s)  Offer support/Encourage patient to verbalize any concerns      Safety    [RN] Potential for Injury(Active)  Current Status(01/13/2020): Patient with generalized weakness. Very weak all  over, at high risk for fall. Assist of 2 with transfers.  Weekly Goal(01/15/2020): No injuries  Discharge Goal: Pt/family aware of fall/safety in the home setting.    Performed Intervention(s)  Safety rounds/bed alarm/ chair alarm on  Falls precautio/call light within easy reach      Sphincter Control    [RN] Bladder Management(Active)  Current Status(01/13/2020): Incontient episodes, patient has urgency when  needing to void. Incont. 100% at night.  Weekly Goal(01/15/2020): Continent 50%  Discharge Goal: Continent 100%    [RN] Bowel Management(Active)  Current Status(01/13/2020): Incontinent of bowel 100%  Weekly Goal(01/15/2020): Continent 50%  Discharge Goal: Continent 100%    Performed Intervention(s)  Monitor I&O  check for incontinence, offer toileting q2hrs  miralax daily-hold if needed.    Signed by: Joanne Weiner RN

## 2020-01-13 NOTE — PROGRESS NOTES
Occupational Therapy: Individual: 30 minutes.    Physical Therapy: Branch    Speech Language Pathology:  Branch    Signed by: Laurita Wheeler OT

## 2020-01-13 NOTE — TELEPHONE ENCOUNTER
Contacted the sister to get info for her paperwork. Unable to speak with her. If she calls back please come find me and do NOT send me another message as it is difficult to get ahold of the caregiver.

## 2020-01-13 NOTE — THERAPY TREATMENT NOTE
Inpatient Rehabilitation - Occupational Therapy Treatment Note    [facility]     Patient Name: Tye JONES Junior  : 1975  MRN: [id number]    Today's Date: 2020                 Admit Date: 2020      Visit Dx:    ICD-10-CM ICD-9-CM   1. Impaired mobility Z74.09 799.89       Patient Active Problem List   Diagnosis   • Mixed hyperlipidemia   • Essential hypertension   • Traumatic brain injury (CMS/HCC)   • Acute allergic rhinitis   • Seizure (CMS/HCC)   • Screen for colon cancer   • H/O traumatic brain injury         Therapy Treatment    IRF Treatment Summary     Row Name 20 1400 20 1051 20 1000       Evaluation/Treatment Time and Intent    Subjective Information  no complaints  -RD  no complaints  -CC  no complaints  (Pended)   -NM    Existing Precautions/Restrictions  fall  -RD  fall  -CC  fall  (Pended)  blind   -NM    Document Type  therapy note (daily note)  -RD  therapy note (daily note)  -CC  therapy note (daily note)  (Pended)   -NM    Mode of Treatment  occupational therapy  -RD  occupational therapy  -CC  physical therapy  (Pended)   -NM    Patient/Family Observations  pt seated in w/c w/ no signs of acute distress  -RD  in recliner w/c  -CC  pt up in w/c in room; mother present; no acute ditress   (Pended)   -NM    Recorded by [RD] Laurita Wayne, OT [CC] Neris Morales, OTR [NM] Anmol Joseph, PT Student    Row Name 20 1400 20 1051 20 1000       Cognition/Psychosocial- PT/OT    Affect/Mental Status (Cognitive)  --  WFL  -CC  confused  (Pended)   -NM    Orientation Status (Cognition)  oriented to;person;situation  -RD  --  oriented to;person;situation  (Pended)   -NM    Follows Commands (Cognition)  follows one step commands;75-90% accuracy;verbal cues/prompting required;repetition of directions required;physical/tactile prompts required;increased processing time needed  -RD  follows one step commands;75-90% accuracy;increased processing time  needed;initiation impaired;physical/tactile prompts required;repetition of directions required  -CC  follows one step commands;delayed response/completion  (Pended)   -NM    Personal Safety Interventions  fall prevention program maintained;gait belt;muscle strengthening facilitated;nonskid shoes/slippers when out of bed  -RD  fall prevention program maintained;gait belt;nonskid shoes/slippers when out of bed  -CC  fall prevention program maintained;gait belt  (Pended)   -NM    Recorded by [RD] Laurita Wayne OT [CC] Neris Morales OTR [NM] Anmol Joseph, PT Student    Row Name 01/13/20 1051             Bed Mobility Assessment/Treatment    Comment (Bed Mobility)  in w/c  -CC      Recorded by [CC] Neris Morales OTR      Row Name 01/13/20 1000             Transfer Assessment/Treatment    Transfer Assessment/Treatment  sit-stand transfer;stand-sit transfer;toilet transfer;squat pivot transfer  (Pended)   -NM      Recorded by [NM] Anmol Joseph, PT Student      Row Name 01/13/20 1051 01/13/20 1000          Sit-Stand Transfer    Sit-Stand [place] (Transfers)  verbal cues;nonverbal cues (demo/gesture);minimum assist (75% patient effort);moderate assist (50% patient effort) At sink level  -  minimum assist (75% patient effort);2 person assist;nonverbal cues (demo/gesture);verbal cues  (Pended)   -NM     Assistive Device (Sit-Stand Transfers)  wheelchair  -CC  wheelchair;walker, front-wheeled  (Pended)   -NM     Recorded by [CC] Neris Morales OTR [NM] Anmol Joseph, PT Student     Row Name 01/13/20 1051 01/13/20 1000          Stand-Sit Transfer    Stand-Sit [place] (Transfers)  minimum assist (75% patient effort);moderate assist (50% patient effort)  -CC  minimum assist (75% patient effort);2 person assist;verbal cues;nonverbal cues (demo/gesture)  (Pended)   -NM     Assistive Device (Stand-Sit Transfers)  wheelchair  -CC  wheelchair;walker, front-wheeled  (Pended)   -NM     Recorded by  [CC] Neris Morales, AVA [NM] Anmol Joseph, PT Student     Row Name 01/13/20 1051 01/13/20 1000          Toilet Transfer    Type (Toilet Transfer)  sit-stand;stand-sit  -CC  stand-sit  (Pended)   -NM     [place] Level (Toilet Transfer)  minimum assist (75% patient effort);moderate assist (50% patient effort)  -CC  moderate assist (50% patient effort);2 person assist;verbal cues;nonverbal cues (demo/gesture)  (Pended)   -NM     Assistive Device (Toilet Transfer)  commode, 3-in-1;grab bars/safety frame;wheelchair  -CC  wheelchair;grab bars/safety frame  (Pended)   -NM     Recorded by [CC] Neris Morales OTR [NM] Anmol Joseph, PT Student     Row Name 01/13/20 1000             Gait/Stairs Assessment/Training    [place] Level (Gait)  minimum assist (75% patient effort);2 person assist;verbal cues;nonverbal cues (demo/gesture)  (Pended)   -NM      Assistive Device (Gait)  walker, front-wheeled  (Pended)   -NM      Distance in Feet (Gait)  70'  (Pended)   -NM      Pattern (Gait)  step-through  (Pended)   -NM      Deviations/Abnormal Patterns (Gait)  base of support, narrow;stride length decreased;bilateral deviations  (Pended)   -NM      Bilateral Gait Deviations  heel strike decreased;forward flexed posture  (Pended)   -NM      Left Sided Gait Deviations  --  (Pended)  decreased step length on LLE  -NM      Comment (Gait/Stairs)  decreased knee ext nicolas during stance phase; vc for increased width; manual assist on rwx for direction   (Pended)   -NM      Recorded by [NM] Anmol Joseph, PT Student      Row Name 01/13/20 1000             Safety Issues, Functional Mobility    Safety Issues Affecting Function (Mobility)  safety precaution awareness;problem solving;sequencing abilities  (Pended)   -NM      Impairments Affecting Function (Mobility)  visual/perceptual;motor planning;strength  (Pended)   -NM      Recorded by [NM] Anmol Joseph, PT Student      Row Name 01/13/20 1051             Bathing  Assessment/Treatment    Bathing [place] Level  bathing skills;lower body;upper body;verbal cues;minimum assist (75% patient effort);moderate assist (50% patient effort)  -CC      Bathing Position  sink side  -CC      Bathing Setup Assistance  adjust water temperature;obtain supplies  -CC      Recorded by [CC] Neris Morales OTR      Row Name 01/13/20 1051             Upper Body Dressing Assessment/Treatment    Upper Body Dressing Task  upper body dressing skills;doff;don;pull over garment;verbal cues;nonverbal cues (demo/gesture);minimum assist (75% or more patient effort)  -CC      Upper Body Dressing Position  supported sitting  -CC      Set-up Assistance (Upper Body Dressing)  obtain clothing  -CC      Recorded by [CC] Neris Morales OTR      Row Name 01/13/20 1051             Lower Body Dressing Assessment/Treatment    Lower Body Dressing [place] Level  doff;don;pants/bottoms;shoes/slippers;socks;underwear;verbal cues;nonverbal cues (demo/gesture);moderate assist (50% patient effort);maximum assist (25% patient effort)  -CC      Lower Body Dressing Position  supported sitting;supported standing  -CC      Lower Body Dressing Setup Assistance  obtain clothing  -CC      Comment (Lower Body Dressing)  second person for safety  -CC      Recorded by [CC] Neris Morales OTR      Row Name 01/13/20 1051             Grooming Assessment/Treatment    Grooming [place] Level  grooming skills;deodorant application;oral care regimen;wash face, hands;verbal cues;minimum assist (75% patient effort)  -CC      Grooming Position  supported sitting;sink side  -CC      Recorded by [CC] Neris Morales OTR      Row Name 01/13/20 1051             Toileting Assessment/Treatment    Toileting [place] Level  toileting skills;adjust/manage clothing;perform perineal hygiene;verbal cues;moderate assist (50% patient effort);maximum assist (25% patient effort)  -CC      Assistive Device Use (Toileting)  eleuterio  bar/safety frame  -CC      Toileting Position  supported sitting;supported standing  -CC      Recorded by [CC] Neris Morales OTR      Row Name 01/13/20 1400 01/13/20 1051 01/13/20 1000       Pain Scale: Numbers Pre/Post-Treatment    Pain Scale: Numbers, Pretreatment  0/10 - no pain  -RD  0/10 - no pain  -CC  0/10 - no pain  (Pended)   -NM    Pain Scale: Numbers, Post-Treatment  0/10 - no pain  -RD  0/10 - no pain  -CC  0/10 - no pain  (Pended)   -NM    Recorded by [RD] Laurita Wayne, OT [CC] Neris Morales OTR [NM] Anmol Joseph, PT Student    Row Name 01/13/20 1400             Upper Extremity Seated Therapeutic Exercise    Performed, Seated Upper Extremity (Therapeutic Exercise)  scapular protraction/retraction;elbow flexion/extension;digit flexion/extension  -RD      Device, Seated Upper Extremity (Therapeutic Exercise)  restorator/arm bike 2# dowel venkat; hand gripper  -RD      Exercise Type, Seated Upper Extremity (Therapeutic Exercise)  AROM (active range of motion);resistive exercise  -RD      Expected Outcomes, Seated Upper Extremity (Therapeutic Exercise)  improve functional tolerance, self-care activity;improve performance, BADLs;improve performance, transfer skills  -RD      Sets/Reps Detail, Seated Upper Extremity (Therapeutic Exercise)  3 min, rest break, 2 min w/ UBE; 10 reps x 2 sets w/ 2# dowel venkat and hand gripper  -RD      Comment, Seated Upper Extremity (Therapeutic Exercise)  improved sitting balance noted w/ leaning forward in w/c during UBE exercise this date  -RD      Recorded by [RD] Laurita Wayne OT      Row Name 01/13/20 1400 01/13/20 1051 01/13/20 1000       Positioning and Restraints    Pre-Treatment Position  sitting in chair/recliner  -RD  sitting in chair/recliner  -CC  sitting in chair/recliner  (Pended)   -NM    Post Treatment Position  wheelchair  -RD  wheelchair  -CC  wheelchair  (Pended)   -NM    In Wheelchair  sitting;exit alarm on;with nsg;with  family/caregiver nursing assistant helping w/ urinal use  -RD  notified nsg;sitting;call light within reach;encouraged to call for assist;exit alarm on;with family/caregiver  -CC  call light within reach;encouraged to call for assist;with family/caregiver  (Pended)   -NM    Recorded by [RD] Laurita Wayne, OT [CC] Neris Morales, OTR [NM] Anmol Joseph, PT Student      User Key  (r) = Recorded By, (t) = Taken By, (c) = Cosigned By    Initials Name Effective Dates    CC Neris Morales, OTR 06/08/18 -     RD Laurita Wayne, OT 10/14/19 -     NM Anmol Joseph, PT Student 01/03/20 -           Wound 01/06/20 2200 head Incision (Active)   Dressing Appearance open to air 1/12/2020  9:05 PM   Closure Approximated 1/12/2020  9:05 PM   Base clean;dry 1/12/2020  9:05 PM   Periwound dry;intact 1/12/2020  9:05 PM   Drainage Amount none 1/13/2020  8:42 AM   Dressing Care, Wound open to air 1/13/2020  8:42 AM         OT Recommendation and Plan                 OT IRF GOALS     Row Name 01/07/20 1527             Transfer Goal 1 (OT-IRF)    Activity/Assistive Device (Transfer Goal 1, OT-IRF)  toilet;commode, bedside without drop arms  -SG      [place] Level (Transfer Goal 1, OT-IRF)  maximum assist (25-49% patient effort)  -SG      Time Frame (Transfer Goal 1, OT-IRF)  short term goal (STG);1 week  -SG      Progress/Outcomes (Transfer Goal 1, OT-IRF)  goal ongoing  -SG         Transfer Goal 2 (OT-IRF)    Activity/Assistive Device (Transfer Goal 2, OT-IRF)  toilet  -SG      [place] Level (Transfer Goal 2, OT-IRF)  minimum assist (75% or more patient effort);moderate assist (50-74% patient effort)  -SG      Time Frame (Transfer Goal 2, OT-IRF)  long term goal (LTG);by discharge  -SG      Progress/Outcomes (Transfer Goal 2, OT-IRF)  goal ongoing  -SG         Transfer Goal 3 (OT-IRF)    Activity/Assistive Device (Transfer Goal 3, OT-IRF)  shower chair  -SG      [place] Level (Transfer Goal 3, OT-IRF)   maximum assist (25-49% patient effort)  -SG      Time Frame (Transfer Goal 3, OT-IRF)  short term goal (STG);1 week  -SG      Progress/Outcomes (Transfer Goal 3, OT-IRF)  goal ongoing  -SG         Transfer Goal 4 (OT-IRF)    Activity/Assistive Device (Transfer Goal 4, OT-IRF)  shower chair  -SG      [place] Level (Transfer Goal 4, OT-IRF)  minimum assist (75% or more patient effort);moderate assist (50-74% patient effort)  -SG      Time Frame (Transfer Goal 4, OT-IRF)  long term goal (LTG);by discharge  -SG      Progress/Outcomes (Transfer Goal 4, OT-IRF)  goal ongoing  -SG         Bathing Goal 1 (OT-IRF)    Activity/Device (Bathing Goal 1, OT-IRF)  bathing skills, all  -SG      [place] Level (Bathing Goal 1, OT-IRF)  moderate assist (50-74% patient effort)  -SG      Time Frame (Bathing Goal 1, OT-IRF)  short term goal (STG);1 week  -SG      Progress/Outcomes (Bathing Goal 1, OT-IRF)  goal ongoing  -SG         Bathing Goal 2 (OT-IRF)    Activity/Device (Bathing Goal 2, OT-IRF)  bathing skills, all  -SG      [place] Level (Bathing Goal 2, OT-IRF)  minimum assist (75% or more patient effort)  -SG      Time Frame (Bathing Goal 2, OT-IRF)  long term goal (LTG);by discharge  -SG      Progress/Outcomes (Bathing Goal 2, OT-IRF)  goal ongoing  -SG         UB Dressing Goal 1 (OT-IRF)    Activity/Device (UB Dressing Goal 1, OT-IRF)  upper body dressing  -SG      [place] (UB Dress Goal 1, OT-IRF)  moderate assist (50-74% patient effort)  -SG      Time Frame (UB Dressing Goal 1, OT-IRF)  short term goal (STG);1 week  -SG      Progress/Outcomes (UB Dressing Goal 1, OT-IRF)  goal ongoing  -SG         UB Dressing Goal 2 (OT-IRF)    Activity/Device (UB Dressing Goal 2, OT-IRF)  upper body dressing  -SG      [place] (UB Dress Goal 2, OT-IRF)  minimum assist (75% or more patient effort)  -SG      Time Frame (UB Dressing Goal 2, OT-IRF)  long term goal (LTG);by discharge  -SG      Progress/Outcomes (UB  Dressing Goal 2, OT-IRF)  goal ongoing  -SG         LB Dressing Goal 1 (OT-IRF)    Activity/Device (LB Dressing Goal 1, OT-IRF)  lower body dressing  -SG      [place] (LB Dressing Goal 1, OT-IRF)  maximum assist (25-49% patient effort)  -SG      Time Frame (LB Dressing Goal 1, OT-IRF)  short term goal (STG);1 week  -SG      Progress/Outcomes (LB Dressing Goal 1, OT-IRF)  goal ongoing  -SG         LB Dressing Goal 2 (OT-IRF)    Activity/Device (LB Dressing Goal 2, OT-IRF)  lower body dressing  -SG      [place] (LB Dressing Goal 2, OT-IRF)  moderate assist (50-74% patient effort)  -SG      Time Frame (LB Dressing Goal 2, OT-IRF)  long term goal (LTG);by discharge  -SG      Progress/Outcomes (LB Dressing Goal 2, OT-IRF)  goal ongoing  -SG         Toileting Goal 1 (OT-IRF)    Activity/Device (Toileting Goal 1, OT-IRF)  toileting skills, all  -SG      [place] Level (Toileting Goal 1, OT-IRF)  moderate assist (50-74% patient effort)  -SG      Time Frame (Toileting Goal 1, OT-IRF)  long term goal (LTG);by discharge  -SG         Strength Goal 1 (OT-IRF)    Strength Goal 1 (OT-IRF)  Pt to tolerate UE strengthing to improve overall ROM and functional use of UE.  -SG      Time Frame (Strength Goal 1, OT-IRF)  long term goal (LTG);by discharge  -SG      Progress/Outcomes (Strength Goal 1, OT-IRF)  goal ongoing  -SG         Balance Goal 1 (OT)    Activity/Assistive Device (Balance Goal 1, OT)  sitting, static  -SG      [place] Level/Cues Needed (Balance Goal 1, OT)  contact guard assist  -SG      Time Frame (Balance Goal 1, OT)  long term goal (LTG);by discharge  -SG      Progress/Outcomes (Balance Goal 1, OT)  goal ongoing  -SG         Caregiver Training Goal 1 (OT-IRF)    Caregiver Training Goal 1 (OT-IRF)  Pt and family to be indep with ADLs, functional transfers, AD/AE, and HEP for safe d/c home.  -SG      Time Frame (Caregiver Training Goal 1, OT-IRF)  long term goal (LTG);by discharge  -SG       Progress/Outcomes (Caregiver Training Goal 1, OT-IRF)  goal ongoing  -        User Key  (r) = Recorded By, (t) = Taken By, (c) = Cosigned By    Initials Name Provider Type    SG Sudha Maret OTR Occupational Therapist          Occupational Therapy Education                 Title: PT OT SLP Therapies (In Progress)     Topic: Occupational Therapy (In Progress)     Point: ADL training (Done)     Description:   Instruct learner(s) on proper safety adaptation and remediation techniques during self care or transfers.   Instruct in proper use of assistive devices.              Learning Progress Summary           Family Acceptance, E,TB, VU by  at 1/7/2020 2914    Comment:  OT role and goals, POC.                               User Key     Initials Effective Dates Name Provider Type Discipline     12/26/18 -  Sudha Marte OTR Occupational Therapist OT                       Time Calculation:     Time Calculation- OT     Row Name 01/13/20 1522 01/13/20 1000          Time Calculation- OT    OT Start Time  1400  -RD  1000  -CC     OT Stop Time  1430  -RD  1045  -CC     OT Time Calculation (min)  30 min  -RD  45 min  -CC     OT Non-Billable Time (min)  --  30 min tech  -CC     OT Received On  01/13/20  -RD  --       User Key  (r) = Recorded By, (t) = Taken By, (c) = Cosigned By    Initials Name Provider Type    CC Neris Morales OTR Occupational Therapist    Laurita Lawrence OT Occupational Therapist          Therapy Charges for Today     Code Description Service Date Service Provider Modifiers Qty    [id number] HC OT THER PROC EA 15 MIN 1/13/2020 Laurita Wayne OT GO 2                   Laurita Wayne OT  1/13/2020

## 2020-01-13 NOTE — PROGRESS NOTES
Occupational Therapy: Branch    Physical Therapy: Individual: 60 minutes.    Speech Language Pathology:  Branch    Signed by: Anmol Joseph PT Student     - CoSigned By: Pita Roth PT 1/13/2020 3:54:24 PM

## 2020-01-13 NOTE — THERAPY TREATMENT NOTE
Inpatient Rehabilitation - Physical Therapy Treatment Note  [facility]     Patient Name: Tye JONES Junior  : 1975  MRN: [id number]    Today's Date: 2020                 Admit Date: 2020      Visit Dx:      ICD-10-CM ICD-9-CM   1. Impaired mobility Z74.09 799.89       Patient Active Problem List   Diagnosis   • Mixed hyperlipidemia   • Essential hypertension   • Traumatic brain injury (CMS/HCC)   • Acute allergic rhinitis   • Seizure (CMS/HCC)   • Screen for colon cancer   • H/O traumatic brain injury       Therapy Treatment    IRF Treatment Summary     Row Name 20 1400 20 1051 20 1000       Evaluation/Treatment Time and Intent    Subjective Information  no complaints  -RD  no complaints  -CC  no complaints  -LH,NM,LH2    Existing Precautions/Restrictions  fall  -RD  fall  -CC  fall blind   -LH,NM,LH2    Document Type  therapy note (daily note)  -RD  therapy note (daily note)  -CC  therapy note (daily note)  -LH,NM,LH2    Mode of Treatment  occupational therapy  -RD  occupational therapy  -CC  physical therapy  -LH,NM,LH2    Patient/Family Observations  pt seated in w/c w/ no signs of acute distress  -RD  in recliner w/c  -CC  pt up in w/c in room; mother present; no acute ditress   -LH,NM,LH2    Recorded by [RD] Laurita Wayne, OT [CC] Neris Morales OTR [LH,NM,LH2] Pita Roth, PT (r) Anmol Joseph, MAILE Student (t) Pita Roth, PT (c)    Row Name 20 1400 20 1051 20 1000       Cognition/Psychosocial- PT/OT    Affect/Mental Status (Cognitive)  --  WFL  -CC  confused  -LH,NM,LH2    Orientation Status (Cognition)  oriented to;person;situation  -RD  --  oriented to;person;situation  -LH,NM,LH2    Follows Commands (Cognition)  follows one step commands;75-90% accuracy;verbal cues/prompting required;repetition of directions required;physical/tactile prompts required;increased processing time needed  -RD  follows one step commands;75-90%  accuracy;increased processing time needed;initiation impaired;physical/tactile prompts required;repetition of directions required  -CC  follows one step commands;delayed response/completion  -LH,NM,LH2    Personal Safety Interventions  fall prevention program maintained;gait belt;muscle strengthening facilitated;nonskid shoes/slippers when out of bed  -RD  fall prevention program maintained;gait belt;nonskid shoes/slippers when out of bed  -CC  fall prevention program maintained;gait belt  -LH,NM,LH2    Recorded by [RD] Laurita Wayne OT [CC] Neris Morales OTR [LH,NM,LH2] Pita Roth, PT (r) Anmol Joseph, PT Student (t) Pita Roth, PT (c)    Row Name 01/13/20 1051             Bed Mobility Assessment/Treatment    Comment (Bed Mobility)  in w/c  -CC      Recorded by [CC] Neris Morales OTR      Row Name 01/13/20 1000             Transfer Assessment/Treatment    Transfer Assessment/Treatment  sit-stand transfer;stand-sit transfer;toilet transfer;squat pivot transfer  -LH,NM,LH2      Comment (Transfers)  completed PM sit<>stands c Manuel and vc, demo improved control   -LH,NM,LH2      Recorded by [LH,NM,LH2] Pita Roth, PT (r) Anmol Joseph, PT Student (t) Pita Roth, PT (c)      Row Name 01/13/20 1051 01/13/20 1000          Sit-Stand Transfer    Sit-Stand [place] (Transfers)  verbal cues;nonverbal cues (demo/gesture);minimum assist (75% patient effort);moderate assist (50% patient effort) At sink level  -  minimum assist (75% patient effort);2 person assist;nonverbal cues (demo/gesture);verbal cues  -LH,NM,LH2     Assistive Device (Sit-Stand Transfers)  wheelchair  -CC  wheelchair;walker, front-wheeled  -LH,NM,LH2     Recorded by [CC] Neris Morales, MALIHAR [LH,NM,LH2] Pita Roth, PT (r) Anmol Joseph, PT Student (t) Pita Roth, PT (c)     Row Name 01/13/20 1051 01/13/20 1000          Stand-Sit Transfer    Stand-Sit [place] (Transfers)  minimum assist (75% patient  effort);moderate assist (50% patient effort)  -CC  minimum assist (75% patient effort);2 person assist;verbal cues;nonverbal cues (demo/gesture)  -LH,NM,LH2     Assistive Device (Stand-Sit Transfers)  wheelchair  -CC  wheelchair;walker, front-wheeled  -LH,NM,LH2     Recorded by [CC] Neris Morales OTR [LH,NM,LH2] Pita Roth, PT (r) Anmol Joseph, PT Student (t) Pita Roth, PT (c)     Row Name 01/13/20 1051 01/13/20 1000          Toilet Transfer    Type (Toilet Transfer)  sit-stand;stand-sit  -CC  stand-sit  -LH,NM,LH2     [place] Level (Toilet Transfer)  minimum assist (75% patient effort);moderate assist (50% patient effort)  -CC  moderate assist (50% patient effort);2 person assist;verbal cues;nonverbal cues (demo/gesture)  -LH,NM,LH2     Assistive Device (Toilet Transfer)  commode, 3-in-1;grab bars/safety frame;wheelchair  -CC  wheelchair;grab bars/safety frame  -LH,NM,LH2     Recorded by [CC] Neris Morales, AVA [LH,NM,LH2] Pita Roth, PT (r) Anmol Joseph, PT Student (t) Pita Roth, PT (c)     Row Name 01/13/20 1000             Gait/Stairs Assessment/Training    [place] Level (Gait)  minimum assist (75% patient effort);2 person assist;verbal cues;nonverbal cues (demo/gesture) min-modA and w/c follow in the PM session   -LH,NM,LH2      Assistive Device (Gait)  walker, front-wheeled  -LH,NM,LH2      Distance in Feet (Gait)  70' in AM; 80' x 3 in the PM   -LH,NM,LH2      Pattern (Gait)  step-through  -LH,NM,LH2      Deviations/Abnormal Patterns (Gait)  base of support, narrow;stride length decreased;bilateral deviations  -LH,NM,LH2      Bilateral Gait Deviations  heel strike decreased;forward flexed posture  -LH,NM,LH2      Left Sided Gait Deviations  -- decreased step length on LLE  -LH,NM,LH2      Comment (Gait/Stairs)  decreased knee ext nicolas during stance phase; vc for increased width; manual assist on rwx for direction; amb in a straight line due to visual deficits from gym in  to [facility]   -LH,NM,LH2      Recorded by [LH,NM,LH2] Pita Roth, PT (r) Anmol Joseph, PT Student (t) Pita Roth, PT (c)      Row Name 01/13/20 1000             Safety Issues, Functional Mobility    Safety Issues Affecting Function (Mobility)  safety precaution awareness;problem solving;sequencing abilities  -LH,NM,LH2      Impairments Affecting Function (Mobility)  visual/perceptual;motor planning;strength  -LH,NM,LH2      Recorded by [LH,NM,LH2] Pita Roth, PT (r) Anmol Joseph, MAILE Student (t) Pita Roth, PT (c)      Row Name 01/13/20 1051             Bathing Assessment/Treatment    Bathing [place] Level  bathing skills;lower body;upper body;verbal cues;minimum assist (75% patient effort);moderate assist (50% patient effort)  -CC      Bathing Position  sink side  -CC      Bathing Setup Assistance  adjust water temperature;obtain supplies  -CC      Recorded by [CC] Neris Morales OTR      Row Name 01/13/20 1051             Upper Body Dressing Assessment/Treatment    Upper Body Dressing Task  upper body dressing skills;doff;don;pull over garment;verbal cues;nonverbal cues (demo/gesture);minimum assist (75% or more patient effort)  -CC      Upper Body Dressing Position  supported sitting  -CC      Set-up Assistance (Upper Body Dressing)  obtain clothing  -CC      Recorded by [CC] Neris Morales OTR      Row Name 01/13/20 1051             Lower Body Dressing Assessment/Treatment    Lower Body Dressing [place] Level  doff;don;pants/bottoms;shoes/slippers;socks;underwear;verbal cues;nonverbal cues (demo/gesture);moderate assist (50% patient effort);maximum assist (25% patient effort)  -CC      Lower Body Dressing Position  supported sitting;supported standing  -CC      Lower Body Dressing Setup Assistance  obtain clothing  -CC      Comment (Lower Body Dressing)  second person for safety  -CC      Recorded by [CC] Neris Morales OTR      Row Name 01/13/20 1051             Grooming  Assessment/Treatment    Grooming [place] Level  grooming skills;deodorant application;oral care regimen;wash face, hands;verbal cues;minimum assist (75% patient effort)  -CC      Grooming Position  supported sitting;sink side  -CC      Recorded by [CC] Neris Morales OTR      Row Name 01/13/20 1051             Toileting Assessment/Treatment    Toileting [place] Level  toileting skills;adjust/manage clothing;perform perineal hygiene;verbal cues;moderate assist (50% patient effort);maximum assist (25% patient effort)  -CC      Assistive Device Use (Toileting)  grab bar/safety frame  -CC      Toileting Position  supported sitting;supported standing  -CC      Recorded by [CC] Neris Morales OTR      Row Name 01/13/20 1400 01/13/20 1051 01/13/20 1000       Pain Scale: Numbers Pre/Post-Treatment    Pain Scale: Numbers, Pretreatment  0/10 - no pain  -RD  0/10 - no pain  -CC  0/10 - no pain  -LH,NM,LH2    Pain Scale: Numbers, Post-Treatment  0/10 - no pain  -RD  0/10 - no pain  -CC  0/10 - no pain  -LH,NM,LH2    Recorded by [RD] Laurita Wayne OT [CC] Neris Morales OTR [LH,NM,LH2] Pita Roth, PT (r) Anmol Joseph PT Student (t) Pita Roth, PT (c)    Row Name 01/13/20 1400             Upper Extremity Seated Therapeutic Exercise    Performed, Seated Upper Extremity (Therapeutic Exercise)  scapular protraction/retraction;elbow flexion/extension;digit flexion/extension  -RD      Device, Seated Upper Extremity (Therapeutic Exercise)  restorator/arm bike 2# dowel venkat; hand gripper  -RD      Exercise Type, Seated Upper Extremity (Therapeutic Exercise)  AROM (active range of motion);resistive exercise  -RD      Expected Outcomes, Seated Upper Extremity (Therapeutic Exercise)  improve functional tolerance, self-care activity;improve performance, BADLs;improve performance, transfer skills  -RD      Sets/Reps Detail, Seated Upper Extremity (Therapeutic Exercise)  3 min, rest break, 2 min w/ UBE; 10  reps x 2 sets w/ 2# dowel venkat and hand gripper  -RD      Comment, Seated Upper Extremity (Therapeutic Exercise)  improved sitting balance noted w/ leaning forward in w/c during UBE exercise this date  -RD      Recorded by [RD] Laurita Wayne OT      Row Name 01/13/20 1400 01/13/20 1051 01/13/20 1000       Positioning and Restraints    Pre-Treatment Position  sitting in chair/recliner  -RD  sitting in chair/recliner  -CC  sitting in chair/recliner  -,NM,LH2    Post Treatment Position  wheelchair  -RD  wheelchair  -CC  wheelchair  -,NM,LH2    In Wheelchair  sitting;exit alarm on;with nsg;with family/caregiver nursing assistant helping w/ urinal use  -RD  notified nsg;sitting;call light within reach;encouraged to call for assist;exit alarm on;with family/caregiver  -CC  call light within reach;encouraged to call for assist;with family/caregiver  -,NM,LH2    Recorded by [RD] Laurita Wayne OT [CC] Neris Morales, OTR [LH,NM,LH2] Pita Roth, PT (r) Anmol Joseph, PT Student (t) Pita Roth, PT (c)      User Key  (r) = Recorded By, (t) = Taken By, (c) = Cosigned By    Initials Name Effective Dates    CC Neris Morales, MALIHAR 06/08/18 -      Pita Roth, PT 04/03/18 -     RD Laurita Wayne OT 10/14/19 -     NM Anmol Joseph, PT Student 01/03/20 -         Wound 01/06/20 2200 head Incision (Active)   Dressing Appearance open to air 1/12/2020  9:05 PM   Closure Approximated 1/12/2020  9:05 PM   Base clean;dry 1/12/2020  9:05 PM   Periwound dry;intact 1/12/2020  9:05 PM   Drainage Amount none 1/13/2020  8:42 AM   Dressing Care, Wound open to air 1/13/2020  8:42 AM     Physical Therapy Education                 Title: PT OT SLP Therapies (In Progress)     Topic: Physical Therapy (In Progress)     Point: Mobility training (In Progress)     Description:   Instruct learner(s) on safety and technique for assisting patient out of bed, chair or wheelchair.  Instruct in the proper use of  assistive devices, such as walker, crutches, cane or brace.              Patient Friendly Description:   It's important to get you on your feet again, but we need to do so in a way that is safe for you. Falling has serious consequences, and your personal safety is the most important thing of all.        When it's time to get out of bed, one of us or a family member will sit next to you on the bed to give you support.     If your doctor or nurse tells you to use a walker, crutches, a cane, or a brace, be sure you use it every time you get out of bed, even if you think you don't need it.    Learning Progress Summary           Patient Acceptance, E, VU,NR by NM at 1/13/2020 1545    Comment:  education regarding safety during transfers    Acceptance, E, NR by  at 1/11/2020 0919    Acceptance, E, VU,NR by  at 1/10/2020 1033    Acceptance, E, NR by  at 1/9/2020 1449    Acceptance, E, NR by  at 1/8/2020 0949    Acceptance, E, NR by  at 1/7/2020 1158   Family Acceptance, E, NR by  at 1/7/2020 1158                   Point: Home exercise program (In Progress)     Description:   Instruct learner(s) on appropriate technique for monitoring, assisting and/or progressing patient with therapeutic exercises and activities.              Learning Progress Summary           Patient Acceptance, E, VU,NR by  at 1/10/2020 1033    Acceptance, E, NR by  at 1/7/2020 1158   Family Acceptance, E, NR by  at 1/7/2020 1158                   Point: Body mechanics (In Progress)     Description:   Instruct learner(s) on proper positioning and spine alignment for patient and/or caregiver during mobility tasks and/or exercises.              Learning Progress Summary           Patient Acceptance, E, VU,NR by NM at 1/13/2020 1545    Comment:  education regarding safety during transfers    Acceptance, E, NR by  at 1/11/2020 0919    Acceptance, E, NR by  at 1/7/2020 1158   Family Acceptance, E, NR by  at 1/7/2020 1158                    Point: Precautions (Done)     Description:   Instruct learner(s) on prescribed precautions during mobility and gait tasks              Learning Progress Summary           Patient Acceptance, E, VU,NR by NM at 1/13/2020 1545    Comment:  education regarding safety during transfers    Acceptance, E, NR by  at 1/8/2020 0949                               User Key     Initials Effective Dates Name Provider Type [facility] 04/03/18 -  Pita Roth, PT Physical Therapist PT    JK 04/03/18 -  Binta Hartman, PT Physical Therapist PT    NM 01/03/20 -  Anmol Joseph, PT Student PT Student PT                  PT Recommendation and Plan                        Time Calculation:     PT Charges     Row Name 01/13/20 1543 01/13/20 1117          Time Calculation    Start Time  1330  -LH (r) NM (t) LH (c)  0900  -LH (r) NM (t)  (c)     Stop Time  1400  -LH (r) NM (t)  (c)  0930  -LH (r) NM (t)  (c)     Time Calculation (min)  30 min  -LH (r) NM (t)  30 min  -LH (r) NM (t)     PT Received On  --  01/13/20  -LH (r) NM (t)  (c)     PT - Next Appointment  --  01/14/20  -LH (r) NM (t) LH (c)       User Key  (r) = Recorded By, (t) = Taken By, (c) = Cosigned By    Initials Name Provider Type     Pita Roth, PT Physical Therapist    NM Anmol Joseph, PT Student PT Student          Therapy Charges for Today     Code Description Service Date Service Provider Modifiers Qty    32321150308 HC PT THER PROC EA 15 MIN 1/13/2020 Anmol Joseph, PT Student GP 2    91270663571 HC GAIT TRAINING EA 15 MIN 1/13/2020 Anmol Joseph, PT Student GP 2                   Anmol Joseph, PT Student  1/13/2020

## 2020-01-14 LAB — HEMOCCULT STL QL: NEGATIVE

## 2020-01-14 PROCEDURE — 97110 THERAPEUTIC EXERCISES: CPT

## 2020-01-14 PROCEDURE — 97129 THER IVNTJ 1ST 15 MIN: CPT

## 2020-01-14 PROCEDURE — 82272 OCCULT BLD FECES 1-3 TESTS: CPT | Performed by: PHYSICAL MEDICINE & REHABILITATION

## 2020-01-14 PROCEDURE — 25010000002 HEPARIN (PORCINE) PER 1000 UNITS: Performed by: PHYSICAL MEDICINE & REHABILITATION

## 2020-01-14 PROCEDURE — 97535 SELF CARE MNGMENT TRAINING: CPT

## 2020-01-14 PROCEDURE — 97130 THER IVNTJ EA ADDL 15 MIN: CPT

## 2020-01-14 PROCEDURE — 25010000003 CEFTRIAXONE PER 250 MG: Performed by: PHYSICAL MEDICINE & REHABILITATION

## 2020-01-14 RX ADMIN — PHENYTOIN SODIUM 100 MG: 100 CAPSULE, EXTENDED RELEASE ORAL at 13:30

## 2020-01-14 RX ADMIN — METOPROLOL TARTRATE 50 MG: 50 TABLET, FILM COATED ORAL at 08:45

## 2020-01-14 RX ADMIN — METOPROLOL TARTRATE 50 MG: 50 TABLET, FILM COATED ORAL at 21:10

## 2020-01-14 RX ADMIN — CEFTRIAXONE SODIUM 2 G: 2 INJECTION, SOLUTION INTRAVENOUS at 21:10

## 2020-01-14 RX ADMIN — CLONAZEPAM 1 MG: 0.5 TABLET ORAL at 13:26

## 2020-01-14 RX ADMIN — PANTOPRAZOLE SODIUM 40 MG: 40 TABLET, DELAYED RELEASE ORAL at 06:22

## 2020-01-14 RX ADMIN — LACOSAMIDE 200 MG: 100 TABLET, FILM COATED ORAL at 09:52

## 2020-01-14 RX ADMIN — LEVETIRACETAM 500 MG: 100 SOLUTION ORAL at 08:46

## 2020-01-14 RX ADMIN — LEVETIRACETAM 500 MG: 100 SOLUTION ORAL at 21:10

## 2020-01-14 RX ADMIN — ATORVASTATIN CALCIUM 10 MG: 10 TABLET, FILM COATED ORAL at 08:45

## 2020-01-14 RX ADMIN — HEPARIN SODIUM 5000 UNITS: 5000 INJECTION INTRAVENOUS; SUBCUTANEOUS at 21:10

## 2020-01-14 RX ADMIN — PHENYTOIN SODIUM 100 MG: 100 CAPSULE, EXTENDED RELEASE ORAL at 06:22

## 2020-01-14 RX ADMIN — CLONAZEPAM 1 MG: 0.5 TABLET ORAL at 21:10

## 2020-01-14 RX ADMIN — CEFTRIAXONE SODIUM 2 G: 2 INJECTION, SOLUTION INTRAVENOUS at 08:55

## 2020-01-14 RX ADMIN — TOPIRAMATE 25 MG: 25 TABLET, FILM COATED ORAL at 08:46

## 2020-01-14 RX ADMIN — HYDROXYZINE PAMOATE 25 MG: 25 CAPSULE ORAL at 21:10

## 2020-01-14 RX ADMIN — HEPARIN SODIUM 5000 UNITS: 5000 INJECTION INTRAVENOUS; SUBCUTANEOUS at 06:22

## 2020-01-14 RX ADMIN — POTASSIUM CHLORIDE 20 MEQ: 750 CAPSULE, EXTENDED RELEASE ORAL at 08:45

## 2020-01-14 RX ADMIN — PHENYTOIN SODIUM 100 MG: 100 CAPSULE, EXTENDED RELEASE ORAL at 21:10

## 2020-01-14 RX ADMIN — DEXTROAMPHETAMINE SACCHARATE, AMPHETAMINE ASPARTATE MONOHYDRATE, DEXTROAMPHETAMINE SULFATE, AND AMPHETAMINE SULFATE 30 MG: 2.5; 2.5; 2.5; 2.5 CAPSULE, EXTENDED RELEASE ORAL at 08:52

## 2020-01-14 RX ADMIN — CHOLECALCIFEROL TAB 10 MCG (400 UNIT) 400 UNITS: 10 TAB at 08:46

## 2020-01-14 RX ADMIN — HEPARIN SODIUM 5000 UNITS: 5000 INJECTION INTRAVENOUS; SUBCUTANEOUS at 13:26

## 2020-01-14 RX ADMIN — CLONAZEPAM 1 MG: 0.5 TABLET ORAL at 06:22

## 2020-01-14 RX ADMIN — LACOSAMIDE 200 MG: 100 TABLET, FILM COATED ORAL at 21:10

## 2020-01-14 NOTE — THERAPY TREATMENT NOTE
Inpatient Rehabilitation - Speech Language Pathology Treatment Note    [facility]       Patient Name: Tye JONES Junior  : 1975  MRN: [id number]    Today's Date: 2020           Admit Date: 2020      Visit Dx:        ICD-10-CM ICD-9-CM   1. Impaired mobility Z74.09 799.89       Patient Active Problem List   Diagnosis   • Mixed hyperlipidemia   • Essential hypertension   • Traumatic brain injury (CMS/HCC)   • Acute allergic rhinitis   • Seizure (CMS/HCC)   • Screen for colon cancer   • H/O traumatic brain injury          Therapy Treatment    Evaluation/Coping    Evaluation/Treatment Time and Intent  Subjective Information: no complaints (20 1438 : Jayna Coker, MS CCC-SLP)  Existing Precautions/Restrictions: fall(swallow) (20 1438 : Jayna Coker, MS CCC-SLP)  Document Type: therapy note (daily note) (20 1438 : Jayna Coker, MS CCC-SLP)  Mode of Treatment: individual therapy, speech-language pathology (20 1438 : Jayna Coker, MS CCC-SLP)  Patient/Family Observations: fatiqued; slow processing (20 1438 : Jayna Coker, MS CCC-SLP)    Vitals/Pain/Safety         Cognition/Communication         Oral Motor/Eating         Mobility/Basic Activities/Instrumental Activities/Motor/Modality                   ROM/MMT                   Sensory/Myotome/Dermatome/Edema               Posture/Balance/Special Tests/Exercise/Transportation/Sexual Function                   Orthotics/Residual Limb/Prosthetic Management              Outcome Summary         EDUCATION    The patient has been educated in the following areas:     Cognitive Impairment Communication Impairment.    SLP Recommendation and Plan                                                          SLP GOALS     Row Name 20 1400 20 1100 20 1130       Comprehend Questions Goal 1 (SLP)    Time Frame (Comprehend Questions Goal 1, SLP)  --  --  short term goal (STG)  -NR    Barriers (Comprehend Questions Goal 1, SLP)  --  slow  processing; multiple reps of stimulus  -SL  --    Progress (Ability to Comprehend Questions Goal 1, SLP)  --  60%;with moderate cues (50-74%) simple comparative/general questions  -SL  50%;90%;independently (over 90% accuracy);with maximum cues (25-49%)  -NR    Progress/Outcomes (Comprehend Questions Goal 1, SLP)  --  goal ongoing  -SL  goal ongoing  -NR    Comment (Comprehend Questions Goal 1, SLP)  --  multiple repetitions of stimulus questions required to elicit responses  -SL  --       Attention Goal 1 (SLP)    Comment (Attention Goal 1, SLP)  --  redirection back to task required throughout session- pt perseverative and tangential  -SL  --       Orientation Goal 1 ([facility])    Improve Orientation Through Goal 1 (SLP)  --  --  demonstrating orientation to day;demonstrating orientation to month;demonstrating orientation to year;demonstrating orientation to place  -NR    Time Frame (Orientation Goal 1, SLP)  --  --  short term goal (STG)  -NR    Progress (Orientation Goal 1, SLP)  --  30%;40%;independently (over 90% accuracy)  -SL  100%;with maximum cues (25-49%) oriented to month only independently  -NR    Progress/Outcomes (Orientation Goal 1, SLP)  --  goal ongoing  -SL  goal ongoing  -NR       Memory Skills Goal 1 (SLP)    Improve Memory Skills Through Goal 1 (SLP)  --  --  select a word from a list by exclusion  -NR    Time Frame (Memory Skills Goal 1, SLP)  --  --  short term goal (STG)  -NR    Progress (Memory Skills Goal 1, SLP)  --  --  0%;100%;with moderate cues (50-74%);with maximum cues (25-49%)  -NR       Organizational Skills Goal 1 (SLP)    Improve Thought Organization Through Goal 1 (SLP)  --  completing a divergent naming task;completing a convergent naming task;generating a list of items in a category;naming by category exclusion;naming similarities and differences;90%;independently (over 90% accuracy)  -SL  --    Time Frame (Thought Organization Skills Goal 1, SLP)  --  by discharge  -SL  --     Barriers (Thought Organization Skills Goal 1, SLP)  --  slow responses; perseverative  -SL  --    Progress (Thought Organization Skills Goal 1, SLP)  40%;with moderate cues (50-74%) convergent categorization  -SL  with maximum cues (25-49%) divergent ( concrete) naming  -SL  --    Progress/Outcomes (Thought Organization Skills Goal 1, SLP)  goal ongoing  -  --  --    Comment (Thought Organization Skills Goal 1, SLP)  --  named 1 item in simple concrete category in 1 minute ( fruits); named one additional item without cues in another 1 min; then 2 more items with mod cues  -SL  --       Reasoning Goal 1 (SLP)    Improve Reasoning Through Goal 1 (SLP)  complete mental flexibility task;complete deductive reasoning task;complete basic reasoning task;complete logic/creative thinking task;90%;with minimal cues (75-90%);independently (over 90% accuracy)  -  --  --    Time Frame (Reasoning Goal 1, SLP)  by discharge  -  --  --    Barriers (Reasoning Goal 1, SLP)  fatiqued- falling asleep at times; multiple reps of all stimulus required  -SL  --  --    Progress (Reasoning Goal 1, SLP)  60%;with maximum cues (25-49%);with moderate cues (50-74%) simple word deductions  -SL  --  --    Progress/Outcomes (Reasoning Goal 1, SLP)  goal ongoing  -  --  --       Functional Problem Solving Skills Goal 1 (SLP)    Improve Problem Solving Through Goal 1 (SLP)  determine solutions to simple ADL/safety problems;sequence steps in a task;name items for a task;complete organization/home management task;90%;independently (over 90% accuracy)  -SL  --  --    Time Frame (Problem Solving Goal 1, SLP)  by discharge  -  --  --    Progress (Problem Solving Goal 1, SLP)  30%;40%;with moderate cues (50-74%);with maximum cues (25-49%) simple situational verbal problem solving  -SL  --  --    Progress/Outcomes (Problem Solving Goal 1, SLP)  goal ongoing  -  --  --    Comment (Problem Solving Goal 1, SLP)  slow to respond; some perseveration;  intermittently no response; multiple promts and repetitions of stimulus  -SL  --  --      User Key  (r) = Recorded By, (t) = Taken By, (c) = Cosigned By    Initials Name Provider Type    Jayna Barnes MS CCC-SLP Speech and Language Pathologist    Hailey Mckeon MA,CCC-SLP Speech and Language Pathologist             SLP Outcome Measures (last 72 hours)      SLP Outcome Measures     Row Name 01/13/20 1130             Adult FCM Scores    Memory FCM Score  3  -NR        User Key  (r) = Recorded By, (t) = Taken By, (c) = Cosigned By    Initials Name Effective Dates    Hailey Mckeon MA,CCC-SLP 06/08/18 -               Time Calculation:       Time Calculation- SLP     Row Name 01/14/20 1500 01/14/20 1128          Time Calculation- SLP    SLP Start Time  1430  -SL  1100  -SL     SLP Stop Time  1500  -SL  1130  -SL     SLP Time Calculation (min)  30 min  -SL  30 min  -SL       User Key  (r) = Recorded By, (t) = Taken By, (c) = Cosigned By    Initials Name Provider Type    Jayna Barnes MS CCC-SLP Speech and Language Pathologist            Therapy Charges for Today     Code Description Service Date Service Provider Modifiers Qty    96421630376 HC ST DEV OF COGN SKILLS INITIAL 15 MIN 1/14/2020 Jayna Coker MS CCC-SLP  1    91571233572 HC ST DEV OF COGN SKILLS EACH ADDT'L 15 MIN 1/14/2020 Jayna Coker MS CCC-SLP  1    03655138488 HC ST DEV OF COGN SKILLS EACH ADDT'L 15 MIN 1/14/2020 Jayna Coker MS CCC-SLP  2               ADULT NOMS (last 72 hours)      Adult NOMS     Row Name 01/13/20 1130                   Adult FCM Scores    Memory FCM Score  3  -NR          User Key  (r) = Recorded By, (t) = Taken By, (c) = Cosigned By    Initials Name Effective Dates    Hailey Mckeon MA,CCC-SLP 06/08/18 -                      Jayna Coker MS CCC-SE  1/14/2020

## 2020-01-14 NOTE — PROGRESS NOTES
Inpatient Rehabilitation Plan of Care Note    Plan of Care  Care Plan Reviewed - No updates at this time.    Sphincter Control    Performed Intervention(s)  Monitor I&O  check for incontinence, offer toileting q2hrs  miralax daily-hold if needed.      Safety    Performed Intervention(s)  Safety rounds/bed alarm/ chair alarm on  Falls precautio/call light within easy reach      Psychosocial    Performed Intervention(s)  Offer support/Encourage patient to verbalize any concerns      Body Systems    Performed Intervention(s)  Skin care q shift and PRN  Assist to turn patient q 2-3hrs.    Signed by: Valarie Rodriguez RN

## 2020-01-14 NOTE — PROGRESS NOTES
Occupational Therapy: Branch    Physical Therapy: Individual: 60 minutes.    Speech Language Pathology:  Br[facility]    Signed by: Anmol Joseph PT Student     - CoSigned By: Pita Roth PT 1/14/2020 2:55:09 PM

## 2020-01-14 NOTE — PROGRESS NOTES
Inpatient Rehabilitation Plan of Care Note    Plan of Care  Care Plan Reviewed - Updates as Follows    Body Systems    [RN] Integumentary(Active)  Current Status(01/13/2020): Small rash dry skin breakdown to groin/inner thigh.  Buttocks intact with a small spot of peeling area. Pink areas noted around  penis.No open area noted. Head incision healing.  Weekly Goal(01/20/2020): No further skin breakdown  Discharge Goal: Intact Skin.    Performed Intervention(s)  Skin care q shift and PRN  Assist to turn patient q 2-3hrs.      Psychosocial    [RN] Coping/Adjustment(Active)  Current Status(01/13/2020): Patient with difficult verbalizing and slow process.  Mother and sister present and very supportive.  Weekly Goal(01/20/2020): Patient will demonstrate appropriate coping mechanisms  Discharge Goal: Patient will demonstrate health coping strategies    Performed Intervention(s)  Offer support/Encourage patient to verbalize any concerns      Safety    [RN] Potential for Injury(Active)  Current Status(01/13/2020): Patient with generalized weakness. Very weak all  over, at high risk for fall. Assist of 2 with transfers.  Weekly Goal(01/20/2020): No injuries  Discharge Goal: Pt/family aware of fall/safety in the home setting.    Performed Intervention(s)  Safety rounds/bed alarm/ chair alarm on  Falls precautio/call light within easy reach      Sphincter Control    [RN] Bladder Management(Active)  Current Status(01/14/2020): Incontinent episodes, patient has urgency when  needing to void.  1/14/20 Incont. 100% usually at night except did use urinal  with assist x1 tonight.  Weekly Goal(01/21/2020): Continent 50%  Discharge Goal: Continent 100%    [RN] Bowel Management(Active)  Current Status(01/14/2020): Incontinent of bowel 100%  Weekly Goal(01/21/2020): Continent 50%  Discharge Goal: Continent 100%    Performed Intervention(s)  Monitor I&O  check for incontinence, offer toileting q2hrs  miralax daily-hold if  needed.    Signed by: Angela Haider RN

## 2020-01-14 NOTE — PROGRESS NOTES
LOS: 8 days   Patient Care Team:  Jolene Laurent MD as PCP - General (Family Medicine)  Efren Martínez MD as PCP - Claims Attributed    Chief Complaint:   Status post infected  shunt-removed-CNS infection/pneumocephalus  Status post 12/23/ 2019 - Removal of ventriculoperitoneal shunt intracranial portion, valve, partial peritoneal down to the cervical region  Status post 12/31/2019 endoscopic repair of paranasal sinus defect  ID-ceftriaxone-antibiotics until January 14, 2020  Seizure disorder-multidrug regimen-phenytoin/Vimpat/Keppra/clonazepam/Topamax  Remote traumatic brain injury  Neuro stimulation-Adderall  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  Bilateral SCDs.  Status post acute renal qqnbwchanrymq-DCY-hbzxki creatinine.  Avoid NSAID/ACE inhibitor's.    Subjective     History of Present Illness   Patient feels better.  He moderate assist with toilet transfers.  He is ambulated minimum assist with a rolling walker with contact-guard of a second person 120feet.  Inconsistent stride length and forward posture.  He continues more alert with the decrease in the dose of Keppra.      History taken from: patient chart family    Objective     Vital Signs  Temp:  [97.8 °F (36.6 °C)-98.2 °F (36.8 °C)] 97.8 °F (36.6 °C)  Heart Rate:  [] 101  Resp:  [16-18] 18  BP: (121-147)/(77-96) 131/90    Physical Exam  Mental status: awake and alert.  HEENT-craniotomy incision clean dry and intact.   Pulm: CTAB, no wheezing, rales, or rhonchi  Heart: RRR, no MRG  Abdomen: soft, non-tender, non-distended. +BS  Extremities: No cyanosis or edema   Neuro: awake   Slow with responses but following commands. Left head preference but can rotate past midline  to the right.  Strength: Mild weakness and incoordination on the right side compared to the left.      Results Review:    I reviewed the patient's new clinical results.     Results from last 7 days   Lab Units 01/13/20  0721 01/10/20  0712   WBC 10*3/mm3 7.85 8.30   HEMOGLOBIN g/dL 8.9* 9.4*   HEMATOCRIT % 25.6* 28.2*   PLATELETS 10*3/mm3 429 387     Results from last 7 days   Lab Units 01/13/20  0721 01/12/20  0704 01/10/20  0712   SODIUM mmol/L 145 140 144   POTASSIUM mmol/L 3.5 3.1* 3.3*   CHLORIDE mmol/L 105 101 103   CO2 mmol/L 27.7 26.4 26.6   BUN mg/dL 23* 26* 38*   CREATININE mg/dL 1.36* 1.33* 1.67*   CALCIUM mg/dL 9.1 8.8 9.3   BILIRUBIN mg/dL 0.2  --   --    ALK PHOS U/L 106  --   --    ALT (SGPT) U/L 20  --   --    AST (SGOT) U/L 21  --   --    GLUCOSE mg/dL 102* 106* 113*       Medication Review:   Scheduled Meds:    amphetamine-dextroamphetamine XR 30 mg Oral Daily   atorvastatin 10 mg Oral Daily   cefTRIAXone 2 g Intravenous Q12H   cholecalciferol 400 Units Oral Daily   clonazePAM 1 mg Oral Q8H   heparin (porcine) 5,000 Units Subcutaneous Q8H   lacosamide 200 mg Oral Q12H   levETIRAcetam 500 mg Oral Q12H   metoprolol tartrate 50 mg Oral Q12H   pantoprazole 40 mg Oral Q AM   phenytoin 100 mg Oral Q8H   polyethylene glycol 17 g Oral Daily   potassium chloride 20 mEq Oral Daily   topiramate 25 mg Oral Daily     Continuous Infusions:   PRN Meds:.•  acetaminophen  •  hydrOXYzine pamoate    Assessment/Plan       H/O traumatic brain injury  Status post infected  shunt-removed-CNS infection/pneumocephalus  -Status post 12/23/ 2019 - Removal of ventriculoperitoneal shunt intracranial portion, valve, partial peritoneal down to the cervical region  -Status post 12/31/2019 endoscopic repair of paranasal sinus defect  -ID-ceftriaxone-antibiotics until January 14, 2020    Seizure disorder-multidrug regimen-phenytoin/Vimpat/Keppra/clonazepam/Topamax  -January 9-patient was on phenytoin at home which was increased  at the outside hospital, on Topamax but less than antiepileptic dose.  He had Vimpat, Keppra, clonazepam added at the outside hospital.  Consulted neurology for input patient has fatigue felt possibly related to his increased antiepileptic medication.  Reviewed with neurology and Keppra dose being decreased.  -January 10- Keppra decreased from 2000 mg BID to 50 mg BID with improved alertness. Neurology continues to follow.     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  Bilateral SCDs.    Status post acute renal aeqbulnhzdpeq-DWP-itdoku creatinine.  Avoid NSAID/ACE inhibitor's.    Anemia-trend downward.  Continue to monitor-recheck jan 16Thursday.  Placed him back on a protein pump inhibitor .Hemoccult was negative.      Sleep-January 13- Family reports restlessness at night. Vistaril added PRN last night without response. Since vistaril has only been tried one night, will continue with Vistaril PRN at bedtime for now and continue to monitor.      Now admit for comprehensive acute inpatient rehabilitation .  This would be an interdisciplinary program with physical therapy 1 hour,  occupational therapy 1 hour, and speech therapy 1 hour, 5 days a week.  Rehabilitation nursing for carryover, monitoring of incision and neurologic   status, bowel and bladder, and skin  Ongoing physician follow-up.  Weekly team conferences.  Goals are indeterminate.   Rehabilitation prognosis indeterminate.  Medical prognosis indeterminate.  Estimated length of stay is indeterminate  The patient's functional status and clinical status is unchanged from preadmission assessment and the patient  continues appropriate for acute inpatient rehabilitation.  Goal is for home with outpatient   therapies.  Barrier to discharge: Cognition/mobility- work on trunk control, strength, balance to overcome.     TEAM CONF - JAN 9 - BED MAX 2.  TRANSFERS MAX 2.  GAIT 8 FEET PARALLEL BARS MOD 2. TOILET TRANSFERS MAX 2.  BLIND.  SLOW PROCESSING.  GROOMING MOD. UBD MAX. LBD DEP. DYSPHAGIA - PUREE/THINS.  FATIGUES WITH COGNITIVE  THERAPY.  ELOS :  4 WEEKS       I discussed the patients findings and my recommendations with patient, family and nursing staff    Janusz Solitario MD  01/14/20  6:49 PM

## 2020-01-14 NOTE — THERAPY TREATMENT NOTE
Inpatient Rehabilitation - Occupational Therapy Treatment Note    [facility]     Patient Name: yTe JONES Junior  : 1975  MRN: [id number]    Today's Date: 2020                 Admit Date: 2020      Visit Dx:    ICD-10-CM ICD-9-CM   1. Impaired mobility Z74.09 799.89       Patient Active Problem List   Diagnosis   • Mixed hyperlipidemia   • Essential hypertension   • Traumatic brain injury (CMS/HCC)   • Acute allergic rhinitis   • Seizure (CMS/HCC)   • Screen for colon cancer   • H/O traumatic brain injury         Therapy Treatment    IRF Treatment Summary     Row Name 20 1818 20 1438 20 1100       Evaluation/Treatment Time and Intent    Subjective Information  no complaints  -CC  no complaints  -SL  no complaints  -SL    Existing Precautions/Restrictions  fall swallow  -CC  fall swallow  -SL  fall swallow  -SL    Document Type  therapy note (daily note)  -CC  therapy note (daily note)  -SL  therapy note (daily note)  -SL    Mode of Treatment  occupational therapy  -CC  individual therapy;speech-language pathology  -SL  individual therapy;speech-language pathology  -SL    Patient/Family Observations  --  fatiqued; slow processing  -SL  cooperative  -SL    Recorded by [CC] Neris Morales, MALIHAR [SL] Jayna Coker, MS CCC-SLP [SL] Jayna Coker, MS CCC-SLP    Row Name 20 1030 20 1000          Evaluation/Treatment Time and Intent    Subjective Information  no complaints  -RD  no complaints  -LH,NM,LH2     Existing Precautions/Restrictions  fall  -RD  fall  -LH,NM,LH2     Document Type  therapy note (daily note)  -RD  therapy note (daily note)  -LH,NM,LH2     Mode of Treatment  occupational therapy  -RD  physical therapy  -LH,NM,LH2     Patient/Family Observations  pt seated in w/c w/ no signs of acute distress  -RD  up in wc; no acute distress; nurse & mother present; IV LUE   -LH,NM,LH2     Recorded by [RD] Laurita Wayne, OT [LH,NM,LH2] Pita Roth, PT (r) Jake,  Anmol, PT Student (t) Pita Roth, PT (c)     Row Name 01/14/20 1818 01/14/20 1030 01/14/20 1000       Cognition/Psychosocial- PT/OT    Affect/Mental Status (Cognitive)  WFL  -CC  --  WFL  -LH,NM,LH2    Orientation Status (Cognition)  --  oriented to;person;situation  -RD  oriented to;person;situation  -LH,NM,LH2    Follows Commands (Cognition)  follows one step commands  -CC  follows one step commands;75-90% accuracy;verbal cues/prompting required;repetition of directions required;physical/tactile prompts required;increased processing time needed  -RD  follows one step commands;verbal cues/prompting required;repetition of directions required;physical/tactile prompts required  -LH,NM,LH2    Personal Safety Interventions  fall prevention program maintained;gait belt;nonskid shoes/slippers when out of bed  -CC  fall prevention program maintained;gait belt;muscle strengthening facilitated;nonskid shoes/slippers when out of bed  -RD  fall prevention program maintained;gait belt;supervised activity  -LH,NM,LH2    Recorded by [CC] Neris Morales OTR [RD] Laurita Wayne, OT [LH,NM,LH2] Pita Roth, PT (r) Anmol Joseph, PT Student (t) Pita Roth, PT (c)    Row Name 01/14/20 1030 01/14/20 1000          Transfer Assessment/Treatment    Transfer Assessment/Treatment  toilet transfer  -RD  --     Comment (Transfers)  --  performed sit<>stands from the  c Manuel in the afternoon, vc to back up to the wc and properly position feet   -LH,NM,LH2     Recorded by [RD] Laurita Wayne, OT [LH,NM,LH2] Pita Roth, PT (r) Anmol Joseph, PT Student (t) Pita Roth, PT (c)     Row Name 01/14/20 1818 01/14/20 1000          Sit-Stand Transfer    Sit-Stand [place] (Transfers)  minimum assist (75% patient effort);verbal cues;nonverbal cues (demo/gesture)  -CC  minimum assist (75% patient effort);verbal cues;nonverbal cues (demo/gesture)  -LH,NM,LH2     Assistive Device (Sit-Stand Transfers)  wheelchair  -CC   wheelchair;walker, front-wheeled cues for hand and foot placement   -LH,NM,LH2     Recorded by [CC] Neris Morales OTR [LH,NM,LH2] Pita Roth, PT (r) Anmol Joseph, PT Student (t) Pita Roth, PT (c)     Row Name 01/14/20 1818 01/14/20 1000          Stand-Sit Transfer    Stand-Sit [place] (Transfers)  minimum assist (75% patient effort);moderate assist (50% patient effort)  -CC  moderate assist (50% patient effort);verbal cues;nonverbal cues (demo/gesture) assist to control descent c pt demo increased posterior lean  -LH,NM,LH2     Assistive Device (Stand-Sit Transfers)  wheelchair  -CC  wheelchair;walker, front-wheeled  -LH,NM,LH2     Recorded by [CC] Neris Morales OTR [LH,NM,LH2] Pita Roth, PT (r) Anmol Joseph, PT Student (t) Pita Roth, PT (c)     Row Name 01/14/20 1030 01/14/20 1000          Toilet Transfer    Type (Toilet Transfer)  stand pivot/stand step;sit-stand;stand-sit  -RD  stand pivot/stand step sit<.stand from wc and pivoted to face commode in standing   -LH,NM,LH2     [place] Level (Toilet Transfer)  moderate assist (50% patient effort);verbal cues;nonverbal cues (demo/gesture)  -RD  verbal cues;nonverbal cues (demo/gesture);moderate assist (50% patient effort)  -LH,NM,LH2     Assistive Device (Toilet Transfer)  grab bars/safety frame;wheelchair  -RD  grab bars/safety frame;wheelchair  -LH,NM,LH2     Recorded by [RD] Laurita Wayne, OT [LH,NM,LH2] Pita Roth, PT (r) Anmol Joseph, PT Student (t) Pita Roth, PT (c)     Row Name 01/14/20 1000             Gait/Stairs Assessment/Training    [place] Level (Gait)  minimum assist (75% patient effort);verbal cues;nonverbal cues (demo/gesture) CGA x second person   -LH,NM,LH2      Assistive Device (Gait)  walker, front-wheeled  -FAB,NM,LH2      Distance in Feet (Gait)  100' x 2 in AM; 120' and 40' in PM worked on making turns c cues and assist c the walker   -JACI SANON,LH2      Pattern (Gait)  step-through   -LH,NM,LH2      Deviations/Abnormal Patterns (Gait)  base of support, narrow;scissoring;bilateral deviations inconsistent stride length and forward posture   -LH,NM,LH2      Bilateral Gait Deviations  forward flexed posture;heel strike decreased  -LH,NM,LH2      Comment (Gait/Stairs)  Req assist managing walker; pt on IV LUE; vc for direction; decreased [place] knee extension during stance phase   -LH,NM,LH2      Recorded by [LH,NM,LH2] Pita Roth, PT (r) Anmol Joseph PT Student (t) Pita Roth, PT (c)      Row Name 01/14/20 1030             Basic Activities of Daily Living (BADLs)    Basic Activities of Daily Living  bathing;upper body dressing;lower body dressing;grooming;toileting  -RD      Recorded by [RD] Laurita Wayne, OT      Row Name 01/14/20 1030             Bathing Assessment/Treatment    Bathing [place] Level  bathing skills;lower body;upper body;minimum assist (75% patient effort);moderate assist (50% patient effort);verbal cues  -RD      Bathing Position  sink side;supported sitting;supported standing  -RD      Bathing Setup Assistance  obtain supplies;adjust water temperature  -RD      Comment (Bathing)  A x2 when standing for safety  -RD      Recorded by [RD] Laurita Wayne OT      Row Name 01/14/20 1030             Upper Body Dressing Assessment/Treatment    Upper Body Dressing Task  upper body dressing skills;doff;don;pull over garment;minimum assist (75% or more patient effort);verbal cues  -RD      Upper Body Dressing Position  supported sitting  -RD      Set-up Assistance (Upper Body Dressing)  obtain clothing  -RD      Comment (Upper Body Dressing)  assist to start donning shirt  -RD      Recorded by [RD] Laurita Wayne, OT      Row Name 01/14/20 1030             Lower Body Dressing Assessment/Treatment    Lower Body Dressing [place] Level  doff;don;pants/bottoms;socks;shoes/slippers;shoelaces;maximum assist (25% patient effort);verbal cues  -RD      Lower Body  Dressing Position  supported sitting;supported standing  -RD      Lower Body Dressing Setup Assistance  obtain clothing  -RD      Comment (Lower Body Dressing)  A x2 when standing for safety  -RD      Recorded by [RD] Laurita Wayne OT      Row Name 01/14/20 1030             Grooming Assessment/Treatment    Grooming [place] Level  grooming skills;deodorant application;oral care regimen;wash face, hands;minimum assist (75% patient effort);verbal cues  -RD      Grooming Position  sink side;supported sitting  -RD      Grooming Setup Assistance  obtain supplies  -RD      Recorded by [RD] Laurita Wayne OT      Row Name 01/14/20 1030             Toileting Assessment/Treatment    Toileting [place] Level  toileting skills;adjust/manage clothing;perform perineal hygiene;maximum assist (25% patient effort);verbal cues  -RD      Assistive Device Use (Toileting)  grab bar/safety frame  -RD      Toileting Position  supported sitting;supported standing  -RD      Toileting Setup Assistance  obtain supplies;change pad/brief  -RD      Comment (Toileting)  A x2 when standing for safety  -RD      Recorded by [RD] Laurita Wayne OT      Row Name 01/14/20 1818 01/14/20 1030 01/14/20 1000       Pain Scale: Numbers Pre/Post-Treatment    Pain Scale: Numbers, Pretreatment  0/10 - no pain  -CC  0/10 - no pain  -RD  0/10 - no pain  -LH,NM,LH2    Pain Scale: Numbers, Post-Treatment  0/10 - no pain  -CC  0/10 - no pain  -RD  0/10 - no pain  -LH,NM,LH2    Recorded by [CC] Neris Morales OTR [RD] Laurita Wayne OT [LH,NM,LH2] Pita Roth, PT (r) Anmol Joseph PT Student (t) Pita Roth, PT (c)    Row Name 01/14/20 1818 01/14/20 1030          Upper Extremity Seated Therapeutic Exercise    Performed, Seated Upper Extremity (Therapeutic Exercise)  shoulder flexion/extension;scapular protraction/retraction;elbow flexion/extension;forearm supination/pronation;wrist flexion/extension  -CC  scapular  protraction/retraction;digit flexion/extension  -RD     Device, Seated Upper Extremity (Therapeutic Exercise)  free weights, cuff;restorator/arm bike  -CC  restorator/arm bike hand gripper  -RD     Exercise Type, Seated Upper Extremity (Therapeutic Exercise)  resistive exercise  -CC  ADL specificity exercises;resistive exercise  -RD     Expected Outcomes, Seated Upper Extremity (Therapeutic Exercise)  improve functional tolerance, self-care activity  -CC  improve functional tolerance, self-care activity;improve performance, BADLs;improve performance, transfer skills  -RD     Sets/Reps Detail, Seated Upper Extremity (Therapeutic Exercise)  8 min arm bike; 2# hand wt 15x2  -CC  2 min x 2 trials w/ UBE- rest break between sets; 12 reps x 2 sets w/ hand gripper  -RD     Recorded by [CC] Neris Morales OTR [RD] Laurita Wayne, MALIHA     Row Name 01/14/20 1818 01/14/20 1030 01/14/20 1000       Positioning and Restraints    Pre-Treatment Position  sitting in chair/recliner  -CC  sitting in chair/recliner  -RD  sitting in chair/recliner  -FAB,NM,LH2    Post Treatment Position  wheelchair  -CC  wheelchair  -RD  wheelchair  -FAB,NM,LH2    In Wheelchair  sitting;with SLP  -CC  sitting;exit alarm on;patient within staff view at nursing station  -RD  call light within reach;encouraged to call for assist;exit alarm on;with family/caregiver  -LH,NM,LH2    Recorded by [CC] Neris Morales OTR [RD] Laurita Wayne OT [LH,NM,LH2] Pita Roth, PT (r) Anmol Joseph, PT Student (t) Pita Roth, PT (c)    Row Name 01/14/20 1000             Weekly Summary of Progress (PT)    Weekly Outcome Summary: Physical Therapy  Pt has met 3/3 short term goals for bed mob, sit<>stands and gait. Pt has made consistent progress since start of care toward 4 long term goals. Pt conitinues to demonstrate ability to participate and improve c skilled PT interventions.   -LH,NM,LH2      Recorded by [LH,NM,LH2] Pita Roth, PT (r) Jake  Anmol, PT Student (t) Pita Roth, PT (c)        User Key  (r) = Recorded By, (t) = Taken By, (c) = Cosigned By    Initials Name Effective Dates    CC Neris Morales, OTR 06/08/18 -     SL Jayna Coker, MS CCC-SLP 06/08/18 -     LH Pita Roth, PT 04/03/18 -     RD Laurita Wayne, OT 10/14/19 -     NM Anmol Joseph, PT Student 01/03/20 -           Wound 01/06/20 2200 head Incision (Active)   Dressing Appearance open to air 1/14/2020  9:00 AM   Closure Approximated 1/13/2020  7:50 PM   Base clean;dry 1/13/2020  7:50 PM   Periwound dry 1/14/2020  9:00 AM   Drainage Amount none 1/13/2020  7:50 PM         OT Recommendation and Plan                 OT IRF GOALS     Row Name 01/07/20 1527             Transfer Goal 1 (OT-IRF)    Activity/Assistive Device (Transfer Goal 1, OT-IRF)  toilet;commode, bedside without drop arms  -SG      [place] Level (Transfer Goal 1, OT-IRF)  maximum assist (25-49% patient effort)  -SG      Time Frame (Transfer Goal 1, OT-IRF)  short term goal (STG);1 week  -SG      Progress/Outcomes (Transfer Goal 1, OT-IRF)  goal ongoing  -SG         Transfer Goal 2 (OT-IRF)    Activity/Assistive Device (Transfer Goal 2, OT-IRF)  toilet  -SG      [place] Level (Transfer Goal 2, OT-IRF)  minimum assist (75% or more patient effort);moderate assist (50-74% patient effort)  -SG      Time Frame (Transfer Goal 2, OT-IRF)  long term goal (LTG);by discharge  -SG      Progress/Outcomes (Transfer Goal 2, OT-IRF)  goal ongoing  -SG         Transfer Goal 3 (OT-IRF)    Activity/Assistive Device (Transfer Goal 3, OT-IRF)  shower chair  -SG      [place] Level (Transfer Goal 3, OT-IRF)  maximum assist (25-49% patient effort)  -SG      Time Frame (Transfer Goal 3, OT-IRF)  short term goal (STG);1 week  -SG      Progress/Outcomes (Transfer Goal 3, OT-IRF)  goal ongoing  -SG         Transfer Goal 4 (OT-IRF)    Activity/Assistive Device (Transfer Goal 4, OT-IRF)  shower chair  -SG      [place]  Level (Transfer Goal 4, OT-IRF)  minimum assist (75% or more patient effort);moderate assist (50-74% patient effort)  -SG      Time Frame (Transfer Goal 4, OT-IRF)  long term goal (LTG);by discharge  -SG      Progress/Outcomes (Transfer Goal 4, OT-IRF)  goal ongoing  -SG         Bathing Goal 1 (OT-IRF)    Activity/Device (Bathing Goal 1, OT-IRF)  bathing skills, all  -SG      [place] Level (Bathing Goal 1, OT-IRF)  moderate assist (50-74% patient effort)  -SG      Time Frame (Bathing Goal 1, OT-IRF)  short term goal (STG);1 week  -SG      Progress/Outcomes (Bathing Goal 1, OT-IRF)  goal ongoing  -SG         Bathing Goal 2 (OT-IRF)    Activity/Device (Bathing Goal 2, OT-IRF)  bathing skills, all  -SG      [place] Level (Bathing Goal 2, OT-IRF)  minimum assist (75% or more patient effort)  -SG      Time Frame (Bathing Goal 2, OT-IRF)  long term goal (LTG);by discharge  -SG      Progress/Outcomes (Bathing Goal 2, OT-IRF)  goal ongoing  -SG         UB Dressing Goal 1 (OT-IRF)    Activity/Device (UB Dressing Goal 1, OT-IRF)  upper body dressing  -SG      [place] (UB Dress Goal 1, OT-IRF)  moderate assist (50-74% patient effort)  -SG      Time Frame (UB Dressing Goal 1, OT-IRF)  short term goal (STG);1 week  -SG      Progress/Outcomes (UB Dressing Goal 1, OT-IRF)  goal ongoing  -SG         UB Dressing Goal 2 (OT-IRF)    Activity/Device (UB Dressing Goal 2, OT-IRF)  upper body dressing  -SG      [place] (UB Dress Goal 2, OT-IRF)  minimum assist (75% or more patient effort)  -SG      Time Frame (UB Dressing Goal 2, OT-IRF)  long term goal (LTG);by discharge  -SG      Progress/Outcomes (UB Dressing Goal 2, OT-IRF)  goal ongoing  -SG         LB Dressing Goal 1 (OT-IRF)    Activity/Device (LB Dressing Goal 1, OT-IRF)  lower body dressing  -SG      [place] (LB Dressing Goal 1, OT-IRF)  maximum assist (25-49% patient effort)  -SG      Time Frame (LB Dressing Goal 1, OT-IRF)  short term goal (STG);1 week   -SG      Progress/Outcomes (LB Dressing Goal 1, OT-IRF)  goal ongoing  -SG         LB Dressing Goal 2 (OT-IRF)    Activity/Device (LB Dressing Goal 2, OT-IRF)  lower body dressing  -SG      [place] (LB Dressing Goal 2, OT-IRF)  moderate assist (50-74% patient effort)  -SG      Time Frame (LB Dressing Goal 2, OT-IRF)  long term goal (LTG);by discharge  -SG      Progress/Outcomes (LB Dressing Goal 2, OT-IRF)  goal ongoing  -SG         Toileting Goal 1 (OT-IRF)    Activity/Device (Toileting Goal 1, OT-IRF)  toileting skills, all  -SG      [place] Level (Toileting Goal 1, OT-IRF)  moderate assist (50-74% patient effort)  -SG      Time Frame (Toileting Goal 1, OT-IRF)  long term goal (LTG);by discharge  -SG         Strength Goal 1 (OT-IRF)    Strength Goal 1 (OT-IRF)  Pt to tolerate UE strengthing to improve overall ROM and functional use of UE.  -SG      Time Frame (Strength Goal 1, OT-IRF)  long term goal (LTG);by discharge  -SG      Progress/Outcomes (Strength Goal 1, OT-IRF)  goal ongoing  -SG         Balance Goal 1 (OT)    Activity/Assistive Device (Balance Goal 1, OT)  sitting, static  -SG      [place] Level/Cues Needed (Balance Goal 1, OT)  contact guard assist  -SG      Time Frame (Balance Goal 1, OT)  long term goal (LTG);by discharge  -SG      Progress/Outcomes (Balance Goal 1, OT)  goal ongoing  -SG         Caregiver Training Goal 1 (OT-IRF)    Caregiver Training Goal 1 (OT-IRF)  Pt and family to be indep with ADLs, functional transfers, AD/AE, and HEP for safe d/c home.  -SG      Time Frame (Caregiver Training Goal 1, OT-IRF)  long term goal (LTG);by discharge  -SG      Progress/Outcomes (Caregiver Training Goal 1, OT-IRF)  goal ongoing  -SG        User Key  (r) = Recorded By, (t) = Taken By, (c) = Cosigned By    Initials Name Provider Type    Sudha Cho OTR Occupational Therapist          Occupational Therapy Education                 Title: PT OT SLP Therapies (In Progress)      Topic: Occupational Therapy (In Progress)     Point: ADL training (Done)     Description:   Instruct learner(s) on proper safety adaptation and remediation techniques during self care or transfers.   Instruct in proper use of assistive devices.              Learning Progress Summary           Family Acceptance, E,TB, VU by  at 1/7/2020 6971    Comment:  OT role and goals, POC.                               User Key     Initials Effective Dates Name Provider Type Discipline     12/26/18 -  Sudha Marte OTR Occupational Therapist OT                       Time Calculation:     Time Calculation- OT     Row Name 01/14/20 1330 01/14/20 1205          Time Calculation- OT    OT Start Time  1330  -CC  1000  -RD     OT Stop Time  1400  -CC  1100  -RD     OT Time Calculation (min)  30 min  -CC  60 min  -RD     OT Received On  --  01/14/20  -RD       User Key  (r) = Recorded By, (t) = Taken By, (c) = Cosigned By    Initials Name Provider Type    CC Neris Morales OTR Occupational Therapist    RD Laurita Wayne, OT Occupational Therapist          Therapy Charges for Today     Code Description Service Date Service Provider Modifiers Qty    48699244411 HC OT THER SUPP EA 15 MIN 1/13/2020 Neris Morales OTR GO 2    [id number] HC OT SELF CARE/MGMT/TRAIN EA 15 MIN 1/13/2020 Neris Morales OTR GO 3    [id number] HC OT THER PROC EA 15 MIN 1/14/2020 Neris Morales OTR GO 2                   AVA Dodd  1/14/2020

## 2020-01-14 NOTE — PLAN OF CARE
Problem: Patient Care Overview  Goal: Plan of Care Review  Outcome: Ongoing (interventions implemented as appropriate)  Flowsheets (Taken 1/13/2020 1916)  Outcome Summary: Mr Christine is cooperative, assist x2, and incontinent of b/b at times and he did have a BM today. No complaints of pain or discomfort, takes medication whole in applesauce and family at bedside all day. Labs faxed, per [facility] and IV tubing changed when new bag of Rocephin hung.  Plan of Care Reviewed With: patient; family  IRF Plan of Care Review: progress ongoing, continue      Pharmacy   Pharmacy  Antibiotics/Antifungals/Antivirals:  Medication:      Active Orders (From admission, onward)    None        Route:        NA  Stop Date:  NA  Reason:      NA  Antihypertensives/Cardiac:  Medication:    Orders (72h ago, onward)     Start     Ordered    01/05/20 1300  midodrine (PROAMATINE) tablet 2.5 mg  2 TIMES DAILY      01/05/20 1001    12/30/19 0600  spironolactone (ALDACTONE) tablet 12.5 mg  Q DAY,   Status:  Discontinued      12/29/19 1104    12/27/19 2100  atorvastatin (LIPITOR) tablet 80 mg  NIGHTLY      12/27/19 1644    12/27/19 1644  hydrALAZINE (APRESOLINE) tablet 25 mg  EVERY 8 HOURS PRN      12/27/19 1644              Patient Vitals for the past 24 hrs:   BP Pulse   01/06/20 1330 (!) 98/64 99   01/06/20 1129 117/77 --   01/06/20 1031 (!) 92/55 100   01/06/20 0931 102/64 --   01/06/20 0700 103/72 94   01/06/20 0651 110/75 92   01/05/20 1925 -- (!) 104   01/05/20 1850 108/77 (!) 106     Anticoagulation:  Medication: Aspirin, Plavix                      Other key medications: A review of the medication list reveals no issues at this time. Patient is currently on antihypertensive(s). Recommend home blood pressure monitoring/recording if antihypertensive(s) regimen(s) continue. Patient is currently on diabetic medication(s) and/or Insulin(s). Recommend home blood glucose monitoring/recording if these regimen(s) continue.    Section completed by: Alexis Russ [facility]

## 2020-01-14 NOTE — THERAPY TREATMENT NOTE
Inpatient Rehabilitation - Occupational Therapy Treatment Note    [facility]     Patient Name: Tye JONES Junior  : 1975  MRN: [id number]    Today's Date: 2020                 Admit Date: 2020      Visit Dx:    ICD-10-CM ICD-9-CM   1. Impaired mobility Z74.09 799.89       Patient Active Problem List   Diagnosis   • Mixed hyperlipidemia   • Essential hypertension   • Traumatic brain injury (CMS/HCC)   • Acute allergic rhinitis   • Seizure (CMS/HCC)   • Screen for colon cancer   • H/O traumatic brain injury         Therapy Treatment    IRF Treatment Summary     Row Name 20 1100 20 1030 20 1000       Evaluation/Treatment Time and Intent    Subjective Information  no complaints  -SL  no complaints  -RD  no complaints  (Pended)   -NM    Existing Precautions/Restrictions  fall swallow  -SL  fall  -RD  fall  (Pended)   -NM    Document Type  therapy note (daily note)  -SL  therapy note (daily note)  -RD  therapy note (daily note)  (Pended)   -NM    Mode of Treatment  individual therapy;speech-language pathology  -SL  occupational therapy  -RD  physical therapy  (Pended)   -NM    Patient/Family Observations  cooperative  -SL  pt seated in w/c w/ no signs of acute distress  -RD  up in wc; no acute distress; nurse & mother present; IV LUE   (Pended)   -NM    Recorded by [SL] Jayna Coker MS CCC-SLP [RD] Laurita Wayne, OT [NM] Anmol Joseph, PT Student    Row Name 20 1030 20 1000          Cognition/Psychosocial- PT/OT    Affect/Mental Status (Cognitive)  --  WFL  (Pended)   -NM     Orientation Status (Cognition)  oriented to;person;situation  -RD  oriented to;person;situation  (Pended)   -NM     Follows Commands (Cognition)  follows one step commands;75-90% accuracy;verbal cues/prompting required;repetition of directions required;physical/tactile prompts required;increased processing time needed  -RD  follows one step commands;verbal cues/prompting required;repetition of  directions required;physical/tactile prompts required  (Pended)   -NM     Personal Safety Interventions  fall prevention program maintained;gait belt;muscle strengthening facilitated;nonskid shoes/slippers when out of bed  -RD  fall prevention program maintained;gait belt;supervised activity  (Pended)   -NM     Recorded by [RD] Laurita Wayne, OT [NM] Anmol Joseph, PT Student     Row Name 01/14/20 1030             Transfer Assessment/Treatment    Transfer Assessment/Treatment  toilet transfer  -RD      Recorded by [RD] Laurita Wayne, OT      Row Name 01/14/20 1000             Sit-Stand Transfer    Sit-Stand [place] (Transfers)  minimum assist (75% patient effort);verbal cues;nonverbal cues (demo/gesture)  (Pended)   -NM      Assistive Device (Sit-Stand Transfers)  wheelchair;walker, front-wheeled  (Pended)  cues for hand and foot placement   -NM      Recorded by [NM] Anmol Joseph, PT Student      Row Name 01/14/20 1000             Stand-Sit Transfer    Stand-Sit [place] (Transfers)  moderate assist (50% patient effort);verbal cues;nonverbal cues (demo/gesture)  (Pended)  assist to control descent c pt demo increased posterior lean  -NM      Assistive Device (Stand-Sit Transfers)  wheelchair;walker, front-wheeled  (Pended)   -NM      Recorded by [NM] Anmol Joseph, PT Student      Row Name 01/14/20 1030             Toilet Transfer    Type (Toilet Transfer)  stand pivot/stand step;sit-stand;stand-sit  -RD      [place] Level (Toilet Transfer)  moderate assist (50% patient effort);verbal cues;nonverbal cues (demo/gesture)  -RD      Assistive Device (Toilet Transfer)  grab bars/safety frame;wheelchair  -RD      Recorded by [RD] Laurita Wayne, OT      Row Name 01/14/20 1000             Gait/Stairs Assessment/Training    [place] Level (Gait)  minimum assist (75% patient effort);verbal cues;nonverbal cues (demo/gesture)  (Pended)  CGA x second person   -NM      Assistive  Device (Gait)  walker, front-wheeled  (Pended)   -NM      Distance in Feet (Gait)  100 x 2   (Pended)   -NM      Pattern (Gait)  step-through  (Pended)   -NM      Deviations/Abnormal Patterns (Gait)  base of support, narrow;scissoring;bilateral deviations  (Pended)  inconsistent stride length and forward posture   -NM      Bilateral Gait Deviations  forward flexed posture;heel strike decreased  (Pended)   -NM      Comment (Gait/Stairs)  Req assist managing walker; pt on IV LUE; vc for direction; decreased [place] knee extension during stance phase   (Pended)   -NM      Recorded by [NM] Anmol Joseph, PT Student      Row Name 01/14/20 1030             Basic Activities of Daily Living (BADLs)    Basic Activities of Daily Living  bathing;upper body dressing;lower body dressing;grooming;toileting  -RD      Recorded by [RD] Laurita Wayne, OT      Row Name 01/14/20 1030             Bathing Assessment/Treatment    Bathing [place] Level  bathing skills;lower body;upper body;minimum assist (75% patient effort);moderate assist (50% patient effort);verbal cues  -RD      Bathing Position  sink side;supported sitting;supported standing  -RD      Bathing Setup Assistance  obtain supplies;adjust water temperature  -RD      Comment (Bathing)  A x2 when standing for safety  -RD      Recorded by [RD] Laurita Wayne, OT      Row Name 01/14/20 1030             Upper Body Dressing Assessment/Treatment    Upper Body Dressing Task  upper body dressing skills;doff;don;pull over garment;minimum assist (75% or more patient effort);verbal cues  -RD      Upper Body Dressing Position  supported sitting  -RD      Set-up Assistance (Upper Body Dressing)  obtain clothing  -RD      Comment (Upper Body Dressing)  assist to start donning shirt  -RD      Recorded by [RD] Laurita Wayne, OT      Row Name 01/14/20 1030             Lower Body Dressing Assessment/Treatment    Lower Body Dressing [place] Level   doff;don;pants/bottoms;socks;shoes/slippers;shoelaces;maximum assist (25% patient effort);verbal cues  -RD      Lower Body Dressing Position  supported sitting;supported standing  -RD      Lower Body Dressing Setup Assistance  obtain clothing  -RD      Comment (Lower Body Dressing)  A x2 when standing for safety  -RD      Recorded by [RD] Laurita Wayne, OT      Row Name 01/14/20 1030             Grooming Assessment/Treatment    Grooming [place] Level  grooming skills;deodorant application;oral care regimen;wash face, hands;minimum assist (75% patient effort);verbal cues  -RD      Grooming Position  sink side;supported sitting  -RD      Grooming Setup Assistance  obtain supplies  -RD      Recorded by [RD] Laurita Wayne, OT      Row Name 01/14/20 1030             Toileting Assessment/Treatment    Toileting [place] Level  toileting skills;adjust/manage clothing;perform perineal hygiene;maximum assist (25% patient effort);verbal cues  -RD      Assistive Device Use (Toileting)  grab bar/safety frame  -RD      Toileting Position  supported sitting;supported standing  -RD      Toileting Setup Assistance  obtain supplies;change pad/brief  -RD      Comment (Toileting)  A x2 when standing for safety  -RD      Recorded by [RD] Laurita Wayne, OT      Row Name 01/14/20 1030 01/14/20 1000          Pain Scale: Numbers Pre/Post-Treatment    Pain Scale: Numbers, Pretreatment  0/10 - no pain  -RD  0/10 - no pain  (Pended)   -NM     Pain Scale: Numbers, Post-Treatment  0/10 - no pain  -RD  0/10 - no pain  (Pended)   -NM     Recorded by [RD] Laurita Wayne, OT [NM] Anmol Joseph, PT Student     Row Name 01/14/20 1030             Upper Extremity Seated Therapeutic Exercise    Performed, Seated Upper Extremity (Therapeutic Exercise)  scapular protraction/retraction;digit flexion/extension  -RD      Device, Seated Upper Extremity (Therapeutic Exercise)  restorator/arm bike hand gripper  -RD       Exercise Type, Seated Upper Extremity (Therapeutic Exercise)  ADL specificity exercises;resistive exercise  -RD      Expected Outcomes, Seated Upper Extremity (Therapeutic Exercise)  improve functional tolerance, self-care activity;improve performance, BADLs;improve performance, transfer skills  -RD      Sets/Reps Detail, Seated Upper Extremity (Therapeutic Exercise)  2 min x 2 trials w/ UBE- rest break between sets; 12 reps x 2 sets w/ hand gripper  -RD      Recorded by [RD] Laurita Wayne, OT      Row Name 01/14/20 1030 01/14/20 1000          Positioning and Restraints    Pre-Treatment Position  sitting in chair/recliner  -RD  sitting in chair/recliner  (Pended)   -NM     Post Treatment Position  wheelchair  -RD  wheelchair  (Pended)   -NM     In Wheelchair  sitting;exit alarm on;patient within staff view at nursing station  -RD  call light within reach;encouraged to call for assist;exit alarm on;with family/caregiver  (Pended)   -NM     Recorded by [RD] Laurita Wayne, OT [NM] Anmol Joseph, PT Student     Row Name 01/14/20 1000             Weekly Summary of Progress (PT)    Weekly Outcome Summary: Physical Therapy  Pt has met 3/3 short term goals for bed mob, sit<>stands and gait. Pt has made consistent progress since start of care toward 4 long term goals. Pt conitinues to demonstrate ability to participate and improve c skilled PT interventions.   (Pended)   -NM      Recorded by [NM] Anmol Joseph, PT Student        User Key  (r) = Recorded By, (t) = Taken By, (c) = Cosigned By    Initials Name Effective Dates    Jayna Barnes, MS CCC-SLP 06/08/18 -     Laurita Lawrence OT 10/14/19 -     NM Anmol Joseph, PT Student 01/03/20 -           Wound 01/06/20 2200 head Incision (Active)   Dressing Appearance open to air 1/14/2020  9:00 AM   Closure Approximated 1/13/2020  7:50 PM   Base clean;dry 1/13/2020  7:50 PM   Periwound dry 1/14/2020  9:00 AM   Drainage Amount none 1/13/2020  7:50 PM          OT Recommendation and Plan                 OT IRF GOALS     Row Name 01/07/20 1527             Transfer Goal 1 (OT-IRF)    Activity/Assistive Device (Transfer Goal 1, OT-IRF)  toilet;commode, bedside without drop arms  -SG      [place] Level (Transfer Goal 1, OT-IRF)  maximum assist (25-49% patient effort)  -SG      Time Frame (Transfer Goal 1, OT-IRF)  short term goal (STG);1 week  -SG      Progress/Outcomes (Transfer Goal 1, OT-IRF)  goal ongoing  -SG         Transfer Goal 2 (OT-IRF)    Activity/Assistive Device (Transfer Goal 2, OT-IRF)  toilet  -SG      [place] Level (Transfer Goal 2, OT-IRF)  minimum assist (75% or more patient effort);moderate assist (50-74% patient effort)  -SG      Time Frame (Transfer Goal 2, OT-IRF)  long term goal (LTG);by discharge  -SG      Progress/Outcomes (Transfer Goal 2, OT-IRF)  goal ongoing  -SG         Transfer Goal 3 (OT-IRF)    Activity/Assistive Device (Transfer Goal 3, OT-IRF)  shower chair  -SG      [place] Level (Transfer Goal 3, OT-IRF)  maximum assist (25-49% patient effort)  -SG      Time Frame (Transfer Goal 3, OT-IRF)  short term goal (STG);1 week  -SG      Progress/Outcomes (Transfer Goal 3, OT-IRF)  goal ongoing  -SG         Transfer Goal 4 (OT-IRF)    Activity/Assistive Device (Transfer Goal 4, OT-IRF)  shower chair  -SG      [place] Level (Transfer Goal 4, OT-IRF)  minimum assist (75% or more patient effort);moderate assist (50-74% patient effort)  -SG      Time Frame (Transfer Goal 4, OT-IRF)  long term goal (LTG);by discharge  -SG      Progress/Outcomes (Transfer Goal 4, OT-IRF)  goal ongoing  -SG         Bathing Goal 1 (OT-IRF)    Activity/Device (Bathing Goal 1, OT-IRF)  bathing skills, all  -SG      [place] Level (Bathing Goal 1, OT-IRF)  moderate assist (50-74% patient effort)  -SG      Time Frame (Bathing Goal 1, OT-IRF)  short term goal (STG);1 week  -SG      Progress/Outcomes (Bathing Goal 1, OT-IRF)  goal ongoing  -SG          Bathing Goal 2 (OT-IRF)    Activity/Device (Bathing Goal 2, OT-IRF)  bathing skills, all  -SG      [place] Level (Bathing Goal 2, OT-IRF)  minimum assist (75% or more patient effort)  -SG      Time Frame (Bathing Goal 2, OT-IRF)  long term goal (LTG);by discharge  -SG      Progress/Outcomes (Bathing Goal 2, OT-IRF)  goal ongoing  -SG         UB Dressing Goal 1 (OT-IRF)    Activity/Device (UB Dressing Goal 1, OT-IRF)  upper body dressing  -SG      [place] (UB Dress Goal 1, OT-IRF)  moderate assist (50-74% patient effort)  -SG      Time Frame (UB Dressing Goal 1, OT-IRF)  short term goal (STG);1 week  -SG      Progress/Outcomes (UB Dressing Goal 1, OT-IRF)  goal ongoing  -SG         UB Dressing Goal 2 (OT-IRF)    Activity/Device (UB Dressing Goal 2, OT-IRF)  upper body dressing  -SG      [place] (UB Dress Goal 2, OT-IRF)  minimum assist (75% or more patient effort)  -SG      Time Frame (UB Dressing Goal 2, OT-IRF)  long term goal (LTG);by discharge  -SG      Progress/Outcomes (UB Dressing Goal 2, OT-IRF)  goal ongoing  -SG         LB Dressing Goal 1 (OT-IRF)    Activity/Device (LB Dressing Goal 1, OT-IRF)  lower body dressing  -SG      [place] (LB Dressing Goal 1, OT-IRF)  maximum assist (25-49% patient effort)  -SG      Time Frame (LB Dressing Goal 1, OT-IRF)  short term goal (STG);1 week  -SG      Progress/Outcomes (LB Dressing Goal 1, OT-IRF)  goal ongoing  -SG         LB Dressing Goal 2 (OT-IRF)    Activity/Device (LB Dressing Goal 2, OT-IRF)  lower body dressing  -SG      [place] (LB Dressing Goal 2, OT-IRF)  moderate assist (50-74% patient effort)  -SG      Time Frame (LB Dressing Goal 2, OT-IRF)  long term goal (LTG);by discharge  -SG      Progress/Outcomes (LB Dressing Goal 2, OT-IRF)  goal ongoing  -SG         Toileting Goal 1 (OT-IRF)    Activity/Device (Toileting Goal 1, OT-IRF)  toileting skills, all  -SG      [place] Level (Toileting Goal 1, OT-IRF)  moderate assist  (50-74% patient effort)  -SG      Time Frame (Toileting Goal 1, OT-IRF)  long term goal (LTG);by discharge  -SG         Strength Goal 1 (OT-IRF)    Strength Goal 1 (OT-IRF)  Pt to tolerate UE strengthing to improve overall ROM and functional use of UE.  -SG      Time Frame (Strength Goal 1, OT-IRF)  long term goal (LTG);by discharge  -SG      Progress/Outcomes (Strength Goal 1, OT-IRF)  goal ongoing  -SG         Balance Goal 1 (OT)    Activity/Assistive Device (Balance Goal 1, OT)  sitting, static  -SG      [place] Level/Cues Needed (Balance Goal 1, OT)  contact guard assist  -SG      Time Frame (Balance Goal 1, OT)  long term goal (LTG);by discharge  -SG      Progress/Outcomes (Balance Goal 1, OT)  goal ongoing  -SG         Caregiver Training Goal 1 (OT-IRF)    Caregiver Training Goal 1 (OT-IRF)  Pt and family to be indep with ADLs, functional transfers, AD/AE, and HEP for safe d/c home.  -SG      Time Frame (Caregiver Training Goal 1, OT-IRF)  long term goal (LTG);by discharge  -SG      Progress/Outcomes (Caregiver Training Goal 1, OT-IRF)  goal ongoing  -SG        User Key  (r) = Recorded By, (t) = Taken By, (c) = Cosigned By    Initials Name Provider Type     Sudha Marte OTR Occupational Therapist          Occupational Therapy Education                 Title: PT OT SLP Therapies (In Progress)     Topic: Occupational Therapy (In Progress)     Point: ADL training (Done)     Description:   Instruct learner(s) on proper safety adaptation and remediation techniques during self care or transfers.   Instruct in proper use of assistive devices.              Learning Progress Summary           Family Acceptance, E,TB, VU by  at 1/7/2020 5702    Comment:  OT role and goals, POC.                               User Key     Initials Effective Dates Name Provider Type Discipline     12/26/18 -  Sudha Marte OTR Occupational Therapist OT                       Time Calculation:     Time Calculation- OT      Row Name 01/14/20 1205             Time Calculation- OT    OT Start Time  1000  -RD      OT Stop Time  1100  -RD      OT Time Calculation (min)  60 min  -RD      OT Received On  01/14/20  -RD        User Key  (r) = Recorded By, (t) = Taken By, (c) = Cosigned By    Initials Name Provider Type    Laurita Lawrence OT Occupational Therapist          Therapy Charges for Today     Code Description Service Date Service Provider Modifiers Qty    53461742762 HC OT THER PROC EA 15 MIN 1/13/2020 Laurita Wayne, OT GO 2    81554362130 HC OT THER SUPP EA 15 MIN 1/14/2020 Laurita Wayne, OT GO 1    78598315147 HC OT SELF CARE/MGMT/TRAIN EA 15 MIN 1/14/2020 Laurita Wayne, OT GO 3    81620206039 HC OT THER PROC EA 15 MIN 1/14/2020 Laurita Wayne, OT GO 1                   Laurita Wayne OT  1/14/2020

## 2020-01-14 NOTE — THERAPY TREATMENT NOTE
Inpatient Rehabilitation - Physical Therapy Treatment Note  [facility]     Patient Name: Tye JONES Junior  : 1975  MRN: [id number]    Today's Date: 2020                 Admit Date: 2020      Visit Dx:      ICD-10-CM ICD-9-CM   1. Impaired mobility Z74.09 799.89       Patient Active Problem List   Diagnosis   • Mixed hyperlipidemia   • Essential hypertension   • Traumatic brain injury (CMS/HCC)   • Acute allergic rhinitis   • Seizure (CMS/HCC)   • Screen for colon cancer   • H/O traumatic brain injury       Therapy Treatment    IRF Treatment Summary     Row Name 20 1438 20 1100 20 1030       Evaluation/Treatment Time and Intent    Subjective Information  no complaints  -SL  no complaints  -SL  no complaints  -RD    Existing Precautions/Restrictions  fall swallow  -SL  fall swallow  -SL  fall  -RD    Document Type  therapy note (daily note)  -SL  therapy note (daily note)  -SL  therapy note (daily note)  -RD    Mode of Treatment  individual therapy;speech-language pathology  -SL  individual therapy;speech-language pathology  -SL  occupational therapy  -RD    Patient/Family Observations  fatiqued; slow processing  -SL  cooperative  -SL  pt seated in w/c w/ no signs of acute distress  -RD    Recorded by [SL] Jayna Coker, MS CCC-SLP [SL] Jayna Coker, MS CCC-SLP [RD] Laurita Wayne, OT    Row Name 20 1000             Evaluation/Treatment Time and Intent    Subjective Information  no complaints  -LH,NM,LH2      Existing Precautions/Restrictions  fall  -LH,NM,LH2      Document Type  therapy note (daily note)  -LH,NM,LH2      Mode of Treatment  physical therapy  -LH,NM,LH2      Patient/Family Observations  up in wc; no acute distress; nurse & mother present; IV LUE   -LH,NM,LH2      Recorded by [LH,NM,LH2] Pita Roth, PT (r) Anmol Joseph PT Student (t) Pita Roth, PT (c)      Row Name 20 1030 20 1000          Cognition/Psychosocial- PT/OT    Affect/Mental  Status (Cognitive)  --  WFL  -LH,NM,LH2     Orientation Status (Cognition)  oriented to;person;situation  -RD  oriented to;person;situation  -LH,NM,LH2     Follows Commands (Cognition)  follows one step commands;75-90% accuracy;verbal cues/prompting required;repetition of directions required;physical/tactile prompts required;increased processing time needed  -RD  follows one step commands;verbal cues/prompting required;repetition of directions required;physical/tactile prompts required  -LH,NM,LH2     Personal Safety Interventions  fall prevention program maintained;gait belt;muscle strengthening facilitated;nonskid shoes/slippers when out of bed  -RD  fall prevention program maintained;gait belt;supervised activity  -LH,NM,LH2     Recorded by [RD] Laurita Wayne, OT [LH,NM,LH2] Pita Roth, PT (r) Anmol Joseph, PT Student (t) Pita Roth, PT (c)     Row Name 01/14/20 1030 01/14/20 1000          Transfer Assessment/Treatment    Transfer Assessment/Treatment  toilet transfer  -RD  --     Comment (Transfers)  --  performed sit<>stands from the wc c Manuel in the afternoon, vc to back up to the wc and properly position feet   -LH,NM,LH2     Recorded by [RD] Laurita Wayne, OT [LH,NM,LH2] Pita Roth, PT (r) Anmol Joseph, PT Student (t) Pita Roth, PT (c)     Row Name 01/14/20 1000             Sit-Stand Transfer    Sit-Stand [place] (Transfers)  minimum assist (75% patient effort);verbal cues;nonverbal cues (demo/gesture)  -LH,NM,LH2      Assistive Device (Sit-Stand Transfers)  wheelchair;walker, front-wheeled cues for hand and foot placement   -LH,NM,LH2      Recorded by [LH,NM,LH2] Pita Roth, PT (r) Anmol Joseph, PT Student (t) Pita Roth, PT (c)      Row Name 01/14/20 1000             Stand-Sit Transfer    Stand-Sit [place] (Transfers)  moderate assist (50% patient effort);verbal cues;nonverbal cues (demo/gesture) assist to control descent c pt demo increased posterior  lean  -LH,NM,LH2      Assistive Device (Stand-Sit Transfers)  wheelchair;walker, front-wheeled  -LH,NM,LH2      Recorded by [LH,NM,LH2] Pita Roth, PT (r) Anmol Joseph, PT Student (t) Pita Roth, PT (c)      Row Name 01/14/20 1030 01/14/20 1000          Toilet Transfer    Type (Toilet Transfer)  stand pivot/stand step;sit-stand;stand-sit  -RD  stand pivot/stand step sit<.stand from wc and pivoted to face commode in standing   -LH,NM,LH2     [place] Level (Toilet Transfer)  moderate assist (50% patient effort);verbal cues;nonverbal cues (demo/gesture)  -RD  verbal cues;nonverbal cues (demo/gesture);moderate assist (50% patient effort)  -LH,NM,LH2     Assistive Device (Toilet Transfer)  grab bars/safety frame;wheelchair  -RD  grab bars/safety frame;wheelchair  -LH,NM,LH2     Recorded by [RD] Laurita Wayne, OT [LH,NM,LH2] Pita Roth, PT (r) Anmol Joseph, PT Student (t) Pita Roth, PT (c)     Row Name 01/14/20 1000             Gait/Stairs Assessment/Training    [place] Level (Gait)  minimum assist (75% patient effort);verbal cues;nonverbal cues (demo/gesture) CGA x second person   -LH,NM,LH2      Assistive Device (Gait)  walker, front-wheeled  -LH,NM,LH2      Distance in Feet (Gait)  100' x 2 in AM; 120' and 40' in PM worked on making turns c cues and assist c the walker   -LH,NM,LH2      Pattern (Gait)  step-through  -LH,NM,LH2      Deviations/Abnormal Patterns (Gait)  base of support, narrow;scissoring;bilateral deviations inconsistent stride length and forward posture   -LH,NM,LH2      Bilateral Gait Deviations  forward flexed posture;heel strike decreased  -LH,NM,LH2      Comment (Gait/Stairs)  Req assist managing walker; pt on IV LUE; vc for direction; decreased [place] knee extension during stance phase   -LH,NM,LH2      Recorded by [LH,NM,LH2] Pita Roth, PT (r) Anmol Joseph PT Student (t) Pita Roth, MAILE (c)      Row Name 01/14/20 1030             Basic Activities of  Daily Living (BADLs)    Basic Activities of Daily Living  bathing;upper body dressing;lower body dressing;grooming;toileting  -RD      Recorded by [RD] Laurita Wayne, OT      Row Name 01/14/20 1030             Bathing Assessment/Treatment    Bathing [place] Level  bathing skills;lower body;upper body;minimum assist (75% patient effort);moderate assist (50% patient effort);verbal cues  -RD      Bathing Position  sink side;supported sitting;supported standing  -RD      Bathing Setup Assistance  obtain supplies;adjust water temperature  -RD      Comment (Bathing)  A x2 when standing for safety  -RD      Recorded by [RD] Laurita Wayne, OT      Row Name 01/14/20 1030             Upper Body Dressing Assessment/Treatment    Upper Body Dressing Task  upper body dressing skills;doff;don;pull over garment;minimum assist (75% or more patient effort);verbal cues  -RD      Upper Body Dressing Position  supported sitting  -RD      Set-up Assistance (Upper Body Dressing)  obtain clothing  -RD      Comment (Upper Body Dressing)  assist to start donning shirt  -RD      Recorded by [RD] Laurita Wayne, OT      Row Name 01/14/20 1030             Lower Body Dressing Assessment/Treatment    Lower Body Dressing [place] Level  doff;don;pants/bottoms;socks;shoes/slippers;shoelaces;maximum assist (25% patient effort);verbal cues  -RD      Lower Body Dressing Position  supported sitting;supported standing  -RD      Lower Body Dressing Setup Assistance  obtain clothing  -RD      Comment (Lower Body Dressing)  A x2 when standing for safety  -RD      Recorded by [RD] Laurita Wayne, OT      Row Name 01/14/20 1030             Grooming Assessment/Treatment    Grooming [place] Level  grooming skills;deodorant application;oral care regimen;wash face, hands;minimum assist (75% patient effort);verbal cues  -RD      Grooming Position  sink side;supported sitting  -RD      Grooming Setup Assistance  obtain supplies   -RD      Recorded by [RD] Laurita Wayne OT      Row Name 01/14/20 1030             Toileting Assessment/Treatment    Toileting [place] Level  toileting skills;adjust/manage clothing;perform perineal hygiene;maximum assist (25% patient effort);verbal cues  -RD      Assistive Device Use (Toileting)  grab bar/safety frame  -RD      Toileting Position  supported sitting;supported standing  -RD      Toileting Setup Assistance  obtain supplies;change pad/brief  -RD      Comment (Toileting)  A x2 when standing for safety  -RD      Recorded by [RD] Laurita Wayne OT      Row Name 01/14/20 1030 01/14/20 1000          Pain Scale: Numbers Pre/Post-Treatment    Pain Scale: Numbers, Pretreatment  0/10 - no pain  -RD  0/10 - no pain  -LH,NM,LH2     Pain Scale: Numbers, Post-Treatment  0/10 - no pain  -RD  0/10 - no pain  -LH,NM,LH2     Recorded by [RD] Laurita Wayne OT [LH,NM,LH2] Pita Roth, PT (r) Anmol Joseph, MAILE Student (t) Pita Roth, PT (c)     Row Name 01/14/20 1030             Upper Extremity Seated Therapeutic Exercise    Performed, Seated Upper Extremity (Therapeutic Exercise)  scapular protraction/retraction;digit flexion/extension  -RD      Device, Seated Upper Extremity (Therapeutic Exercise)  restorator/arm bike hand gripper  -RD      Exercise Type, Seated Upper Extremity (Therapeutic Exercise)  ADL specificity exercises;resistive exercise  -RD      Expected Outcomes, Seated Upper Extremity (Therapeutic Exercise)  improve functional tolerance, self-care activity;improve performance, BADLs;improve performance, transfer skills  -RD      Sets/Reps Detail, Seated Upper Extremity (Therapeutic Exercise)  2 min x 2 trials w/ UBE- rest break between sets; 12 reps x 2 sets w/ hand gripper  -RD      Recorded by [RD] Laurita Wayne OT      Row Name 01/14/20 1030 01/14/20 1000          Positioning and Restraints    Pre-Treatment Position  sitting in chair/recliner  -RD  sitting in  chair/recliner  -LH,NM,LH2     Post Treatment Position  wheelchair  -RD  wheelchair  -LH,NM,LH2     In Wheelchair  sitting;exit alarm on;patient within staff view at nursing station  -RD  call light within reach;encouraged to call for assist;exit alarm on;with family/caregiver  -LH,NM,LH2     Recorded by [RD] Laurita Wayne, OT [LH,NM,LH2] Pita Roth, PT (r) Anmol Joseph, PT Student (t) Pita Roth, PT (c)     Row Name 01/14/20 1000             Weekly Summary of Progress (PT)    Weekly Outcome Summary: Physical Therapy  Pt has met 3/3 short term goals for bed mob, sit<>stands and gait. Pt has made consistent progress since start of care toward 4 long term goals. Pt conitinues to demonstrate ability to participate and improve c skilled PT interventions.   -LH,NM,LH2      Recorded by [LH,NM,LH2] Pita Roth, PT (r) Anmol oJseph, PT Student (t) Pita Roth, PT (c)        User Key  (r) = Recorded By, (t) = Taken By, (c) = Cosigned By    Initials Name Effective Dates    Jayna Barnes MS CCC-SLP 06/08/18 -     Pita Lobo, PT 04/03/18 -     Laurita Lawrence, OT 10/14/19 -     Anmol Cordero, PT Student 01/03/20 -         Wound 01/06/20 2200 head Incision (Active)   Dressing Appearance open to air 1/14/2020  9:00 AM   Closure Approximated 1/13/2020  7:50 PM   Base clean;dry 1/13/2020  7:50 PM   Periwound dry 1/14/2020  9:00 AM   Drainage Amount none 1/13/2020  7:50 PM     Physical Therapy Education                 Title: PT OT SLP Therapies (In Progress)     Topic: Physical Therapy (In Progress)     Point: Mobility training (In Progress)     Description:   Instruct learner(s) on safety and technique for assisting patient out of bed, chair or wheelchair.  Instruct in the proper use of assistive devices, such as walker, crutches, cane or brace.              Patient Friendly Description:   It's important to get you on your feet again, but we need to do so in a way that is safe for you.  Falling has serious consequences, and your personal safety is the most important thing of all.        When it's time to get out of bed, one of us or a family member will sit next to you on the bed to give you support.     If your doctor or nurse tells you to use a walker, crutches, a cane, or a brace, be sure you use it every time you get out of bed, even if you think you don't need it.    Learning Progress Summary           Patient Acceptance, E, NR by NM at 1/14/2020 1111    Comment:  Reinforcement of setup for transfers in order to increase independence and safety    Acceptance, E, VU,NR by NM at 1/13/2020 1545    Comment:  education regarding safety during transfers    Acceptance, E, NR by  at 1/11/2020 0919    Acceptance, E, VU,NR by  at 1/10/2020 1033    Acceptance, E, NR by  at 1/9/2020 1449    Acceptance, E, NR by  at 1/8/2020 0949    Acceptance, E, NR by  at 1/7/2020 1158   Family Acceptance, E, NR by  at 1/7/2020 1158                   Point: Home exercise program (In Progress)     Description:   Instruct learner(s) on appropriate technique for monitoring, assisting and/or progressing patient with therapeutic exercises and activities.              Learning Progress Summary           Patient Acceptance, E, VU,NR by  at 1/10/2020 1033    Acceptance, E, NR by  at 1/7/2020 1158   Family Acceptance, E, NR by  at 1/7/2020 1158                   Point: Body mechanics (In Progress)     Description:   Instruct learner(s) on proper positioning and spine alignment for patient and/or caregiver during mobility tasks and/or exercises.              Learning Progress Summary           Patient Acceptance, E, NR by NM at 1/14/2020 1111    Comment:  Reinforcement of setup for transfers in order to increase independence and safety    Acceptance, E, VU,NR by NM at 1/13/2020 1545    Comment:  education regarding safety during transfers    Acceptance, E, NR by  at 1/11/2020 0919    Acceptance, E, NR by  at  1/7/2020 1158   Family Acceptance, E, NR by  at 1/7/2020 1158                   Point: Precautions (In Progress)     Description:   Instruct learner(s) on prescribed precautions during mobility and gait tasks              Learning Progress Summary           Patient Acceptance, E, NR by NM at 1/14/2020 1111    Comment:  Reinforcement of setup for transfers in order to increase independence and safety    Acceptance, E, VU,NR by NM at 1/13/2020 1545    Comment:  education regarding safety during transfers    Acceptance, E, NR by  at 1/8/2020 0949                               User Key     Initials Effective Dates Name Provider Type Discipline     04/03/18 -  Pita Roth, PT Physical Therapist PT    JK 04/03/18 -  Binta Hartman, PT Physical Therapist PT    NM 01/03/20 -  Anmol Joseph, MAILE Student PT Student PT                  PT Recommendation and Plan               PT IRF GOALS     Row Name 01/14/20 0800             Bed Mobility Goal 1 (PT-IRF)    Progress/Outcomes (Bed Mobility Goal 1, PT-IRF)  goal met  -LH (r) NM (t) LH (c)         Bed Mobility Goal 2 (PT-IRF)    [place] Level (Bed Mobility Goal 2, PT-IRF)  conditional independence;supervision required  -LH (r) NM (t) LH (c)      Time Frame (Bed Mobility Goal 2, PT-IRF)  3 weeks  -LH (r) NM (t) LH (c)      Progress/Outcomes (Bed Mobility Goal 2, PT-IRF)  goal ongoing  -LH (r) NM (t) LH (c)         Transfer Goal 1 (PT-IRF)    Progress/Outcomes (Transfer Goal 1, PT-IRF)  goal met  -LH (r) NM (t) LH (c)         Transfer Goal 2 (PT-IRF)    Activity/Assistive Device (Transfer Goal 2, PT-IRF)  sit-to-stand/stand-to-sit  -LH (r) NM (t) LH (c)      [place] Level (Transfer Goal 2, PT-IRF)  standby assist  -LH (r) NM (t) LH (c)      Time Frame (Transfer Goal 2, PT-IRF)  3 weeks  -LH (r) NM (t) LH (c)      Progress/Outcomes (Transfer Goal 2, PT-IRF)  goal ongoing  -LH (r) NM (t) LH (c)         Transfer Goal 3 (PT-IRF)    Activity/Assistive Device  (Transfer Goal 3, PT-IRF)  car transfer  -LH (r) NM (t) LH (c)      [place] Level (Transfer Goal 3, PT-IRF)  standby assist  -LH (r) NM (t) LH (c)      Time Frame (Transfer Goal 3, PT-IRF)  3 weeks  -LH (r) NM (t) LH (c)      Progress/Outcomes (Transfer Goal 3, PT-IRF)  goal ongoing  -LH (r) NM (t) LH (c)         Gait/Walking Locomotion Goal 1 (PT-IRF)    Progress/Outcomes (Gait/Walking Locomotion Goal 1, PT-IRF)  goal met  -LH (r) NM (t) LH (c)         Gait/Walking Locomotion Goal 2 (PT-IRF)    Activity/Assistive Device (Gait/Walking Locomotion Goal 2, PT-IRF)  -- LRAD, walking aide   -LH (r) NM (t) LH (c)      Gait/Walking Locomotion Distance Goal 2 (PT-IRF)  160  -LH (r) NM (t) LH (c)      [place] Level (Gait/Walking Locomotion Goal 2, PT-IRF)  standby assist  -LH (r) NM (t) LH (c)      Time Frame (Gait/Walking Locomotion Goal 2, PT-IRF)  3 weeks  -LH (r) NM (t) LH (c)      Progress/Outcomes (Gait/Walking Locomotion Goal 2, PT-IRF)  goal ongoing  -LH (r) NM (t) LH (c)        User Key  (r) = Recorded By, (t) = Taken By, (c) = Cosigned By    Initials Name Provider Type    LH Pita Roth, PT Physical Therapist    NM Anmol Joseph, PT Student PT Student               Time Calculation:     PT Charges     Row Name 01/14/20 1416 01/14/20 1109          Time Calculation    Start Time  1230  -LH (r) NM (t) LH (c)  0900  -LH (r) NM (t) LH (c)     Stop Time  1300  -LH (r) NM (t) LH (c)  0930  -LH (r) NM (t) LH (c)     Time Calculation (min)  30 min  -LH (r) NM (t)  30 min  -LH (r) NM (t)     PT Received On  --  01/14/20  -LH (r) NM (t) LH (c)     PT - Next Appointment  --  01/15/20  -LH (r) NM (t)  (c)       User Key  (r) = Recorded By, (t) = Taken By, (c) = Cosigned By    Initials Name Provider Type     Pita Roth, PT Physical Therapist    Anmol Cordero PT Student PT Student          Therapy Charges for Today     Code Description Service Date Service Provider Modifiers Qty    23350470260  PT  THER PROC EA 15 MIN 1/13/2020 Anmol Joseph, PT Student GP 2    59251572553 HC GAIT TRAINING EA 15 MIN 1/13/2020 Anmol Joseph, PT Student GP 2    59677481545 HC PT THER PROC EA 15 MIN 1/14/2020 Anmol Joseph, PT Student GP 4                   Anmol Joseph, PT Student  1/14/2020

## 2020-01-14 NOTE — PLAN OF CARE
Problem: Patient Care Overview  Goal: Plan of Care Review  Outcome: Ongoing (interventions implemented as appropriate)  Flowsheets (Taken 1/14/2020 6792)  Outcome Summary: Pt sleeping well so far. Gets a little restless/ disoriented to time of day, reoriented prn. Incont. bladder. No bm yet this shift. Still need stool OB specimen. Meds whole with applesauce. Mother stayed with pt.  Progress, Functional Goals: demonstrating adequate progress  Plan of Care Reviewed With: patient  IRF Plan of Care Review: progress ongoing, continue

## 2020-01-14 NOTE — PROGRESS NOTES
Inpatient Rehabilitation Functional Measures Assessment and Plan of Care    Plan of Care  Updated Problems/Interventions  Field    Functional Measures  JENNIFER Eating:  [facility] Grooming: [facility] Bathing:  [facility] Upper Body Dressing:  [facility] Lower Body Dressing:  [facility] Toileting:  [facility] Bladder Management  Level of Assistance:  [place]  Frequency/Number of Accidents this Shift:  [facility] Bowel Management  Level of Assistance: [place]  Frequency/Number of Accidents this Shift: [facility] Bed/Chair/Wheelchair Transfer:  [facility] Toilet Transfer:  [facility] Tub/Shower Transfer:  [place]    Previously Documented Mode of Locomotion at Discharge: Field  JENNIFER Expected Mode of Locomotion at Discharge: [facility] Walk/Wheelchair:  [facility] Stairs:  [facility] Comprehension:  [facility] Expression:  [facility] Social Interaction:  [facility] Problem Solving:  [facility] Memory:  Benedicta    Therapy Mode Minutes  Occupational Therapy: Individual: 30 minutes.  Physical Therapy: [place]  Speech Language Pathology:  Benedicta    Signed by: AVA Dodd/ZOË

## 2020-01-15 PROCEDURE — 25010000002 HEPARIN (PORCINE) PER 1000 UNITS: Performed by: PHYSICAL MEDICINE & REHABILITATION

## 2020-01-15 PROCEDURE — 25010000003 CEFTRIAXONE PER 250 MG: Performed by: PHYSICAL MEDICINE & REHABILITATION

## 2020-01-15 PROCEDURE — 97116 GAIT TRAINING THERAPY: CPT

## 2020-01-15 PROCEDURE — 97110 THERAPEUTIC EXERCISES: CPT

## 2020-01-15 PROCEDURE — 97129 THER IVNTJ 1ST 15 MIN: CPT

## 2020-01-15 PROCEDURE — 97130 THER IVNTJ EA ADDL 15 MIN: CPT

## 2020-01-15 PROCEDURE — 92526 ORAL FUNCTION THERAPY: CPT

## 2020-01-15 PROCEDURE — 97535 SELF CARE MNGMENT TRAINING: CPT

## 2020-01-15 RX ORDER — NYSTATIN 100000 [USP'U]/G
1 POWDER TOPICAL EVERY 12 HOURS SCHEDULED
Status: DISCONTINUED | OUTPATIENT
Start: 2020-01-15 | End: 2020-01-24 | Stop reason: HOSPADM

## 2020-01-15 RX ADMIN — HEPARIN SODIUM 5000 UNITS: 5000 INJECTION INTRAVENOUS; SUBCUTANEOUS at 21:47

## 2020-01-15 RX ADMIN — PHENYTOIN SODIUM 100 MG: 100 CAPSULE, EXTENDED RELEASE ORAL at 14:32

## 2020-01-15 RX ADMIN — HYDROXYZINE PAMOATE 25 MG: 25 CAPSULE ORAL at 21:47

## 2020-01-15 RX ADMIN — PHENYTOIN SODIUM 100 MG: 100 CAPSULE, EXTENDED RELEASE ORAL at 21:47

## 2020-01-15 RX ADMIN — HEPARIN SODIUM 5000 UNITS: 5000 INJECTION INTRAVENOUS; SUBCUTANEOUS at 05:54

## 2020-01-15 RX ADMIN — HEPARIN SODIUM 5000 UNITS: 5000 INJECTION INTRAVENOUS; SUBCUTANEOUS at 14:37

## 2020-01-15 RX ADMIN — CLONAZEPAM 1 MG: 0.5 TABLET ORAL at 05:54

## 2020-01-15 RX ADMIN — LACOSAMIDE 200 MG: 100 TABLET, FILM COATED ORAL at 08:43

## 2020-01-15 RX ADMIN — PHENYTOIN SODIUM 100 MG: 100 CAPSULE, EXTENDED RELEASE ORAL at 05:54

## 2020-01-15 RX ADMIN — TOPIRAMATE 25 MG: 25 TABLET, FILM COATED ORAL at 08:43

## 2020-01-15 RX ADMIN — CLONAZEPAM 1 MG: 0.5 TABLET ORAL at 21:47

## 2020-01-15 RX ADMIN — PANTOPRAZOLE SODIUM 40 MG: 40 TABLET, DELAYED RELEASE ORAL at 05:54

## 2020-01-15 RX ADMIN — METOPROLOL TARTRATE 50 MG: 50 TABLET, FILM COATED ORAL at 08:43

## 2020-01-15 RX ADMIN — POTASSIUM CHLORIDE 20 MEQ: 750 CAPSULE, EXTENDED RELEASE ORAL at 08:43

## 2020-01-15 RX ADMIN — CHOLECALCIFEROL TAB 10 MCG (400 UNIT) 400 UNITS: 10 TAB at 08:43

## 2020-01-15 RX ADMIN — CLONAZEPAM 1 MG: 0.5 TABLET ORAL at 14:32

## 2020-01-15 RX ADMIN — METOPROLOL TARTRATE 50 MG: 50 TABLET, FILM COATED ORAL at 21:47

## 2020-01-15 RX ADMIN — NYSTATIN 1 APPLICATION: 100000 POWDER TOPICAL at 21:48

## 2020-01-15 RX ADMIN — LEVETIRACETAM 500 MG: 100 SOLUTION ORAL at 08:43

## 2020-01-15 RX ADMIN — ATORVASTATIN CALCIUM 10 MG: 10 TABLET, FILM COATED ORAL at 08:43

## 2020-01-15 RX ADMIN — DEXTROAMPHETAMINE SACCHARATE, AMPHETAMINE ASPARTATE MONOHYDRATE, DEXTROAMPHETAMINE SULFATE, AND AMPHETAMINE SULFATE 30 MG: 2.5; 2.5; 2.5; 2.5 CAPSULE, EXTENDED RELEASE ORAL at 10:38

## 2020-01-15 RX ADMIN — NYSTATIN 1 APPLICATION: 100000 POWDER TOPICAL at 14:31

## 2020-01-15 RX ADMIN — CEFTRIAXONE SODIUM 2 G: 2 INJECTION, SOLUTION INTRAVENOUS at 10:39

## 2020-01-15 RX ADMIN — LEVETIRACETAM 500 MG: 100 SOLUTION ORAL at 21:47

## 2020-01-15 RX ADMIN — LACOSAMIDE 200 MG: 100 TABLET, FILM COATED ORAL at 21:47

## 2020-01-15 NOTE — PROGRESS NOTES
LOS: 9 days   Patient Care Team:  Jolene Laurent MD as PCP - General (Family Medicine)  Efren Martínez MD as PCP - Claims Attributed    Chief Complaint:   Status post infected  shunt-removed-CNS infection/pneumocephalus  Status post 12/23/ 2019 - Removal of ventriculoperitoneal shunt intracranial portion, valve, partial peritoneal down to the cervical region  Status post 12/31/2019 endoscopic repair of paranasal sinus defect  ID-ceftriaxone-antibiotics until January 14, 2020  Seizure disorder-multidrug regimen-phenytoin/Vimpat/Keppra/clonazepam/Topamax  Remote traumatic brain injury  Neuro stimulation-Adderall  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  Bilateral SCDs.  Status post acute renal pdpadscpsnysd-NUC-psaugj creatinine.  Avoid NSAID/ACE inhibitor's.    Subjective     History of Present Illness     Patient seen and occupational therapy doing ADLs.  Overall showing improvement.  Does have what appears to be dry fungal type rash at the bilateral thighs.  Tolerating activities.  We discussed having neurology see him in follow-up at some point to review possibly decreasing the additional seizure medications further.      History taken from: patient chart family    Objective     Vital Signs  Temp:  [97.8 °F (36.6 °C)-98.4 °F (36.9 °C)] 98.4 °F (36.9 °C)  Heart Rate:  [100-116] 116  Resp:  [18] 18  BP: (130-134)/(76-97) 134/90    Physical Exam  Mental status: awake and alert.  HEENT-craniotomy incision clean dry and intact.   Pulm: Normal respirations  Heart: S1, S2  Abdomen:  , non-distended   Extremities: No cyanosis or edema   : Dry rash bilateral inner thighs  Neuro: awake   good speed with responses, following commands.    Blindness-chronic  Strength:        Results Review:    I reviewed the patient's new clinical results.     Results from last 7 days   Lab Units 01/13/20  0721 01/10/20  0712   WBC 10*3/mm3 7.85 8.30   HEMOGLOBIN g/dL 8.9* 9.4*   HEMATOCRIT % 25.6* 28.2*   PLATELETS 10*3/mm3 429 387     Results from last 7 days   Lab Units 01/13/20  0721 01/12/20  0704 01/10/20  0712   SODIUM mmol/L 145 140 144   POTASSIUM mmol/L 3.5 3.1* 3.3*   CHLORIDE mmol/L 105 101 103   CO2 mmol/L 27.7 26.4 26.6   BUN mg/dL 23* 26* 38*   CREATININE mg/dL 1.36* 1.33* 1.67*   CALCIUM mg/dL 9.1 8.8 9.3   BILIRUBIN mg/dL 0.2  --   --    ALK PHOS U/L 106  --   --    ALT (SGPT) U/L 20  --   --    AST (SGOT) U/L 21  --   --    GLUCOSE mg/dL 102* 106* 113*       Medication Review:   Scheduled Meds:    amphetamine-dextroamphetamine XR 30 mg Oral Daily   atorvastatin 10 mg Oral Daily   cholecalciferol 400 Units Oral Daily   clonazePAM 1 mg Oral Q8H   heparin (porcine) 5,000 Units Subcutaneous Q8H   lacosamide 200 mg Oral Q12H   levETIRAcetam 500 mg Oral Q12H   metoprolol tartrate 50 mg Oral Q12H   nystatin 1 application Topical Q12H   pantoprazole 40 mg Oral Q AM   phenytoin 100 mg Oral Q8H   polyethylene glycol 17 g Oral Daily   potassium chloride 20 mEq Oral Daily   topiramate 25 mg Oral Daily     Continuous Infusions:   PRN Meds:.•  acetaminophen  •  hydrOXYzine pamoate    Assessment/Plan       H/O traumatic brain injury  Status post infected  shunt-removed-CNS infection/pneumocephalus  -Status post 12/23/ 2019 - Removal of ventriculoperitoneal shunt intracranial portion, valve, partial peritoneal down to the cervical region  -Status post 12/31/2019 endoscopic repair of paranasal sinus defect  -ID-ceftriaxone-antibiotics until January 14, 2020    Seizure disorder-multidrug regimen-phenytoin/Vimpat/Keppra/clonazepam/Topamax  -January 9-patient was on phenytoin at home which was increased at the outside hospital, on Topamax but less than antiepileptic  dose.  He had Vimpat, Keppra, clonazepam added at the outside hospital.  Consulted neurology for input patient has fatigue felt possibly related to his increased antiepileptic medication.  Reviewed with neurology and Keppra dose being decreased.  -January 10- Keppra decreased from 2000 mg BID to 50 mg BID with improved alertness. Neurology continues to follow.   -January 15-Discussed having neurology see him in follow-up at some point to review possibly decreasing the additional seizure medications further.      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  Bilateral SCDs.    Status post acute renal hmonuennybhjv-ZSS-xhumer creatinine.  Avoid NSAID/ACE inhibitor's.    Anemia-trend downward.  Continue to monitor-recheck Jan 16 Thursday.  Placed him back on a protein pump inhibitor .Hemoccult was negative.      Sleep-January 13- Family reports restlessness at night. Vistaril added PRN last night without response. Since vistaril has only been tried one night, will continue with Vistaril PRN at bedtime for now and continue to monitor.      Skin-dry rash to bilateral inner thighs-try Mycostatin powder     Now admit for comprehensive acute inpatient rehabilitation .  This would be an interdisciplinary program with physical therapy 1 hour,  occupational therapy 1 hour, and speech therapy 1 hour, 5 days a week.  Rehabilitation nursing for carryover, monitoring of incision and neurologic   status, bowel and bladder, and skin  Ongoing physician follow-up.  Weekly team conferences.  Goals are indeterminate.   Rehabilitation prognosis indeterminate.  Medical prognosis indeterminate.   Estimated length of stay is indeterminate  The patient's functional status and clinical status is unchanged from preadmission assessment and the patient continues appropriate for acute inpatient rehabilitation.  Goal is for home with outpatient   therapies.  Barrier to discharge: Cognition/mobility- work on trunk control, strength, balance to overcome.     TEAM CONF - JAN 9 - BED MAX 2.  TRANSFERS MAX 2.  GAIT 8 FEET PARALLEL BARS MOD 2. TOILET TRANSFERS MAX 2.  BLIND.  SLOW PROCESSING.  GROOMING MOD. UBD MAX. LBD DEP. DYSPHAGIA - PUREE/THINS.  FATIGUES WITH COGNITIVE  THERAPY.  ELOS :  4 WEEKS       I discussed the patients findings and my recommendations with patient, family and nursing staff    Janusz Solitario MD  01/15/20  11:19 AM

## 2020-01-15 NOTE — THERAPY TREATMENT NOTE
Inpatient Rehabilitation - Speech Language Pathology Treatment Note    [facility]       Patient Name: Tye JONES Junior  : 1975  MRN: [id number]    Today's Date: 1/15/2020           Admit Date: 2020      Visit Dx:        ICD-10-CM ICD-9-CM   1. Impaired mobility Z74.09 799.89       Patient Active Problem List   Diagnosis   • Mixed hyperlipidemia   • Essential hypertension   • Traumatic brain injury (CMS/HCC)   • Acute allergic rhinitis   • Seizure (CMS/HCC)   • Screen for colon cancer   • H/O traumatic brain injury          Therapy Treatment    Evaluation/Coping    Evaluation/Treatment Time and Intent  Subjective Information: complains of, fatigue (01/15/20 1100 : Jayna Coker, MS CCC-SLP)  Existing Precautions/Restrictions: fall(swallow) (01/15/20 1100 : Jayna Coker MS CCC-SLP)  Document Type: therapy note (daily note) (01/15/20 1100 : Jayna Coker MS CCC-SLP)  Mode of Treatment: individual therapy, speech-language pathology (01/15/20 1100 : Jayna Coker MS CCC-SLP)  Patient/Family Observations: fatiqued; lethargic/drowsy (01/15/20 1100 : Jayna Coker, MS CCC-SLP)    Vitals/Pain/Safety         Cognition/Communication         Oral Motor/Eating         Mobility/Basic Activities/Instrumental Activities/Motor/Modality                   ROM/MMT                   Sensory/Myotome/Dermatome/Edema               Posture/Balance/Special Tests/Exercise/Transportation/Sexual Function                   Orthotics/Residual Limb/Prosthetic Management              Outcome Summary         EDUCATION    The patient has been educated in the following areas:     Cognitive Impairment Communication Impairment Dysphagia.    SLP Recommendation and Plan                                                          SLP GOALS     Row Name 01/15/20 1100 01/15/20 0800 20 1400       Labial Strengthening Goal 1 (SLP)    Activity (Labial Strengthening Goal 1, SLP)  increase labial tone  -SL  --  --    [place]/Accuracy (Labial  Strengthening Goal 1, SLP)  with maximum cues (25-49% accuracy)  -SL  --  --    Barriers (Labial Strengthening Goal 1, SLP)  lethargic  -SL  --  --    Progress/Outcomes (Labial Strengthening Goal 1, SLP)  goal ongoing  -SL  --  --       Lingual Strengthening Goal 1 (SLP)    Activity (Lingual Strengthening Goal 1, SLP)  increase tongue back strength;increase lingual tone/sensation/control/coordination/movement  -SL  --  --    [place]/Accuracy (Lingual Strengthening Goal 1, SLP)  with maximum cues (25-49% accuracy)  -SL  --  --    Barriers (Lingual Strengthening Goal 1, SLP)  slow weak mvmts; max cues and still displayed poor/reduced ROM- lethargy neg impacting performance  -SL  --  --    Progress/Outcomes (Lingual Strengthening Goal 1, SLP)  goal ongoing  -SL  --  --       Comprehend Questions Goal 1 (SLP)    Improve Ability to Comprehend Questions Goal 1 (SLP)  --  simple yes/no questions;simple general questions  -  --    Progress (Ability to Comprehend Questions Goal 1, SLP)  --  60%;70%;with minimal cues (75-90%);independently (over 90% accuracy)  -  --    Progress/Outcomes (Comprehend Questions Goal 1, SLP)  --  goal ongoing  -SL  --       Attention Goal 1 (SLP)    Progress (Attention Goal 1, SLP)  with maximum cues (25-49%);with 1:1 supervision/constant cues  -SL  --  --    Progress/Outcomes (Attention Goal 1, SLP)  goal ongoing  -SL  --  --    Comment (Attention Goal 1, SLP)  lethargic- constant repetition of all stimulus required  -  --  --       Orientation Goal 1 ([facility])    Improve Orientation Through Goal 1 (SLP)  --  demonstrating orientation to place;demonstrating orientation to year;demonstrating orientation to month;demonstrating orientation to day;demonstrating orientation to disease/impairment  -  --    Progress (Orientation Goal 1, SLP)  --  50%;with minimal cues (75-90%)  -SL  --    Progress/Outcomes (Orientation Goal 1, SLP)  --  goal ongoing  -  --    Comment (Orientation Goal 1,  SLP)  --  did not know age, address, month, year, day, place  -SL  --       Organizational Skills Goal 1 (SLP)    Progress (Thought Organization Skills Goal 1, SLP)  --  --  40%;with moderate cues (50-74%) convergent categorization  -SL    Progress/Outcomes (Thought Organization Skills Goal 1, SLP)  --  --  goal ongoing  -       Reasoning Goal 1 (SLP)    Improve Reasoning Through Goal 1 (SLP)  --  --  complete mental flexibility task;complete deductive reasoning task;complete basic reasoning task;complete logic/creative thinking task;90%;with minimal cues (75-90%);independently (over 90% accuracy)  -SL    Time Frame (Reasoning Goal 1, SLP)  --  --  by discharge  -SL    Barriers (Reasoning Goal 1, SLP)  --  --  fatiqued- falling asleep at times; multiple reps of all stimulus required  -SL    Progress (Reasoning Goal 1, SLP)  --  70%;independently (over 90% accuracy) simple concrete general verbal analogies  -SL  60%;with maximum cues (25-49%);with moderate cues (50-74%) simple word deductions  -SL    Progress/Outcomes (Reasoning Goal 1, SLP)  --  goal ongoing  -SL  goal ongoing  -       Functional Problem Solving Skills Goal 1 (SLP)    Improve Problem Solving Through Goal 1 (SLP)  --  --  determine solutions to simple ADL/safety problems;sequence steps in a task;name items for a task;complete organization/home management task;90%;independently (over 90% accuracy)  -SL    Time Frame (Problem Solving Goal 1, SLP)  --  --  by discharge  -SL    Barriers (Problem Solving Goal 1, SLP)  slow to respond/form ideas; perseverative; intermittently began utterance but did not finish ideas  -SL  --  --    Progress (Problem Solving Goal 1, SLP)  20%;30%;with 1:1 supervision/constant cues;with maximum cues (25-49%) similarity/difference  -SL  --  30%;40%;with moderate cues (50-74%);with maximum cues (25-49%) simple situational verbal problem solving  -SL    Progress/Outcomes (Problem Solving Goal 1, SLP)  goal ongoing  -  --   goal ongoing  -SL    Comment (Problem Solving Goal 1, SLP)  --  --  slow to respond; some perseveration; intermittently no response; multiple promts and repetitions of stimulus  -SL    Row Name 01/14/20 1100 01/13/20 1130          Comprehend Questions Goal 1 (SLP)    Time Frame (Comprehend Questions Goal 1, SLP)  --  short term goal (STG)  -NR     Barriers (Comprehend Questions Goal 1, SLP)  slow processing; multiple reps of stimulus  -SL  --     Progress (Ability to Comprehend Questions Goal 1, SLP)  60%;with moderate cues (50-74%) simple comparative/general questions  -SL  50%;90%;independently (over 90% accuracy);with maximum cues (25-49%)  -NR     Progress/Outcomes (Comprehend Questions Goal 1, SLP)  goal ongoing  -SL  goal ongoing  -NR     Comment (Comprehend Questions Goal 1, SLP)  multiple repetitions of stimulus questions required to elicit responses  -SL  --        Attention Goal 1 (SLP)    Comment (Attention Goal 1, SLP)  redirection back to task required throughout session- pt perseverative and tangential  -SL  --        Orientation Goal 1 ([facility])    Improve Orientation Through Goal 1 (SLP)  --  demonstrating orientation to day;demonstrating orientation to month;demonstrating orientation to year;demonstrating orientation to place  -NR     Time Frame (Orientation Goal 1, SLP)  --  short term goal (STG)  -NR     Progress (Orientation Goal 1, SLP)  30%;40%;independently (over 90% accuracy)  -SL  100%;with maximum cues (25-49%) oriented to month only independently  -NR     Progress/Outcomes (Orientation Goal 1, SLP)  goal ongoing  -SL  goal ongoing  -NR        Memory Skills Goal 1 (SLP)    Improve Memory Skills Through Goal 1 (SLP)  --  select a word from a list by exclusion  -NR     Time Frame (Memory Skills Goal 1, SLP)  --  short term goal (STG)  -NR     Progress (Memory Skills Goal 1, SLP)  --  0%;100%;with moderate cues (50-74%);with maximum cues (25-49%)  -NR        Organizational Skills Goal 1 (SLP)     Improve Thought Organization Through Goal 1 (SLP)  completing a divergent naming task;completing a convergent naming task;generating a list of items in a category;naming by category exclusion;naming similarities and differences;90%;independently (over 90% accuracy)  -SL  --     Time Frame (Thought Organization Skills Goal 1, SLP)  by discharge  -SL  --     Barriers (Thought Organization Skills Goal 1, SLP)  slow responses; perseverative  -SL  --     Progress (Thought Organization Skills Goal 1, SLP)  with maximum cues (25-49%) divergent ( concrete) naming  -SL  --     Comment (Thought Organization Skills Goal 1, SLP)  named 1 item in simple concrete category in 1 minute ( fruits); named one additional item without cues in another 1 min; then 2 more items with mod cues  -SL  --       User Key  (r) = Recorded By, (t) = Taken By, (c) = Cosigned By    Initials Name Provider Type    Jayna Barnes MS CCC-SLP Speech and Language Pathologist    NR Hailey Cote MA,CCC-SLP Speech and Language Pathologist             SLP Outcome Measures (last 72 hours)      SLP Outcome Measures     Row Name 01/13/20 1130             Adult FCM Scores    Memory FCM Score  3  -NR        User Key  (r) = Recorded By, (t) = Taken By, (c) = Cosigned By    Initials Name Effective Dates    NR Hailey Cote MA,CCC-SLP 06/08/18 -               Time Calculation:       Time Calculation- SLP     Row Name 01/15/20 1127 01/15/20 0827          Time Calculation- SLP    SLP Start Time  1100  -SL  0800  -     SLP Stop Time  1130  -SL  0830  -     SLP Time Calculation (min)  30 min  -SL  30 min  -       User Key  (r) = Recorded By, (t) = Taken By, (c) = Cosigned By    Initials Name Provider Type    Jayna Barnes MS CCC-SLP Speech and Language Pathologist            Therapy Charges for Today     Code Description Service Date Service Provider Modifiers Qty    38684470112  ST DEV OF COGN SKILLS INITIAL 15 MIN 1/14/2020 Jayna Coker MS CCC-SLP  1     24318908908 HC ST DEV OF COGN SKILLS EACH ADDT'L 15 MIN 1/14/2020 Sheela Jayna, MS CCC-SLP  1    66224368472 HC ST DEV OF COGN SKILLS EACH ADDT'L 15 MIN 1/14/2020 Sheela Jayna MS CCC-SLP  2    70555155066 HC ST DEV OF COGN SKILLS INITIAL 15 MIN 1/15/2020 Sheela Jayna MS CCC-SLP  1    [id number] HC ST DEV OF COGN SKILLS EACH ADDT'L 15 MIN 1/15/2020 Sheela Jayna MS CCC-SLP  1    72700605119 HC ST DEV OF COGN SKILLS EACH ADDT'L 15 MIN 1/15/2020 Sheela Jayna, MS CCC-SLP  1    51811130226 HC ST TREATMENT SWALLOW 1 1/15/2020 Sheela Jayna MS CCC-SLP GN 1               ADULT NOMS (last 72 hours)      Adult NOMS     Row Name 01/13/20 1130                   Adult FCM Scores    Memory FCM Score  3  -NR          User Key  (r) = Recorded By, (t) = Taken By, (c) = Cosigned By    Initials Name Effective Dates    NR Hailey Cote MA,CCC-SLP 06/08/18 -                      Jayna Coker MS CCC-SLP  1/15/2020

## 2020-01-15 NOTE — THERAPY TREATMENT NOTE
Inpatient Rehabilitation - Occupational Therapy Progress Note    [facility]     Patient Name: Tye JONES Junior  : 1975  MRN: [id number]    Today's Date: 1/15/2020                 Admit Date: 2020      Visit Dx:    ICD-10-CM ICD-9-CM   1. Impaired mobility Z74.09 799.89       Patient Active Problem List   Diagnosis   • Mixed hyperlipidemia   • Essential hypertension   • Traumatic brain injury (CMS/HCC)   • Acute allergic rhinitis   • Seizure (CMS/HCC)   • Screen for colon cancer   • H/O traumatic brain injury         Therapy Treatment    IRF Treatment Summary     Row Name 01/15/20 1531 01/15/20 1500 01/15/20 1100       Evaluation/Treatment Time and Intent    Subjective Information  no complaints  -CC  no complaints  (Pended)   -NM  complains of;fatigue  -SL    Existing Precautions/Restrictions  fall  -CC  fall  (Pended)   -NM  fall swallow  -SL    Document Type  therapy note (daily note)  -CC  therapy note (daily note)  (Pended)   -NM  therapy note (daily note)  -SL    Mode of Treatment  occupational therapy  -CC  physical therapy  (Pended)   -NM  individual therapy;speech-language pathology  -SL    Patient/Family Observations  --  up in ; no distress; mother present   (Pended)   -NM  fatiqued; lethargic/drowsy  -SL    Recorded by [CC] Neris Morales, OTR [NM] Anmol Joseph, PT Student [SL] Jayna Coker, MS CCC-SLP    Row Name 01/15/20 0800             Evaluation/Treatment Time and Intent    Subjective Information  no complaints  -SL      Existing Precautions/Restrictions  fall swallow  -SL      Document Type  therapy note (daily note)  -SL      Mode of Treatment  individual therapy;speech-language pathology  -SL      Patient/Family Observations  cooperative; slow to repsond  -SL      Recorded by [SL] Jayna Coker MS CCC-SLP      Row Name 01/15/20 1531 01/15/20 1500          Cognition/Psychosocial- PT/OT    Affect/Mental Status (Cognitive)  WFL  -CC  WFL  (Pended)   -NM     Orientation Status  (Cognition)  --  oriented to;person;situation  (Pended)   -NM     Follows Commands (Cognition)  --  follows one step commands;physical/tactile prompts required;verbal cues/prompting required;increased processing time needed  (Pended)   -NM     Personal Safety Interventions  fall prevention program maintained;gait belt;nonskid shoes/slippers when out of bed  -CC  fall prevention program maintained;gait belt;supervised activity  (Pended)   -NM     Recorded by [CC] Neris Morales OTR [NM] Anmol Joseph, PT Student     Row Name 01/15/20 1531 01/15/20 1500          Sit-Stand Transfer    Sit-Stand [place] (Transfers)  minimum assist (75% patient effort);verbal cues;nonverbal cues (demo/gesture)  -CC  minimum assist (75% patient effort);contact guard;verbal cues;nonverbal cues (demo/gesture)  (Pended)   -NM     Assistive Device (Sit-Stand Transfers)  wheelchair  -CC  wheelchair  (Pended)   -NM     Recorded by [CC] Neris Morlaes OTR [NM] Anmol Joseph, PT Student     Row Name 01/15/20 1531 01/15/20 1500          Stand-Sit Transfer    Stand-Sit [place] (Transfers)  minimum assist (75% patient effort);moderate assist (50% patient effort)  -CC  minimum assist (75% patient effort);contact guard;verbal cues;nonverbal cues (demo/gesture)  (Pended)   -NM     Assistive Device (Stand-Sit Transfers)  wheelchair  -CC  wheelchair  (Pended)   -NM     Recorded by [CC] Neris Morales OTR [NM] Anmol Joseph, PT Student     Row Name 01/15/20 1531             Toilet Transfer    Type (Toilet Transfer)  sit-stand;stand-sit;stand pivot/stand step  -CC      [place] Level (Toilet Transfer)  minimum assist (75% patient effort)  -CC      Assistive Device (Toilet Transfer)  grab bars/safety frame;wheelchair  -CC      Recorded by [CC] Neris Morales OTR      Row Name 01/15/20 1531             Shower Transfer    Type (Shower Transfer)  stand pivot/stand step  -CC      [place] Level (Shower Transfer)  minimum  assist (75% patient effort)  -CC      Assistive Device (Shower Transfer)  shower chair  -CC      Recorded by [CC] Neris Morales OTR      Row Name 01/15/20 1500             Gait/Stairs Assessment/Training    [place] Level (Gait)  minimum assist (75% patient effort);verbal cues;nonverbal cues (demo/gesture)  (Pended)  Manuel for walker management approx 50% of the time   -NM      Assistive Device (Gait)  walker, front-wheeled  (Pended)   -NM      Distance in Feet (Gait)  80x3   (Pended)   -NM      Pattern (Gait)  step-through  (Pended)   -NM      Deviations/Abnormal Patterns (Gait)  bilateral deviations;base of support, narrow;scissoring  (Pended)  uneaqual step length between L and R   -NM      Bilateral Gait Deviations  weight shift ability decreased;heel strike decreased;forward flexed posture  (Pended)   -NM      Comment (Gait/Stairs)  decreased knee ext during stance bilaterally; Manuel for turns and backing up to when returning to ; cues for navigiation but uses step counting as a strategy to orient self   (Pended)   -NM      Recorded by [NM] Anmol Joseph, PT Student      Row Name 01/15/20 1531             Bathing Assessment/Treatment    Bathing [place] Level  bathing skills;lower body;upper body;minimum assist (75% patient effort);moderate assist (50% patient effort);verbal cues  -CC      Assistive Device (Bathing)  grab bar/tub rail;hand held shower spray hose;shower chair  -CC      Bathing Position  supported sitting;supported standing  -CC      Bathing Setup Assistance  adjust water temperature;obtain supplies  -CC      Recorded by [CC] Neris Morales OTR      Row Name 01/15/20 1531             Upper Body Dressing Assessment/Treatment    Upper Body Dressing Task  upper body dressing skills;doff;don;pull over garment;supervision;verbal cues  -CC      Upper Body Dressing Position  supported sitting  -CC      Set-up Assistance (Upper Body Dressing)  obtain clothing  -CC      Recorded by [CC]  Neris Morales, AVA      Row Name 01/15/20 1531             Lower Body Dressing Assessment/Treatment    Lower Body Dressing [place] Level  doff;don;pants/bottoms;shoes/slippers;socks;shoelaces;verbal cues;moderate assist (50% patient effort)  -CC      Lower Body Dressing Position  supported sitting;supported standing  -CC      Lower Body Dressing Setup Assistance  obtain clothing  -CC      Recorded by [CC] Neris Morales OTR      Row Name 01/15/20 1531             Grooming Assessment/Treatment    Grooming [place] Level  grooming skills;deodorant application;oral care regimen;wash face, hands;minimum assist (75% patient effort);verbal cues  -CC      Grooming Position  sink side;supported sitting  -CC      Grooming Setup Assistance  obtain supplies  -CC      Recorded by [CC] Neris Morales OTR      Row Name 01/15/20 1531             Toileting Assessment/Treatment    Toileting [place] Level  toileting skills;adjust/manage clothing;perform perineal hygiene;verbal cues;minimum assist (75% patient effort);moderate assist (50% patient effort)  -CC      Toileting Position  supported sitting;supported standing  -CC      Recorded by [CC] Neris Morales OTR      Row Name 01/15/20 1531 01/15/20 1500          Pain Scale: Numbers Pre/Post-Treatment    Pain Scale: Numbers, Pretreatment  0/10 - no pain  -CC  0/10 - no pain  (Pended)   -NM     Pain Scale: Numbers, Post-Treatment  0/10 - no pain  -CC  0/10 - no pain  (Pended)   -NM     Recorded by [CC] Neris Morales, OTR [NM] Anmol Joseph, PT Student     Row Name 01/15/20 1500             Balance    Balance  standing balance activity;dynamic balance activity  -LH      Recorded by [LH] Pita Roth, PT      Row Name 01/15/20 1500             Dynamic Standing Balance    Level of [place], Reaches Outside Midline (Standing, Dynamic Balance)  --  (Pended)   -NM      Recorded by [NM] Anmol Joseph, PT Student      Row Name 01/15/20 1500              Standing Balance Activity    Support Needed for Balance (Standing, Balance Training)  CGA  (Pended)   -NM      Restrictions (Standing, Balance Training)  visual deficits   (Pended)   -NM      Comment (Standing, Balance Training)  performed forward, backward and lateral walking inside // bars c CGA and vc/ tactile cues for posture, hand placement, step length and to widen RAJI   (Pended)   -NM      Recorded by [NM] Anmol Joseph, PT Student      Row Name 01/15/20 1531             Upper Extremity Seated Therapeutic Exercise    Performed, Seated Upper Extremity (Therapeutic Exercise)  shoulder flexion/extension;scapular protraction/retraction;elbow flexion/extension;forearm supination/pronation;wrist flexion/extension  -CC      Device, Seated Upper Extremity (Therapeutic Exercise)  free weights, barbell;free weights, cuff  -CC      Exercise Type, Seated Upper Extremity (Therapeutic Exercise)  resistive exercise  -CC      Expected Outcomes, Seated Upper Extremity (Therapeutic Exercise)  improve functional tolerance, self-care activity  -CC      Sets/Reps Detail, Seated Upper Extremity (Therapeutic Exercise)  15x3  -CC      Recorded by [CC] Neris Morales OTR      Row Name 01/15/20 1531 01/15/20 1500          Positioning and Restraints    Pre-Treatment Position  sitting in chair/recliner  -CC  sitting in chair/recliner  (Pended)   -NM     Post Treatment Position  wheelchair  -CC  wheelchair  (Pended)   -NM     In Wheelchair  sitting;call light within reach;encouraged to call for assist;exit alarm on;with family/caregiver  -CC  call light within reach;encouraged to call for assist;exit alarm on  (Pended)   -NM     Recorded by [CC] Neris Morales OTR [NM] Anmol Joseph, PT Student       User Key  (r) = Recorded By, (t) = Taken By, (c) = Cosigned By    Initials Name Effective Dates    CC Neris Morales OTR 06/08/18 -     Jayna Barnes MS CCC-SLP 06/08/18 -     Pita Lobo PT 04/03/18 -     JACI Joseph  Anmol PT Student 01/03/20 -           Wound 01/06/20 2200 head Incision (Active)   Dressing Appearance open to air 1/14/2020  7:48 PM   Closure Approximated;Sutures 1/14/2020  7:48 PM   Base dry;clean 1/14/2020  7:48 PM   Periwound dry;intact 1/14/2020  7:48 PM   Drainage Amount none 1/15/2020  8:40 AM   Dressing Care, Wound open to air 1/15/2020  8:40 AM         OT Recommendation and Plan                 OT IRF GOALS     Row Name 01/15/20 1541 01/07/20 1527          Transfer Goal 1 (OT-IRF)    Activity/Assistive Device (Transfer Goal 1, OT-IRF)  toilet;commode, bedside without drop arms  -CC  toilet;commode, bedside without drop arms  -SG     [place] Level (Transfer Goal 1, OT-IRF)  maximum assist (25-49% patient effort)  -CC  maximum assist (25-49% patient effort)  -SG     Time Frame (Transfer Goal 1, OT-IRF)  short term goal (STG);1 week  -CC  short term goal (STG);1 week  -SG     Progress/Outcomes (Transfer Goal 1, OT-IRF)  goal met  -CC  goal ongoing  -SG        Transfer Goal 2 (OT-IRF)    Activity/Assistive Device (Transfer Goal 2, OT-IRF)  toilet  -CC  toilet  -SG     [place] Level (Transfer Goal 2, OT-IRF)  minimum assist (75% or more patient effort);contact guard assist  -CC  minimum assist (75% or more patient effort);moderate assist (50-74% patient effort)  -SG     Time Frame (Transfer Goal 2, OT-IRF)  long term goal (LTG);by discharge  -CC  long term goal (LTG);by discharge  -SG     Progress/Outcomes (Transfer Goal 2, OT-IRF)  goal revised this date  -CC  goal ongoing  -SG        Transfer Goal 3 (OT-IRF)    Activity/Assistive Device (Transfer Goal 3, OT-IRF)  shower chair  -CC  shower chair  -SG     [place] Level (Transfer Goal 3, OT-IRF)  maximum assist (25-49% patient effort)  -CC  maximum assist (25-49% patient effort)  -SG     Time Frame (Transfer Goal 3, OT-IRF)  short term goal (STG);1 week  -CC  short term goal (STG);1 week  -SG     Progress/Outcomes (Transfer Goal 3, OT-IRF)   goal met  -CC  goal ongoing  -SG        Transfer Goal 4 (OT-IRF)    Activity/Assistive Device (Transfer Goal 4, OT-IRF)  shower chair  -CC  shower chair  -SG     [place] Level (Transfer Goal 4, OT-IRF)  minimum assist (75% or more patient effort);moderate assist (50-74% patient effort)  -CC  minimum assist (75% or more patient effort);moderate assist (50-74% patient effort)  -SG     Time Frame (Transfer Goal 4, OT-IRF)  long term goal (LTG);by discharge  -CC  long term goal (LTG);by discharge  -SG     Progress/Outcomes (Transfer Goal 4, OT-IRF)  goal ongoing  -CC  goal ongoing  -SG        Bathing Goal 1 (OT-IRF)    Activity/Device (Bathing Goal 1, OT-IRF)  bathing skills, all  -CC  bathing skills, all  -SG     [place] Level (Bathing Goal 1, OT-IRF)  moderate assist (50-74% patient effort)  -CC  moderate assist (50-74% patient effort)  -SG     Time Frame (Bathing Goal 1, OT-IRF)  short term goal (STG);1 week  -CC  short term goal (STG);1 week  -SG     Progress/Outcomes (Bathing Goal 1, OT-IRF)  goal met  -CC  goal ongoing  -SG        Bathing Goal 2 (OT-IRF)    Activity/Device (Bathing Goal 2, OT-IRF)  bathing skills, all  -CC  bathing skills, all  -SG     [place] Level (Bathing Goal 2, OT-IRF)  minimum assist (75% or more patient effort)  -CC  minimum assist (75% or more patient effort)  -SG     Time Frame (Bathing Goal 2, OT-IRF)  long term goal (LTG);by discharge  -CC  long term goal (LTG);by discharge  -SG     Progress/Outcomes (Bathing Goal 2, OT-IRF)  goal ongoing  -CC  goal ongoing  -SG        UB Dressing Goal 1 (OT-IRF)    Activity/Device (UB Dressing Goal 1, OT-IRF)  upper body dressing  -CC  upper body dressing  -SG     [place] (UB Dress Goal 1, OT-IRF)  moderate assist (50-74% patient effort)  -CC  moderate assist (50-74% patient effort)  -SG     Time Frame (UB Dressing Goal 1, OT-IRF)  short term goal (STG);1 week  -CC  short term goal (STG);1 week  -SG     Progress/Outcomes (UB  Dressing Goal 1, OT-IRF)  goal met  -CC  goal ongoing  -SG        UB Dressing Goal 2 (OT-IRF)    Activity/Device (UB Dressing Goal 2, OT-IRF)  upper body dressing  -CC  upper body dressing  -SG     [place] (UB Dress Goal 2, OT-IRF)  supervision required  -CC  minimum assist (75% or more patient effort)  -SG     Time Frame (UB Dressing Goal 2, OT-IRF)  long term goal (LTG);by discharge  -CC  long term goal (LTG);by discharge  -SG     Progress/Outcomes (UB Dressing Goal 2, OT-IRF)  goal revised this date  -CC  goal ongoing  -SG        LB Dressing Goal 1 (OT-IRF)    Activity/Device (LB Dressing Goal 1, OT-IRF)  lower body dressing  -CC  lower body dressing  -SG     [place] (LB Dressing Goal 1, OT-IRF)  maximum assist (25-49% patient effort)  -CC  maximum assist (25-49% patient effort)  -SG     Time Frame (LB Dressing Goal 1, OT-IRF)  short term goal (STG);1 week  -CC  short term goal (STG);1 week  -SG     Progress/Outcomes (LB Dressing Goal 1, OT-IRF)  goal met  -CC  goal ongoing  -SG        LB Dressing Goal 2 (OT-IRF)    Activity/Device (LB Dressing Goal 2, OT-IRF)  lower body dressing  -CC  lower body dressing  -SG     [place] (LB Dressing Goal 2, OT-IRF)  minimum assist (75% or more patient effort);contact guard assist  -CC  moderate assist (50-74% patient effort)  -SG     Time Frame (LB Dressing Goal 2, OT-IRF)  long term goal (LTG);by discharge  -CC  long term goal (LTG);by discharge  -SG     Progress/Outcomes (LB Dressing Goal 2, OT-IRF)  goal revised this date  -CC  goal ongoing  -SG        Toileting Goal 1 (OT-IRF)    Activity/Device (Toileting Goal 1, OT-IRF)  toileting skills, all  -CC  toileting skills, all  -SG     [place] Level (Toileting Goal 1, OT-IRF)  moderate assist (50-74% patient effort)  -CC  moderate assist (50-74% patient effort)  -SG     Time Frame (Toileting Goal 1, OT-IRF)  long term goal (LTG);by discharge  -CC  long term goal (LTG);by discharge  -SG     Progress/Outcomes  (Toileting Goal 1, OT-IRF)  goal ongoing  -CC  --        Strength Goal 1 (OT-IRF)    Strength Goal 1 (OT-IRF)  Pt to tolerate UE strengthing to improve overall ROM and functional use of UE.  -CC  Pt to tolerate UE strengthing to improve overall ROM and functional use of UE.  -SG     Time Frame (Strength Goal 1, OT-IRF)  long term goal (LTG);by discharge  -CC  long term goal (LTG);by discharge  -SG     Progress/Outcomes (Strength Goal 1, OT-IRF)  goal ongoing  -CC  goal ongoing  -SG        Balance Goal 1 (OT)    Activity/Assistive Device (Balance Goal 1, OT)  sitting, static  -CC  sitting, static  -SG     [place] Level/Cues Needed (Balance Goal 1, OT)  contact guard assist  -CC  contact guard assist  -SG     Time Frame (Balance Goal 1, OT)  long term goal (LTG);by discharge  -CC  long term goal (LTG);by discharge  -SG     Progress/Outcomes (Balance Goal 1, OT)  goal met  -CC  goal ongoing  -SG        Caregiver Training Goal 1 (OT-IRF)    Caregiver Training Goal 1 (OT-IRF)  Pt and family to be indep with ADLs, functional transfers, AD/AE, and HEP for safe d/c home.  -CC  Pt and family to be indep with ADLs, functional transfers, AD/AE, and HEP for safe d/c home.  -SG     Time Frame (Caregiver Training Goal 1, OT-IRF)  long term goal (LTG);by discharge  -CC  long term goal (LTG);by discharge  -SG     Progress/Outcomes (Caregiver Training Goal 1, OT-IRF)  goal ongoing  -CC  goal ongoing  -SG       User Key  (r) = Recorded By, (t) = Taken By, (c) = Cosigned By    Initials Name Provider Type    Neris Linn OTR Occupational Therapist    SG Sudha Marte OTR Occupational Therapist          Occupational Therapy Education                 Title: PT OT SLP Therapies (In Progress)     Topic: Occupational Therapy (In Progress)     Point: ADL training (Done)     Description:   Instruct learner(s) on proper safety adaptation and remediation techniques during self care or transfers.   Instruct in proper use of  assistive devices.              Learning Progress Summary           Family Acceptance, E,TB, VU by  at 1/7/2020 3818    Comment:  OT role and goals, POC.                               User Key     Initials Effective Dates Name Provider Type [facility] 12/26/18 -  Sudha Marte OTR Occupational Therapist OT                       Time Calculation:     Time Calculation- OT     Row Name 01/15/20 1430 01/15/20 1000          Time Calculation- OT    OT Start Time  1430  -CC  1000  -CC     OT Stop Time  1500  -CC  1045  -CC     OT Time Calculation (min)  30 min  -CC  45 min  -CC     OT Non-Billable Time (min)  --  30 min  -CC       User Key  (r) = Recorded By, (t) = Taken By, (c) = Cosigned By    Initials Name Provider Type    CC Neris Morales OTR Occupational Therapist          Therapy Charges for Today     Code Description Service Date Service Provider Modifiers Qty    07057754436 HC OT THER PROC EA 15 MIN 1/14/2020 Neris Morales OTR GO 2    15728216805 HC OT SELF CARE/MGMT/TRAIN EA 15 MIN 1/15/2020 Neris Morales OTR GO 3    48534638641 HC OT THER PROC EA 15 MIN 1/15/2020 Neris Morales OTR GO 2    63816565680 HC OT THER SUPP EA 15 MIN 1/15/2020 Neris Morales OTR GO 2                   AVA Dodd  1/15/2020

## 2020-01-15 NOTE — PROGRESS NOTES
Adult Nutrition  Assessment/PES    Patient Name:  Tye JONES Junior  YOB: 1975  MRN: [id number]  Admit Date:  1/6/2020    Assessment Date:  1/15/2020    Reason for Assessment     Row Name 01/15/20 1146          Reason for Assessment    Reason For Assessment  follow-up protocol         Nutrition/Diet History     Row Name 01/15/20 1146          Nutrition/Diet History    Typical Food/Fluid Intake  More alert, eating 100%. Tray set up/assist needed d/t visual deficits. Making general improvement in therapies per notes.           Labs/Tests/Procedures/Meds     Row Name 01/15/20 1146          Labs/Procedures/Meds    Lab Results Reviewed  reviewed     Lab Results Comments  BUN/Cr        Diagnostic Tests/Procedures    Diagnostic Test/Procedure Reviewed  reviewed        Medications    Pertinent Medications Reviewed  reviewed         Physical Findings     Row Name 01/15/20 1146          Physical Findings    Overall Physical Appearance  hemiplegia;other (see comments) blind     Skin  surgical incision head incision, iker 14           Nutrition Prescription Ordered     Row Name 01/15/20 1147          Nutrition Prescription PO    Current PO Diet  Pureed     Fluid Consistency  Thin     Supplement  Boost Glucose Control (Glucerna Shake)     Supplement Frequency  3 times a day         Evaluation of Received Nutrient/Fluid Intake     Row Name 01/15/20 1147          PO Evaluation    Number of Days PO Intake Evaluated  3 days     % PO Intake  100%           Problem/Interventions:    Intervention Goal     Row Name 01/15/20 1147          Intervention Goal    General  Maintain nutrition     PO  Maintain intake;PO intake (%)     PO Intake %  100 %     Weight  Maintain weight         Nutrition Intervention     Row Name 01/15/20 1147          Nutrition Intervention    RD/Tech Action  Follow Tx progress;Care plan reviewd           Education/Evaluation     Row Name 01/15/20 1147          Education    Education  No discharge  needs identified at this time        Monitor/Evaluation    Monitor  Per protocol           Electronically signed by:  Shawnee Gaiatn RD  01/15/20 11:47 AM

## 2020-01-15 NOTE — PROGRESS NOTES
Inpatient Rehabilitation Functional Measures Assessment and Plan of Care    Plan of Care  Updated Problems/Interventions  Mobility    [OT] Toilet Transfers(Active)  Current Status(01/15/2020): Min  Weekly Goal(01/22/2020): MIn/CGA  Discharge Goal: CGA    [OT] Tub/Shower Transfers(Active)  Current Status(01/15/2020): Min/Mod  Weekly Goal(01/21/2020): Min  Discharge Goal: CGA        Self Care    [OT] Bathing(Active)  Current Status(01/15/2020): Min  Weekly Goal(01/22/2020): CGA  Discharge Goal: SBA/CGA    [OT] Dressing (Lower)(Active)  Current Status(01/15/2020): mod/max  Weekly Goal(01/23/2020): mod/min  Discharge Goal: CGA    [OT] Dressing (Upper)(Active)  Current Status(01/15/2020): SBA/Min  Weekly Goal(01/24/2020): SBA  Discharge Goal: SBA    [OT] Eating(Active)  Current Status(01/15/2020): Min  Weekly Goal(01/22/2020): SBA  Discharge Goal: SBA    [OT] Grooming(Active)  Current Status(01/15/2020): MIn/SBA  Weekly Goal(01/23/2020): SBA  Discharge Goal: SBA    [OT] Toileting(Active)  Current Status(01/15/2020): Mod/Max  Weekly Goal(01/22/2020): Min  Discharge Goal: CGA    Functional Measures  JENNIFER Eating:  Branch  JENNIFER Grooming: Branch  JENNIFER Bathing:  Branch  JENNIFER Upper Body Dressing:  Branch  JENNIFER Lower Body Dressing:  Branch  JENNIFER Toileting:  Branch    JENNIFER Bladder Management  Level of Assistance:  Branch  Frequency/Number of Accidents this Shift:  Branch    JENNIFER Bowel Management  Level of Assistance: Branch  Frequency/Number of Accidents this Shift: Branch    JENNIFER Bed/Chair/Wheelchair Transfer:  Branch  JENNIFER Toilet Transfer:  Branch  JENNIFER Tub/Shower Transfer:  Branch    Previously Documented Mode of Locomotion at Discharge: Field  JENNIFER Expected Mode of Locomotion at Discharge: Branch  JENNIFER Walk/Wheelchair:  Branch  JENNIFER Stairs:  Branch    JENNIFER Comprehension:  Branch  JENNIFER Expression:  Branch  JENNIFER Social Interaction:  Branch  JENNIFER Problem Solving:  Branch  JENNIFER Memory:  Branch    Therapy Mode Minutes  Occupational Therapy:  Individual: 60 minutes.  Physical Therapy: Branch  Speech Language Pathology:  Branch    Signed by: AVA Dodd/ZOË

## 2020-01-15 NOTE — THERAPY TREATMENT NOTE
Inpatient Rehabilitation - Occupational Therapy Treatment Note    [facility]     Patient Name: Tye JONES Junior  : 1975  MRN: [id number]    Today's Date: 1/15/2020                 Admit Date: 2020      Visit Dx:    ICD-10-CM ICD-9-CM   1. Impaired mobility Z74.09 799.89       Patient Active Problem List   Diagnosis   • Mixed hyperlipidemia   • Essential hypertension   • Traumatic brain injury (CMS/HCC)   • Acute allergic rhinitis   • Seizure (CMS/HCC)   • Screen for colon cancer   • H/O traumatic brain injury         Therapy Treatment    IRF Treatment Summary     Row Name 01/15/20 1531 01/15/20 1500 01/15/20 1100       Evaluation/Treatment Time and Intent    Subjective Information  no complaints  -CC  no complaints  (Pended)   -NM  complains of;fatigue  -SL    Existing Precautions/Restrictions  fall  -CC  fall  (Pended)   -NM  fall swallow  -SL    Document Type  therapy note (daily note)  -CC  therapy note (daily note)  (Pended)   -NM  therapy note (daily note)  -SL    Mode of Treatment  occupational therapy  -CC  physical therapy  (Pended)   -NM  individual therapy;speech-language pathology  -SL    Patient/Family Observations  --  up in ; no distress; mother present   (Pended)   -NM  fatiqued; lethargic/drowsy  -SL    Recorded by [CC] Neris Morales, OTR [NM] Anmol Joseph, PT Student [SL] Jayna Coker, MS CCC-SLP    Row Name 01/15/20 0800             Evaluation/Treatment Time and Intent    Subjective Information  no complaints  -SL      Existing Precautions/Restrictions  fall swallow  -SL      Document Type  therapy note (daily note)  -SL      Mode of Treatment  individual therapy;speech-language pathology  -SL      Patient/Family Observations  cooperative; slow to repsond  -SL      Recorded by [SL] Jayna Coker MS CCC-SLP      Row Name 01/15/20 1531 01/15/20 1500          Cognition/Psychosocial- PT/OT    Affect/Mental Status (Cognitive)  WFL  -CC  WFL  (Pended)   -NM     Orientation Status  (Cognition)  --  oriented to;person;situation  (Pended)   -NM     Follows Commands (Cognition)  --  follows one step commands;physical/tactile prompts required;verbal cues/prompting required;increased processing time needed  (Pended)   -NM     Personal Safety Interventions  fall prevention program maintained;gait belt;nonskid shoes/slippers when out of bed  -CC  fall prevention program maintained;gait belt;supervised activity  (Pended)   -NM     Recorded by [CC] Neris Morales OTR [NM] Anmol Joseph, PT Student     Row Name 01/15/20 1531 01/15/20 1500          Sit-Stand Transfer    Sit-Stand [place] (Transfers)  minimum assist (75% patient effort);verbal cues;nonverbal cues (demo/gesture)  -CC  minimum assist (75% patient effort);contact guard;verbal cues;nonverbal cues (demo/gesture)  (Pended)   -NM     Assistive Device (Sit-Stand Transfers)  wheelchair  -CC  wheelchair  (Pended)   -NM     Recorded by [CC] Neris Morales OTR [NM] Anmol Joseph, PT Student     Row Name 01/15/20 1531 01/15/20 1500          Stand-Sit Transfer    Stand-Sit [place] (Transfers)  minimum assist (75% patient effort);moderate assist (50% patient effort)  -CC  minimum assist (75% patient effort);contact guard;verbal cues;nonverbal cues (demo/gesture)  (Pended)   -NM     Assistive Device (Stand-Sit Transfers)  wheelchair  -CC  wheelchair  (Pended)   -NM     Recorded by [CC] Neris Morales OTR [NM] Anmol Joseph, PT Student     Row Name 01/15/20 1531             Toilet Transfer    Type (Toilet Transfer)  sit-stand;stand-sit;stand pivot/stand step  -CC      [place] Level (Toilet Transfer)  minimum assist (75% patient effort)  -CC      Assistive Device (Toilet Transfer)  grab bars/safety frame;wheelchair  -CC      Recorded by [CC] Neris Morales OTR      Row Name 01/15/20 1531             Shower Transfer    Type (Shower Transfer)  stand pivot/stand step  -CC      [place] Level (Shower Transfer)  minimum  assist (75% patient effort)  -CC      Assistive Device (Shower Transfer)  shower chair  -CC      Recorded by [CC] Neris Morales OTR      Row Name 01/15/20 1500             Gait/Stairs Assessment/Training    [place] Level (Gait)  minimum assist (75% patient effort);verbal cues;nonverbal cues (demo/gesture)  (Pended)  Manuel for walker management approx 50% of the time   -NM      Assistive Device (Gait)  walker, front-wheeled  (Pended)   -NM      Distance in Feet (Gait)  80x3   (Pended)   -NM      Pattern (Gait)  step-through  (Pended)   -NM      Deviations/Abnormal Patterns (Gait)  bilateral deviations;base of support, narrow;scissoring  (Pended)  uneaqual step length between L and R   -NM      Bilateral Gait Deviations  weight shift ability decreased;heel strike decreased;forward flexed posture  (Pended)   -NM      Comment (Gait/Stairs)  decreased knee ext during stance bilaterally; Manuel for turns and backing up to when returning to ; cues for navigiation but uses step counting as a strategy to orient self   (Pended)   -NM      Recorded by [NM] Anmol Joseph, PT Student      Row Name 01/15/20 1531             Bathing Assessment/Treatment    Bathing [place] Level  bathing skills;lower body;upper body;minimum assist (75% patient effort);moderate assist (50% patient effort);verbal cues  -CC      Assistive Device (Bathing)  grab bar/tub rail;hand held shower spray hose;shower chair  -CC      Bathing Position  supported sitting;supported standing  -CC      Bathing Setup Assistance  adjust water temperature;obtain supplies  -CC      Recorded by [CC] Neris Morales OTR      Row Name 01/15/20 1531             Upper Body Dressing Assessment/Treatment    Upper Body Dressing Task  upper body dressing skills;doff;don;pull over garment;supervision;verbal cues  -CC      Upper Body Dressing Position  supported sitting  -CC      Set-up Assistance (Upper Body Dressing)  obtain clothing  -CC      Recorded by [CC]  Neris Morales, AVA      Row Name 01/15/20 1531             Lower Body Dressing Assessment/Treatment    Lower Body Dressing [place] Level  doff;don;pants/bottoms;shoes/slippers;socks;shoelaces;verbal cues;moderate assist (50% patient effort)  -CC      Lower Body Dressing Position  supported sitting;supported standing  -CC      Lower Body Dressing Setup Assistance  obtain clothing  -CC      Recorded by [CC] Neris Morales OTR      Row Name 01/15/20 1531             Grooming Assessment/Treatment    Grooming [place] Level  grooming skills;deodorant application;oral care regimen;wash face, hands;minimum assist (75% patient effort);verbal cues  -CC      Grooming Position  sink side;supported sitting  -CC      Grooming Setup Assistance  obtain supplies  -CC      Recorded by [CC] Neris Morales OTR      Row Name 01/15/20 1531             Toileting Assessment/Treatment    Toileting [place] Level  toileting skills;adjust/manage clothing;perform perineal hygiene;verbal cues;minimum assist (75% patient effort);moderate assist (50% patient effort)  -CC      Toileting Position  supported sitting;supported standing  -CC      Recorded by [CC] Neris Morales OTR      Row Name 01/15/20 1531 01/15/20 1500          Pain Scale: Numbers Pre/Post-Treatment    Pain Scale: Numbers, Pretreatment  0/10 - no pain  -CC  0/10 - no pain  (Pended)   -NM     Pain Scale: Numbers, Post-Treatment  0/10 - no pain  -CC  0/10 - no pain  (Pended)   -NM     Recorded by [CC] Neris Morales, OTR [NM] Anmol Joseph, PT Student     Row Name 01/15/20 1500             Balance    Balance  standing balance activity  (Pended)   -NM      Recorded by [NM] Anmol Joseph, PT Student      Row Name 01/15/20 1500             Dynamic Standing Balance    Level of [place], Reaches Outside Midline (Standing, Dynamic Balance)  --  (Pended)   -NM      Recorded by [NM] Anmol Joseph, PT Student      Row Name 01/15/20 1500              Standing Balance Activity    Support Needed for Balance (Standing, Balance Training)  CGA  (Pended)   -NM      Restrictions (Standing, Balance Training)  visual deficits   (Pended)   -NM      Comment (Standing, Balance Training)  performed forward, backward and lateral walking inside // bars c CGA and vc/ tactile cues for posture, hand placement, step length and to widen RAJI   (Pended)   -NM      Recorded by [NM] Anmol Joseph, PT Student      Row Name 01/15/20 1531             Upper Extremity Seated Therapeutic Exercise    Performed, Seated Upper Extremity (Therapeutic Exercise)  shoulder flexion/extension;scapular protraction/retraction;elbow flexion/extension;forearm supination/pronation;wrist flexion/extension  -CC      Device, Seated Upper Extremity (Therapeutic Exercise)  free weights, barbell;free weights, cuff  -CC      Exercise Type, Seated Upper Extremity (Therapeutic Exercise)  resistive exercise  -CC      Expected Outcomes, Seated Upper Extremity (Therapeutic Exercise)  improve functional tolerance, self-care activity  -CC      Sets/Reps Detail, Seated Upper Extremity (Therapeutic Exercise)  15x3  -CC      Recorded by [CC] Neris Morales OTR      Row Name 01/15/20 1531 01/15/20 1500          Positioning and Restraints    Pre-Treatment Position  sitting in chair/recliner  -CC  sitting in chair/recliner  (Pended)   -NM     Post Treatment Position  wheelchair  -CC  wheelchair  (Pended)   -NM     In Wheelchair  sitting;call light within reach;encouraged to call for assist;exit alarm on;with family/caregiver  -CC  call light within reach;encouraged to call for assist;exit alarm on  (Pended)   -NM     Recorded by [CC] Neris Morales OTR [NM] Anmol Joseph, PT Student       User Key  (r) = Recorded By, (t) = Taken By, (c) = Cosigned By    Initials Name Effective Dates    CC Neris Morales OTR 06/08/18 -     SL Jayna Coker MS CCC-SLP 06/08/18 -     NM Anmol Joseph, MAILE Student 01/03/20 -            Wound 01/06/20 2200 head Incision (Active)   Dressing Appearance open to air 1/14/2020  7:48 PM   Closure Approximated;Sutures 1/14/2020  7:48 PM   Base dry;clean 1/14/2020  7:48 PM   Periwound dry;intact 1/14/2020  7:48 PM   Drainage Amount none 1/15/2020  8:40 AM   Dressing Care, Wound open to air 1/15/2020  8:40 AM         OT Recommendation and Plan                 OT IRF GOALS     Row Name 01/07/20 1527             Transfer Goal 1 (OT-IRF)    Activity/Assistive Device (Transfer Goal 1, OT-IRF)  toilet;commode, bedside without drop arms  -SG      [place] Level (Transfer Goal 1, OT-IRF)  maximum assist (25-49% patient effort)  -SG      Time Frame (Transfer Goal 1, OT-IRF)  short term goal (STG);1 week  -SG      Progress/Outcomes (Transfer Goal 1, OT-IRF)  goal ongoing  -SG         Transfer Goal 2 (OT-IRF)    Activity/Assistive Device (Transfer Goal 2, OT-IRF)  toilet  -SG      [place] Level (Transfer Goal 2, OT-IRF)  minimum assist (75% or more patient effort);moderate assist (50-74% patient effort)  -SG      Time Frame (Transfer Goal 2, OT-IRF)  long term goal (LTG);by discharge  -SG      Progress/Outcomes (Transfer Goal 2, OT-IRF)  goal ongoing  -SG         Transfer Goal 3 (OT-IRF)    Activity/Assistive Device (Transfer Goal 3, OT-IRF)  shower chair  -SG      [place] Level (Transfer Goal 3, OT-IRF)  maximum assist (25-49% patient effort)  -SG      Time Frame (Transfer Goal 3, OT-IRF)  short term goal (STG);1 week  -SG      Progress/Outcomes (Transfer Goal 3, OT-IRF)  goal ongoing  -SG         Transfer Goal 4 (OT-IRF)    Activity/Assistive Device (Transfer Goal 4, OT-IRF)  shower chair  -SG      [place] Level (Transfer Goal 4, OT-IRF)  minimum assist (75% or more patient effort);moderate assist (50-74% patient effort)  -SG      Time Frame (Transfer Goal 4, OT-IRF)  long term goal (LTG);by discharge  -SG      Progress/Outcomes (Transfer Goal 4, OT-IRF)  goal ongoing  -SG         Bathing  Goal 1 (OT-IRF)    Activity/Device (Bathing Goal 1, OT-IRF)  bathing skills, all  -SG      [place] Level (Bathing Goal 1, OT-IRF)  moderate assist (50-74% patient effort)  -SG      Time Frame (Bathing Goal 1, OT-IRF)  short term goal (STG);1 week  -SG      Progress/Outcomes (Bathing Goal 1, OT-IRF)  goal ongoing  -SG         Bathing Goal 2 (OT-IRF)    Activity/Device (Bathing Goal 2, OT-IRF)  bathing skills, all  -SG      [place] Level (Bathing Goal 2, OT-IRF)  minimum assist (75% or more patient effort)  -SG      Time Frame (Bathing Goal 2, OT-IRF)  long term goal (LTG);by discharge  -SG      Progress/Outcomes (Bathing Goal 2, OT-IRF)  goal ongoing  -SG         UB Dressing Goal 1 (OT-IRF)    Activity/Device (UB Dressing Goal 1, OT-IRF)  upper body dressing  -SG      [place] (UB Dress Goal 1, OT-IRF)  moderate assist (50-74% patient effort)  -SG      Time Frame (UB Dressing Goal 1, OT-IRF)  short term goal (STG);1 week  -SG      Progress/Outcomes (UB Dressing Goal 1, OT-IRF)  goal ongoing  -SG         UB Dressing Goal 2 (OT-IRF)    Activity/Device (UB Dressing Goal 2, OT-IRF)  upper body dressing  -SG      [place] (UB Dress Goal 2, OT-IRF)  minimum assist (75% or more patient effort)  -SG      Time Frame (UB Dressing Goal 2, OT-IRF)  long term goal (LTG);by discharge  -SG      Progress/Outcomes (UB Dressing Goal 2, OT-IRF)  goal ongoing  -SG         LB Dressing Goal 1 (OT-IRF)    Activity/Device (LB Dressing Goal 1, OT-IRF)  lower body dressing  -SG      [place] (LB Dressing Goal 1, OT-IRF)  maximum assist (25-49% patient effort)  -SG      Time Frame (LB Dressing Goal 1, OT-IRF)  short term goal (STG);1 week  -SG      Progress/Outcomes (LB Dressing Goal 1, OT-IRF)  goal ongoing  -SG         LB Dressing Goal 2 (OT-IRF)    Activity/Device (LB Dressing Goal 2, OT-IRF)  lower body dressing  -SG      [place] (LB Dressing Goal 2, OT-IRF)  moderate assist (50-74% patient effort)  -SG      Time  Frame (LB Dressing Goal 2, OT-IRF)  long term goal (LTG);by discharge  -SG      Progress/Outcomes (LB Dressing Goal 2, OT-IRF)  goal ongoing  -SG         Toileting Goal 1 (OT-IRF)    Activity/Device (Toileting Goal 1, OT-IRF)  toileting skills, all  -SG      [place] Level (Toileting Goal 1, OT-IRF)  moderate assist (50-74% patient effort)  -SG      Time Frame (Toileting Goal 1, OT-IRF)  long term goal (LTG);by discharge  -SG         Strength Goal 1 (OT-IRF)    Strength Goal 1 (OT-IRF)  Pt to tolerate UE strengthing to improve overall ROM and functional use of UE.  -SG      Time Frame (Strength Goal 1, OT-IRF)  long term goal (LTG);by discharge  -SG      Progress/Outcomes (Strength Goal 1, OT-IRF)  goal ongoing  -SG         Balance Goal 1 (OT)    Activity/Assistive Device (Balance Goal 1, OT)  sitting, static  -SG      [place] Level/Cues Needed (Balance Goal 1, OT)  contact guard assist  -SG      Time Frame (Balance Goal 1, OT)  long term goal (LTG);by discharge  -SG      Progress/Outcomes (Balance Goal 1, OT)  goal ongoing  -SG         Caregiver Training Goal 1 (OT-IRF)    Caregiver Training Goal 1 (OT-IRF)  Pt and family to be indep with ADLs, functional transfers, AD/AE, and HEP for safe d/c home.  -SG      Time Frame (Caregiver Training Goal 1, OT-IRF)  long term goal (LTG);by discharge  -SG      Progress/Outcomes (Caregiver Training Goal 1, OT-IRF)  goal ongoing  -SG        User Key  (r) = Recorded By, (t) = Taken By, (c) = Cosigned By    Initials Name Provider Type    Sudha Cho OTR Occupational Therapist          Occupational Therapy Education                 Title: PT OT SLP Therapies (In Progress)     Topic: Occupational Therapy (In Progress)     Point: ADL training (Done)     Description:   Instruct learner(s) on proper safety adaptation and remediation techniques during self care or transfers.   Instruct in proper use of assistive devices.              Learning Progress Summary            Family Acceptance, E,TB, VU by  at 1/7/2020 0945    Comment:  OT role and goals, POC.                               User Key     Initials Effective Dates Name Provider Type Discipline     12/26/18 -  Sudha Marte OTR Occupational Therapist OT                       Time Calculation:     Time Calculation- OT     Row Name 01/15/20 1430 01/15/20 1000          Time Calculation- OT    OT Start Time  1430  -CC  1000  -CC     OT Stop Time  1500  -CC  1045  -CC     OT Time Calculation (min)  30 min  -CC  45 min  -CC     OT Non-Billable Time (min)  --  30 min  -CC       User Key  (r) = Recorded By, (t) = Taken By, (c) = Cosigned By    Initials Name Provider Type    CC Neris Morales OTR Occupational Therapist          Therapy Charges for Today     Code Description Service Date Service Provider Modifiers Qty    43748039131 HC OT THER PROC EA 15 MIN 1/14/2020 Neris Morales OTR GO 2    85629169063 HC OT SELF CARE/MGMT/TRAIN EA 15 MIN 1/15/2020 Neris Morales OTR GO 3    19819982429 HC OT THER PROC EA 15 MIN 1/15/2020 Neris Morales OTR GO 2    47735008727 HC OT THER SUPP EA 15 MIN 1/15/2020 Neris Morales OTR GO 2                   AVA Dodd  1/15/2020

## 2020-01-15 NOTE — PLAN OF CARE
Problem: Patient Care Overview  Goal: Plan of Care Review  Outcome: Ongoing (interventions implemented as appropriate)  Flowsheets (Taken 1/15/2020 1522)  Outcome Summary: Tye Doyle Junior, has been cooperative, assist x 2, and has been continent today, but can be incontinent at times. He takes medication, whole, with applesauce, Midline to L arm which flushes well, and powder applied to groin for itching. No pain, no unsafe behavior- family at bedside, and VS stable, but his HR can be tachycardic at times.  Progress, Functional Goals: demonstrating adequate progress  Plan of Care Reviewed With: patient  IRF Plan of Care Review: progress ongoing, continue

## 2020-01-15 NOTE — THERAPY TREATMENT NOTE
Inpatient Rehabilitation - Physical Therapy Treatment Note  [facility]     Patient Name: Tye JONES Junior  : 1975  MRN: [id number]    Today's Date: 1/15/2020                 Admit Date: 2020      Visit Dx:      ICD-10-CM ICD-9-CM   1. Impaired mobility Z74.09 799.89       Patient Active Problem List   Diagnosis   • Mixed hyperlipidemia   • Essential hypertension   • Traumatic brain injury (CMS/HCC)   • Acute allergic rhinitis   • Seizure (CMS/HCC)   • Screen for colon cancer   • H/O traumatic brain injury       Therapy Treatment    IRF Treatment Summary     Row Name 01/15/20 1531 01/15/20 1500 01/15/20 1100       Evaluation/Treatment Time and Intent    Subjective Information  no complaints  -CC  no complaints  (Pended)   -NM  complains of;fatigue  -SL    Existing Precautions/Restrictions  fall  -CC  fall  (Pended)   -NM  fall swallow  -SL    Document Type  therapy note (daily note)  -CC  therapy note (daily note)  (Pended)   -NM  therapy note (daily note)  -SL    Mode of Treatment  occupational therapy  -CC  physical therapy  (Pended)   -NM  individual therapy;speech-language pathology  -SL    Patient/Family Observations  --  up in ; no distress; mother present   (Pended)   -NM  fatiqued; lethargic/drowsy  -SL    Recorded by [CC] Neris Morales, OTR [NM] Anmol Joseph, PT Student [SL] Jayna Coker, MS CCC-SLP    Row Name 01/15/20 0800             Evaluation/Treatment Time and Intent    Subjective Information  no complaints  -SL      Existing Precautions/Restrictions  fall swallow  -SL      Document Type  therapy note (daily note)  -SL      Mode of Treatment  individual therapy;speech-language pathology  -SL      Patient/Family Observations  cooperative; slow to repsond  -SL      Recorded by [SL] Jayna Coker MS CCC-SLP      Row Name 01/15/20 1531 01/15/20 1500          Cognition/Psychosocial- PT/OT    Affect/Mental Status (Cognitive)  WFL  -CC  WFL  (Pended)   -NM     Orientation Status  (Cognition)  --  oriented to;person;situation  (Pended)   -NM     Follows Commands (Cognition)  --  follows one step commands;physical/tactile prompts required;verbal cues/prompting required;increased processing time needed  (Pended)   -NM     Personal Safety Interventions  fall prevention program maintained;gait belt;nonskid shoes/slippers when out of bed  -CC  fall prevention program maintained;gait belt;supervised activity  (Pended)   -NM     Recorded by [CC] Neris Morales OTR [NM] Anmol Joseph, PT Student     Row Name 01/15/20 1531 01/15/20 1500          Sit-Stand Transfer    Sit-Stand [place] (Transfers)  minimum assist (75% patient effort);verbal cues;nonverbal cues (demo/gesture)  -CC  minimum assist (75% patient effort);contact guard;verbal cues;nonverbal cues (demo/gesture)  (Pended)   -NM     Assistive Device (Sit-Stand Transfers)  wheelchair  -CC  wheelchair  (Pended)   -NM     Recorded by [CC] Neris Morales OTR [NM] Anmol Joseph, PT Student     Row Name 01/15/20 1531 01/15/20 1500          Stand-Sit Transfer    Stand-Sit [place] (Transfers)  minimum assist (75% patient effort);moderate assist (50% patient effort)  -CC  minimum assist (75% patient effort);contact guard;verbal cues;nonverbal cues (demo/gesture)  (Pended)   -NM     Assistive Device (Stand-Sit Transfers)  wheelchair  -CC  wheelchair  (Pended)   -NM     Recorded by [CC] Neris Morales OTR [NM] Anmol Joseph, PT Student     Row Name 01/15/20 1531             Toilet Transfer    Type (Toilet Transfer)  sit-stand;stand-sit;stand pivot/stand step  -CC      [place] Level (Toilet Transfer)  minimum assist (75% patient effort)  -CC      Assistive Device (Toilet Transfer)  grab bars/safety frame;wheelchair  -CC      Recorded by [CC] Neris Morales OTR      Row Name 01/15/20 1531             Shower Transfer    Type (Shower Transfer)  stand pivot/stand step  -CC      [place] Level (Shower Transfer)  minimum  assist (75% patient effort)  -CC      Assistive Device (Shower Transfer)  shower chair  -CC      Recorded by [CC] Neris Morales OTR      Row Name 01/15/20 1500             Gait/Stairs Assessment/Training    [place] Level (Gait)  minimum assist (75% patient effort);verbal cues;nonverbal cues (demo/gesture)  (Pended)  Manuel for walker management approx 50% of the time   -NM      Assistive Device (Gait)  walker, front-wheeled  (Pended)   -NM      Distance in Feet (Gait)  80x3   (Pended)   -NM      Pattern (Gait)  step-through  (Pended)   -NM      Deviations/Abnormal Patterns (Gait)  bilateral deviations;base of support, narrow;scissoring  (Pended)  uneaqual step length between L and R   -NM      Bilateral Gait Deviations  weight shift ability decreased;heel strike decreased;forward flexed posture  (Pended)   -NM      Comment (Gait/Stairs)  decreased knee ext during stance bilaterally; Manuel for turns and backing up to when returning to ; cues for navigiation but uses step counting as a strategy to orient self   (Pended)   -NM      Recorded by [NM] Anmol Joseph, PT Student      Row Name 01/15/20 1531             Bathing Assessment/Treatment    Bathing [place] Level  bathing skills;lower body;upper body;minimum assist (75% patient effort);moderate assist (50% patient effort);verbal cues  -CC      Assistive Device (Bathing)  grab bar/tub rail;hand held shower spray hose;shower chair  -CC      Bathing Position  supported sitting;supported standing  -CC      Bathing Setup Assistance  adjust water temperature;obtain supplies  -CC      Recorded by [CC] Neris Morales OTR      Row Name 01/15/20 1531             Upper Body Dressing Assessment/Treatment    Upper Body Dressing Task  upper body dressing skills;doff;don;pull over garment;supervision;verbal cues  -CC      Upper Body Dressing Position  supported sitting  -CC      Set-up Assistance (Upper Body Dressing)  obtain clothing  -CC      Recorded by [CC]  Neris Morales, AVA      Row Name 01/15/20 1531             Lower Body Dressing Assessment/Treatment    Lower Body Dressing [place] Level  doff;don;pants/bottoms;shoes/slippers;socks;shoelaces;verbal cues;moderate assist (50% patient effort)  -CC      Lower Body Dressing Position  supported sitting;supported standing  -CC      Lower Body Dressing Setup Assistance  obtain clothing  -CC      Recorded by [CC] Neris Morales OTR      Row Name 01/15/20 1531             Grooming Assessment/Treatment    Grooming [place] Level  grooming skills;deodorant application;oral care regimen;wash face, hands;minimum assist (75% patient effort);verbal cues  -CC      Grooming Position  sink side;supported sitting  -CC      Grooming Setup Assistance  obtain supplies  -CC      Recorded by [CC] Neris Morales OTR      Row Name 01/15/20 1531             Toileting Assessment/Treatment    Toileting [place] Level  toileting skills;adjust/manage clothing;perform perineal hygiene;verbal cues;minimum assist (75% patient effort);moderate assist (50% patient effort)  -CC      Toileting Position  supported sitting;supported standing  -CC      Recorded by [CC] Neris Morales OTR      Row Name 01/15/20 1531 01/15/20 1500          Pain Scale: Numbers Pre/Post-Treatment    Pain Scale: Numbers, Pretreatment  0/10 - no pain  -CC  0/10 - no pain  (Pended)   -NM     Pain Scale: Numbers, Post-Treatment  0/10 - no pain  -CC  0/10 - no pain  (Pended)   -NM     Recorded by [CC] Neris Morales, OTR [NM] Anmol Joseph, PT Student     Row Name 01/15/20 1500             Balance    Balance  standing balance activity;dynamic balance activity  -LH      Recorded by [LH] Pita Roth, PT      Row Name 01/15/20 1500             Dynamic Standing Balance    Level of [place], Reaches Outside Midline (Standing, Dynamic Balance)  --  (Pended)   -NM      Recorded by [NM] Anmol Joseph, PT Student      Row Name 01/15/20 1500              Standing Balance Activity    Support Needed for Balance (Standing, Balance Training)  CGA  (Pended)   -NM      Restrictions (Standing, Balance Training)  visual deficits   (Pended)   -NM      Comment (Standing, Balance Training)  performed forward, backward and lateral walking inside // bars c CGA and vc/ tactile cues for posture, hand placement, step length and to widen RAJI   (Pended)   -NM      Recorded by [NM] Anmol Joseph, PT Student      Row Name 01/15/20 1531             Upper Extremity Seated Therapeutic Exercise    Performed, Seated Upper Extremity (Therapeutic Exercise)  shoulder flexion/extension;scapular protraction/retraction;elbow flexion/extension;forearm supination/pronation;wrist flexion/extension  -CC      Device, Seated Upper Extremity (Therapeutic Exercise)  free weights, barbell;free weights, cuff  -CC      Exercise Type, Seated Upper Extremity (Therapeutic Exercise)  resistive exercise  -CC      Expected Outcomes, Seated Upper Extremity (Therapeutic Exercise)  improve functional tolerance, self-care activity  -CC      Sets/Reps Detail, Seated Upper Extremity (Therapeutic Exercise)  15x3  -CC      Recorded by [CC] Neris Morales OTR      Row Name 01/15/20 1531 01/15/20 1500          Positioning and Restraints    Pre-Treatment Position  sitting in chair/recliner  -CC  sitting in chair/recliner  (Pended)   -NM     Post Treatment Position  wheelchair  -CC  wheelchair  (Pended)   -NM     In Wheelchair  sitting;call light within reach;encouraged to call for assist;exit alarm on;with family/caregiver  -CC  call light within reach;encouraged to call for assist;exit alarm on  (Pended)   -NM     Recorded by [CC] Neris Morales OTR [NM] Anmol Joseph, PT Student       User Key  (r) = Recorded By, (t) = Taken By, (c) = Cosigned By    Initials Name Effective Dates    CC Neris Morales OTR 06/08/18 -     Jayna Barnes MS CCC-SLP 06/08/18 -     Pita Loob PT 04/03/18 -     JACI Joseph  Anmol, PT Student 01/03/20 -         Wound 01/06/20 2200 head Incision (Active)   Dressing Appearance open to air 1/14/2020  7:48 PM   Closure Approximated;Sutures 1/14/2020  7:48 PM   Base dry;clean 1/14/2020  7:48 PM   Periwound dry;intact 1/14/2020  7:48 PM   Drainage Amount none 1/15/2020  8:40 AM   Dressing Care, Wound open to air 1/15/2020  8:40 AM     Physical Therapy Education                 Title: PT OT SLP Therapies (In Progress)     Topic: Physical Therapy (In Progress)     Point: Mobility training (In Progress)     Description:   Instruct learner(s) on safety and technique for assisting patient out of bed, chair or wheelchair.  Instruct in the proper use of assistive devices, such as walker, crutches, cane or brace.              Patient Friendly Description:   It's important to get you on your feet again, but we need to do so in a way that is safe for you. Falling has serious consequences, and your personal safety is the most important thing of all.        When it's time to get out of bed, one of us or a family member will sit next to you on the bed to give you support.     If your doctor or nurse tells you to use a walker, crutches, a cane, or a brace, be sure you use it every time you get out of bed, even if you think you don't need it.    Learning Progress Summary           Patient Acceptance, E, NR,VU by NM at 1/15/2020 1543    Acceptance, E, NR by NM at 1/14/2020 1111    Comment:  Reinforcement of setup for transfers in order to increase independence and safety    Acceptance, E, VU,NR by NM at 1/13/2020 1545    Comment:  education regarding safety during transfers    Acceptance, E, NR by  at 1/11/2020 0919    Acceptance, E, VU,NR by  at 1/10/2020 1033    Acceptance, E, NR by JCASI at 1/9/2020 1449    Acceptance, E, NR by  at 1/8/2020 0949    Acceptance, E, NR by  at 1/7/2020 1158   Family Acceptance, E, NR by  at 1/7/2020 1158                   Point: Home exercise program (In Progress)      Description:   Instruct learner(s) on appropriate technique for monitoring, assisting and/or progressing patient with therapeutic exercises and activities.              Learning Progress Summary           Patient Acceptance, E, VU,NR by  at 1/10/2020 1033    Acceptance, E, NR by  at 1/7/2020 1158   Family Acceptance, E, NR by  at 1/7/2020 1158                   Point: Body mechanics (In Progress)     Description:   Instruct learner(s) on proper positioning and spine alignment for patient and/or caregiver during mobility tasks and/or exercises.              Learning Progress Summary           Patient Acceptance, E, NR by NM at 1/14/2020 1111    Comment:  Reinforcement of setup for transfers in order to increase independence and safety    Acceptance, E, VU,NR by NM at 1/13/2020 1545    Comment:  education regarding safety during transfers    Acceptance, E, NR by  at 1/11/2020 0919    Acceptance, E, NR by  at 1/7/2020 1158   Family Acceptance, E, NR by  at 1/7/2020 1158                   Point: Precautions (Done)     Description:   Instruct learner(s) on prescribed precautions during mobility and gait tasks              Learning Progress Summary           Patient Acceptance, E, NR,VU by NM at 1/15/2020 1543    Acceptance, E, NR by NM at 1/14/2020 1111    Comment:  Reinforcement of setup for transfers in order to increase independence and safety    Acceptance, E, VU,NR by NM at 1/13/2020 1545    Comment:  education regarding safety during transfers    Acceptance, E, NR by  at 1/8/2020 0949                               User Key     Initials Effective Dates Name Provider Type Discipline     04/03/18 -  Pita Roth, PT Physical Therapist PT    JK 04/03/18 -  Binta Hartman, PT Physical Therapist PT    NM 01/03/20 -  Anmol Joseph PT Student PT Student PT                  PT Recommendation and Plan                        Time Calculation:     PT Charges     Row Name 01/15/20 1541 01/15/20 1540           Time Calculation    Start Time  1330  (Pended)   -NM  0900  (Pended)   -NM     Stop Time  1400  (Pended)   -NM  0930  (Pended)   -NM     Time Calculation (min)  30 min  (Pended)   -NM  30 min  (Pended)   -NM     PT Received On  --  01/15/20  (Pended)   -NM     PT - Next Appointment  --  01/16/20  (Pended)   -NM       User Key  (r) = Recorded By, (t) = Taken By, (c) = Cosigned By    Initials Name Provider Type    NM Anmol Joseph, PT Student PT Student          Therapy Charges for Today     Code Description Service Date Service Provider Modifiers Qty    64995016401 HC PT THER PROC EA 15 MIN 1/14/2020 Anmol Joseph, PT Student GP 4    87200989061 HC GAIT TRAINING EA 15 MIN 1/15/2020 Anmol Joseph, PT Student GP 2    62968223693 HC PT THER PROC EA 15 MIN 1/15/2020 Anmol Joseph, PT Student GP 2                   Anmol Joseph, PT Student  1/15/2020

## 2020-01-15 NOTE — PROGRESS NOTES
Inpatient Rehabilitation Functional Measures Assessment and Plan of Care    Plan of Care  Updated Problems/Interventions  Cognition    [ST] Memory(Active)  Current Status(01/15/2020): mod/sev cognitive deficits; inconsistent  performance; fatique neg impacts performance; slow processing, perseverative  Weekly Goal(01/21/2020): orientation to place, time, bio info indep  Discharge Goal: Follow a schedule and return home with supervision        Swallow Function    [ST] Swallowing(Active)  Current Status(01/15/2020): Mild to moderate oropharyngeal dysphagia, VFSS at  [facility] 1/3/20 recommended puree and thins, meds crushed with puree  Weekly Goal(01/08/2020): Follow safe swallow precautions with supervision with  min cues  Discharge Goal: Independent with safe swallow precautions and tolerating least  restrictive diet    Functional Measures  [facility] Eating:  [facility] Grooming: [facility] Bathing:  [facility] Upper Body Dressing:  [facility] Lower Body Dressing:  [facility] Toileting:  [facility] Bladder Management  Level of Assistance:  [place]  Frequency/Number of Accidents this Shift:  [facility] Bowel Management  Level of Assistance: [place]  Frequency/Number of Accidents this Shift: [facility] Bed/Chair/Wheelchair Transfer:  [facility] Toilet Transfer:  [facility] Tub/Shower Transfer:  [place]    Previously Documented Mode of Locomotion at Discharge: Field  JENNIFER Expected Mode of Locomotion at Discharge: [facility] Walk/Wheelchair:  [facility] Stairs:  [facility] Comprehension:  [facility] Expression:  [facility] Social Interaction:  [facility] Problem Solving:  [facility] Memory:  [place]    Therapy Mode Minutes  Occupational Therapy: [place]  Physical Therapy: [place]  Speech Language Pathology:  Individual: 60 minutes.    Signed by: Jayna Coker, SLP

## 2020-01-15 NOTE — PLAN OF CARE
Patient is calm and cooperative. Denied pain. Taking his medication whole in apple sauce without difficulty. Assistance provided with turning and repositioning. Patient's mother stayed at bedside.

## 2020-01-15 NOTE — PROGRESS NOTES
Inpatient Rehabilitation Plan of Care Note    Plan of Care  Care Plan Reviewed - Updates as Follows    Body Systems    [RN] Integumentary(Active)  Current Status(01/15/2020): Small rash dry skin breakdown to groin/inner thigh.  Buttocks intact with a small spot of peeling area. Pink areas noted around  penis.No open area noted. Head incision healing.  Weekly Goal(01/20/2020): No further skin breakdown  Discharge Goal: Intact Skin.    Performed Intervention(s)  Skin care q shift and PRN  Assist to turn patient q 2-3hrs.      Psychosocial    [RN] Coping/Adjustment(Active)  Current Status(01/15/2020): Patient with difficult verbalizing and slow process.  Mother and sister present and very supportive.  Weekly Goal(01/20/2020): Patient will demonstrate appropriate coping mechanisms  Discharge Goal: Patient will demonstrate health coping strategies    Performed Intervention(s)  Offer support/Encourage patient to verbalize any concerns      Safety    [RN] Potential for Injury(Active)  Current Status(01/15/2020): Patient with generalized weakness. Very weak all  over, at high risk for fall. Assist of 2 with transfers.  Weekly Goal(01/20/2020): No injuries  Discharge Goal: Pt/family aware of fall/safety in the home setting.    Performed Intervention(s)  Safety rounds/bed alarm/ chair alarm on  Falls precautio/call light within easy reach      Sphincter Control    [RN] Bladder Management(Active)  Current Status(01/15/2020): Incontinent episodes, patient has urgency when  needing to void.  1/14/20 Incont. 100% usually at night except did use urinal  with assist x1 tonight.  Weekly Goal(01/21/2020): Continent 50%  Discharge Goal: Continent 100%    [RN] Bowel Management(Active)  Current Status(01/15/2020): Incontinent of bowel 100%  Weekly Goal(01/21/2020): Continent 50%  Discharge Goal: Continent 100%    Performed Intervention(s)  Monitor I&O  check for incontinence, offer toileting q2hrs  miralax daily-hold if  needed.    Signed by: Joanne Weiner RN

## 2020-01-15 NOTE — PROGRESS NOTES
Occupational Therapy: [place]    Physical Therapy: Individual: 60 minutes.    Speech Language Pathology:  [place]    Signed by: Anmol Joseph PT Student     - CoSigned By: Pita Roth PT 1/15/2020 3:55:20 PM

## 2020-01-16 LAB
ANION GAP SERPL CALCULATED.3IONS-SCNC: 10.9 MMOL/L (ref 5–15)
BASOPHILS # BLD AUTO: 0.03 10*3/MM3 (ref 0–0.2)
BASOPHILS NFR BLD AUTO: 0.5 % (ref 0–1.5)
BUN BLD-MCNC: 15 MG/DL (ref 6–20)
BUN/CREAT SERPL: 14.2 (ref 7–25)
CALCIUM SPEC-SCNC: 8.8 MG/DL (ref 8.6–10.5)
CHLORIDE SERPL-SCNC: 97 MMOL/L (ref 98–107)
CO2 SERPL-SCNC: 26.1 MMOL/L (ref 22–29)
CREAT BLD-MCNC: 1.06 MG/DL (ref 0.76–1.27)
DEPRECATED RDW RBC AUTO: 40.1 FL (ref 37–54)
EOSINOPHIL # BLD AUTO: 0.25 10*3/MM3 (ref 0–0.4)
EOSINOPHIL NFR BLD AUTO: 4 % (ref 0.3–6.2)
ERYTHROCYTE [DISTWIDTH] IN BLOOD BY AUTOMATED COUNT: 13.1 % (ref 12.3–15.4)
FOLATE SERPL-MCNC: 6.69 NG/ML (ref 4.78–24.2)
GFR SERPL CREATININE-BSD FRML MDRD: 91 ML/MIN/1.73
GLUCOSE BLD-MCNC: 97 MG/DL (ref 65–99)
HCT VFR BLD AUTO: 24.9 % (ref 37.5–51)
HGB BLD-MCNC: 8.3 G/DL (ref 13–17.7)
IMM GRANULOCYTES # BLD AUTO: 0.04 10*3/MM3 (ref 0–0.05)
IMM GRANULOCYTES NFR BLD AUTO: 0.6 % (ref 0–0.5)
IRON 24H UR-MRATE: 99 MCG/DL (ref 59–158)
IRON SATN MFR SERPL: 48 % (ref 20–50)
LDH SERPL-CCNC: 259 U/L (ref 135–225)
LYMPHOCYTES # BLD AUTO: 1.77 10*3/MM3 (ref 0.7–3.1)
LYMPHOCYTES NFR BLD AUTO: 28.2 % (ref 19.6–45.3)
MCH RBC QN AUTO: 28.4 PG (ref 26.6–33)
MCHC RBC AUTO-ENTMCNC: 33.3 G/DL (ref 31.5–35.7)
MCV RBC AUTO: 85.3 FL (ref 79–97)
MONOCYTES # BLD AUTO: 0.87 10*3/MM3 (ref 0.1–0.9)
MONOCYTES NFR BLD AUTO: 13.9 % (ref 5–12)
NEUTROPHILS # BLD AUTO: 3.32 10*3/MM3 (ref 1.7–7)
NEUTROPHILS NFR BLD AUTO: 52.8 % (ref 42.7–76)
NRBC BLD AUTO-RTO: 0 /100 WBC (ref 0–0.2)
PLATELET # BLD AUTO: 338 10*3/MM3 (ref 140–450)
PMV BLD AUTO: 9.2 FL (ref 6–12)
POTASSIUM BLD-SCNC: 3.3 MMOL/L (ref 3.5–5.2)
RBC # BLD AUTO: 2.92 10*6/MM3 (ref 4.14–5.8)
RETICS # AUTO: 0.1 10*6/MM3 (ref 0.02–0.13)
RETICS/RBC NFR AUTO: 3.56 % (ref 0.7–1.9)
SODIUM BLD-SCNC: 134 MMOL/L (ref 136–145)
TIBC SERPL-MCNC: 204 MCG/DL (ref 298–536)
TRANSFERRIN SERPL-MCNC: 137 MG/DL (ref 200–360)
VIT B12 BLD-MCNC: 543 PG/ML (ref 211–946)
WBC NRBC COR # BLD: 6.28 10*3/MM3 (ref 3.4–10.8)

## 2020-01-16 PROCEDURE — 25010000002 HEPARIN (PORCINE) PER 1000 UNITS: Performed by: PHYSICAL MEDICINE & REHABILITATION

## 2020-01-16 PROCEDURE — 97130 THER IVNTJ EA ADDL 15 MIN: CPT

## 2020-01-16 PROCEDURE — 83010 ASSAY OF HAPTOGLOBIN QUANT: CPT | Performed by: STUDENT IN AN ORGANIZED HEALTH CARE EDUCATION/TRAINING PROGRAM

## 2020-01-16 PROCEDURE — 83540 ASSAY OF IRON: CPT | Performed by: PHYSICAL MEDICINE & REHABILITATION

## 2020-01-16 PROCEDURE — 85045 AUTOMATED RETICULOCYTE COUNT: CPT | Performed by: PHYSICAL MEDICINE & REHABILITATION

## 2020-01-16 PROCEDURE — 97110 THERAPEUTIC EXERCISES: CPT

## 2020-01-16 PROCEDURE — 84466 ASSAY OF TRANSFERRIN: CPT | Performed by: PHYSICAL MEDICINE & REHABILITATION

## 2020-01-16 PROCEDURE — 80048 BASIC METABOLIC PNL TOTAL CA: CPT | Performed by: PHYSICAL MEDICINE & REHABILITATION

## 2020-01-16 PROCEDURE — 82746 ASSAY OF FOLIC ACID SERUM: CPT | Performed by: PHYSICAL MEDICINE & REHABILITATION

## 2020-01-16 PROCEDURE — 97535 SELF CARE MNGMENT TRAINING: CPT

## 2020-01-16 PROCEDURE — 97129 THER IVNTJ 1ST 15 MIN: CPT

## 2020-01-16 PROCEDURE — 82607 VITAMIN B-12: CPT | Performed by: PHYSICAL MEDICINE & REHABILITATION

## 2020-01-16 PROCEDURE — 83615 LACTATE (LD) (LDH) ENZYME: CPT | Performed by: PHYSICAL MEDICINE & REHABILITATION

## 2020-01-16 PROCEDURE — 85025 COMPLETE CBC W/AUTO DIFF WBC: CPT | Performed by: PHYSICAL MEDICINE & REHABILITATION

## 2020-01-16 PROCEDURE — 82728 ASSAY OF FERRITIN: CPT | Performed by: INTERNAL MEDICINE

## 2020-01-16 RX ORDER — POTASSIUM CHLORIDE 750 MG/1
20 CAPSULE, EXTENDED RELEASE ORAL EVERY 6 HOURS
Status: COMPLETED | OUTPATIENT
Start: 2020-01-16 | End: 2020-01-16

## 2020-01-16 RX ORDER — LACOSAMIDE 200 MG/1
200 TABLET ORAL EVERY 12 HOURS SCHEDULED
Qty: 60 TABLET | Refills: 3 | Status: SHIPPED | OUTPATIENT
Start: 2020-01-16

## 2020-01-16 RX ADMIN — NYSTATIN 1 APPLICATION: 100000 POWDER TOPICAL at 21:43

## 2020-01-16 RX ADMIN — PHENYTOIN SODIUM 100 MG: 100 CAPSULE, EXTENDED RELEASE ORAL at 21:42

## 2020-01-16 RX ADMIN — LACOSAMIDE 200 MG: 100 TABLET, FILM COATED ORAL at 10:15

## 2020-01-16 RX ADMIN — HEPARIN SODIUM 5000 UNITS: 5000 INJECTION INTRAVENOUS; SUBCUTANEOUS at 21:43

## 2020-01-16 RX ADMIN — CLONAZEPAM 1 MG: 0.5 TABLET ORAL at 14:45

## 2020-01-16 RX ADMIN — DEXTROAMPHETAMINE SACCHARATE, AMPHETAMINE ASPARTATE MONOHYDRATE, DEXTROAMPHETAMINE SULFATE, AND AMPHETAMINE SULFATE 30 MG: 2.5; 2.5; 2.5; 2.5 CAPSULE, EXTENDED RELEASE ORAL at 07:27

## 2020-01-16 RX ADMIN — HYDROXYZINE PAMOATE 25 MG: 25 CAPSULE ORAL at 21:42

## 2020-01-16 RX ADMIN — CLONAZEPAM 1 MG: 0.5 TABLET ORAL at 05:34

## 2020-01-16 RX ADMIN — CHOLECALCIFEROL TAB 10 MCG (400 UNIT) 400 UNITS: 10 TAB at 07:27

## 2020-01-16 RX ADMIN — LEVETIRACETAM 500 MG: 100 SOLUTION ORAL at 07:26

## 2020-01-16 RX ADMIN — POLYETHYLENE GLYCOL 3350 17 G: 17 POWDER, FOR SOLUTION ORAL at 07:26

## 2020-01-16 RX ADMIN — HEPARIN SODIUM 5000 UNITS: 5000 INJECTION INTRAVENOUS; SUBCUTANEOUS at 14:53

## 2020-01-16 RX ADMIN — PHENYTOIN SODIUM 100 MG: 100 CAPSULE, EXTENDED RELEASE ORAL at 14:45

## 2020-01-16 RX ADMIN — POTASSIUM CHLORIDE 20 MEQ: 750 CAPSULE, EXTENDED RELEASE ORAL at 14:45

## 2020-01-16 RX ADMIN — PHENYTOIN SODIUM 100 MG: 100 CAPSULE, EXTENDED RELEASE ORAL at 05:34

## 2020-01-16 RX ADMIN — LEVETIRACETAM 500 MG: 100 SOLUTION ORAL at 21:43

## 2020-01-16 RX ADMIN — TOPIRAMATE 25 MG: 25 TABLET, FILM COATED ORAL at 07:26

## 2020-01-16 RX ADMIN — NYSTATIN 1 APPLICATION: 100000 POWDER TOPICAL at 07:26

## 2020-01-16 RX ADMIN — METOPROLOL TARTRATE 50 MG: 50 TABLET, FILM COATED ORAL at 21:43

## 2020-01-16 RX ADMIN — PANTOPRAZOLE SODIUM 40 MG: 40 TABLET, DELAYED RELEASE ORAL at 05:34

## 2020-01-16 RX ADMIN — POTASSIUM CHLORIDE 20 MEQ: 750 CAPSULE, EXTENDED RELEASE ORAL at 21:42

## 2020-01-16 RX ADMIN — LACOSAMIDE 200 MG: 100 TABLET, FILM COATED ORAL at 21:42

## 2020-01-16 RX ADMIN — POTASSIUM CHLORIDE 20 MEQ: 750 CAPSULE, EXTENDED RELEASE ORAL at 07:27

## 2020-01-16 RX ADMIN — HEPARIN SODIUM 5000 UNITS: 5000 INJECTION INTRAVENOUS; SUBCUTANEOUS at 05:34

## 2020-01-16 RX ADMIN — ATORVASTATIN CALCIUM 10 MG: 10 TABLET, FILM COATED ORAL at 07:27

## 2020-01-16 RX ADMIN — METOPROLOL TARTRATE 50 MG: 50 TABLET, FILM COATED ORAL at 07:27

## 2020-01-16 RX ADMIN — CLONAZEPAM 1 MG: 0.5 TABLET ORAL at 21:42

## 2020-01-16 NOTE — PROGRESS NOTES
LOS: 10 days   Patient Care Team:  Jolene Laurent MD as PCP - General (Family Medicine)  Efren Martínez MD as PCP - Claims Attributed    Chief Complaint:   Status post infected  shunt-removed-CNS infection/pneumocephalus  Status post 12/23/ 2019 - Removal of ventriculoperitoneal shunt intracranial portion, valve, partial peritoneal down to the cervical region  Status post 12/31/2019 endoscopic repair of paranasal sinus defect  ID-ceftriaxone-antibiotics until January 14, 2020  Seizure disorder-multidrug regimen-phenytoin/Vimpat/Keppra/clonazepam/Topamax  Remote traumatic brain injury  Neuro stimulation-Adderall  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  Bilateral SCDs.  Status post acute renal nirohkwsukukb-KBX-jkajsv creatinine.  Avoid NSAID/ACE inhibitor's.    Subjective     History of Present Illness     Tolerates therapies Overall stronger. No seizure.         History taken from: patient chart family    Objective     Vital Signs  Temp:  [97.9 °F (36.6 °C)-98.3 °F (36.8 °C)] 98.3 °F (36.8 °C)  Heart Rate:  [] 103  Resp:  [20] 20  BP: (126-138)/(81-90) 126/86    Physical Exam  Mental status: awake and alert.  HEENT-craniotomy incision clean dry and intact.   Pulm: Normal respirations  Heart: S1, S2  Abdomen:  , non-distended   Extremities: No cyanosis or edema   : Dry rash bilateral inner thighs  Neuro: awake   good speed with responses, following commands.   Blindness-chronic  Strength:  Takes resistance bilaterally      Results Review:    I reviewed the patient's new clinical results.     Results from last 7 days   Lab Units 01/16/20  0624 01/13/20  0721 01/10/20  0712   WBC 10*3/mm3 6.28 7.85 8.30   HEMOGLOBIN g/dL 8.3* 8.9* 9.4*    HEMATOCRIT % 24.9* 25.6* 28.2*   PLATELETS 10*3/mm3 338 429 387     Results from last 7 days   Lab Units 01/16/20  0624 01/13/20  0721 01/12/20  0704   SODIUM mmol/L 134* 145 140   POTASSIUM mmol/L 3.3* 3.5 3.1*   CHLORIDE mmol/L 97* 105 101   CO2 mmol/L 26.1 27.7 26.4   BUN mg/dL 15 23* 26*   CREATININE mg/dL 1.06 1.36* 1.33*   CALCIUM mg/dL 8.8 9.1 8.8   BILIRUBIN mg/dL  --  0.2  --    ALK PHOS U/L  --  106  --    ALT (SGPT) U/L  --  20  --    AST (SGOT) U/L  --  21  --    GLUCOSE mg/dL 97 102* 106*       Medication Review:   Scheduled Meds:    amphetamine-dextroamphetamine XR 30 mg Oral Daily   atorvastatin 10 mg Oral Daily   cholecalciferol 400 Units Oral Daily   clonazePAM 1 mg Oral Q8H   heparin (porcine) 5,000 Units Subcutaneous Q8H   lacosamide 200 mg Oral Q12H   levETIRAcetam 500 mg Oral Q12H   metoprolol tartrate 50 mg Oral Q12H   nystatin 1 application Topical Q12H   pantoprazole 40 mg Oral Q AM   phenytoin 100 mg Oral Q8H   polyethylene glycol 17 g Oral Daily   potassium chloride 20 mEq Oral Daily   topiramate 25 mg Oral Daily     Continuous Infusions:   PRN Meds:.•  acetaminophen  •  hydrOXYzine pamoate    Assessment/Plan       H/O traumatic brain injury  Status post infected  shunt-removed-CNS infection/pneumocephalus  -Status post 12/23/ 2019 - Removal of ventriculoperitoneal shunt intracranial portion, valve, partial peritoneal down to the cervical region  -Status post 12/31/2019 endoscopic repair of paranasal sinus defect  -ID-ceftriaxone-antibiotics until January 14, 2020    Seizure disorder-multidrug regimen-phenytoin/Vimpat/Keppra/clonazepam/Topamax  -January 9-patient was on phenytoin at home which was increased at the outside hospital, on Topamax but less than antiepileptic dose.  He had Vimpat, Keppra, clonazepam added at the outside hospital.  Consulted neurology for input patient has fatigue felt possibly related to his increased antiepileptic medication.  Reviewed with neurology and  Keppra dose being decreased.  -January 10- Keppra decreased from 2000 mg BID to 50 mg BID with improved alertness. Neurology continues to follow.   -January 15-Discussed having neurology see him in follow-up at some point to review possibly decreasing the additional seizure medications further.      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  Bilateral SCDs.    Status post acute renal thwglmwzutkij-HIN-wbfqfy creatinine.  Avoid NSAID/ACE inhibitor's.  Jan 16 - Cr improved to 1.06    Anemia-trend downward.  Continue to monitor-recheck Jan 16 Thursday.  Placed him back on a protein pump inhibitor .Hemoccult was negative.  Jan 16 - HGB trends down to 8.3. Will check iron studies, B12/Folate/retic count/LDH. ? If due to recent medical issues vs medication effect.       Sleep-January 13- Family reports restlessness at night. Vistaril added PRN last night without response. Since vistaril has only been tried one night, will continue with Vistaril PRN at bedtime for now and continue to monitor.      Skin-dry rash to bilateral inner thighs-try Mycostatin powder     Now admit for comprehensive acute inpatient rehabilitation .  This would be an interdisciplinary program with physical therapy 1 hour,  occupational therapy 1 hour, and speech therapy 1 hour, 5 days a week.  Rehabilitation nursing for carryover, monitoring of incision and neurologic   status, bowel and bladder, and skin  Ongoing physician follow-up.  Weekly team conferences.  Goals are indeterminate.   Rehabilitation prognosis indeterminate.  Medical prognosis indeterminate.  Estimated length of stay is indeterminate  The  patient's functional status and clinical status is unchanged from preadmission assessment and the patient continues appropriate for acute inpatient rehabilitation.  Goal is for home with outpatient   therapies.  Barrier to discharge: Cognition/mobility- work on trunk control, strength, balance to overcome.     TEAM CONF - JAN 9 - BED MAX 2.  TRANSFERS MAX 2.  GAIT 8 FEET PARALLEL BARS MOD 2. TOILET TRANSFERS MAX 2.  BLIND.  SLOW PROCESSING.  GROOMING MOD. UBD MAX. LBD DEP. DYSPHAGIA - PUREE/THINS.  FATIGUES WITH COGNITIVE  THERAPY.  ELOS :  4 WEEKS    TEAM CONF - JAN 16- BED MOD ASSIST. GAIT 120 FEET MIN ASSIST WITH WALKER.  TRANSFERS MIN ASSIST. WILL START TRAINING WITH HIS WALKER AID FROM FROM. TOILET TRANSFERS MIN ASSIST. BATH MIN ASSIST. UBD SBA-MIN. LBD MOD-MAX. ENDURANCE IMPROVED. SWALLOW - PUREE/THIN LIQUIDS, SLOW RATE, ALTERNATE LIQUID AND SOLIDS. WILL TRY TEST TRAY BUT DOES NOT CHEW WELL OR CONTROL BOLUS. AFTERNOON FATIGUES. MOD-SEVERE COGNITIVE DEFICITS. SLOW PROCESSING. DIFFICULTY WITH THOUGHT ORGANIZATION. STILL NOT ORIENTED TO DAY/PLACE. BNE PENDING.NO SAFETY ISSUES. BLADDER CONTINENT/INCONTINENT. WILL DO TIMED VOIDS.  WILL D/C MIDLINE.   ELOS -  END OF NEXT WEEK. FAMILY CONF PENDING.       I discussed the patients findings and my recommendations with patient, family and nursing staff    Janusz Solitario MD  01/16/20  7:54 AM

## 2020-01-16 NOTE — THERAPY TREATMENT NOTE
Inpatient Rehabilitation - Physical Therapy Treatment Note  [facility]     Patient Name: Tye JONES Junior  : 1975  MRN: [id number]    Today's Date: 2020                 Admit Date: 2020      Visit Dx:      ICD-10-CM ICD-9-CM   1. Impaired mobility Z74.09 799.89   2. Seizure (CMS/HCC) R56.9 780.39       Patient Active Problem List   Diagnosis   • Mixed hyperlipidemia   • Essential hypertension   • Traumatic brain injury (CMS/HCC)   • Acute allergic rhinitis   • Seizure (CMS/HCC)   • Screen for colon cancer   • H/O traumatic brain injury       Therapy Treatment    IRF Treatment Summary     Row Name 20 1100 20 0919 20 0841       Evaluation/Treatment Time and Intent    Subjective Information  no complaints  -SL  no complaints  -  no complaints  -    Existing Precautions/Restrictions  fall swallow  -SL  fall  -  fall swallow  -    Document Type  therapy note (daily note)  -  therapy note (daily note)  -  therapy note (daily note)  -SL    Mode of Treatment  individual therapy;speech-language pathology  -SL  individual therapy;physical therapy  -  individual therapy;speech-language pathology  -SL    Patient/Family Observations  nikko goss  -  pt seated in WC no acute distress  -  cooperative  -SL    Recorded by [] Jayna Coker, MS CCC-SLP [LH] Pita Roth, PT [SL] Jayna Coker, MS CCC-SLP    Row Name 2019             Cognition/Psychosocial- PT/OT    Affect/Mental Status (Cognitive)  WFL  -      Orientation Status (Cognition)  oriented to;person;situation  -      Follows Commands (Cognition)  follows one step commands  -      Personal Safety Interventions  fall prevention program maintained;gait belt;supervised activity  -      Cognitive Function (Cognitive)  executive function deficit  -      Recorded by [] Pita Roth, PT      Row Name 20             Bed Mobility Assessment/Treatment    Comment (Bed Mobility)  NT  -       Recorded by [] Pita Roth, PT      Row Name 01/16/20 0919             Functional Mobility    Functional Mobility- Ind. Level  minimum assist (75% patient effort);verbal cues required;nonverbal cues required (demo/gesture)  -      Functional Mobility- Device  other (see comments)  -      Functional Mobility-Distance (Feet)  50  -      Functional Mobility- Safety Issues  weight-shifting ability decreased;step length decreased;balance decreased during turns  -      Functional Mobility- Comment  trial  of walking aide cane this date- ambulated at hemibars, LUE on hemibar and RUE using walking aide cane. pt displays fair mechanics- VCs to minimize LUE WB on hemibar. also utilized walking aide cane to locate objects- pts WC, pt able to safely sit in WC using walking aide to locate objectt  -      Recorded by [] Pita Roth, PT      Row Name 01/16/20 0919             Sit-Stand Transfer    Sit-Stand [place] (Transfers)  minimum assist (75% patient effort);verbal cues;nonverbal cues (demo/gesture)  -      Assistive Device (Sit-Stand Transfers)  walker, front-wheeled  -      Recorded by [] Pita Roth, PT      Row Name 01/16/20 0919             Stand-Sit Transfer    Stand-Sit [place] (Transfers)  minimum assist (75% patient effort);verbal cues;nonverbal cues (demo/gesture)  -      Assistive Device (Stand-Sit Transfers)  walker, front-wheeled  -      Recorded by [] Pita Roth, PT      Row Name 01/16/20 0919             Toilet Transfer    Type (Toilet Transfer)  stand pivot/stand step  -      [place] Level (Toilet Transfer)  minimum assist (75% patient effort);verbal cues;set up;nonverbal cues (demo/gesture)  -      Assistive Device (Toilet Transfer)  wheelchair;grab bars/safety frame  -      Recorded by [] Pita Roth, PT      Row Name 01/16/20 0919             Gait/Stairs Assessment/Training    [place] Level (Gait)  minimum assist (75% patient effort);verbal  cues;nonverbal cues (demo/gesture)  -      Assistive Device (Gait)  walker, front-wheeled  -      Distance in Feet (Gait)  160x2  -      Pattern (Gait)  step-through  -      Deviations/Abnormal Patterns (Gait)  base of support, narrow;bilateral deviations;scissoring 1 episode of scissoring-min to recover  -      Bilateral Gait Deviations  weight shift ability decreased  -      Comment (Gait/Stairs)  dec nicolas knee extension in stance. practiced turning and back up to WC- Min A w guiding walker, inc assist when backing up- occassional LOB w backing up to WC, min/moD a to recover.   -LH      Recorded by [] Pita Roth PT      Row Name 01/16/20 0919             Pain Scale: Numbers Pre/Post-Treatment    Pain Scale: Numbers, Pretreatment  0/10 - no pain  -LH      Pain Scale: Numbers, Post-Treatment  0/10 - no pain  -LH      Recorded by [] Pita Roth, PT      Row Name 01/16/20 0919             Positioning and Restraints    Pre-Treatment Position  sitting in chair/recliner  -      Post Treatment Position  wheelchair  -LH      In Wheelchair  sitting;call light within reach;encouraged to call for assist;exit alarm on  -LH      Recorded by [] Pita Roth, PT        User Key  (r) = Recorded By, (t) = Taken By, (c) = Cosigned By    Initials Name Effective Dates    Jayna Barnes, MS CCC-SLP 06/08/18 -      Pita Roth, PT 04/03/18 -         Wound 01/06/20 2200 head Incision (Active)   Dressing Appearance open to air 1/16/2020  7:28 AM   Closure Approximated;Sutures 1/16/2020  7:28 AM   Base dry;clean 1/16/2020  7:28 AM   Periwound dry;intact 1/15/2020  7:11 PM   Drainage Amount none 1/16/2020  7:28 AM   Dressing Care, Wound open to air 1/15/2020  7:11 PM     Physical Therapy Education                 Title: PT OT SLP Therapies (In Progress)     Topic: Physical Therapy (In Progress)     Point: Mobility training (In Progress)     Description:   Instruct learner(s) on safety and technique for assisting  patient out of bed, chair or wheelchair.  Instruct in the proper use of assistive devices, such as walker, crutches, cane or brace.              Patient Friendly Description:   It's important to get you on your feet again, but we need to do so in a way that is safe for you. Falling has serious consequences, and your personal safety is the most important thing of all.        When it's time to get out of bed, one of us or a family member will sit next to you on the bed to give you support.     If your doctor or nurse tells you to use a walker, crutches, a cane, or a brace, be sure you use it every time you get out of bed, even if you think you don't need it.    Learning Progress Summary           Patient Acceptance, E, NR by  at 1/16/2020 0920    Acceptance, E, NR,VU by NM at 1/15/2020 1543    Acceptance, E, NR by NM at 1/14/2020 1111    Comment:  Reinforcement of setup for transfers in order to increase independence and safety    Acceptance, E, VU,NR by NM at 1/13/2020 1545    Comment:  education regarding safety during transfers    Acceptance, E, NR by  at 1/11/2020 0919    Acceptance, E, VU,NR by  at 1/10/2020 1033    Acceptance, E, NR by  at 1/9/2020 1449    Acceptance, E, NR by  at 1/8/2020 0949    Acceptance, E, NR by  at 1/7/2020 1158   Family Acceptance, E, NR by  at 1/7/2020 1158                   Point: Home exercise program (In Progress)     Description:   Instruct learner(s) on appropriate technique for monitoring, assisting and/or progressing patient with therapeutic exercises and activities.              Learning Progress Summary           Patient Acceptance, E, VU,NR by  at 1/10/2020 1033    Acceptance, E, NR by  at 1/7/2020 1158   Family Acceptance, E, NR by  at 1/7/2020 1158                   Point: Body mechanics (In Progress)     Description:   Instruct learner(s) on proper positioning and spine alignment for patient and/or caregiver during mobility tasks and/or exercises.               Learning Progress Summary           Patient Acceptance, E, NR by NM at 1/14/2020 1111    Comment:  Reinforcement of setup for transfers in order to increase independence and safety    Acceptance, E, VU,NR by NM at 1/13/2020 1545    Comment:  education regarding safety during transfers    Acceptance, E, NR by  at 1/11/2020 0919    Acceptance, E, NR by  at 1/7/2020 1158   Family Acceptance, E, NR by  at 1/7/2020 1158                   Point: Precautions (Done)     Description:   Instruct learner(s) on prescribed precautions during mobility and gait tasks              Learning Progress Summary           Patient Acceptance, E, NR,VU by NM at 1/15/2020 1543    Acceptance, E, NR by NM at 1/14/2020 1111    Comment:  Reinforcement of setup for transfers in order to increase independence and safety    Acceptance, E, VU,NR by NM at 1/13/2020 1545    Comment:  education regarding safety during transfers    Acceptance, E, NR by  at 1/8/2020 0949                               User Key     Initials Effective Dates Name Provider Type Discipline     04/03/18 -  Pita Roth, PT Physical Therapist PT    JK 04/03/18 -  Binta Hartman, PT Physical Therapist PT    NM 01/03/20 -  Anmol Joseph, PT Student PT Student PT                  PT Recommendation and Plan  Planned Therapy Interventions (PT Eval): balance training, bed mobility training, gait training, home exercise program, ROM (range of motion), stretching, strengthening, stair training, transfer training, wheelchair management/propulsion training  Frequency of Treatment (PT Eval): 60 minutes per session                     Time Calculation:     PT Charges     Row Name 01/16/20 1407 01/16/20 0920          Time Calculation    Start Time  1330  -  0900  -     Stop Time  1400  -  0930  -     Time Calculation (min)  30 min  -  30 min  -     PT Received On  --  01/16/20  -     PT - Next Appointment  --  01/17/20  -       User Key  (r) = Recorded  By, (t) = Taken By, (c) = Cosigned By    Initials Name Provider Type     Pita Roth, PT Physical Therapist          Therapy Charges for Today     Code Description Service Date Service Provider Modifiers Qty    [id number] HC PT THER PROC EA 15 MIN 1/16/2020 Pita Roth, PT GP 4                   Pita Roth, PT  1/16/2020

## 2020-01-16 NOTE — PLAN OF CARE
Patient is calm and cooperative. Denied pain. Continent and incontinent of bladder during the night. Mother at bedside. Alarm sounded as patient attempt to get out of bed. Encouraged patient to wait for assistance and set bed alarm to zone 2. Taking his medication whole in apple sauce without difficulty.

## 2020-01-16 NOTE — PROGRESS NOTES
Occupational Therapy: Branch    Physical Therapy: Branch    Speech Language Pathology:  Individual: 60 minutes.    Signed by: Jayna Coker SLP

## 2020-01-16 NOTE — PROGRESS NOTES
Discussed ELOS, progress, and d/c goals with patient's sister, by phone. She is agreeable to plan. Scheduled family conference for Tuesday, 1/21 at 2:30 p.m. She would like patient's  to be present as well. Contacted , Joanna Rodriguez, and discussed progress and family conference. She will plan to be present for family conference. She is setting up some aide assistance for patient at home as well. Will assist with plans.

## 2020-01-16 NOTE — THERAPY TREATMENT NOTE
Inpatient Rehabilitation - Occupational Therapy Treatment Note    [facility]     Patient Name: Tye JONES Junior  : 1975  MRN: [id number]    Today's Date: 2020                 Admit Date: 2020      Visit Dx:    ICD-10-CM ICD-9-CM   1. Impaired mobility Z74.09 799.89   2. Seizure (CMS/HCC) R56.9 780.39       Patient Active Problem List   Diagnosis   • Mixed hyperlipidemia   • Essential hypertension   • Traumatic brain injury (CMS/HCC)   • Acute allergic rhinitis   • Seizure (CMS/HCC)   • Screen for colon cancer   • H/O traumatic brain injury         Therapy Treatment    IRF Treatment Summary     Row Name 20 1601 20 1100 20 0919       Evaluation/Treatment Time and Intent    Subjective Information  no complaints  -CC  no complaints  -SL  no complaints  -    Existing Precautions/Restrictions  fall  -CC  fall swallow  -SL  fall  -    Document Type  therapy note (daily note)  -CC  therapy note (daily note)  -SL  therapy note (daily note)  -    Mode of Treatment  occupational therapy  -CC  individual therapy;speech-language pathology  -SL  individual therapy;physical therapy  -    Patient/Family Observations  --  nikko goss  -SL  pt seated in WC no acute distress  -LH    Recorded by [CC] Neris Morales OTR [SL] Jayna Coker, MS CCC-SLP [] Pita Roth, PT    Row Name 20 0841             Evaluation/Treatment Time and Intent    Subjective Information  no complaints  -SL      Existing Precautions/Restrictions  fall swallow  -      Document Type  therapy note (daily note)  -      Mode of Treatment  individual therapy;speech-language pathology  -      Patient/Family Observations  cooperative  -      Recorded by [] Jayna Coker, MS CCC-SLP      Row Name 20 1601 20 0919          Cognition/Psychosocial- PT/OT    Affect/Mental Status (Cognitive)  --  WFL  -     Orientation Status (Cognition)  --  oriented to;person;situation  -     Follows  Commands (Cognition)  follows one step commands  -  follows one step commands  -     Personal Safety Interventions  fall prevention program maintained;gait belt;nonskid shoes/slippers when out of bed  -  fall prevention program maintained;gait belt;supervised activity  -     Cognitive Function (Cognitive)  --  executive function deficit  -     Recorded by [CC] Neris Morales OTR [] Pita Roth, PT     Row Name 01/16/20 1601 01/16/20 0919          Bed Mobility Assessment/Treatment    Sit-Supine [place] (Bed Mobility)  verbal cues;nonverbal cues (demo/gesture);minimum assist (75% patient effort)  -  --     Comment (Bed Mobility)  --  NT  -     Recorded by [CC] Neris Moraels OTR [] Pita Roth, PT     Row Name 01/16/20 0919             Functional Mobility    Functional Mobility- Ind. Level  minimum assist (75% patient effort);verbal cues required;nonverbal cues required (demo/gesture)  -      Functional Mobility- Device  other (see comments)  -      Functional Mobility-Distance (Feet)  50  -      Functional Mobility- Safety Issues  weight-shifting ability decreased;step length decreased;balance decreased during turns  -      Functional Mobility- Comment  trial  of walking aide cane this date- ambulated at hemibars, LUE on hemibar and RUE using walking aide cane. pt displays fair mechanics- VCs to minimize LUE WB on hemibar. also utilized walking aide cane to locate objects- pts WC, pt able to safely sit in WC using walking aide to locate objectt  -      Recorded by [] Pita Roth, PT      Row Name 01/16/20 1601             Chair-Bed Transfer    Chair-Bed [place] (Transfers)  minimum assist (75% patient effort)  -      Assistive Device (Chair-Bed Transfers)  wheelchair  -CC      Recorded by [CC] Neris Morales OTR      Row Name 01/16/20 1601 01/16/20 0919          Sit-Stand Transfer    Sit-Stand [place] (Transfers)  minimum assist (75% patient effort);verbal  cues;nonverbal cues (demo/gesture)  -  minimum assist (75% patient effort);verbal cues;nonverbal cues (demo/gesture)  -     Assistive Device (Sit-Stand Transfers)  walker, front-wheeled  -CC  walker, front-wheeled  -LH     Recorded by [CC] Neris Morales OTR [] Pita Roth, PT     Row Name 01/16/20 1601 01/16/20 0919          Stand-Sit Transfer    Stand-Sit [place] (Transfers)  minimum assist (75% patient effort);verbal cues;nonverbal cues (demo/gesture)  -  minimum assist (75% patient effort);verbal cues;nonverbal cues (demo/gesture)  -     Assistive Device (Stand-Sit Transfers)  walker, front-wheeled  -CC  walker, front-wheeled  -LH     Recorded by [CC] Neris Morales OTR [] Pita Roth, PT     Row Name 01/16/20 0919             Toilet Transfer    Type (Toilet Transfer)  stand pivot/stand step  -      [place] Level (Toilet Transfer)  minimum assist (75% patient effort);verbal cues;set up;nonverbal cues (demo/gesture)  -      Assistive Device (Toilet Transfer)  wheelchair;grab bars/safety frame  -LH      Recorded by [] Pita Roth, PT      Row Name 01/16/20 0919             Gait/Stairs Assessment/Training    [place] Level (Gait)  minimum assist (75% patient effort);verbal cues;nonverbal cues (demo/gesture)  -      Assistive Device (Gait)  walker, front-wheeled  -      Distance in Feet (Gait)  160x2  -      Pattern (Gait)  step-through  -      Deviations/Abnormal Patterns (Gait)  base of support, narrow;bilateral deviations;scissoring 1 episode of scissoring-min to recover  -      Bilateral Gait Deviations  weight shift ability decreased  -      Comment (Gait/Stairs)  dec nicolas knee extension in stance. practiced turning and back up to WC- Min A w guiding walker, inc assist when backing up- occassional LOB w backing up to WC, min/moD a to recover.   -LH      Recorded by [] Pita Roth, PT      Row Name 01/16/20 1601             Bathing Assessment/Treatment     Bathing [place] Level  bathing skills;lower body;upper body;verbal cues;minimum assist (75% patient effort)  -CC      Bathing Position  supported sitting;supported standing  -CC      Bathing Setup Assistance  adjust water temperature;obtain supplies;open containers  -CC      Recorded by [CC] Neris Morales OTR      Row Name 01/16/20 1601             Upper Body Dressing Assessment/Treatment    Upper Body Dressing Task  upper body dressing skills;doff;don;pull over garment;minimum assist (75% or more patient effort)  -CC      Upper Body Dressing Position  supported sitting  -CC      Set-up Assistance (Upper Body Dressing)  obtain clothing  -CC      Recorded by [CC] Neris Morales OTR      Row Name 01/16/20 1601             Lower Body Dressing Assessment/Treatment    Lower Body Dressing [place] Level  doff;don;pants/bottoms;shoes/slippers;socks;shoelaces;verbal cues;minimum assist (75% patient effort)  -CC      Lower Body Dressing Position  supported sitting;supported standing  -CC      Lower Body Dressing Setup Assistance  obtain clothing  -CC      Comment (Lower Body Dressing)  able to tie laces and naresh shoes  -CC      Recorded by [CC] Neris Morales OTR      Row Name 01/16/20 1601             Grooming Assessment/Treatment    Grooming [place] Level  grooming skills;deodorant application;oral care regimen;shave face;wash face, hands;verbal cues;minimum assist (75% patient effort)  -CC      Grooming Position  sink side;supported sitting  -CC      Grooming Setup Assistance  obtain supplies  -CC      Comment (Grooming)  assist w shaving   -CC      Recorded by [CC] Neris Morales OTR      Row Name 01/16/20 1601 01/16/20 0919          Pain Scale: Numbers Pre/Post-Treatment    Pain Scale: Numbers, Pretreatment  0/10 - no pain  -CC  0/10 - no pain  -     Pain Scale: Numbers, Post-Treatment  0/10 - no pain  -CC  0/10 - no pain  -LH     Recorded by [CC] Neris Morales OTR [] Pita Roth,  PT     Row Name 01/16/20 1601             Upper Extremity Seated Therapeutic Exercise    Performed, Seated Upper Extremity (Therapeutic Exercise)  elbow flexion/extension;forearm supination/pronation;wrist flexion/extension  -CC      Device, Seated Upper Extremity (Therapeutic Exercise)  free weights, cuff  -CC      Exercise Type, Seated Upper Extremity (Therapeutic Exercise)  resistive exercise  -CC      Expected Outcomes, Seated Upper Extremity (Therapeutic Exercise)  improve functional tolerance, self-care activity  -CC      Sets/Reps Detail, Seated Upper Extremity (Therapeutic Exercise)  15x3 2# hand wt  -CC      Recorded by [CC] Neris Morales OTR      Row Name 01/16/20 1601             Upper Extremity Supine Therapeutic Exercise    Performed, Supine Upper Extremity (Therapeutic Exercise)  shoulder flexion/extension;shoulder/scapular protraction/retraction  -CC      Device, Supine Upper Extremity (Therapeutic Exercise)  free weights, barbell  -CC      Exercise Type, Supine Upper Extremity (Therapeutic Exercise)  resistive exercise  -CC      Sets/Reps Detail, Supine Upper Extremity (Therapeutic Exercise)  15x3 2.5# dowel  -CC      Transfers Skills, Training to Functional Activity, Supine Upper Extremity (Therapeutic Exercise)  transfers skills to functional activity most of the time  -CC      Recorded by [CC] Neris Morales OTR      Row Name 01/16/20 1601 01/16/20 0919          Positioning and Restraints    Pre-Treatment Position  sitting in chair/recliner  -  sitting in chair/recliner  -     Post Treatment Position  bed  -  wheelchair  -     In Bed  call light within reach;encouraged to call for assist;exit alarm on;with family/caregiver mom  -CC  --     In Wheelchair  --  sitting;call light within reach;encouraged to call for assist;exit alarm on  -     Recorded by [CC] Neris Morales OTR [] Pita Roth PT       User Key  (r) = Recorded By, (t) = Taken By, (c) = Cosigned By    Initials  Name Effective Dates    CC Neris Morales, OTR 06/08/18 -     Jayna Barnes, MS CCC-SLP 06/08/18 -     Pita Lobo, PT 04/03/18 -           Wound 01/06/20 2200 head Incision (Active)   Dressing Appearance open to air 1/16/2020  7:28 AM   Closure Approximated;Sutures 1/16/2020  7:28 AM   Base dry;clean 1/16/2020  7:28 AM   Periwound dry;intact 1/15/2020  7:11 PM   Drainage Amount none 1/16/2020  7:28 AM   Dressing Care, Wound open to air 1/15/2020  7:11 PM         OT Recommendation and Plan                 OT IRF GOALS     Row Name 01/15/20 1541 01/07/20 1527          Transfer Goal 1 (OT-IRF)    Activity/Assistive Device (Transfer Goal 1, OT-IRF)  toilet;commode, bedside without drop arms  -CC  toilet;commode, bedside without drop arms  -SG     [place] Level (Transfer Goal 1, OT-IRF)  maximum assist (25-49% patient effort)  -CC  maximum assist (25-49% patient effort)  -SG     Time Frame (Transfer Goal 1, OT-IRF)  short term goal (STG);1 week  -CC  short term goal (STG);1 week  -SG     Progress/Outcomes (Transfer Goal 1, OT-IRF)  goal met  -CC  goal ongoing  -SG        Transfer Goal 2 (OT-IRF)    Activity/Assistive Device (Transfer Goal 2, OT-IRF)  toilet  -CC  toilet  -SG     [place] Level (Transfer Goal 2, OT-IRF)  minimum assist (75% or more patient effort);contact guard assist  -CC  minimum assist (75% or more patient effort);moderate assist (50-74% patient effort)  -SG     Time Frame (Transfer Goal 2, OT-IRF)  long term goal (LTG);by discharge  -CC  long term goal (LTG);by discharge  -SG     Progress/Outcomes (Transfer Goal 2, OT-IRF)  goal revised this date  -CC  goal ongoing  -SG        Transfer Goal 3 (OT-IRF)    Activity/Assistive Device (Transfer Goal 3, OT-IRF)  shower chair  -CC  shower chair  -SG     [place] Level (Transfer Goal 3, OT-IRF)  maximum assist (25-49% patient effort)  -CC  maximum assist (25-49% patient effort)  -SG     Time Frame (Transfer Goal 3, OT-IRF)  short term  goal (STG);1 week  -CC  short term goal (STG);1 week  -SG     Progress/Outcomes (Transfer Goal 3, OT-IRF)  goal met  -CC  goal ongoing  -SG        Transfer Goal 4 (OT-IRF)    Activity/Assistive Device (Transfer Goal 4, OT-IRF)  shower chair  -CC  shower chair  -SG     [place] Level (Transfer Goal 4, OT-IRF)  minimum assist (75% or more patient effort);moderate assist (50-74% patient effort)  -CC  minimum assist (75% or more patient effort);moderate assist (50-74% patient effort)  -SG     Time Frame (Transfer Goal 4, OT-IRF)  long term goal (LTG);by discharge  -CC  long term goal (LTG);by discharge  -SG     Progress/Outcomes (Transfer Goal 4, OT-IRF)  goal ongoing  -CC  goal ongoing  -SG        Bathing Goal 1 (OT-IRF)    Activity/Device (Bathing Goal 1, OT-IRF)  bathing skills, all  -CC  bathing skills, all  -SG     [place] Level (Bathing Goal 1, OT-IRF)  moderate assist (50-74% patient effort)  -CC  moderate assist (50-74% patient effort)  -SG     Time Frame (Bathing Goal 1, OT-IRF)  short term goal (STG);1 week  -CC  short term goal (STG);1 week  -SG     Progress/Outcomes (Bathing Goal 1, OT-IRF)  goal met  -CC  goal ongoing  -SG        Bathing Goal 2 (OT-IRF)    Activity/Device (Bathing Goal 2, OT-IRF)  bathing skills, all  -CC  bathing skills, all  -SG     [place] Level (Bathing Goal 2, OT-IRF)  minimum assist (75% or more patient effort)  -CC  minimum assist (75% or more patient effort)  -SG     Time Frame (Bathing Goal 2, OT-IRF)  long term goal (LTG);by discharge  -CC  long term goal (LTG);by discharge  -SG     Progress/Outcomes (Bathing Goal 2, OT-IRF)  goal ongoing  -CC  goal ongoing  -SG        UB Dressing Goal 1 (OT-IRF)    Activity/Device (UB Dressing Goal 1, OT-IRF)  upper body dressing  -CC  upper body dressing  -SG     [place] (UB Dress Goal 1, OT-IRF)  moderate assist (50-74% patient effort)  -CC  moderate assist (50-74% patient effort)  -SG     Time Frame (UB Dressing Goal 1,  OT-IRF)  short term goal (STG);1 week  -CC  short term goal (STG);1 week  -SG     Progress/Outcomes (UB Dressing Goal 1, OT-IRF)  goal met  -CC  goal ongoing  -SG        UB Dressing Goal 2 (OT-IRF)    Activity/Device (UB Dressing Goal 2, OT-IRF)  upper body dressing  -CC  upper body dressing  -SG     [place] (UB Dress Goal 2, OT-IRF)  supervision required  -CC  minimum assist (75% or more patient effort)  -SG     Time Frame (UB Dressing Goal 2, OT-IRF)  long term goal (LTG);by discharge  -CC  long term goal (LTG);by discharge  -SG     Progress/Outcomes (UB Dressing Goal 2, OT-IRF)  goal revised this date  -CC  goal ongoing  -SG        LB Dressing Goal 1 (OT-IRF)    Activity/Device (LB Dressing Goal 1, OT-IRF)  lower body dressing  -CC  lower body dressing  -SG     [place] (LB Dressing Goal 1, OT-IRF)  maximum assist (25-49% patient effort)  -CC  maximum assist (25-49% patient effort)  -SG     Time Frame (LB Dressing Goal 1, OT-IRF)  short term goal (STG);1 week  -CC  short term goal (STG);1 week  -SG     Progress/Outcomes (LB Dressing Goal 1, OT-IRF)  goal met  -CC  goal ongoing  -SG        LB Dressing Goal 2 (OT-IRF)    Activity/Device (LB Dressing Goal 2, OT-IRF)  lower body dressing  -CC  lower body dressing  -SG     [place] (LB Dressing Goal 2, OT-IRF)  minimum assist (75% or more patient effort);contact guard assist  -CC  moderate assist (50-74% patient effort)  -SG     Time Frame (LB Dressing Goal 2, OT-IRF)  long term goal (LTG);by discharge  -CC  long term goal (LTG);by discharge  -SG     Progress/Outcomes (LB Dressing Goal 2, OT-IRF)  goal revised this date  -CC  goal ongoing  -SG        Toileting Goal 1 (OT-IRF)    Activity/Device (Toileting Goal 1, OT-IRF)  toileting skills, all  -CC  toileting skills, all  -SG     [place] Level (Toileting Goal 1, OT-IRF)  moderate assist (50-74% patient effort)  -CC  moderate assist (50-74% patient effort)  -SG     Time Frame (Toileting Goal 1,  OT-IRF)  long term goal (LTG);by discharge  -CC  long term goal (LTG);by discharge  -SG     Progress/Outcomes (Toileting Goal 1, OT-IRF)  goal ongoing  -CC  --        Strength Goal 1 (OT-IRF)    Strength Goal 1 (OT-IRF)  Pt to tolerate UE strengthing to improve overall ROM and functional use of UE.  -CC  Pt to tolerate UE strengthing to improve overall ROM and functional use of UE.  -SG     Time Frame (Strength Goal 1, OT-IRF)  long term goal (LTG);by discharge  -CC  long term goal (LTG);by discharge  -SG     Progress/Outcomes (Strength Goal 1, OT-IRF)  goal ongoing  -CC  goal ongoing  -SG        Balance Goal 1 (OT)    Activity/Assistive Device (Balance Goal 1, OT)  sitting, static  -CC  sitting, static  -SG     [place] Level/Cues Needed (Balance Goal 1, OT)  contact guard assist  -CC  contact guard assist  -SG     Time Frame (Balance Goal 1, OT)  long term goal (LTG);by discharge  -CC  long term goal (LTG);by discharge  -SG     Progress/Outcomes (Balance Goal 1, OT)  goal met  -CC  goal ongoing  -SG        Caregiver Training Goal 1 (OT-IRF)    Caregiver Training Goal 1 (OT-IRF)  Pt and family to be indep with ADLs, functional transfers, AD/AE, and HEP for safe d/c home.  -CC  Pt and family to be indep with ADLs, functional transfers, AD/AE, and HEP for safe d/c home.  -SG     Time Frame (Caregiver Training Goal 1, OT-IRF)  long term goal (LTG);by discharge  -CC  long term goal (LTG);by discharge  -SG     Progress/Outcomes (Caregiver Training Goal 1, OT-IRF)  goal ongoing  -CC  goal ongoing  -SG       User Key  (r) = Recorded By, (t) = Taken By, (c) = Cosigned By    Initials Name Provider Type    Neris Linn OTR Occupational Therapist    SG Sudha Marte OTR Occupational Therapist          Occupational Therapy Education                 Title: PT OT SLP Therapies (In Progress)     Topic: Occupational Therapy (In Progress)     Point: ADL training (Done)     Description:   Instruct learner(s) on  proper safety adaptation and remediation techniques during self care or transfers.   Instruct in proper use of assistive devices.              Learning Progress Summary           Family Acceptance, E,TB, VU by  at 1/7/2020 3405    Comment:  OT role and goals, POC.                               User Key     Initials Effective Dates Name Provider Type Discipline     12/26/18 -  Sudha Marte OTR Occupational Therapist OT                       Time Calculation:     Time Calculation- OT     Row Name 01/16/20 1400 01/16/20 1030          Time Calculation- OT    OT Start Time  1400  -CC  1030  -CC     OT Stop Time  1430  -CC  1100  -CC     OT Time Calculation (min)  30 min  -CC  30 min  -CC     OT Non-Billable Time (min)  --  30 min tech  -CC       User Key  (r) = Recorded By, (t) = Taken By, (c) = Cosigned By    Initials Name Provider Type    CC Neris Morales OTR Occupational Therapist          Therapy Charges for Today     Code Description Service Date Service Provider Modifiers Qty    18517320199 HC OT SELF CARE/MGMT/TRAIN EA 15 MIN 1/15/2020 Nersi Morales OTR GO 3    38008425233 HC OT THER PROC EA 15 MIN 1/15/2020 Neris Morales OTR GO 2    90508266957 HC OT THER SUPP EA 15 MIN 1/15/2020 Neris Morales OTR GO 2    81479054549 HC OT SELF CARE/MGMT/TRAIN EA 15 MIN 1/16/2020 Neris Morales OTR GO 2    90229325582 HC OT THER PROC EA 15 MIN 1/16/2020 Neris Morales OTR GO 2                   AVA Dodd  1/16/2020

## 2020-01-16 NOTE — PROGRESS NOTES
Inpatient Rehabilitation Plan of Care Note    Plan of Care  Branch    Body Systems    [RN] Integumentary(Active)  Current Status(01/16/2020): Small rash dry skin breakdown to groin/inner thigh.  Buttocks intact with a small spot of peeling area. Pink areas noted around  penis.No open area noted. Head incision healing.  Weekly Goal(01/22/2020): No further skin breakdown  Discharge Goal: Intact Skin.        Psychosocial    [RN] Coping/Adjustment(Active)  Current Status(01/16/2020): Patient with difficult verbalizing and slow process.  Mother and sister present and very supportive.  Weekly Goal(01/20/2020): Patient will demonstrate appropriate coping mechanisms  Discharge Goal: Patient will demonstrate health coping strategies        Safety    [RN] Potential for Injury(Active)  Current Status(01/16/2020): Patient with generalized weakness. Very weak all  over, at high risk for fall. Assist of 2 with transfers.  Weekly Goal(01/22/2020): No injuries  Discharge Goal: Pt/family aware of fall/safety in the home setting.    Signed by: Zaida Panchal RN

## 2020-01-16 NOTE — PLAN OF CARE
Problem: Patient Care Overview  Goal: Plan of Care Review  Outcome: Ongoing (interventions implemented as appropriate)  Flowsheets (Taken 1/16/2020 1522)  Outcome Summary: pt alert and pleasantly confused. x2 assist to transfer. has been mostly continent today requesting to use the toilet. meds whole in applesauce. midline to be d/c'd today. powder to groin for itchiness. c/o no pain. mother at bedside. will continue to monitor.  Progress, Functional Goals: demonstrating adequate progress  Plan of Care Reviewed With: patient  IRF Plan of Care Review: progress ongoing, continue
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
## 2020-01-16 NOTE — PROGRESS NOTES
Inpatient Rehabilitation Functional Measures Assessment and Plan of Care    Plan of Care  Updated Problems/Interventions  Field    Functional Measures  JENNIFER Eating:  [facility] Grooming: [facility] Bathing:  [facility] Upper Body Dressing:  [facility] Lower Body Dressing:  [facility] Toileting:  [facility] Bladder Management  Level of Assistance:  [place]  Frequency/Number of Accidents this Shift:  [facility] Bowel Management  Level of Assistance: [place]  Frequency/Number of Accidents this Shift: [facility] Bed/Chair/Wheelchair Transfer:  [facility] Toilet Transfer:  [facility] Tub/Shower Transfer:  [place]    Previously Documented Mode of Locomotion at Discharge: Field  JENNIFER Expected Mode of Locomotion at Discharge: [facility] Walk/Wheelchair:  [facility] Stairs:  [facility] Comprehension:  [facility] Expression:  [facility] Social Interaction:  [facility] Problem Solving:  [facility] Memory:  [place]    Therapy Mode Minutes  Occupational Therapy: Individual: 60 minutes.  Physical Therapy: [place]  Speech Language Pathology:  [place]    Signed by: AVA Dodd/ZOË

## 2020-01-16 NOTE — THERAPY TREATMENT NOTE
Inpatient Rehabilitation - Speech Language Pathology Treatment Note    [facility]       Patient Name: Tye JONES Junior  : 1975  MRN: [id number]    Today's Date: 2020           Admit Date: 2020      Visit Dx:        ICD-10-CM ICD-9-CM   1. Impaired mobility Z74.09 799.89   2. Seizure (CMS/HCC) R56.9 780.39       Patient Active Problem List   Diagnosis   • Mixed hyperlipidemia   • Essential hypertension   • Traumatic brain injury (CMS/HCC)   • Acute allergic rhinitis   • Seizure (CMS/HCC)   • Screen for colon cancer   • H/O traumatic brain injury          Therapy Treatment    Evaluation/Coping    Evaluation/Treatment Time and Intent  Subjective Information: no complaints (20 1100 : Jayna Coker, MS CCC-SLP)  Existing Precautions/Restrictions: fall(swallow) (20 1100 : Jayna Cokre MS CCC-SLP)  Document Type: therapy note (daily note) (20 1100 : Jayna Coker, MS CCC-SLP)  Mode of Treatment: individual therapy, speech-language pathology (20 1100 : Jayna Coker, MS CCC-SLP)  Patient/Family Observations: cooperative, juan luisquecayden (20 1100 : Jayna Coker, MS CCC-SLP)    Vitals/Pain/Safety         Cognition/Communication         Oral Motor/Eating         Mobility/Basic Activities/Instrumental Activities/Motor/Modality                   ROM/MMT                   Sensory/Myotome/Dermatome/Edema               Posture/Balance/Special Tests/Exercise/Transportation/Sexual Function                   Orthotics/Residual Limb/Prosthetic Management              Outcome Summary         EDUCATION    The patient has been educated in the following areas:     Cognitive Impairment Communication Impairment.    SLP Recommendation and Plan                                                          SLP GOALS     Row Name 20 1100 20 0800 01/15/20 1100       Labial Strengthening Goal 1 (SLP)    Activity (Labial Strengthening Goal 1, SLP)  --  --  increase labial tone  -SL    [place]/Accuracy  (Labial Strengthening Goal 1, SLP)  --  --  with maximum cues (25-49% accuracy)  -SL    Barriers (Labial Strengthening Goal 1, SLP)  --  --  lethargic  -SL    Progress/Outcomes (Labial Strengthening Goal 1, SLP)  --  --  goal ongoing  -SL       Lingual Strengthening Goal 1 (SLP)    Activity (Lingual Strengthening Goal 1, SLP)  --  --  increase tongue back strength;increase lingual tone/sensation/control/coordination/movement  -SL    [place]/Accuracy (Lingual Strengthening Goal 1, SLP)  --  --  with maximum cues (25-49% accuracy)  -SL    Barriers (Lingual Strengthening Goal 1, SLP)  --  --  slow weak mvmts; max cues and still displayed poor/reduced ROM- lethargy neg impacting performance  -SL    Progress/Outcomes (Lingual Strengthening Goal 1, SLP)  --  --  goal ongoing  -SL       Word Retrieval Skills Goal 1 (SLP)    Progress (Word Retrieval Skills Goal 1, SLP)  90%;with minimal cues (75-90%);independently (over 90% accuracy) antonyms  -SL  --  --    Progress/Outcomes (Word Retrieval Goal 1, SLP)  goal ongoing  -SL  --  --       Attention Goal 1 (SLP)    Progress (Attention Goal 1, SLP)  --  with maximum cues (25-49%)  -SL  with maximum cues (25-49%);with 1:1 supervision/constant cues  -SL    Progress/Outcomes (Attention Goal 1, SLP)  --  goal ongoing  -SL  goal ongoing  -SL    Comment (Attention Goal 1, SLP)  --  moving around in chair- playing with belt, chair etc- cues to focus  -SL  lethargic- constant repetition of all stimulus required  -SL       Orientation Goal 1 ([facility])    Improve Orientation Through Goal 1 (SLP)  --  demonstrating orientation to day;demonstrating orientation to month;demonstrating orientation to year;demonstrating orientation to place;demonstrating orientation to disease/impairment  -SL  --    Progress (Orientation Goal 1, SLP)  --  50%;independently (over 90% accuracy)  -SL  --    Progress/Outcomes (Orientation Goal 1, SLP)  --  goal ongoing  -SL  --       Memory Skills Goal 1 (SLP)     Progress (Memory Skills Goal 1, SLP)  70%;80%;with minimal cues (75-90%) 4 item category inclusion- repeated x2  -SL  40%;50%;independently (over 90% accuracy) recall of short stories- 50-65 words   -SL  --    Progress/Outcomes (Memory Skills Goal 1, SLP)  goal ongoing  -SL  goal ongoing  -SL  --       Organizational Skills Goal 1 (SLP)    Progress (Thought Organization Skills Goal 1, SLP)  --  80%;with minimal cues (75-90%) concrete convergent categorization  -SL  --    Progress/Outcomes (Thought Organization Skills Goal 1, SLP)  --  goal ongoing  -SL  --       Reasoning Goal 1 (SLP)    Progress (Reasoning Goal 1, SLP)  50%;with moderate cues (50-74%);with minimal cues (75-90%) word deductions ( simple)  -SL  --  --    Progress/Outcomes (Reasoning Goal 1, SLP)  goal ongoing  -SL  --  --       Functional Problem Solving Skills Goal 1 (SLP)    Barriers (Problem Solving Goal 1, SLP)  --  --  slow to respond/form ideas; perseverative; intermittently began utterance but did not finish ideas  -SL    Progress (Problem Solving Goal 1, SLP)  --  --  20%;30%;with 1:1 supervision/constant cues;with maximum cues (25-49%) similarity/difference  -SL    Progress/Outcomes (Problem Solving Goal 1, SLP)  --  --  goal ongoing  -SL    Row Name 01/15/20 0800 01/14/20 1400 01/14/20 1100       Comprehend Questions Goal 1 (SLP)    Improve Ability to Comprehend Questions Goal 1 (SLP)  simple yes/no questions;simple general questions  -  --  --    Barriers (Comprehend Questions Goal 1, SLP)  --  --  slow processing; multiple reps of stimulus  -SL    Progress (Ability to Comprehend Questions Goal 1, SLP)  60%;70%;with minimal cues (75-90%);independently (over 90% accuracy)  -SL  --  60%;with moderate cues (50-74%) simple comparative/general questions  -SL    Progress/Outcomes (Comprehend Questions Goal 1, SLP)  goal ongoing  -SL  --  goal ongoing  -    Comment (Comprehend Questions Goal 1, SLP)  --  --  multiple repetitions of stimulus  questions required to elicit responses  -SL       Attention Goal 1 (SLP)    Comment (Attention Goal 1, SLP)  --  --  redirection back to task required throughout session- pt perseverative and tangential  -SL       Orientation Goal 1 ([facility])    Improve Orientation Through Goal 1 ([facility])  demonstrating orientation to place;demonstrating orientation to year;demonstrating orientation to month;demonstrating orientation to day;demonstrating orientation to disease/impairment  -SL  --  --    Progress (Orientation Goal 1, SLP)  50%;with minimal cues (75-90%)  -SL  --  30%;40%;independently (over 90% accuracy)  -SL    Progress/Outcomes (Orientation Goal 1, SLP)  goal ongoing  -  --  goal ongoing  -    Comment (Orientation Goal 1, SLP)  did not know age, address, month, year, day, place  -  --  --       Organizational Skills Goal 1 (SLP)    Improve Thought Organization Through Goal 1 (SLP)  --  --  completing a divergent naming task;completing a convergent naming task;generating a list of items in a category;naming by category exclusion;naming similarities and differences;90%;independently (over 90% accuracy)  -SL    Time Frame (Thought Organization Skills Goal 1, SLP)  --  --  by discharge  -SL    Barriers (Thought Organization Skills Goal 1, SLP)  --  --  slow responses; perseverative  -SL    Progress (Thought Organization Skills Goal 1, SLP)  --  40%;with moderate cues (50-74%) convergent categorization  -SL  with maximum cues (25-49%) divergent ( concrete) naming  -SL    Progress/Outcomes (Thought Organization Skills Goal 1, SLP)  --  goal ongoing  -  --    Comment (Thought Organization Skills Goal 1, SLP)  --  --  named 1 item in simple concrete category in 1 minute ( fruits); named one additional item without cues in another 1 min; then 2 more items with mod cues  -       Reasoning Goal 1 (SLP)    Improve Reasoning Through Goal 1 (SLP)  --  complete mental flexibility task;complete deductive reasoning task;complete  basic reasoning task;complete logic/creative thinking task;90%;with minimal cues (75-90%);independently (over 90% accuracy)  -SL  --    Time Frame (Reasoning Goal 1, SLP)  --  by discharge  -SL  --    Barriers (Reasoning Goal 1, SLP)  --  fatiqued- falling asleep at times; multiple reps of all stimulus required  -SL  --    Progress (Reasoning Goal 1, SLP)  70%;independently (over 90% accuracy) simple concrete general verbal analogies  -SL  60%;with maximum cues (25-49%);with moderate cues (50-74%) simple word deductions  -SL  --    Progress/Outcomes (Reasoning Goal 1, SLP)  goal ongoing  -SL  goal ongoing  -SL  --       Functional Problem Solving Skills Goal 1 (SLP)    Improve Problem Solving Through Goal 1 (SLP)  --  determine solutions to simple ADL/safety problems;sequence steps in a task;name items for a task;complete organization/home management task;90%;independently (over 90% accuracy)  -SL  --    Time Frame (Problem Solving Goal 1, SLP)  --  by discharge  -SL  --    Progress (Problem Solving Goal 1, SLP)  --  30%;40%;with moderate cues (50-74%);with maximum cues (25-49%) simple situational verbal problem solving  -SL  --    Progress/Outcomes (Problem Solving Goal 1, SLP)  --  goal ongoing  -SL  --    Comment (Problem Solving Goal 1, SLP)  --  slow to respond; some perseveration; intermittently no response; multiple promts and repetitions of stimulus  -SL  --    Row Name 01/13/20 1130             Comprehend Questions Goal 1 (SLP)    Time Frame (Comprehend Questions Goal 1, SLP)  short term goal (STG)  -NR      Progress (Ability to Comprehend Questions Goal 1, SLP)  50%;90%;independently (over 90% accuracy);with maximum cues (25-49%)  -NR      Progress/Outcomes (Comprehend Questions Goal 1, SLP)  goal ongoing  -NR         Orientation Goal 1 ([facility])    Improve Orientation Through Goal 1 ([facility])  demonstrating orientation to day;demonstrating orientation to month;demonstrating orientation to year;demonstrating  orientation to place  -NR      Time Frame (Orientation Goal 1, SLP)  short term goal (STG)  -NR      Progress (Orientation Goal 1, SLP)  100%;with maximum cues (25-49%) oriented to month only independently  -NR      Progress/Outcomes (Orientation Goal 1, SLP)  goal ongoing  -NR         Memory Skills Goal 1 (SLP)    Improve Memory Skills Through Goal 1 (SLP)  select a word from a list by exclusion  -NR      Time Frame (Memory Skills Goal 1, SLP)  short term goal (STG)  -NR      Progress (Memory Skills Goal 1, SLP)  0%;100%;with moderate cues (50-74%);with maximum cues (25-49%)  -NR        User Key  (r) = Recorded By, (t) = Taken By, (c) = Cosigned By    Initials Name Provider Type    Jayna Barnes MS CCC-SLP Speech and Language Pathologist    NR Hailey Cote MA,CCC-SLP Speech and Language Pathologist             SLP Outcome Measures (last 72 hours)      SLP Outcome Measures     Row Name 01/13/20 1130             Adult FCM Scores    Memory FCM Score  3  -NR        User Key  (r) = Recorded By, (t) = Taken By, (c) = Cosigned By    Initials Name Effective Dates    NR Hailey Cote MA,[facility]-SLP 06/08/18 -               Time Calculation:       Time Calculation- SLP     Row Name 01/16/20 1128 01/16/20 0907 01/16/20 0901       Time Calculation- SLP    SLP Start Time  1100  -SL  --  0830  -SL    SLP Stop Time  1130  -SL  --  0900  -SL    SLP Time Calculation (min)  30 min  -SL  --  30 min  -SL    SLP Non-Billable Time (min)  --  15 min rounds  -SL  --      User Key  (r) = Recorded By, (t) = Taken By, (c) = Cosigned By    Initials Name Provider Type    Jayna Barnes MS CCC-SLP Speech and Language Pathologist            Therapy Charges for Today     Code Description Service Date Service Provider Modifiers Qty    52728044857 HC ST DEV OF COGN SKILLS INITIAL 15 MIN 1/15/2020 Jayna Coker MS CCC-SLP  1    09115672925 HC ST DEV OF COGN SKILLS EACH ADDT'L 15 MIN 1/15/2020 Jayna Coker MS CCC-SLP  1    96109776242 HC ST DEV OF COGN  SKILLS EACH ADDT'L 15 MIN 1/15/2020 Jayna Coker, MS CCC-SLP  1    72858974560 HC ST TREATMENT SWALLOW 1 1/15/2020 Sheela, Jayna MS CCC-SLP GN 1    87217861044 HC ST DEV OF COGN SKILLS INITIAL 15 MIN 1/16/2020 Jayna Coker, MS CCC-SLP  1    [id number] HC ST DEV OF COGN SKILLS EACH ADDT'L 15 MIN 1/16/2020 Sheela, Jayna MS CCC-SLP  1    64689909998 HC ST DEV OF COGN SKILLS EACH ADDT'L 15 MIN 1/16/2020 Sheela, Jayna, MS CCC-SLP  2               ADULT NOMS (last 72 hours)      Adult NOMS     Row Name 01/13/20 1130                   Adult FCM Scores    Memory FCM Score  3  -NR          User Key  (r) = Recorded By, (t) = Taken By, (c) = Cosigned By    Initials Name Effective Dates    NR Hailey Cote MA,CCC-SLP 06/08/18 -                      Jayna Coker MS CCC-SLP  1/16/2020

## 2020-01-17 PROBLEM — D89.2 SERUM GAMMA GLOBULIN INCREASED: Status: ACTIVE | Noted: 2020-01-17

## 2020-01-17 PROBLEM — D64.9 ANEMIA: Status: ACTIVE | Noted: 2020-01-17

## 2020-01-17 LAB
B2 MICROGLOB SERPL-MCNC: 3.5 MG/L (ref 0.8–2.2)
FERRITIN SERPL-MCNC: 673 NG/ML (ref 30–400)
HAPTOGLOB SERPL-MCNC: 157 MG/DL (ref 30–200)

## 2020-01-17 PROCEDURE — 99222 1ST HOSP IP/OBS MODERATE 55: CPT | Performed by: INTERNAL MEDICINE

## 2020-01-17 PROCEDURE — 86334 IMMUNOFIX E-PHORESIS SERUM: CPT | Performed by: INTERNAL MEDICINE

## 2020-01-17 PROCEDURE — 84165 PROTEIN E-PHORESIS SERUM: CPT | Performed by: INTERNAL MEDICINE

## 2020-01-17 PROCEDURE — 97130 THER IVNTJ EA ADDL 15 MIN: CPT

## 2020-01-17 PROCEDURE — 97110 THERAPEUTIC EXERCISES: CPT

## 2020-01-17 PROCEDURE — 97535 SELF CARE MNGMENT TRAINING: CPT

## 2020-01-17 PROCEDURE — 83883 ASSAY NEPHELOMETRY NOT SPEC: CPT | Performed by: INTERNAL MEDICINE

## 2020-01-17 PROCEDURE — 82784 ASSAY IGA/IGD/IGG/IGM EACH: CPT | Performed by: INTERNAL MEDICINE

## 2020-01-17 PROCEDURE — 82232 ASSAY OF BETA-2 PROTEIN: CPT | Performed by: INTERNAL MEDICINE

## 2020-01-17 PROCEDURE — 97129 THER IVNTJ 1ST 15 MIN: CPT

## 2020-01-17 PROCEDURE — 84155 ASSAY OF PROTEIN SERUM: CPT | Performed by: INTERNAL MEDICINE

## 2020-01-17 PROCEDURE — 25010000002 HEPARIN (PORCINE) PER 1000 UNITS: Performed by: PHYSICAL MEDICINE & REHABILITATION

## 2020-01-17 RX ORDER — FOLIC ACID 1 MG/1
1 TABLET ORAL DAILY
Status: DISCONTINUED | OUTPATIENT
Start: 2020-01-17 | End: 2020-01-24 | Stop reason: HOSPADM

## 2020-01-17 RX ORDER — CHOLECALCIFEROL (VITAMIN D3) 125 MCG
1000 CAPSULE ORAL DAILY
Status: DISCONTINUED | OUTPATIENT
Start: 2020-01-17 | End: 2020-01-24 | Stop reason: HOSPADM

## 2020-01-17 RX ADMIN — LEVETIRACETAM 500 MG: 100 SOLUTION ORAL at 08:21

## 2020-01-17 RX ADMIN — METOPROLOL TARTRATE 50 MG: 50 TABLET, FILM COATED ORAL at 08:21

## 2020-01-17 RX ADMIN — LACOSAMIDE 200 MG: 100 TABLET, FILM COATED ORAL at 08:21

## 2020-01-17 RX ADMIN — PANTOPRAZOLE SODIUM 40 MG: 40 TABLET, DELAYED RELEASE ORAL at 06:23

## 2020-01-17 RX ADMIN — CHOLECALCIFEROL TAB 10 MCG (400 UNIT) 400 UNITS: 10 TAB at 08:21

## 2020-01-17 RX ADMIN — DEXTROAMPHETAMINE SACCHARATE, AMPHETAMINE ASPARTATE MONOHYDRATE, DEXTROAMPHETAMINE SULFATE, AND AMPHETAMINE SULFATE 30 MG: 2.5; 2.5; 2.5; 2.5 CAPSULE, EXTENDED RELEASE ORAL at 08:21

## 2020-01-17 RX ADMIN — CLONAZEPAM 1 MG: 0.5 TABLET ORAL at 13:12

## 2020-01-17 RX ADMIN — POTASSIUM CHLORIDE 20 MEQ: 750 CAPSULE, EXTENDED RELEASE ORAL at 08:21

## 2020-01-17 RX ADMIN — METOPROLOL TARTRATE 50 MG: 50 TABLET, FILM COATED ORAL at 20:34

## 2020-01-17 RX ADMIN — ATORVASTATIN CALCIUM 10 MG: 10 TABLET, FILM COATED ORAL at 08:21

## 2020-01-17 RX ADMIN — PHENYTOIN SODIUM 100 MG: 100 CAPSULE, EXTENDED RELEASE ORAL at 06:23

## 2020-01-17 RX ADMIN — PHENYTOIN SODIUM 100 MG: 100 CAPSULE, EXTENDED RELEASE ORAL at 20:34

## 2020-01-17 RX ADMIN — TOPIRAMATE 25 MG: 25 TABLET, FILM COATED ORAL at 08:21

## 2020-01-17 RX ADMIN — HEPARIN SODIUM 5000 UNITS: 5000 INJECTION INTRAVENOUS; SUBCUTANEOUS at 06:23

## 2020-01-17 RX ADMIN — NYSTATIN 1 APPLICATION: 100000 POWDER TOPICAL at 08:21

## 2020-01-17 RX ADMIN — NYSTATIN 1 APPLICATION: 100000 POWDER TOPICAL at 23:00

## 2020-01-17 RX ADMIN — LACOSAMIDE 200 MG: 100 TABLET, FILM COATED ORAL at 20:33

## 2020-01-17 RX ADMIN — FOLIC ACID 1 MG: 1 TABLET ORAL at 16:41

## 2020-01-17 RX ADMIN — Medication 1000 MCG: at 16:41

## 2020-01-17 RX ADMIN — HYDROXYZINE PAMOATE 25 MG: 25 CAPSULE ORAL at 20:37

## 2020-01-17 RX ADMIN — CLONAZEPAM 1 MG: 0.5 TABLET ORAL at 20:34

## 2020-01-17 RX ADMIN — LEVETIRACETAM 500 MG: 100 SOLUTION ORAL at 20:34

## 2020-01-17 RX ADMIN — PHENYTOIN SODIUM 100 MG: 100 CAPSULE, EXTENDED RELEASE ORAL at 13:12

## 2020-01-17 RX ADMIN — CLONAZEPAM 1 MG: 0.5 TABLET ORAL at 06:23

## 2020-01-17 NOTE — PLAN OF CARE
Problem: Patient Care Overview  Goal: Plan of Care Review  Outcome: Ongoing (interventions implemented as appropriate)  Flowsheets (Taken 1/17/2020 5650)  Outcome Summary: pt alert and confused. blind at baseline. therapy ok'd for patient to ambulate to bathroom with nursing. does well with cueing. has been continent of b&b during the day. only incontinent once this AM. mother stays at bedside. nystatin powder to groin for itchiness. will continue to monitor.  Progress, Functional Goals: demonstrating adequate progress  Plan of Care Reviewed With: patient  IRF Plan of Care Review: progress ongoing, continue
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
## 2020-01-17 NOTE — PROGRESS NOTES
LOS: 11 days   Patient Care Team:  Jolene Laurent MD as PCP - General (Family Medicine)  Efren Martínez MD as PCP - Claims Attributed    Chief Complaint:   Status post infected  shunt-removed-CNS infection/pneumocephalus  Status post 12/23/ 2019 - Removal of ventriculoperitoneal shunt intracranial portion, valve, partial peritoneal down to the cervical region  Status post 12/31/2019 endoscopic repair of paranasal sinus defect  ID-ceftriaxone-antibiotics until January 14, 2020  Seizure disorder-multidrug regimen-phenytoin/Vimpat/Keppra/clonazepam/Topamax  Remote traumatic brain injury  Neuro stimulation-Adderall  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  Bilateral SCDs.  Status post acute renal otfzdxvtgvsii-HLR-ghguip creatinine.  Avoid NSAID/ACE inhibitor's.    Subjective     History of Present Illness     Overall continues much improved.  Tolerates therapies Overall stronger.Good speed of responses.  No seizure.   Reviewed anemia and possible med changes with patient and mother.        History taken from: patient chart family    Objective     Vital Signs  Temp:  [97.9 °F (36.6 °C)-98.1 °F (36.7 °C)] 97.9 °F (36.6 °C)  Heart Rate:  [] 101  Resp:  [16-18] 18  BP: (104-130)/(65-88) 128/88    Physical Exam  Mental status: awake and alert.  HEENT-craniotomy incision clean dry and intact.   Pulm: Normal respirations  Heart: S1, S2  Abdomen: normoactive bowel sounds, soft, NT , non-distended   Extremities: No cyanosis or edema   : Dry rash bilateral inner thighs  Neuro: awake   good speed with responses, following commands.   Blindness-chronic  Strength:  Takes resistance bilaterally      Results Review:    I reviewed the patient's new  clinical results.     Results from last 7 days   Lab Units 01/16/20  0624 01/13/20  0721   WBC 10*3/mm3 6.28 7.85   HEMOGLOBIN g/dL 8.3* 8.9*   HEMATOCRIT % 24.9* 25.6*   PLATELETS 10*3/mm3 338 429     Results from last 7 days   Lab Units 01/16/20  0624 01/13/20  0721 01/12/20  0704   SODIUM mmol/L 134* 145 140   POTASSIUM mmol/L 3.3* 3.5 3.1*   CHLORIDE mmol/L 97* 105 101   CO2 mmol/L 26.1 27.7 26.4   BUN mg/dL 15 23* 26*   CREATININE mg/dL 1.06 1.36* 1.33*   CALCIUM mg/dL 8.8 9.1 8.8   BILIRUBIN mg/dL  --  0.2  --    ALK PHOS U/L  --  106  --    ALT (SGPT) U/L  --  20  --    AST (SGOT) U/L  --  21  --    GLUCOSE mg/dL 97 102* 106*       Medication Review:   Scheduled Meds:    amphetamine-dextroamphetamine XR 30 mg Oral Daily   atorvastatin 10 mg Oral Daily   cholecalciferol 400 Units Oral Daily   clonazePAM 1 mg Oral Q8H   lacosamide 200 mg Oral Q12H   levETIRAcetam 500 mg Oral Q12H   metoprolol tartrate 50 mg Oral Q12H   nystatin 1 application Topical Q12H   pantoprazole 40 mg Oral Q AM   phenytoin 100 mg Oral Q8H   polyethylene glycol 17 g Oral Daily   potassium chloride 20 mEq Oral Daily   topiramate 25 mg Oral Daily     Continuous Infusions:   PRN Meds:.•  acetaminophen  •  hydrOXYzine pamoate    Assessment/Plan       H/O traumatic brain injury  Status post infected  shunt-removed-CNS infection/pneumocephalus  -Status post 12/23/ 2019 - Removal of ventriculoperitoneal shunt intracranial portion, valve, partial peritoneal down to the cervical region  -Status post 12/31/2019 endoscopic repair of paranasal sinus defect  -ID-ceftriaxone-antibiotics until January 14, 2020    Seizure disorder-multidrug regimen-phenytoin/Vimpat/Keppra/clonazepam/Topamax  -January 9-patient was on phenytoin at home which was increased at the outside hospital, on Topamax but less than antiepileptic dose.  He had Vimpat, Keppra, clonazepam added at the outside hospital.  Consulted neurology for input patient has fatigue felt possibly  related to his increased antiepileptic medication.  Reviewed with neurology and Keppra dose being decreased.  -January 10- Keppra decreased from 2000 mg BID to 50 mg BID with improved alertness. Neurology continues to follow.   -January 15-Discussed having neurology see him in follow-up at some point to review possibly decreasing the additional seizure medications further.  -January 17 - neurologist who same him at this hospital out until next week. Pharmacy checked and Vimpat will be covered as outpatient. Discussed with patient and mother possibly taper off some of additional seizure meds - clonazepam or Vimpat as continues without seizure but will await neurology input next week unless hematology recommends discontinue one due to anemia.       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  Bilateral SCDs.    Status post acute renal fpezwvrluzrmr-TTC-eehipj creatinine.  Avoid NSAID/ACE inhibitor's.  Jan 16 - Cr improved to 1.06    Anemia-trend downward.    Placed him back on a protein pump inhibitor .Hemoccult was negative.   ? If due to recent medical issues vs medication effect.   Jan 17 - Patient with progressive anemia.  HGB 13.2 on Dec 22. HGB 10.6 on Jan 6. HGB 8.3 on Jan 16.  Hemoccult was negative x 1.  Platelets normal.  Retic count increased at 3.56.  Iron 99, iron saturation 137, transferrin 137.  .  Patient's mother thinks his father had sickle cell trait but does remember patient ever being testing.   He has been on Dilantin and low dose Topamax chronically. Keppra is new but dose recently decreased. Vimpat is new but on  quick review do not see that associated with anemia. Has been on Heparin subcutaneous for DVT prophylaxis. More mobile now and now ~ 4 weeks out so will discontinue Heparin at this point and continue with SCDs.  PPI Protonix was added Tues Jan 14 after anemia started.   Have added Haptoglobin to labs - 157 - WNL.  Will consult Hematology for input.      Sleep-January 13- Family reports restlessness at night. Vistaril added PRN last night without response. Since vistaril has only been tried one night, will continue with Vistaril PRN at bedtime for now and continue to monitor.      Skin-dry rash to bilateral inner thighs-try Mycostatin powder     Now admit for comprehensive acute inpatient rehabilitation .  This would be an interdisciplinary program with physical therapy 1 hour,  occupational therapy 1 hour, and speech therapy 1 hour, 5 days a week.  Rehabilitation nursing for carryover, monitoring of incision and neurologic   status, bowel and bladder, and skin  Ongoing physician follow-up.  Weekly team conferences.  Goals are indeterminate.   Rehabilitation prognosis indeterminate.  Medical prognosis indeterminate.  Estimated length of stay is indeterminate  The patient's functional status and clinical status is unchanged from preadmission assessment and the patient continues appropriate for acute inpatient rehabilitation.  Goal is for home with outpatient   therapies.  Barrier to discharge: Cognition/mobility- work on trunk control, strength, balance to overcome.     TEAM CONF - JAN 9 - BED MAX 2.  TRANSFERS MAX 2.  GAIT 8 FEET PARALLEL BARS MOD 2. TOILET TRANSFERS MAX 2.  BLIND.  SLOW PROCESSING.  GROOMING MOD. UBD MAX. LBD DEP. DYSPHAGIA - PUREE/THINS.  FATIGUES WITH COGNITIVE  THERAPY.  ELOS :  4 WEEKS    TEAM CONF - JAN 16- BED MOD ASSIST. GAIT 120 FEET MIN ASSIST WITH WALKER.  TRANSFERS MIN ASSIST. WILL START TRAINING WITH HIS WALKER AID FROM FROM. TOILET TRANSFERS MIN ASSIST. BATH MIN ASSIST. UBD SBA-MIN. LBD  MOD-MAX. ENDURANCE IMPROVED. SWALLOW - PUREE/THIN LIQUIDS, SLOW RATE, ALTERNATE LIQUID AND SOLIDS. WILL TRY TEST TRAY BUT DOES NOT CHEW WELL OR CONTROL BOLUS. AFTERNOON FATIGUES. MOD-SEVERE COGNITIVE DEFICITS. SLOW PROCESSING. DIFFICULTY WITH THOUGHT ORGANIZATION. STILL NOT ORIENTED TO DAY/PLACE. BNE PENDING.NO SAFETY ISSUES. BLADDER CONTINENT/INCONTINENT. WILL DO TIMED VOIDS.  WILL D/C MIDLINE.   ELOS -  END OF NEXT WEEK. FAMILY CONF PENDING.       I discussed the patients findings and my recommendations with patient, family and nursing staff    Janusz Solitario MD  01/17/20  2:30 PM

## 2020-01-17 NOTE — PROGRESS NOTES
Patient with progressive anemia.  HGB 13.2 on Dec 22. HGB 10.6 on Jan 6. HGB 8.3 on Jan 16.  Hemoccult was negative x 1.  Platelets normal.  Retic count increased at 3.56.  Iron 99, iron saturation 137, transferrin 137.  .  Patient's mother thinks his father had sickle cell trait but does remember patient ever being testing.   He has been on Dilantin and low dose Topamax chronically. Keppra is new but dose recently decreased. Vimpat is new but on quick review do not see that associated with anemia. Has been on Heparin subcutaneous for DVT prophylaxis. More mobile now and now ~ 4 weeks out so will discontinue Heparin at this point and continue with SCDs.  PPI Protonix was added Tues Jan 14 after anemia started.    Have added Haptoglobin to labs.  Will consult Hematology for input.

## 2020-01-17 NOTE — THERAPY TREATMENT NOTE
Inpatient Rehabilitation - Speech Language Pathology Treatment Note    [facility]       Patient Name: Tye JONES Junior  : 1975  MRN: [id number]    Today's Date: 2020           Admit Date: 2020      Visit Dx:        ICD-10-CM ICD-9-CM   1. Impaired mobility Z74.09 799.89   2. Seizure (CMS/HCC) R56.9 780.39       Patient Active Problem List   Diagnosis   • Mixed hyperlipidemia   • Essential hypertension   • Traumatic brain injury (CMS/HCC)   • Acute allergic rhinitis   • Seizure (CMS/HCC)   • Screen for colon cancer   • H/O traumatic brain injury          Therapy Treatment    Evaluation/Coping    Evaluation/Treatment Time and Intent  Subjective Information: no complaints (20 1100 : Jayna Coker MS CCC-SLP)  Existing Precautions/Restrictions: fall(swallow) (20 1100 : Jayna Coker MS CCC-SLP)  Document Type: therapy note (daily note) (20 1100 : Jayna Coker MS CCC-SLP)  Mode of Treatment: individual therapy, speech-language pathology (20 1100 : Jayna Coker, MS CCC-SLP)  Patient/Family Observations: cooperative (20 1100 : Jayna Coker, MS CCC-SLP)    Vitals/Pain/Safety         Cognition/Communication         Oral Motor/Eating         Mobility/Basic Activities/Instrumental Activities/Motor/Modality                   ROM/MMT                   Sensory/Myotome/Dermatome/Edema               Posture/Balance/Special Tests/Exercise/Transportation/Sexual Function                   Orthotics/Residual Limb/Prosthetic Management              Outcome Summary         EDUCATION    The patient has been educated in the following areas:     Cognitive Impairment Communication Impairment Dysphagia.    SLP Recommendation and Plan                                                          SLP GOALS     Row Name 20 1100 20 0800 20 1100       Comprehend Questions Goal 1 (SLP)    Progress (Ability to Comprehend Questions Goal 1, SLP)  --  80%;independently (over 90% accuracy) negative  true false statements  -SL  --    Progress/Outcomes (Comprehend Questions Goal 1, SLP)  --  goal ongoing  -SL  --    Comment (Comprehend Questions Goal 1, SLP)  --  2x reps of stimulus  -SL  --       Word Retrieval Skills Goal 1 (SLP)    Progress (Word Retrieval Skills Goal 1, SLP)  --  --  90%;with minimal cues (75-90%);independently (over 90% accuracy) antonyms  -SL    Progress/Outcomes (Word Retrieval Goal 1, SLP)  --  --  goal ongoing  -SL       Orientation Goal 1 (SLP)    Progress (Orientation Goal 1, SLP)  --  50%;independently (over 90% accuracy)  -SL  --    Progress/Outcomes (Orientation Goal 1, SLP)  --  goal ongoing  -SL  --       Memory Skills Goal 1 (SLP)    Progress (Memory Skills Goal 1, SLP)  --  --  70%;80%;with minimal cues (75-90%) 4 item category inclusion- repeated x2  -SL    Progress/Outcomes (Memory Skills Goal 1, SLP)  --  --  goal ongoing  -       Organizational Skills Goal 1 (SLP)    Barriers (Thought Organization Skills Goal 1, SLP)  --  cont to show slow response times, decreased processing  -SL  --    Progress (Thought Organization Skills Goal 1, SLP)  70%;independently (over 90% accuracy) concrete categorzation given initial letter  -SL  40%;with moderate cues (50-74%) semantic sorting- 4 words- concrete stimulus  -SL  --    Progress/Outcomes (Thought Organization Skills Goal 1, SLP)  goal ongoing  -SL  goal ongoing  -SL  --       Reasoning Goal 1 (SLP)    Progress (Reasoning Goal 1, SLP)  60%;70%;independently (over 90% accuracy) situational causes ( simple- 1 idea)  -SL  --  50%;with moderate cues (50-74%);with minimal cues (75-90%) word deductions ( simple)  -SL    Progress/Outcomes (Reasoning Goal 1, SLP)  goal ongoing  -SL  --  goal ongoing  -SL       Functional Problem Solving Skills Goal 1 (SLP)    Progress (Problem Solving Goal 1, SLP)  50%;with moderate cues (50-74%) simple similarity and difference b/w 2 items  -SL  --  --    Progress/Outcomes (Problem Solving Goal 1, SLP)   goal ongoing  -SL  --  --    Row Name 01/16/20 0800 01/15/20 1100 01/15/20 0800       Labial Strengthening Goal 1 (SLP)    Activity (Labial Strengthening Goal 1, SLP)  --  increase labial tone  -SL  --    [place]/Accuracy (Labial Strengthening Goal 1, SLP)  --  with maximum cues (25-49% accuracy)  -SL  --    Barriers (Labial Strengthening Goal 1, SLP)  --  lethargic  -SL  --    Progress/Outcomes (Labial Strengthening Goal 1, SLP)  --  goal ongoing  -SL  --       Lingual Strengthening Goal 1 (SLP)    Activity (Lingual Strengthening Goal 1, SLP)  --  increase tongue back strength;increase lingual tone/sensation/control/coordination/movement  -SL  --    [place]/Accuracy (Lingual Strengthening Goal 1, SLP)  --  with maximum cues (25-49% accuracy)  -SL  --    Barriers (Lingual Strengthening Goal 1, SLP)  --  slow weak mvmts; max cues and still displayed poor/reduced ROM- lethargy neg impacting performance  -SL  --    Progress/Outcomes (Lingual Strengthening Goal 1, SLP)  --  goal ongoing  -SL  --       Comprehend Questions Goal 1 (SLP)    Improve Ability to Comprehend Questions Goal 1 (SLP)  --  --  simple yes/no questions;simple general questions  -SL    Progress (Ability to Comprehend Questions Goal 1, SLP)  --  --  60%;70%;with minimal cues (75-90%);independently (over 90% accuracy)  -SL    Progress/Outcomes (Comprehend Questions Goal 1, SLP)  --  --  goal ongoing  -       Attention Goal 1 (SLP)    Progress (Attention Goal 1, SLP)  with maximum cues (25-49%)  -SL  with maximum cues (25-49%);with 1:1 supervision/constant cues  -SL  --    Progress/Outcomes (Attention Goal 1, SLP)  goal ongoing  -SL  goal ongoing  -SL  --    Comment (Attention Goal 1, SLP)  moving around in chair- playing with belt, chair etc- cues to focus  -SL  lethargic- constant repetition of all stimulus required  -SL  --       Orientation Goal 1 ([facility])    Improve Orientation Through Goal 1 ([facility])  demonstrating orientation to  day;demonstrating orientation to month;demonstrating orientation to year;demonstrating orientation to place;demonstrating orientation to disease/impairment  -SL  --  demonstrating orientation to place;demonstrating orientation to year;demonstrating orientation to month;demonstrating orientation to day;demonstrating orientation to disease/impairment  -SL    Progress (Orientation Goal 1, SLP)  50%;independently (over 90% accuracy)  -SL  --  50%;with minimal cues (75-90%)  -SL    Progress/Outcomes (Orientation Goal 1, SLP)  goal ongoing  -SL  --  goal ongoing  -    Comment (Orientation Goal 1, SLP)  --  --  did not know age, address, month, year, day, place  -SL       Memory Skills Goal 1 (SLP)    Progress (Memory Skills Goal 1, SLP)  40%;50%;independently (over 90% accuracy) recall of short stories- 50-65 words   -  --  --    Progress/Outcomes (Memory Skills Goal 1, SLP)  goal ongoing  -  --  --       Organizational Skills Goal 1 (SLP)    Progress (Thought Organization Skills Goal 1, SLP)  80%;with minimal cues (75-90%) concrete convergent categorization  -SL  --  --    Progress/Outcomes (Thought Organization Skills Goal 1, SLP)  goal ongoing  -  --  --       Reasoning Goal 1 (SLP)    Progress (Reasoning Goal 1, SLP)  --  --  70%;independently (over 90% accuracy) simple concrete general verbal analogies  -SL    Progress/Outcomes (Reasoning Goal 1, SLP)  --  --  goal ongoing  -       Functional Problem Solving Skills Goal 1 (SLP)    Barriers (Problem Solving Goal 1, SLP)  --  slow to respond/form ideas; perseverative; intermittently began utterance but did not finish ideas  -  --    Progress (Problem Solving Goal 1, SLP)  --  20%;30%;with 1:1 supervision/constant cues;with maximum cues (25-49%) similarity/difference  -  --    Progress/Outcomes (Problem Solving Goal 1, SLP)  --  goal ongoing  -  --    Row Name 01/14/20 1400             Organizational Skills Goal 1 (SLP)    Progress (Thought Organization  Skills Goal 1, SLP)  40%;with moderate cues (50-74%) convergent categorization  -SL      Progress/Outcomes (Thought Organization Skills Goal 1, SLP)  goal ongoing  -SL         Reasoning Goal 1 (SLP)    Improve Reasoning Through Goal 1 (SLP)  complete mental flexibility task;complete deductive reasoning task;complete basic reasoning task;complete logic/creative thinking task;90%;with minimal cues (75-90%);independently (over 90% accuracy)  -SL      Time Frame (Reasoning Goal 1, SLP)  by discharge  -SL      Barriers (Reasoning Goal 1, SLP)  fatiqued- falling asleep at times; multiple reps of all stimulus required  -SL      Progress (Reasoning Goal 1, SLP)  60%;with maximum cues (25-49%);with moderate cues (50-74%) simple word deductions  -SL      Progress/Outcomes (Reasoning Goal 1, SLP)  goal ongoing  -SL         Functional Problem Solving Skills Goal 1 (SLP)    Improve Problem Solving Through Goal 1 (SLP)  determine solutions to simple ADL/safety problems;sequence steps in a task;name items for a task;complete organization/home management task;90%;independently (over 90% accuracy)  -SL      Time Frame (Problem Solving Goal 1, SLP)  by discharge  -SL      Progress (Problem Solving Goal 1, SLP)  30%;40%;with moderate cues (50-74%);with maximum cues (25-49%) simple situational verbal problem solving  -SL      Progress/Outcomes (Problem Solving Goal 1, SLP)  goal ongoing  -SL      Comment (Problem Solving Goal 1, SLP)  slow to respond; some perseveration; intermittently no response; multiple promts and repetitions of stimulus  -SL        User Key  (r) = Recorded By, (t) = Taken By, (c) = Cosigned By    Initials Name Provider Type    Jayna Barnes MS CCC-SLP Speech and Language Pathologist                  Time Calculation:       Time Calculation- SLP     Row Name 01/17/20 1127 01/17/20 0900          Time Calculation- [facility]    SLP Start Time  1100  -SL  0830  -     SLP Stop Time  1130  -SL  0900  -SL     SLP Time  Calculation (min)  30 min  -SL  30 min  -SL       User Key  (r) = Recorded By, (t) = Taken By, (c) = Cosigned By    Initials Name Provider Type    Jayna Barnes MS CCC-SLP Speech and Language Pathologist            Therapy Charges for Today     Code Description Service Date Service Provider Modifiers Qty    97358275137 HC ST DEV OF COGN SKILLS INITIAL 15 MIN 1/16/2020 Jayna Coker MS CCC-SLP  1    29140062100 HC ST DEV OF COGN SKILLS EACH ADDT'L 15 MIN 1/16/2020 Jayna Coker MS CCC-SLP  1    56247791919 HC ST DEV OF COGN SKILLS EACH ADDT'L 15 MIN 1/16/2020 Jayna Coker MS CCC-SLP  2    [id number] HC ST DEV OF COGN SKILLS INITIAL 15 MIN 1/17/2020 Jayna Coker MS CCC-SLP  1    [id number] HC ST DEV OF COGN SKILLS EACH ADDT'L 15 MIN 1/17/2020 Jayna Coker MS CCC-SLP  1    [id number] HC ST DEV OF COGN SKILLS EACH ADDT'L 15 MIN 1/17/2020 Jayna Coker MS CCC-SLP  2                           Jayna Coker MS CCC-SLP  1/17/2020

## 2020-01-17 NOTE — THERAPY TREATMENT NOTE
Inpatient Rehabilitation - Physical Therapy Treatment Note  [facility]     Patient Name: Tye JONES Junior  : 1975  MRN: [id number]    Today's Date: 2020                 Admit Date: 2020      Visit Dx:      ICD-10-CM ICD-9-CM   1. Impaired mobility Z74.09 799.89   2. Seizure (CMS/HCC) R56.9 780.39       Patient Active Problem List   Diagnosis   • Mixed hyperlipidemia   • Essential hypertension   • Traumatic brain injury (CMS/HCC)   • Acute allergic rhinitis   • Seizure (CMS/HCC)   • Screen for colon cancer   • H/O traumatic brain injury       Therapy Treatment    IRF Treatment Summary     Row Name 20 1100 20 1000 20 0920       Evaluation/Treatment Time and Intent    Subjective Information  no complaints  -SL  no complaints  -RD  no complaints  -LH    Existing Precautions/Restrictions  fall swallow  -SL  fall  -RD  fall  -LH    Document Type  therapy note (daily note)  -SL  therapy note (daily note)  -RD  therapy note (daily note)  -    Mode of Treatment  individual therapy;speech-language pathology  -SL  occupational therapy  -RD  individual therapy;physical therapy  -    Patient/Family Observations  cooperative  -SL  pt seated in w/c in room w/ no signs of acute distress  -RD  pt seated in WC post ST, no acute distress. asking about whats for lunch  -    Recorded by [SL] Jayna Coker MS CCC-SLP [RD] Laurita Wayne, OT [LH] Pita Roth, PT    Row Name 20 0839             Evaluation/Treatment Time and Intent    Subjective Information  no complaints  -SL      Existing Precautions/Restrictions  fall swallow  -SL      Document Type  therapy note (daily note)  -      Mode of Treatment  individual therapy;speech-language pathology  -SL      Patient/Family Observations  cooperative  -      Recorded by [SL] Jayna Coker MS CCC-SLP      Row Name 20 1000 20 0920          Cognition/Psychosocial- PT/OT    Orientation Status (Cognition)  oriented  to;person;situation  -  oriented to;person;situation  -     Follows Commands (Cognition)  follows one step commands;verbal cues/prompting required;physical/tactile prompts required;increased processing time needed  -RD  follows one step commands;increased processing time needed;repetition of directions required;verbal cues/prompting required  -     Personal Safety Interventions  fall prevention program maintained;gait belt;nonskid shoes/slippers when out of bed  -  fall prevention program maintained;gait belt;supervised activity  -     Cognitive Function (Cognitive)  --  executive function deficit  -     Recorded by [RD] Laurita Wayne, OT [] Pita Roth, PT     Row Name 01/17/20 0920             Functional Mobility    Functional Mobility- Ind. Level  minimum assist (75% patient effort)  -      Functional Mobility- Device  rolling walker  -      Functional Mobility-Distance (Feet)  35  -      Functional Mobility- Safety Issues  balance decreased during turns;weight-shifting ability decreased;step length decreased  -      Functional Mobility- Comment  trial of walking to bathroom in pt room-TCs to guide the rwx in tight spaces, pt able to navigate safely w assist. pt board updated to reflect nsg to ambulate pt in room w rwx and gait belt. RN notified.   -LH      Recorded by [] Pita Roth, PT      Row Name 01/17/20 1000             Transfer Assessment/Treatment    Transfer Assessment/Treatment  sit-stand transfer;stand-sit transfer;shower transfer  -RD      Recorded by [RD] Laurita Wayne, OT      Row Name 01/17/20 1000 01/17/20 0920          Sit-Stand Transfer    Sit-Stand [place] (Transfers)  minimum assist (75% patient effort);verbal cues;nonverbal cues (demo/gesture)  -  minimum assist (75% patient effort);verbal cues;nonverbal cues (demo/gesture)  -     Assistive Device (Sit-Stand Transfers)  wheelchair grab bar  -  wheelchair  -     Recorded by [RD] Rosana  "Laurita Wheeler OT [LH] Pita Roth, PT     Row Name 01/17/20 1000 01/17/20 0920          Stand-Sit Transfer    Stand-Sit [place] (Transfers)  minimum assist (75% patient effort);verbal cues;nonverbal cues (demo/gesture)  -RD  minimum assist (75% patient effort);verbal cues;nonverbal cues (demo/gesture)  -     Assistive Device (Stand-Sit Transfers)  wheelchair grab bar  -RD  wheelchair  -LH     Recorded by [RD] Laurita Wayne OT [LH] Pita Roth, PT     Row Name 01/17/20 1000             Shower Transfer    Type (Shower Transfer)  stand pivot/stand step;sit-stand;stand-sit  -RD      [place] Level (Shower Transfer)  minimum assist (75% patient effort);verbal cues;nonverbal cues (demo/gesture)  -RD      Assistive Device (Shower Transfer)  grab bars/tub rail;shower chair;wheelchair  -RD      Recorded by [RD] Laurita Wayne OT      Row Name 01/17/20 0920             Gait/Stairs Assessment/Training    [place] Level (Gait)  minimum assist (75% patient effort);verbal cues;nonverbal cues (demo/gesture);1 person to manage equipment  -      Assistive Device (Gait)  other (see comments) HHA LUE and walking aide/cane on R  -LH      Distance in Feet (Gait)  40x2  -      Pattern (Gait)  step-through  -      Deviations/Abnormal Patterns (Gait)  base of support, narrow;bilateral deviations;scissoring;left sided deviations;stride length decreased;[place] decreased  -      Bilateral Gait Deviations  heel strike decreased;weight shift ability decreased  -LH      Left Sided Gait Deviations  -- VCs for \"softer\" step on LLE-VCs for inc step length  -      Comment (Gait/Stairs)  practicing turning to chairs- using walking aide/cane to locate objects/chairs in gym prior to sitting. pt does have [place] LOB w turning to chair-min/mod to recover  -LH      Recorded by [LH] Pita Roth, PT      Row Name 01/17/20 1000             Basic Activities of Daily Living (BADLs)    Basic Activities of Daily " Living  bathing;upper body dressing;lower body dressing;grooming  -RD      Recorded by [RD] Laurita Wayne, OT      Row Name 01/17/20 1000             Bathing Assessment/Treatment    Bathing [place] Level  bathing skills;upper body;lower body;minimum assist (75% patient effort);verbal cues  -RD      Assistive Device (Bathing)  grab bar/tub rail;hand held shower spray hose;shower chair  -RD      Bathing Position  supported sitting;supported standing  -RD      Bathing Setup Assistance  obtain supplies;adjust water temperature  -RD      Comment (Bathing)  A x2 when standing in shower for safety  -RD      Recorded by [RD] Laurita Wayne, OT      Row Name 01/17/20 1000             Upper Body Dressing Assessment/Treatment    Upper Body Dressing Task  upper body dressing skills;doff;don;pull over garment;minimum assist (75% or more patient effort);verbal cues  -RD      Upper Body Dressing Position  supported sitting  -RD      Set-up Assistance (Upper Body Dressing)  obtain clothing  -RD      Comment (Upper Body Dressing)  min A required to start shirt  -RD      Recorded by [RD] Laurita Wayne, OT      Row Name 01/17/20 1000             Lower Body Dressing Assessment/Treatment    Lower Body Dressing [place] Level  doff;don;pants/bottoms;socks;shoes/slippers;shoelaces;minimum assist (75% patient effort);verbal cues;nonverbal cues (demo/gesture)  -RD      Lower Body Dressing Position  supported sitting;supported standing  -RD      Lower Body Dressing Setup Assistance  obtain clothing  -RD      Comment (Lower Body Dressing)  min A required to start socks and pants  -RD      Recorded by [RD] Laurita Wayne, OT      Row Name 01/17/20 1000             Grooming Assessment/Treatment    Grooming [place] Level  grooming skills;deodorant application;oral care regimen;wash face, hands;set up;supervision;verbal cues  -RD      Grooming Position  sink side;supported sitting  -RD      Grooming Setup  Assistance  obtain supplies;open containers  -RD      Recorded by [RD] Laurita Wayne OT      Row Name 01/17/20 0920             Vision Assessment/Intervention    Visual Impairment/Limitations  legally blind  -LH      Recorded by [LH] Pita Roth, PT      Row Name 01/17/20 1000 01/17/20 0920          Pain Scale: Numbers Pre/Post-Treatment    Pain Scale: Numbers, Pretreatment  0/10 - no pain  -RD  0/10 - no pain  -LH     Pain Scale: Numbers, Post-Treatment  0/10 - no pain  -RD  0/10 - no pain  -LH     Recorded by [RD] Laurita Wayne OT [LH] Pita Roth, PT     Row Name 01/17/20 1000 01/17/20 0920          Positioning and Restraints    Pre-Treatment Position  sitting in chair/recliner  -RD  sitting in chair/recliner  -LH     Post Treatment Position  wheelchair  -RD  wheelchair  -LH     In Wheelchair  sitting;call light within reach;encouraged to call for assist;exit alarm on;with family/caregiver  -RD  sitting;call light within reach;encouraged to call for assist;exit alarm on  -LH     Recorded by [RD] Laurita Wayne, MALIHA [LH] Pita Roth, PT       User Key  (r) = Recorded By, (t) = Taken By, (c) = Cosigned By    Initials Name Effective Dates    Jayna Barnes MS CCC-SLP 06/08/18 -      Pita Roth, PT 04/03/18 -     Laurita Lawrence OT 10/14/19 -         Wound 01/06/20 2200 head Incision (Active)   Dressing Appearance open to air 1/17/2020  8:15 AM   Closure Approximated;Sutures 1/17/2020  8:15 AM   Base dry;clean 1/17/2020  8:15 AM   Periwound dry;intact 1/16/2020  9:42 PM   Drainage Amount none 1/17/2020  8:15 AM     Physical Therapy Education                 Title: PT OT SLP Therapies (In Progress)     Topic: Physical Therapy (In Progress)     Point: Mobility training (In Progress)     Description:   Instruct learner(s) on safety and technique for assisting patient out of bed, chair or wheelchair.  Instruct in the proper use of assistive devices, such as walker, crutches, cane or  brace.              Patient Friendly Description:   It's important to get you on your feet again, but we need to do so in a way that is safe for you. Falling has serious consequences, and your personal safety is the most important thing of all.        When it's time to get out of bed, one of us or a family member will sit next to you on the bed to give you support.     If your doctor or nurse tells you to use a walker, crutches, a cane, or a brace, be sure you use it every time you get out of bed, even if you think you don't need it.    Learning Progress Summary           Patient Acceptance, E, NR by  at 1/17/2020 0922    Acceptance, E, NR by  at 1/16/2020 0920    Acceptance, E, NR,VU by NM at 1/15/2020 1543    Acceptance, E, NR by NM at 1/14/2020 1111    Comment:  Reinforcement of setup for transfers in order to increase independence and safety    Acceptance, E, VU,NR by NM at 1/13/2020 1545    Comment:  education regarding safety during transfers    Acceptance, E, NR by  at 1/11/2020 0919    Acceptance, E, VU,NR by  at 1/10/2020 1033    Acceptance, E, NR by  at 1/9/2020 1449    Acceptance, E, NR by  at 1/8/2020 0949    Acceptance, E, NR by  at 1/7/2020 1158   Family Acceptance, E, NR by  at 1/7/2020 1158                   Point: Home exercise program (In Progress)     Description:   Instruct learner(s) on appropriate technique for monitoring, assisting and/or progressing patient with therapeutic exercises and activities.              Learning Progress Summary           Patient Acceptance, E, VU,NR by  at 1/10/2020 1033    Acceptance, E, NR by  at 1/7/2020 1158   Family Acceptance, E, NR by  at 1/7/2020 1158                   Point: Body mechanics (In Progress)     Description:   Instruct learner(s) on proper positioning and spine alignment for patient and/or caregiver during mobility tasks and/or exercises.              Learning Progress Summary           Patient Acceptance, E, NR by  at  1/17/2020 0922    Acceptance, E, NR by NM at 1/14/2020 1111    Comment:  Reinforcement of setup for transfers in order to increase independence and safety    Acceptance, E, VU,NR by NM at 1/13/2020 1545    Comment:  education regarding safety during transfers    Acceptance, E, NR by  at 1/11/2020 0919    Acceptance, E, NR by  at 1/7/2020 1158   Family Acceptance, E, NR by  at 1/7/2020 1158                   Point: Precautions (Done)     Description:   Instruct learner(s) on prescribed precautions during mobility and gait tasks              Learning Progress Summary           Patient Acceptance, E, NR,VU by NM at 1/15/2020 1543    Acceptance, E, NR by NM at 1/14/2020 1111    Comment:  Reinforcement of setup for transfers in order to increase independence and safety    Acceptance, E, VU,NR by NM at 1/13/2020 1545    Comment:  education regarding safety during transfers    Acceptance, E, NR by  at 1/8/2020 0949                               User Key     Initials Effective Dates Name Provider Type Discipline     04/03/18 -  Pita Roth, PT Physical Therapist PT    JK 04/03/18 -  Binta Hartman, PT Physical Therapist PT    NM 01/03/20 -  Anmol Joseph, PT Student PT Student PT                  PT Recommendation and Plan  Planned Therapy Interventions (PT Eval): balance training, bed mobility training, gait training, home exercise program, ROM (range of motion), stretching, strengthening, stair training, transfer training, wheelchair management/propulsion training  Frequency of Treatment (PT Eval): 60 minutes per session                     Time Calculation:     PT Charges     Row Name 01/17/20 1411 01/17/20 0922          Time Calculation    Start Time  1330  -  0900  -     Stop Time  1400  -  0930  -     Time Calculation (min)  30 min  -  30 min  -     PT Received On  --  01/17/20  -     PT - Next Appointment  --  01/18/20  -       User Key  (r) = Recorded By, (t) = Taken By, (c) =  Cosigned By    Initials Name Provider Type     Pita Roth, PT Physical Therapist          Therapy Charges for Today     Code Description Service Date Service Provider Modifiers Qty    34862305410 HC PT THER PROC EA 15 MIN 1/16/2020 Pita Roth, PT GP 4    [id number] HC PT THER PROC EA 15 MIN 1/17/2020 Pita Roth, PT GP 4                   Pita Roth, PT  1/17/2020

## 2020-01-17 NOTE — THERAPY TREATMENT NOTE
Inpatient Rehabilitation - Occupational Therapy Treatment Note    [facility]     Patient Name: Tye JONES Junior  : 1975  MRN: [id number]    Today's Date: 2020                 Admit Date: 2020      Visit Dx:    ICD-10-CM ICD-9-CM   1. Impaired mobility Z74.09 799.89   2. Seizure (CMS/HCC) R56.9 780.39       Patient Active Problem List   Diagnosis   • Mixed hyperlipidemia   • Essential hypertension   • Traumatic brain injury (CMS/HCC)   • Acute allergic rhinitis   • Seizure (CMS/HCC)   • Screen for colon cancer   • H/O traumatic brain injury         Therapy Treatment    IRF Treatment Summary     Row Name 20 1100 20 1000 20 0920       Evaluation/Treatment Time and Intent    Subjective Information  no complaints  -SL  no complaints  -RD  no complaints  -LH    Existing Precautions/Restrictions  fall swallow  -SL  fall  -RD  fall  -LH    Document Type  therapy note (daily note)  -SL  therapy note (daily note)  -RD  therapy note (daily note)  -    Mode of Treatment  individual therapy;speech-language pathology  -SL  occupational therapy  -RD  individual therapy;physical therapy  -    Patient/Family Observations  cooperative  -SL  pt seated in w/c in room w/ no signs of acute distress  -RD  pt seated in WC post ST, no acute distress. asking about whats for lunch  -    Recorded by [SL] Jayna Coker MS CCC-SLP [RD] Laurita Wayne, OT [LH] Pita Roth, PT    Row Name 20 0839             Evaluation/Treatment Time and Intent    Subjective Information  no complaints  -SL      Existing Precautions/Restrictions  fall swallow  -SL      Document Type  therapy note (daily note)  -      Mode of Treatment  individual therapy;speech-language pathology  -SL      Patient/Family Observations  cooperative  -      Recorded by [SL] Jayna Coker MS CCC-SLP      Row Name 20 1000 20 0920          Cognition/Psychosocial- PT/OT    Orientation Status (Cognition)  oriented  to;person;situation  -RD  oriented to;person;situation  -     Follows Commands (Cognition)  follows one step commands;verbal cues/prompting required;physical/tactile prompts required;increased processing time needed  -RD  follows one step commands;increased processing time needed;repetition of directions required;verbal cues/prompting required  -     Personal Safety Interventions  fall prevention program maintained;gait belt;nonskid shoes/slippers when out of bed  -RD  fall prevention program maintained;gait belt;supervised activity  -     Cognitive Function (Cognitive)  --  executive function deficit  -LH     Recorded by [RD] Laurita Wayne, MALIHA [LH] Pita Roth, PT     Row Name 01/17/20 1000             Transfer Assessment/Treatment    Transfer Assessment/Treatment  sit-stand transfer;stand-sit transfer;shower transfer  -RD      Recorded by [RD] Laurita Wayne, OT      Row Name 01/17/20 1000 01/17/20 0920          Sit-Stand Transfer    Sit-Stand Green (Transfers)  minimum assist (75% patient effort);verbal cues;nonverbal cues (demo/gesture)  -RD  minimum assist (75% patient effort);verbal cues;nonverbal cues (demo/gesture)  -     Assistive Device (Sit-Stand Transfers)  wheelchair grab bar  -RD  wheelchair  -LH     Recorded by [RD] Laurita Wayne, MALIHA [LH] Pita Roth, PT     Row Name 01/17/20 1000 01/17/20 0920          Stand-Sit Transfer    Stand-Sit Green (Transfers)  minimum assist (75% patient effort);verbal cues;nonverbal cues (demo/gesture)  -RD  minimum assist (75% patient effort);verbal cues;nonverbal cues (demo/gesture)  -     Assistive Device (Stand-Sit Transfers)  wheelchair grab bar  -RD  wheelchair  -LH     Recorded by [RD] Laurita Wayne OT [LH] Pita Roth, PT     Row Name 01/17/20 1000             Shower Transfer    Type (Shower Transfer)  stand pivot/stand step;sit-stand;stand-sit  -RD      Green Level (Shower Transfer)  minimum assist (75%  "patient effort);verbal cues;nonverbal cues (demo/gesture)  -RD      Assistive Device (Shower Transfer)  grab bars/tub rail;shower chair;wheelchair  -RD      Recorded by [RD] Laurita Wayne, OT      Row Name 01/17/20 0920             Gait/Stairs Assessment/Training    [place] Level (Gait)  minimum assist (75% patient effort);verbal cues;nonverbal cues (demo/gesture);1 person to manage equipment  -LH      Assistive Device (Gait)  other (see comments) HHA LUE and walking aide/cane on R  -LH      Distance in Feet (Gait)  40x2  -      Pattern (Gait)  step-through  -      Deviations/Abnormal Patterns (Gait)  base of support, narrow;bilateral deviations;scissoring;left sided deviations;stride length decreased;[place]uka decreased  -      Bilateral Gait Deviations  heel strike decreased;weight shift ability decreased  -LH      Left Sided Gait Deviations  -- VCs for \"softer\" step on LLE-VCs for inc step length  -      Comment (Gait/Stairs)  practicing turning to chairs- using walking aide/cane to locate objects/chairs in gym prior to sitting. pt does have [place] LOB w turning to chair-min/mod to recover  -LH      Recorded by [LH] Pita Roth, PT      Row Name 01/17/20 1000             Basic Activities of Daily Living (BADLs)    Basic Activities of Daily Living  bathing;upper body dressing;lower body dressing;grooming  -RD      Recorded by [RD] Laurita Wayne OT      Row Name 01/17/20 1000             Bathing Assessment/Treatment    Bathing [place] Level  bathing skills;upper body;lower body;minimum assist (75% patient effort);verbal cues  -RD      Assistive Device (Bathing)  grab bar/tub rail;hand held shower spray hose;shower chair  -RD      Bathing Position  supported sitting;supported standing  -RD      Bathing Setup Assistance  obtain supplies;adjust water temperature  -RD      Comment (Bathing)  A x2 when standing in shower for safety  -RD      Recorded by [RD] Laurita Wayne, OT   "    Row Name 01/17/20 1000             Upper Body Dressing Assessment/Treatment    Upper Body Dressing Task  upper body dressing skills;doff;don;pull over garment;minimum assist (75% or more patient effort);verbal cues  -RD      Upper Body Dressing Position  supported sitting  -RD      Set-up Assistance (Upper Body Dressing)  obtain clothing  -RD      Comment (Upper Body Dressing)  min A required to start shirt  -RD      Recorded by [RD] Laurita Wayne OT      Row Name 01/17/20 1000             Lower Body Dressing Assessment/Treatment    Lower Body Dressing [place] Level  doff;don;pants/bottoms;socks;shoes/slippers;shoelaces;minimum assist (75% patient effort);verbal cues;nonverbal cues (demo/gesture)  -RD      Lower Body Dressing Position  supported sitting;supported standing  -RD      Lower Body Dressing Setup Assistance  obtain clothing  -RD      Comment (Lower Body Dressing)  min A required to start socks and pants  -RD      Recorded by [RD] Laurita Wayne OT      Row Name 01/17/20 1000             Grooming Assessment/Treatment    Grooming [place] Level  grooming skills;deodorant application;oral care regimen;wash face, hands;set up;supervision;verbal cues  -RD      Grooming Position  sink side;supported sitting  -RD      Grooming Setup Assistance  obtain supplies;open containers  -RD      Recorded by [RD] Laurita Wayne OT      Row Name 01/17/20 1000 01/17/20 0920          Pain Scale: Numbers Pre/Post-Treatment    Pain Scale: Numbers, Pretreatment  0/10 - no pain  -RD  0/10 - no pain  -LH     Pain Scale: Numbers, Post-Treatment  0/10 - no pain  -RD  0/10 - no pain  -LH     Recorded by [RD] Laurita Wayne, OT [LH] Pita Roth, PT     Row Name 01/17/20 1000 01/17/20 0920          Positioning and Restraints    Pre-Treatment Position  sitting in chair/recliner  -RD  sitting in chair/recliner  -LH     Post Treatment Position  wheelchair  -RD  wheelchair  -LH     In Wheelchair   sitting;call light within reach;encouraged to call for assist;exit alarm on;with family/caregiver  -RD  sitting;call light within reach;encouraged to call for assist;exit alarm on  -     Recorded by [RD] Laurita Wayne, OT [LH] Pita Roth, PT       User Key  (r) = Recorded By, (t) = Taken By, (c) = Cosigned By    Initials Name Effective Dates     Jayna Coker, MS CCC-SLP 06/08/18 -     LH Pita Roth, PT 04/03/18 -     Laurita Lawrence, OT 10/14/19 -           Wound 01/06/20 2200 head Incision (Active)   Dressing Appearance open to air 1/17/2020  8:15 AM   Closure Approximated;Sutures 1/17/2020  8:15 AM   Base dry;clean 1/17/2020  8:15 AM   Periwound dry;intact 1/16/2020  9:42 PM   Drainage Amount none 1/17/2020  8:15 AM         OT Recommendation and Plan                 OT IRF GOALS     Row Name 01/15/20 1541 01/07/20 1527          Transfer Goal 1 (OT-IRF)    Activity/Assistive Device (Transfer Goal 1, OT-IRF)  toilet;commode, bedside without drop arms  -CC  toilet;commode, bedside without drop arms  -SG     [place] Level (Transfer Goal 1, OT-IRF)  maximum assist (25-49% patient effort)  -CC  maximum assist (25-49% patient effort)  -SG     Time Frame (Transfer Goal 1, OT-IRF)  short term goal (STG);1 week  -CC  short term goal (STG);1 week  -SG     Progress/Outcomes (Transfer Goal 1, OT-IRF)  goal met  -CC  goal ongoing  -SG        Transfer Goal 2 (OT-IRF)    Activity/Assistive Device (Transfer Goal 2, OT-IRF)  toilet  -CC  toilet  -SG     [place] Level (Transfer Goal 2, OT-IRF)  minimum assist (75% or more patient effort);contact guard assist  -CC  minimum assist (75% or more patient effort);moderate assist (50-74% patient effort)  -SG     Time Frame (Transfer Goal 2, OT-IRF)  long term goal (LTG);by discharge  -CC  long term goal (LTG);by discharge  -SG     Progress/Outcomes (Transfer Goal 2, OT-IRF)  goal revised this date  -CC  goal ongoing  -SG        Transfer Goal 3 (OT-IRF)     Activity/Assistive Device (Transfer Goal 3, OT-IRF)  shower chair  -CC  shower chair  -SG     [place] Level (Transfer Goal 3, OT-IRF)  maximum assist (25-49% patient effort)  -CC  maximum assist (25-49% patient effort)  -SG     Time Frame (Transfer Goal 3, OT-IRF)  short term goal (STG);1 week  -CC  short term goal (STG);1 week  -SG     Progress/Outcomes (Transfer Goal 3, OT-IRF)  goal met  -CC  goal ongoing  -SG        Transfer Goal 4 (OT-IRF)    Activity/Assistive Device (Transfer Goal 4, OT-IRF)  shower chair  -CC  shower chair  -SG     [place] Level (Transfer Goal 4, OT-IRF)  minimum assist (75% or more patient effort);moderate assist (50-74% patient effort)  -CC  minimum assist (75% or more patient effort);moderate assist (50-74% patient effort)  -SG     Time Frame (Transfer Goal 4, OT-IRF)  long term goal (LTG);by discharge  -CC  long term goal (LTG);by discharge  -SG     Progress/Outcomes (Transfer Goal 4, OT-IRF)  goal ongoing  -CC  goal ongoing  -SG        Bathing Goal 1 (OT-IRF)    Activity/Device (Bathing Goal 1, OT-IRF)  bathing skills, all  -CC  bathing skills, all  -SG     [place] Level (Bathing Goal 1, OT-IRF)  moderate assist (50-74% patient effort)  -CC  moderate assist (50-74% patient effort)  -SG     Time Frame (Bathing Goal 1, OT-IRF)  short term goal (STG);1 week  -CC  short term goal (STG);1 week  -SG     Progress/Outcomes (Bathing Goal 1, OT-IRF)  goal met  -CC  goal ongoing  -SG        Bathing Goal 2 (OT-IRF)    Activity/Device (Bathing Goal 2, OT-IRF)  bathing skills, all  -CC  bathing skills, all  -SG     [place] Level (Bathing Goal 2, OT-IRF)  minimum assist (75% or more patient effort)  -CC  minimum assist (75% or more patient effort)  -SG     Time Frame (Bathing Goal 2, OT-IRF)  long term goal (LTG);by discharge  -CC  long term goal (LTG);by discharge  -SG     Progress/Outcomes (Bathing Goal 2, OT-IRF)  goal ongoing  -CC  goal ongoing  -SG        UB Dressing Goal 1  (OT-IRF)    Activity/Device (UB Dressing Goal 1, OT-IRF)  upper body dressing  -CC  upper body dressing  -SG     [place] (UB Dress Goal 1, OT-IRF)  moderate assist (50-74% patient effort)  -CC  moderate assist (50-74% patient effort)  -SG     Time Frame (UB Dressing Goal 1, OT-IRF)  short term goal (STG);1 week  -CC  short term goal (STG);1 week  -SG     Progress/Outcomes (UB Dressing Goal 1, OT-IRF)  goal met  -CC  goal ongoing  -SG        UB Dressing Goal 2 (OT-IRF)    Activity/Device (UB Dressing Goal 2, OT-IRF)  upper body dressing  -CC  upper body dressing  -SG     [place] (UB Dress Goal 2, OT-IRF)  supervision required  -CC  minimum assist (75% or more patient effort)  -SG     Time Frame (UB Dressing Goal 2, OT-IRF)  long term goal (LTG);by discharge  -CC  long term goal (LTG);by discharge  -SG     Progress/Outcomes (UB Dressing Goal 2, OT-IRF)  goal revised this date  -CC  goal ongoing  -SG        LB Dressing Goal 1 (OT-IRF)    Activity/Device (LB Dressing Goal 1, OT-IRF)  lower body dressing  -CC  lower body dressing  -SG     [place] (LB Dressing Goal 1, OT-IRF)  maximum assist (25-49% patient effort)  -CC  maximum assist (25-49% patient effort)  -SG     Time Frame (LB Dressing Goal 1, OT-IRF)  short term goal (STG);1 week  -CC  short term goal (STG);1 week  -SG     Progress/Outcomes (LB Dressing Goal 1, OT-IRF)  goal met  -CC  goal ongoing  -SG        LB Dressing Goal 2 (OT-IRF)    Activity/Device (LB Dressing Goal 2, OT-IRF)  lower body dressing  -CC  lower body dressing  -SG     [place] (LB Dressing Goal 2, OT-IRF)  minimum assist (75% or more patient effort);contact guard assist  -CC  moderate assist (50-74% patient effort)  -SG     Time Frame (LB Dressing Goal 2, OT-IRF)  long term goal (LTG);by discharge  -CC  long term goal (LTG);by discharge  -SG     Progress/Outcomes (LB Dressing Goal 2, OT-IRF)  goal revised this date  -CC  goal ongoing  -SG        Toileting Goal 1 (OT-IRF)     Activity/Device (Toileting Goal 1, OT-IRF)  toileting skills, all  -CC  toileting skills, all  -SG     [place] Level (Toileting Goal 1, OT-IRF)  moderate assist (50-74% patient effort)  -CC  moderate assist (50-74% patient effort)  -SG     Time Frame (Toileting Goal 1, OT-IRF)  long term goal (LTG);by discharge  -CC  long term goal (LTG);by discharge  -SG     Progress/Outcomes (Toileting Goal 1, OT-IRF)  goal ongoing  -CC  --        Strength Goal 1 (OT-IRF)    Strength Goal 1 (OT-IRF)  Pt to tolerate UE strengthing to improve overall ROM and functional use of UE.  -CC  Pt to tolerate UE strengthing to improve overall ROM and functional use of UE.  -SG     Time Frame (Strength Goal 1, OT-IRF)  long term goal (LTG);by discharge  -CC  long term goal (LTG);by discharge  -SG     Progress/Outcomes (Strength Goal 1, OT-IRF)  goal ongoing  -CC  goal ongoing  -SG        Balance Goal 1 (OT)    Activity/Assistive Device (Balance Goal 1, OT)  sitting, static  -CC  sitting, static  -SG     [place] Level/Cues Needed (Balance Goal 1, OT)  contact guard assist  -CC  contact guard assist  -SG     Time Frame (Balance Goal 1, OT)  long term goal (LTG);by discharge  -CC  long term goal (LTG);by discharge  -SG     Progress/Outcomes (Balance Goal 1, OT)  goal met  -CC  goal ongoing  -SG        Caregiver Training Goal 1 (OT-IRF)    Caregiver Training Goal 1 (OT-IRF)  Pt and family to be indep with ADLs, functional transfers, AD/AE, and HEP for safe d/c home.  -CC  Pt and family to be indep with ADLs, functional transfers, AD/AE, and HEP for safe d/c home.  -SG     Time Frame (Caregiver Training Goal 1, OT-IRF)  long term goal (LTG);by discharge  -CC  long term goal (LTG);by discharge  -SG     Progress/Outcomes (Caregiver Training Goal 1, OT-IRF)  goal ongoing  -CC  goal ongoing  -SG       User Key  (r) = Recorded By, (t) = Taken By, (c) = Cosigned By    Initials Name Provider Type    Neris Linn OTR Occupational  Therapist    Sudha Cho OTR Occupational Therapist          Occupational Therapy Education                 Title: PT OT SLP Therapies (In Progress)     Topic: Occupational Therapy (In Progress)     Point: ADL training (Done)     Description:   Instruct learner(s) on proper safety adaptation and remediation techniques during self care or transfers.   Instruct in proper use of assistive devices.              Learning Progress Summary           Family Acceptance, E,TB, VU by  at 1/7/2020 1545    Comment:  OT role and goals, POC.                               User Key     Initials Effective Dates Name Provider Type Discipline     12/26/18 -  Sudha Marte OTR Occupational Therapist OT                       Time Calculation:     Time Calculation- OT     Row Name 01/17/20 1145             Time Calculation- OT    OT Start Time  1000  -RD      OT Stop Time  1100  -RD      OT Time Calculation (min)  60 min  -RD      OT Received On  01/17/20  -RD        User Key  (r) = Recorded By, (t) = Taken By, (c) = Cosigned By    Initials Name Provider Type    RD Laurita Wayne OT Occupational Therapist          Therapy Charges for Today     Code Description Service Date Service Provider Modifiers Qty    [id number] HC OT SELF CARE/MGMT/TRAIN EA 15 MIN 1/17/2020 Laurita Wayne OT GO 4                   Laurita Wayne OT  1/17/2020

## 2020-01-17 NOTE — PROGRESS NOTES
Reviewed progress, d/c goals, and ELOS with patient and mother. He feels he is making good progress. Discussed family conference scheduled for Tuesday, 1/21 at 2:30. Will assist with d/c plans.

## 2020-01-17 NOTE — PLAN OF CARE
Problem: Patient Care Overview  Goal: Plan of Care Review  Outcome: Ongoing (interventions implemented as appropriate)  Flowsheets (Taken 1/17/2020 0310)  Outcome Summary: Continent/incont. bladder this shift, assist with urinal. Meds whole with applesauce. Mother stayed with pt. No c/o pain. Nystatin powder to bilat. groin/inner thighs. Does not use call light. Blind.Verbal cues given.  Progress, Functional Goals: demonstrating adequate progress  Plan of Care Reviewed With: patient  IRF Plan of Care Review: progress ongoing, continue

## 2020-01-18 LAB
BASOPHILS # BLD AUTO: 0.07 10*3/MM3 (ref 0–0.2)
BASOPHILS NFR BLD AUTO: 1 % (ref 0–1.5)
DEPRECATED RDW RBC AUTO: 41.3 FL (ref 37–54)
EOSINOPHIL # BLD AUTO: 0.17 10*3/MM3 (ref 0–0.4)
EOSINOPHIL NFR BLD AUTO: 2.3 % (ref 0.3–6.2)
ERYTHROCYTE [DISTWIDTH] IN BLOOD BY AUTOMATED COUNT: 12.9 % (ref 12.3–15.4)
HCT VFR BLD AUTO: 23.5 % (ref 37.5–51)
HGB BLD-MCNC: 7.6 G/DL (ref 13–17.7)
IMM GRANULOCYTES # BLD AUTO: 0.09 10*3/MM3 (ref 0–0.05)
IMM GRANULOCYTES NFR BLD AUTO: 1.2 % (ref 0–0.5)
LYMPHOCYTES # BLD AUTO: 2.79 10*3/MM3 (ref 0.7–3.1)
LYMPHOCYTES NFR BLD AUTO: 38.5 % (ref 19.6–45.3)
MCH RBC QN AUTO: 28.8 PG (ref 26.6–33)
MCHC RBC AUTO-ENTMCNC: 32.3 G/DL (ref 31.5–35.7)
MCV RBC AUTO: 89 FL (ref 79–97)
MONOCYTES # BLD AUTO: 0.87 10*3/MM3 (ref 0.1–0.9)
MONOCYTES NFR BLD AUTO: 12 % (ref 5–12)
NEUTROPHILS # BLD AUTO: 3.25 10*3/MM3 (ref 1.7–7)
NEUTROPHILS NFR BLD AUTO: 45 % (ref 42.7–76)
NRBC BLD AUTO-RTO: 0.1 /100 WBC (ref 0–0.2)
PLATELET # BLD AUTO: 358 10*3/MM3 (ref 140–450)
PMV BLD AUTO: 9.6 FL (ref 6–12)
RBC # BLD AUTO: 2.64 10*6/MM3 (ref 4.14–5.8)
WBC NRBC COR # BLD: 7.24 10*3/MM3 (ref 3.4–10.8)

## 2020-01-18 PROCEDURE — 86747 PARVOVIRUS ANTIBODY: CPT | Performed by: INTERNAL MEDICINE

## 2020-01-18 PROCEDURE — 99233 SBSQ HOSP IP/OBS HIGH 50: CPT | Performed by: INTERNAL MEDICINE

## 2020-01-18 PROCEDURE — 85025 COMPLETE CBC W/AUTO DIFF WBC: CPT | Performed by: STUDENT IN AN ORGANIZED HEALTH CARE EDUCATION/TRAINING PROGRAM

## 2020-01-18 PROCEDURE — 97110 THERAPEUTIC EXERCISES: CPT | Performed by: PHYSICAL THERAPIST

## 2020-01-18 RX ADMIN — CLONAZEPAM 1 MG: 0.5 TABLET ORAL at 05:44

## 2020-01-18 RX ADMIN — POTASSIUM CHLORIDE 20 MEQ: 750 CAPSULE, EXTENDED RELEASE ORAL at 09:22

## 2020-01-18 RX ADMIN — PHENYTOIN SODIUM 100 MG: 100 CAPSULE, EXTENDED RELEASE ORAL at 21:01

## 2020-01-18 RX ADMIN — METOPROLOL TARTRATE 50 MG: 50 TABLET, FILM COATED ORAL at 21:01

## 2020-01-18 RX ADMIN — METOPROLOL TARTRATE 50 MG: 50 TABLET, FILM COATED ORAL at 09:21

## 2020-01-18 RX ADMIN — CLONAZEPAM 1 MG: 0.5 TABLET ORAL at 13:51

## 2020-01-18 RX ADMIN — CLONAZEPAM 1 MG: 0.5 TABLET ORAL at 21:01

## 2020-01-18 RX ADMIN — PANTOPRAZOLE SODIUM 40 MG: 40 TABLET, DELAYED RELEASE ORAL at 05:44

## 2020-01-18 RX ADMIN — PHENYTOIN SODIUM 100 MG: 100 CAPSULE, EXTENDED RELEASE ORAL at 05:44

## 2020-01-18 RX ADMIN — CHOLECALCIFEROL TAB 10 MCG (400 UNIT) 400 UNITS: 10 TAB at 09:21

## 2020-01-18 RX ADMIN — LACOSAMIDE 200 MG: 100 TABLET, FILM COATED ORAL at 09:19

## 2020-01-18 RX ADMIN — FOLIC ACID 1 MG: 1 TABLET ORAL at 09:21

## 2020-01-18 RX ADMIN — NYSTATIN 1 APPLICATION: 100000 POWDER TOPICAL at 09:22

## 2020-01-18 RX ADMIN — LEVETIRACETAM 500 MG: 100 SOLUTION ORAL at 09:22

## 2020-01-18 RX ADMIN — NYSTATIN 1 APPLICATION: 100000 POWDER TOPICAL at 21:23

## 2020-01-18 RX ADMIN — Medication 1000 MCG: at 09:21

## 2020-01-18 RX ADMIN — LACOSAMIDE 200 MG: 100 TABLET, FILM COATED ORAL at 21:01

## 2020-01-18 RX ADMIN — DEXTROAMPHETAMINE SACCHARATE, AMPHETAMINE ASPARTATE MONOHYDRATE, DEXTROAMPHETAMINE SULFATE, AND AMPHETAMINE SULFATE 30 MG: 2.5; 2.5; 2.5; 2.5 CAPSULE, EXTENDED RELEASE ORAL at 09:19

## 2020-01-18 RX ADMIN — ATORVASTATIN CALCIUM 10 MG: 10 TABLET, FILM COATED ORAL at 09:21

## 2020-01-18 RX ADMIN — LEVETIRACETAM 500 MG: 100 SOLUTION ORAL at 21:01

## 2020-01-18 RX ADMIN — PHENYTOIN SODIUM 100 MG: 100 CAPSULE, EXTENDED RELEASE ORAL at 13:51

## 2020-01-18 RX ADMIN — TOPIRAMATE 25 MG: 25 TABLET, FILM COATED ORAL at 09:33

## 2020-01-18 NOTE — PROGRESS NOTES
Inpatient Rehabilitation Functional Measures Assessment    Functional Measures  JENNIFER Eating:  [facility] Grooming: [facility] Bathing:  [facility] Upper Body Dressing:  [facility] Lower Body Dressing:  [facility] Toileting:  [facility] Bladder Management  Level of Assistance:  [place]  Frequency/Number of Accidents this Shift:  [facility] Bowel Management  Level of Assistance: [place]  Frequency/Number of Accidents this Shift: [facility] Bed/Chair/Wheelchair Transfer:  [facility] Toilet Transfer:  [facility] Tub/Shower Transfer:  [place]    Previously Documented Mode of Locomotion at Discharge: Field  JENNIFER Expected Mode of Locomotion at Discharge: [facility] Walk/Wheelchair:  [facility] Stairs:  [facility] Comprehension:  [facility] Expression:  [facility] Social Interaction:  [facility] Problem Solving:  [facility] Memory:  [place]    Therapy Mode Minutes  Occupational Therapy: [place]  Physical Therapy: Individual: 35 minutes.  Speech Language Pathology:  [place]    Signed by: Ainsley Servin PT

## 2020-01-18 NOTE — THERAPY TREATMENT NOTE
Inpatient Rehabilitation - Physical Therapy Treatment Note  [facility]     Patient Name: Tye JONES Junior  : 1975  MRN: [id number]    Today's Date: 2020                 Admit Date: 2020      Visit Dx:      ICD-10-CM ICD-9-CM   1. Impaired mobility Z74.09 799.89   2. Seizure (CMS/HCC) R56.9 780.39       Patient Active Problem List   Diagnosis   • Mixed hyperlipidemia   • Essential hypertension   • Traumatic brain injury (CMS/HCC)   • Acute allergic rhinitis   • Seizure (CMS/HCC)   • Screen for colon cancer   • H/O traumatic brain injury   • Anemia   • Serum gamma globulin increased       Therapy Treatment    IRF Treatment Summary     Row Name 20 1235             Evaluation/Treatment Time and Intent    Subjective Information  no complaints  -JS      Existing Precautions/Restrictions  fall swallow, vision  -JS      Document Type  therapy note (daily note)  -JS      Mode of Treatment  physical therapy  -JS      Patient/Family Observations  Pt sitting in w/c upon arrival, mother bedside  -JS      Recorded by [JS] Ainsley Servin PT      Row Name 20 1235             Cognition/Psychosocial- PT/OT    Orientation Status (Cognition)  oriented to;person;situation  -JS      Follows Commands (Cognition)  follows one step commands;verbal cues/prompting required;physical/tactile prompts required;increased processing time needed  -JS      Personal Safety Interventions  fall prevention program maintained;gait belt;muscle strengthening facilitated;nonskid shoes/slippers when out of bed  -JS      Recorded by [JS] Ainsley Servin PT      Row Name 20 1235             Bed Mobility Assessment/Treatment    Comment (Bed Mobility)  Up in w/c  -JS      Recorded by [JS] Ainsley Servin PT      Row Name 20 1235             Sit-Stand Transfer    Sit-Stand [place] (Transfers)  minimum assist (75% patient effort);verbal cues;nonverbal cues (demo/gesture)  -JS      Assistive Device (Sit-Stand Transfers)   wheelchair and grab bar during toilet transfer  -JS      Recorded by [JS] Ainsley Servin PT      Row Name 01/18/20 1235             Stand-Sit Transfer    Stand-Sit [place] (Transfers)  minimum assist (75% patient effort);verbal cues;nonverbal cues (demo/gesture)  -JS      Assistive Device (Stand-Sit Transfers)  wheelchair and grab bar during toilet transfer  -JS      Recorded by [JS] Ainsley Servin, PT      Row Name 01/18/20 1235             Toilet Transfer    Type (Toilet Transfer)  sit-stand;stand-sit;stand pivot/stand step  -JS      [place] Level (Toilet Transfer)  minimum assist (75% patient effort);verbal cues;nonverbal cues (demo/gesture)  -JS      Assistive Device (Toilet Transfer)  grab bars/safety frame;wheelchair  -JS      Recorded by [JS] Ainsley Servin, MAILE      Row Name 01/18/20 1235             Gait/Stairs Assessment/Training    [place] Level (Gait)  verbal cues;nonverbal cues (demo/gesture);contact guard;minimum assist (75% patient effort);2 person assist  -JS      Assistive Device (Gait)  walker, front-wheeled  -JS      Distance in Feet (Gait)  80'x2  -JS      Pattern (Gait)  step-through  -JS      Deviations/Abnormal Patterns (Gait)  base of support, narrow;bilateral deviations;scissoring;left sided deviations;stride length decreased;renuka decreased  -JS      Bilateral Gait Deviations  heel strike decreased;weight shift ability decreased  -JS      Comment (Gait/Stairs)  Requires assistance with walker/direction due to vision impairments.   -JS      Recorded by [JS] Ainsley Servin PT      Row Name 01/18/20 1235             Safety Issues, Functional Mobility    Impairments Affecting Function (Mobility)  visual/perceptual  -JS      Recorded by [JS] Ainsley Servin, MAILE      Row Name 01/18/20 1235             Pain Scale: Numbers Pre/Post-Treatment    Pain Scale: Numbers, Pretreatment  0/10 - no pain  -JS      Pain Scale: Numbers, Post-Treatment  0/10 - no pain  -JS      Recorded by [JS] Ainsley Servin, PT       Row Name 01/18/20 1235             Lower Extremity Seated Therapeutic Exercise    Performed, Seated Lower Extremity (Therapeutic Exercise)  hip flexion/extension;hip abduction/adduction;ankle dorsiflexion/plantarflexion;LAQ (long arc quad), knee extension  -JS      Exercise Type, Seated Lower Extremity (Therapeutic Exercise)  AROM (active range of motion)  -JS      Sets/Reps Detail, Seated Lower Extremity (Therapeutic Exercise)  1/10  -JS      Recorded by [JS] Ainsley Servin PT      Row Name 01/18/20 1235             Positioning and Restraints    Pre-Treatment Position  sitting in chair/recliner  -JS      Post Treatment Position  wheelchair  -JS      In Wheelchair  sitting;call light within reach;encouraged to call for assist;with family/caregiver  -JS      Recorded by [JS] Ainsley Servin PT        User Key  (r) = Recorded By, (t) = Taken By, (c) = Cosigned By    Initials Name Effective Dates    Ainsley Avelar PT 04/06/17 -         Wound 01/06/20 2200 head Incision (Active)   Dressing Appearance open to air 1/17/2020 10:33 PM   Closure Approximated;Sutures 1/17/2020 10:33 PM   Base dry;clean 1/17/2020 10:33 PM   Drainage Amount none 1/18/2020  9:19 AM   Dressing Care, Wound open to air 1/18/2020  9:19 AM     Physical Therapy Education                 Title: PT OT SLP Therapies (Done)     Topic: Physical Therapy (Done)     Point: Mobility training (Done)     Description:   Instruct learner(s) on safety and technique for assisting patient out of bed, chair or wheelchair.  Instruct in the proper use of assistive devices, such as walker, crutches, cane or brace.              Patient Friendly Description:   It's important to get you on your feet again, but we need to do so in a way that is safe for you. Falling has serious consequences, and your personal safety is the most important thing of all.        When it's time to get out of bed, one of us or a family member will sit next to you on the bed to give you support.      If your doctor or nurse tells you to use a walker, crutches, a cane, or a brace, be sure you use it every time you get out of bed, even if you think you don't need it.    Learning Progress Summary           Patient Acceptance, E,TB,D, VU,NR by RAJAT at 1/18/2020 1525    Acceptance, E, VU by WN at 1/17/2020 2256    Acceptance, E, NR by  at 1/17/2020 0922    Acceptance, E, NR by  at 1/16/2020 0920    Acceptance, E, NR,VU by NM at 1/15/2020 1543    Acceptance, E, NR by NM at 1/14/2020 1111    Comment:  Reinforcement of setup for transfers in order to increase independence and safety    Acceptance, E, VU,NR by NM at 1/13/2020 1545    Comment:  education regarding safety during transfers    Acceptance, E, NR by  at 1/11/2020 0919    Acceptance, E, VU,NR by  at 1/10/2020 1033    Acceptance, E, NR by  at 1/9/2020 1449    Acceptance, E, NR by  at 1/8/2020 0949    Acceptance, E, NR by  at 1/7/2020 1158   Family Acceptance, E, VU by  at 1/17/2020 2256    Acceptance, E, NR by  at 1/7/2020 1158                   Point: Home exercise program (Done)     Description:   Instruct learner(s) on appropriate technique for monitoring, assisting and/or progressing patient with therapeutic exercises and activities.              Learning Progress Summary           Patient Acceptance, E,TB,D, VU,NR by RAJAT at 1/18/2020 1525    Acceptance, E, VU by WN at 1/17/2020 2256    Acceptance, E, VU,NR by  at 1/10/2020 1033    Acceptance, E, NR by  at 1/7/2020 1158   Family Acceptance, E, VU by  at 1/17/2020 2256    Acceptance, E, NR by  at 1/7/2020 1158                   Point: Body mechanics (Done)     Description:   Instruct learner(s) on proper positioning and spine alignment for patient and/or caregiver during mobility tasks and/or exercises.              Learning Progress Summary           Patient Acceptance, E,TB,D, VU,NR by RAJAT at 1/18/2020 1525    Acceptance, E, VU by WN at 1/17/2020 2256    Acceptance, E, NR by LH at  1/17/2020 0922    Acceptance, E, NR by NM at 1/14/2020 1111    Comment:  Reinforcement of setup for transfers in order to increase independence and safety    Acceptance, E, VU,NR by NM at 1/13/2020 1545    Comment:  education regarding safety during transfers    Acceptance, E, NR by  at 1/11/2020 0919    Acceptance, E, NR by  at 1/7/2020 1158   Family Acceptance, E, VU by WN at 1/17/2020 2256    Acceptance, E, NR by  at 1/7/2020 1158                   Point: Precautions (Done)     Description:   Instruct learner(s) on prescribed precautions during mobility and gait tasks              Learning Progress Summary           Patient Acceptance, E,TB,D, VU,NR by  at 1/18/2020 1525    Acceptance, E, VU by WN at 1/17/2020 2256    Acceptance, E, NR,VU by NM at 1/15/2020 1543    Acceptance, E, NR by NM at 1/14/2020 1111    Comment:  Reinforcement of setup for transfers in order to increase independence and safety    Acceptance, E, VU,NR by NM at 1/13/2020 1545    Comment:  education regarding safety during transfers    Acceptance, E, NR by  at 1/8/2020 0949   Family Acceptance, E, VU by WN at 1/17/2020 2256                               User Key     Initials Effective Dates Name Provider Type Discipline     04/06/17 -  Ainsley Servin, PT Physical Therapist PT     04/03/18 -  Pita Roth, PT Physical Therapist PT    JK 04/03/18 -  Binta Hartman, PT Physical Therapist PT    WN 06/24/19 -  Hai Serrano, RN Registered Nurse Nurse    NM 01/03/20 -  Anmol Joseph, MAILE Student PT Student PT                  PT Recommendation and Plan                        Time Calculation:     PT Charges     Row Name 01/18/20 1235             Time Calculation    Start Time  1235  -JS      Stop Time  1310  -JS      Time Calculation (min)  35 min  -JS        User Key  (r) = Recorded By, (t) = Taken By, (c) = Cosigned By    Initials Name Provider Type    JS Ainsley Servin, PT Physical Therapist          Therapy Charges for Today      Code Description Service Date Service Provider Modifiers Qty    79394793551 HC PT THER PROC EA 15 MIN 1/18/2020 Ainsley Servin, PT GP 2                   Ainsley Servin, PT  1/18/2020

## 2020-01-18 NOTE — PROGRESS NOTES
Inpatient Rehabilitation Plan of Care Note    Plan of Care  Care Plan Reviewed - No updates at this time.    Sphincter Control    Performed Intervention(s)  Monitor I&O  check for incontinence, offer toileting q2hrs  miralax daily-hold if needed.      Safety    Performed Intervention(s)  Safety rounds/bed alarm/ chair alarm on  Falls precautio/call light within easy reach      Psychosocial    Performed Intervention(s)  Offer support/Encourage patient to verbalize any concerns      Body Systems    Performed Intervention(s)  Skin care q shift and PRN  Assist to turn patient q 2-3hrs.    Signed by: Hai Serrano RN
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
## 2020-01-18 NOTE — PLAN OF CARE
Problem: Patient Care Overview  Goal: Plan of Care Review  Outcome: Ongoing (interventions implemented as appropriate)  Flowsheets  Taken 1/17/2020 2233  Plan of Care Reviewed With: patient;family  Taken 1/17/2020 2257  IRF Plan of Care Review: progress ongoing, continue  Note:   Pt A&Ox2 with some confusion noted, pt and family educated on ambulation with staffing for support, safety education completed, updated on plan of care, all questions and concerns addressed, call light within reach, bed in low position, family at the bedside, all care explained, will continue to monitor pt.

## 2020-01-19 LAB
ALBUMIN SERPL-MCNC: 3.8 G/DL (ref 3.5–5.2)
ALBUMIN/GLOB SERPL: 0.8 G/DL
ALP SERPL-CCNC: 110 U/L (ref 39–117)
ALT SERPL W P-5'-P-CCNC: 27 U/L (ref 1–41)
ANION GAP SERPL CALCULATED.3IONS-SCNC: 12.7 MMOL/L (ref 5–15)
AST SERPL-CCNC: 21 U/L (ref 1–40)
BASOPHILS # BLD AUTO: 0.04 10*3/MM3 (ref 0–0.2)
BASOPHILS NFR BLD AUTO: 0.6 % (ref 0–1.5)
BILIRUB SERPL-MCNC: 0.2 MG/DL (ref 0.2–1.2)
BUN BLD-MCNC: 14 MG/DL (ref 6–20)
BUN/CREAT SERPL: 13.6 (ref 7–25)
CALCIUM SPEC-SCNC: 9.4 MG/DL (ref 8.6–10.5)
CHLORIDE SERPL-SCNC: 102 MMOL/L (ref 98–107)
CO2 SERPL-SCNC: 26.3 MMOL/L (ref 22–29)
CREAT BLD-MCNC: 1.03 MG/DL (ref 0.76–1.27)
DEPRECATED RDW RBC AUTO: 40.8 FL (ref 37–54)
EOSINOPHIL # BLD AUTO: 0.14 10*3/MM3 (ref 0–0.4)
EOSINOPHIL NFR BLD AUTO: 2.1 % (ref 0.3–6.2)
ERYTHROCYTE [DISTWIDTH] IN BLOOD BY AUTOMATED COUNT: 13.1 % (ref 12.3–15.4)
GFR SERPL CREATININE-BSD FRML MDRD: 94 ML/MIN/1.73
GLOBULIN UR ELPH-MCNC: 4.5 GM/DL
GLUCOSE BLD-MCNC: 85 MG/DL (ref 65–99)
HAPTOGLOB SERPL-MCNC: 153 MG/DL (ref 30–200)
HCT VFR BLD AUTO: 28 % (ref 37.5–51)
HEMOCCULT STL QL: NEGATIVE
HGB BLD-MCNC: 9.4 G/DL (ref 13–17.7)
IMM GRANULOCYTES # BLD AUTO: 0.04 10*3/MM3 (ref 0–0.05)
IMM GRANULOCYTES NFR BLD AUTO: 0.6 % (ref 0–0.5)
LDH SERPL-CCNC: 272 U/L (ref 135–225)
LYMPHOCYTES # BLD AUTO: 2.14 10*3/MM3 (ref 0.7–3.1)
LYMPHOCYTES NFR BLD AUTO: 32.1 % (ref 19.6–45.3)
MCH RBC QN AUTO: 29 PG (ref 26.6–33)
MCHC RBC AUTO-ENTMCNC: 33.6 G/DL (ref 31.5–35.7)
MCV RBC AUTO: 86.4 FL (ref 79–97)
MONOCYTES # BLD AUTO: 0.8 10*3/MM3 (ref 0.1–0.9)
MONOCYTES NFR BLD AUTO: 12 % (ref 5–12)
NEUTROPHILS # BLD AUTO: 3.51 10*3/MM3 (ref 1.7–7)
NEUTROPHILS NFR BLD AUTO: 52.6 % (ref 42.7–76)
NRBC BLD AUTO-RTO: 0 /100 WBC (ref 0–0.2)
PLATELET # BLD AUTO: 290 10*3/MM3 (ref 140–450)
PMV BLD AUTO: 9.4 FL (ref 6–12)
POTASSIUM BLD-SCNC: 4.4 MMOL/L (ref 3.5–5.2)
PROT SERPL-MCNC: 8.3 G/DL (ref 6–8.5)
RBC # BLD AUTO: 3.24 10*6/MM3 (ref 4.14–5.8)
SODIUM BLD-SCNC: 141 MMOL/L (ref 136–145)
WBC NRBC COR # BLD: 6.67 10*3/MM3 (ref 3.4–10.8)

## 2020-01-19 PROCEDURE — 99231 SBSQ HOSP IP/OBS SF/LOW 25: CPT | Performed by: INTERNAL MEDICINE

## 2020-01-19 PROCEDURE — 83010 ASSAY OF HAPTOGLOBIN QUANT: CPT | Performed by: INTERNAL MEDICINE

## 2020-01-19 PROCEDURE — 80053 COMPREHEN METABOLIC PANEL: CPT | Performed by: INTERNAL MEDICINE

## 2020-01-19 PROCEDURE — 83615 LACTATE (LD) (LDH) ENZYME: CPT | Performed by: INTERNAL MEDICINE

## 2020-01-19 PROCEDURE — 85025 COMPLETE CBC W/AUTO DIFF WBC: CPT | Performed by: INTERNAL MEDICINE

## 2020-01-19 PROCEDURE — 82272 OCCULT BLD FECES 1-3 TESTS: CPT | Performed by: PHYSICAL MEDICINE & REHABILITATION

## 2020-01-19 PROCEDURE — 82525 ASSAY OF COPPER: CPT | Performed by: INTERNAL MEDICINE

## 2020-01-19 RX ORDER — HYDROXYZINE PAMOATE 50 MG/1
50 CAPSULE ORAL NIGHTLY PRN
Status: DISCONTINUED | OUTPATIENT
Start: 2020-01-19 | End: 2020-01-21

## 2020-01-19 RX ADMIN — LEVETIRACETAM 500 MG: 100 SOLUTION ORAL at 09:11

## 2020-01-19 RX ADMIN — LACOSAMIDE 200 MG: 100 TABLET, FILM COATED ORAL at 09:10

## 2020-01-19 RX ADMIN — LACOSAMIDE 200 MG: 100 TABLET, FILM COATED ORAL at 21:25

## 2020-01-19 RX ADMIN — METOPROLOL TARTRATE 50 MG: 50 TABLET, FILM COATED ORAL at 21:24

## 2020-01-19 RX ADMIN — PHENYTOIN SODIUM 100 MG: 100 CAPSULE, EXTENDED RELEASE ORAL at 05:41

## 2020-01-19 RX ADMIN — CLONAZEPAM 1 MG: 0.5 TABLET ORAL at 21:24

## 2020-01-19 RX ADMIN — PANTOPRAZOLE SODIUM 40 MG: 40 TABLET, DELAYED RELEASE ORAL at 05:41

## 2020-01-19 RX ADMIN — NYSTATIN 1 APPLICATION: 100000 POWDER TOPICAL at 09:11

## 2020-01-19 RX ADMIN — ATORVASTATIN CALCIUM 10 MG: 10 TABLET, FILM COATED ORAL at 09:10

## 2020-01-19 RX ADMIN — CHOLECALCIFEROL TAB 10 MCG (400 UNIT) 400 UNITS: 10 TAB at 09:10

## 2020-01-19 RX ADMIN — DEXTROAMPHETAMINE SACCHARATE, AMPHETAMINE ASPARTATE MONOHYDRATE, DEXTROAMPHETAMINE SULFATE, AND AMPHETAMINE SULFATE 30 MG: 2.5; 2.5; 2.5; 2.5 CAPSULE, EXTENDED RELEASE ORAL at 09:08

## 2020-01-19 RX ADMIN — CLONAZEPAM 1 MG: 0.5 TABLET ORAL at 13:25

## 2020-01-19 RX ADMIN — NYSTATIN 1 APPLICATION: 100000 POWDER TOPICAL at 21:25

## 2020-01-19 RX ADMIN — LEVETIRACETAM 500 MG: 100 SOLUTION ORAL at 21:24

## 2020-01-19 RX ADMIN — POTASSIUM CHLORIDE 20 MEQ: 750 CAPSULE, EXTENDED RELEASE ORAL at 09:10

## 2020-01-19 RX ADMIN — PHENYTOIN SODIUM 100 MG: 100 CAPSULE, EXTENDED RELEASE ORAL at 13:25

## 2020-01-19 RX ADMIN — PHENYTOIN SODIUM 100 MG: 100 CAPSULE, EXTENDED RELEASE ORAL at 21:24

## 2020-01-19 RX ADMIN — METOPROLOL TARTRATE 50 MG: 50 TABLET, FILM COATED ORAL at 09:10

## 2020-01-19 RX ADMIN — HYDROXYZINE PAMOATE 50 MG: 50 CAPSULE ORAL at 21:24

## 2020-01-19 RX ADMIN — Medication 1000 MCG: at 09:10

## 2020-01-19 RX ADMIN — TOPIRAMATE 25 MG: 25 TABLET, FILM COATED ORAL at 09:10

## 2020-01-19 RX ADMIN — CLONAZEPAM 1 MG: 0.5 TABLET ORAL at 05:41

## 2020-01-19 RX ADMIN — FOLIC ACID 1 MG: 1 TABLET ORAL at 09:10

## 2020-01-19 NOTE — PROGRESS NOTES
LOS: 12 days   Patient Care Team:  Jolene Laurent MD as PCP - General (Family Medicine)  Efren Martínez MD as PCP - Claims Attributed    Chief Complaint:   Status post infected  shunt-removed-CNS infection/pneumocephalus  Status post 12/23/ 2019 - Removal of ventriculoperitoneal shunt intracranial portion, valve, partial peritoneal down to the cervical region  Status post 12/31/2019 endoscopic repair of paranasal sinus defect  ID-ceftriaxone-antibiotics until January 14, 2020  Seizure disorder-multidrug regimen-phenytoin/Vimpat/Keppra/clonazepam/Topamax  Remote traumatic brain injury  Neuro stimulation-Adderall  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  Bilateral SCDs.  Status post acute renal aadkckijrkehk-KHE-degnvl creatinine.  Avoid NSAID/ACE inhibitor's.    Subjective     History of Present Illness    Subjective   Feeling well, denies ha/n/v.  Family concerned about his anemia.    History taken from: patient    Objective     Vital Signs  Temp:  [97.7 °F (36.5 °C)-98 °F (36.7 °C)] 97.7 °F (36.5 °C)  Heart Rate:  [89-99] 99  Resp:  [16-18] 16  BP: (120-133)/(81-89) 131/89    Objective   Mental status: awake and alert.  Gen: nad, sitting in wheelchair eating dinner  HEENT-craniotomy incision clean dry and intact.   Pulm: Normal respirations, ctab  Heart: S1, S2, rrr  Abdomen: normoactive bowel sounds, soft, NT , non-distended   Extremities: No cyanosis or edema   : Dry rash bilateral inner thighs  Neuro: awake   good speed with responses, following commands.   Blindness-chronic  Strength:  Takes resistance bilaterally    Results Review:     I reviewed the patient's new clinical results.     Results from last 7 days   Lab Units  01/18/20  0630 01/16/20  0624 01/13/20  0721   WBC 10*3/mm3 7.24 6.28 7.85   HEMOGLOBIN g/dL 7.6* 8.3* 8.9*   HEMATOCRIT % 23.5* 24.9* 25.6*   PLATELETS 10*3/mm3 358 338 429         Medication Review:   reviewed    Assessment/Plan       H/O traumatic brain injury    Anemia    Serum gamma globulin increased      Assessment & Plan       H/O traumatic brain injury  Status post infected  shunt-removed-CNS infection/pneumocephalus  -Status post 12/23/ 2019 - Removal of ventriculoperitoneal shunt intracranial portion, valve, partial peritoneal down to the cervical region  -Status post 12/31/2019 endoscopic repair of paranasal sinus defect  -ID-ceftriaxone-antibiotics until January 14, 2020     Seizure disorder-multidrug regimen-phenytoin/Vimpat/Keppra/clonazepam/Topamax  -January 9-patient was on phenytoin at home which was increased at the outside hospital, on Topamax but less than antiepileptic dose.  He had Vimpat, Keppra, clonazepam added at the outside hospital.  Consulted neurology for input patient has fatigue felt possibly related to his increased antiepileptic medication.  Reviewed with neurology and Keppra dose being decreased.  -January 10- Keppra decreased from 2000 mg BID to 50 mg BID with improved alertness. Neurology continues to follow.   -January 15-Discussed having neurology see him in follow-up at some point to review possibly decreasing the additional seizure medications further.  -January 17 - neurologist who same him at this hospital out until next week. Pharmacy checked and Vimpat will be covered as outpatient. Discussed with patient and mother possibly taper off some of additional seizure meds - clonazepam or Vimpat as continues without seizure but will await neurology input next week unless hematology recommends discontinue one due to anemia.         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  Bilateral SCDs.     Status post acute renal uveuiugerbgyi-USD-nllrzq creatinine.  Avoid NSAID/ACE inhibitor's.  Jan 16 - Cr improved to 1.06     Anemia-trend downward.    Placed him back on a protein pump inhibitor .Hemoccult was negative.   ? If due to recent medical issues vs medication effect.   Jan 17 - Patient with progressive anemia.  HGB 13.2 on Dec 22. HGB 10.6 on Jan 6. HGB 8.3 on Jan 16.  Hemoccult was negative x 1.  Platelets normal.  Retic count increased at 3.56.  Iron 99, iron saturation 137, transferrin 137.  .  Patient's mother thinks his father had sickle cell trait but does remember patient ever being testing.   He has been on Dilantin and low dose Topamax chronically. Keppra is new but dose recently decreased. Vimpat is new but on quick review do not see that associated with anemia. Has been on Heparin subcutaneous for DVT prophylaxis. More mobile now and now ~ 4 weeks out so will discontinue Heparin at this point and continue with SCDs.  PPI Protonix was added Tues Jan 14 after anemia started.   Have added Haptoglobin to labs - 157 - WNL.  Will consult Hematology for input.        Sleep-January 13- Family reports restlessness at night. Vistaril added PRN last night without response. Since vistaril has only been tried one night, will continue with Vistaril PRN at bedtime for now and continue to monitor.       Skin-dry rash to bilateral inner thighs-try Mycostatin powder     1/18/2020 -   Anemia - appreciate Hematology recs - per their note -   1.  Repeated stool occult blood study today.    2.  Check serum Parvovirus B19 IgG/IgM.   3.  We will check serum copper and zinc level.    4.    Continue oral vitamin B12 at 1000 mcg daily and folic acid 1 mg daily.   5.  Monitor CBC.   Recheck CMP, haptoglobin and LDH in the morning.   6.  Possible bone marrow aspiration biopsy in the near future.       Niki King MD  01/18/20  7:00 PM    Time: 15min

## 2020-01-19 NOTE — PLAN OF CARE
Problem: Patient Care Overview  Goal: Plan of Care Review  Outcome: Ongoing (interventions implemented as appropriate)  Flowsheets (Taken 1/18/2020 2201)  Progress, Functional Goals: demonstrating adequate progress  Plan of Care Reviewed With: patient  IRF Plan of Care Review: progress ongoing, continue  Note:   Pt ambulated to the bathroom with assistance x1 and a walker, pt needed constant ques but ambulated with no difficulties noted, pt and family educated on safety, currently updated on plan of care, no concerns voiced at the moment, pt denies any pain at this moment, stan light within reach, bed in low position, family at the bedside, will continue to monitor pt.
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
## 2020-01-19 NOTE — PROGRESS NOTES
LOS: 13 days   Patient Care Team:  Jolene Laurent MD as PCP - General (Family Medicine)  Efren Martínez MD as PCP - Claims Attributed    Chief Complaint:   Status post infected  shunt-removed-CNS infection/pneumocephalus  Status post 12/23/ 2019 - Removal of ventriculoperitoneal shunt intracranial portion, valve, partial peritoneal down to the cervical region  Status post 12/31/2019 endoscopic repair of paranasal sinus defect  ID-ceftriaxone-antibiotics until January 14, 2020  Seizure disorder-multidrug regimen-phenytoin/Vimpat/Keppra/clonazepam/Topamax  Remote traumatic brain injury  Neuro stimulation-Adderall  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  Bilateral SCDs.  Status post acute renal ghhxcvtrzalkj-FKV-iedbdw creatinine.  Avoid NSAID/ACE inhibitor's.    Subjective     History of Present Illness    Subjective   Sustained a fall out of bed early this morning.  Sister was in room but was sleeping and she woke up when he fell.  States she thinks he was crawling over the bed handrail as he went face forward to the ground and his legs were still on the handrail.  She states he denied hitting his head but stated his head had some pain.  Patient denies any pain currently.  Family doesn't note any differences in neuro status since this morning.  No f/c/n/v.  Patient denies pain in arms/legs/head/chest.  Sister asking if can increase vistaril at night to see if that will help him sleep better.    History taken from: patient and sister in room    Objective     Vital Signs  Temp:  [96.7 °F (35.9 °C)-97.9 °F (36.6 °C)] 96.7 °F (35.9 °C)  Heart Rate:  [89-99] 98  Resp:  [16-18] 16  BP: (131-140)/(78-90) 131/90    Objective   Mental status: awake  and alert.  Gen: nad, sitting in wheelchair eating  HEENT-craniotomy incision clean dry and intact. No bruising noted, but sister points to left forehead as a small bruise.  This is not visible to examiner.  No change in prior crani ridges  Pulm: Normal respirations, ctab  Heart: S1, S2, rrr  Abdomen: normoactive bowel sounds, soft, NT , non-distended   Extremities: No cyanosis or edema   : Dry rash bilateral inner thighs  Neuro: awake   good speed with responses, following commands.   Blindness-chronic  Strength:  Takes resistance bilaterally    Results Review:     I reviewed the patient's new clinical results.   -FOB test    Results from last 7 days   Lab Units 01/19/20  0630 01/18/20  0630 01/16/20  0624   WBC 10*3/mm3 6.67 7.24 6.28   HEMOGLOBIN g/dL 9.4* 7.6* 8.3*   HEMATOCRIT % 28.0* 23.5* 24.9*   PLATELETS 10*3/mm3 290 358 338       Results from last 7 days   Lab Units 01/19/20  0630 01/16/20  0624 01/13/20  0721   SODIUM mmol/L 141 134* 145   POTASSIUM mmol/L 4.4 3.3* 3.5   CHLORIDE mmol/L 102 97* 105   CO2 mmol/L 26.3 26.1 27.7   BUN mg/dL 14 15 23*   CREATININE mg/dL 1.03 1.06 1.36*   GLUCOSE mg/dL 85 97 102*   CALCIUM mg/dL 9.4 8.8 9.1         Medication Review: reviewed    Assessment/Plan       H/O traumatic brain injury    Anemia    Serum gamma globulin increased      Assessment & Plan   Status post infected  shunt-removed-CNS infection/pneumocephalus  -Status post 12/23/ 2019 - Removal of ventriculoperitoneal shunt intracranial portion, valve, partial peritoneal down to the cervical region  -Status post 12/31/2019 endoscopic repair of paranasal sinus defect  -ID-ceftriaxone-antibiotics until January 14, 2020     Seizure disorder-multidrug regimen-phenytoin/Vimpat/Keppra/clonazepam/Topamax  -January 9-patient was on phenytoin at home which was increased at the outside hospital, on Topamax but less than antiepileptic dose.  He had Vimpat, Keppra, clonazepam added at the outside hospital.  Consulted  neurology for input patient has fatigue felt possibly related to his increased antiepileptic medication.  Reviewed with neurology and Keppra dose being decreased.  -January 10- Keppra decreased from 2000 mg BID to 50 mg BID with improved alertness. Neurology continues to follow.   -January 15-Discussed having neurology see him in follow-up at some point to review possibly decreasing the additional seizure medications further.  -January 17 - neurologist who same him at this hospital out until next week. Pharmacy checked and Vimpat will be covered as outpatient. Discussed with patient and mother possibly taper off some of additional seizure meds - clonazepam or Vimpat as continues without seizure but will await neurology input next week unless hematology recommends discontinue one due to anemia.         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  Bilateral SCDs.     Status post acute renal ytthntnreplhl-AZZ-ttxvuk creatinine.  Avoid NSAID/ACE inhibitor's.  Jan 16 - Cr improved to 1.06     Anemia-trend downward.    Placed him back on a protein pump inhibitor .Hemoccult was negative.   ? If due to recent medical issues vs medication effect.   Jan 17 - Patient with progressive anemia.  HGB 13.2 on Dec 22. HGB 10.6 on Jan 6. HGB 8.3 on Jan 16.  Hemoccult was negative x 1.  Platelets normal.  Retic count increased at 3.56.  Iron 99, iron saturation 137, transferrin 137.  .  Patient's mother thinks his father had sickle cell trait but does remember patient ever being testing.   He has been on Dilantin and low dose Topamax chronically.  Keppra is new but dose recently decreased. Vimpat is new but on quick review do not see that associated with anemia. Has been on Heparin subcutaneous for DVT prophylaxis. More mobile now and now ~ 4 weeks out so will discontinue Heparin at this point and continue with SCDs.  PPI Protonix was added Tues Jan 14 after anemia started.   Have added Haptoglobin to labs - 157 - WNL.  Will consult Hematology for input.        Sleep-January 13- Family reports restlessness at night. Vistaril added PRN last night without response. Since vistaril has only been tried one night, will continue with Vistaril PRN at bedtime for now and continue to monitor.       Skin-dry rash to bilateral inner thighs-try Mycostatin powder     1/18/2020 -   Anemia - appreciate Hematology recs - per their note -   1.  Repeated stool occult blood study today.    2.  Check serum Parvovirus B19 IgG/IgM.   3.  We will check serum copper and zinc level.    4.   Continue oral vitamin B12 at 1000 mcg daily and folic acid 1 mg daily.   5.  Monitor CBC.   Recheck CMP, haptoglobin and LDH in the morning.   6.  Possible bone marrow aspiration biopsy in the near future.        1/19/20 -   1. Anemia - Hematology following.  FOBT negative.  H/H slightly improved today.  2. Increase vistaril prn dosage at night to see if helps with sleep  3. Fall out of bed early this morning - see if improved sleep helps.  No neuro changes and labs stable.  No imaging now as patient not complaining of anything hurting.  Monitor.      Niki King MD  01/19/20  4:35 PM    Time: 15min

## 2020-01-19 NOTE — PLAN OF CARE
Problem: Patient Care Overview  Goal: Plan of Care Review  Outcome: Ongoing (interventions implemented as appropriate)  Flowsheets (Taken 1/19/2020 1727)  Outcome Summary: Mr Christine has been pleasant and cooperative, no complaints of pain and assist x1. His occult stool saample negative, H & H trending up, and Vistaril increased at bedtime. Family at bedside at all times, no other issues or concerns at this time.  Progress, Functional Goals: demonstrating adequate progress  Plan of Care Reviewed With: patient  IRF Plan of Care Review: progress ongoing, continue

## 2020-01-19 NOTE — PROGRESS NOTES
Inpatient Rehabilitation Plan of Care Note    Plan of Care  Care Plan Reviewed - No updates at this time.    Sphincter Control    Performed Intervention(s)  Monitor I&O  check for incontinence, offer toileting q2hrs  miralax daily-hold if needed.      Safety    Performed Intervention(s)  Safety rounds/bed alarm/ chair alarm on  Falls precautio/call light within easy reach      Psychosocial    Performed Intervention(s)  Offer support/Encourage patient to verbalize any concerns      Body Systems    Performed Intervention(s)  Skin care q shift and PRN  Assist to turn patient q 2-3hrs.    Signed by: Joanne Weiner RN

## 2020-01-19 NOTE — PLAN OF CARE
Problem: Patient Care Overview  Goal: Plan of Care Review  Outcome: Ongoing (interventions implemented as appropriate)  Flowsheets (Taken 1/18/2020 1946)  Outcome Summary: Tye RESENDEZ Junior, has been cooperative, assist x2, and has been continent of b/b. He did have a BM prior to occult stool sample request, supplies in room if needed. Please obtain the stool sample, if possble. No pain and no unsafe behavior, and family at bedside at all times.  Progress, Functional Goals: demonstrating adequate progress  Plan of Care Reviewed With: patient; family  IRF Plan of Care Review: progress ongoing, continue

## 2020-01-19 NOTE — NURSING NOTE
At 0500am, pt's bed alarm and call light sounded. Nurse went into the pts room only to find the patient laying flat (prone position)on the floor. Per sister, pt was trying to go to the bathroom when he fell. Pt had SCDs in place but pt managed to disconnect the SCDs off. Pt stated hitting his head on the floor but denied having any headaches, body aches or any subjective changes that he could report. Pt's vital signs were stable with a temp of 98.3, HR 98, /86, RR 18 &O2sats 100%RA. A neuro assessment was completed but pt's baseline was still the same only oriented to self and no new neuro changes could be noted at the moment, skin was with no bruising or tears that could be noticed at the time of the fall. Safety, call light and falls education was completed, both pt and sister verbalized understanding, MD was called and notified of the pt's fall, telephone orders for blood work were given, pt was safely put back to bed, bed alarm zone changed to zone 2, call light within reach, bed in low position, sister continues to remain at the bedside, will continue to monitor pt and complete neuro assessments.

## 2020-01-20 LAB
ALBUMIN SERPL-MCNC: 3.2 G/DL (ref 2.9–4.4)
ALBUMIN SERPL-MCNC: 3.6 G/DL (ref 3.5–5.2)
ALBUMIN/GLOB SERPL: 0.7 {RATIO} (ref 0.7–1.7)
ALBUMIN/GLOB SERPL: 0.9 G/DL
ALP SERPL-CCNC: 103 U/L (ref 39–117)
ALPHA1 GLOB FLD ELPH-MCNC: 0.4 G/DL (ref 0–0.4)
ALPHA2 GLOB SERPL ELPH-MCNC: 1.1 G/DL (ref 0.4–1)
ALT SERPL W P-5'-P-CCNC: 26 U/L (ref 1–41)
ANION GAP SERPL CALCULATED.3IONS-SCNC: 13.1 MMOL/L (ref 5–15)
AST SERPL-CCNC: 20 U/L (ref 1–40)
B-GLOBULIN SERPL ELPH-MCNC: 1.2 G/DL (ref 0.7–1.3)
BASOPHILS # BLD AUTO: 0.05 10*3/MM3 (ref 0–0.2)
BASOPHILS NFR BLD AUTO: 0.7 % (ref 0–1.5)
BILIRUB SERPL-MCNC: 0.2 MG/DL (ref 0.2–1.2)
BUN BLD-MCNC: 13 MG/DL (ref 6–20)
BUN/CREAT SERPL: 14 (ref 7–25)
CALCIUM SPEC-SCNC: 9.2 MG/DL (ref 8.6–10.5)
CHLORIDE SERPL-SCNC: 101 MMOL/L (ref 98–107)
CO2 SERPL-SCNC: 24.9 MMOL/L (ref 22–29)
CREAT BLD-MCNC: 0.93 MG/DL (ref 0.76–1.27)
DEPRECATED RDW RBC AUTO: 41.8 FL (ref 37–54)
EOSINOPHIL # BLD AUTO: 0.12 10*3/MM3 (ref 0–0.4)
EOSINOPHIL NFR BLD AUTO: 1.7 % (ref 0.3–6.2)
ERYTHROCYTE [DISTWIDTH] IN BLOOD BY AUTOMATED COUNT: 13.5 % (ref 12.3–15.4)
GAMMA GLOB SERPL ELPH-MCNC: 2.4 G/DL (ref 0.4–1.8)
GFR SERPL CREATININE-BSD FRML MDRD: 106 ML/MIN/1.73
GLOBULIN SER CALC-MCNC: 5.1 G/DL (ref 2.2–3.9)
GLOBULIN UR ELPH-MCNC: 4.2 GM/DL
GLUCOSE BLD-MCNC: 86 MG/DL (ref 65–99)
HCT VFR BLD AUTO: 27.5 % (ref 37.5–51)
HGB BLD-MCNC: 9.3 G/DL (ref 13–17.7)
IGA SERPL-MCNC: 472 MG/DL (ref 90–386)
IGG SERPL-MCNC: 2413 MG/DL (ref 700–1600)
IGM SERPL-MCNC: 70 MG/DL (ref 20–172)
IMM GRANULOCYTES # BLD AUTO: 0.05 10*3/MM3 (ref 0–0.05)
IMM GRANULOCYTES NFR BLD AUTO: 0.7 % (ref 0–0.5)
INTERPRETATION SERPL IEP-IMP: ABNORMAL
KAPPA LC SERPL-MCNC: 47.2 MG/L (ref 3.3–19.4)
KAPPA LC/LAMBDA SER: 1.36 {RATIO} (ref 0.26–1.65)
LAMBDA LC FREE SERPL-MCNC: 34.8 MG/L (ref 5.7–26.3)
LYMPHOCYTES # BLD AUTO: 2.72 10*3/MM3 (ref 0.7–3.1)
LYMPHOCYTES NFR BLD AUTO: 38.9 % (ref 19.6–45.3)
Lab: ABNORMAL
M-SPIKE: ABNORMAL G/DL
MCH RBC QN AUTO: 29 PG (ref 26.6–33)
MCHC RBC AUTO-ENTMCNC: 33.8 G/DL (ref 31.5–35.7)
MCV RBC AUTO: 85.7 FL (ref 79–97)
MONOCYTES # BLD AUTO: 0.7 10*3/MM3 (ref 0.1–0.9)
MONOCYTES NFR BLD AUTO: 10 % (ref 5–12)
NEUTROPHILS # BLD AUTO: 3.36 10*3/MM3 (ref 1.7–7)
NEUTROPHILS NFR BLD AUTO: 48 % (ref 42.7–76)
NRBC BLD AUTO-RTO: 0 /100 WBC (ref 0–0.2)
PLATELET # BLD AUTO: 282 10*3/MM3 (ref 140–450)
PMV BLD AUTO: 9.4 FL (ref 6–12)
POTASSIUM BLD-SCNC: 4.2 MMOL/L (ref 3.5–5.2)
PROT SERPL-MCNC: 7.8 G/DL (ref 6–8.5)
PROT SERPL-MCNC: 8.3 G/DL (ref 6–8.5)
RBC # BLD AUTO: 3.21 10*6/MM3 (ref 4.14–5.8)
SODIUM BLD-SCNC: 139 MMOL/L (ref 136–145)
WBC NRBC COR # BLD: 7 10*3/MM3 (ref 3.4–10.8)

## 2020-01-20 PROCEDURE — 97116 GAIT TRAINING THERAPY: CPT

## 2020-01-20 PROCEDURE — 97530 THERAPEUTIC ACTIVITIES: CPT

## 2020-01-20 PROCEDURE — 80053 COMPREHEN METABOLIC PANEL: CPT | Performed by: PHYSICAL MEDICINE & REHABILITATION

## 2020-01-20 PROCEDURE — 85025 COMPLETE CBC W/AUTO DIFF WBC: CPT | Performed by: PHYSICAL MEDICINE & REHABILITATION

## 2020-01-20 PROCEDURE — 97535 SELF CARE MNGMENT TRAINING: CPT

## 2020-01-20 PROCEDURE — 97110 THERAPEUTIC EXERCISES: CPT

## 2020-01-20 PROCEDURE — 84630 ASSAY OF ZINC: CPT | Performed by: INTERNAL MEDICINE

## 2020-01-20 PROCEDURE — 97130 THER IVNTJ EA ADDL 15 MIN: CPT

## 2020-01-20 PROCEDURE — 99232 SBSQ HOSP IP/OBS MODERATE 35: CPT | Performed by: INTERNAL MEDICINE

## 2020-01-20 PROCEDURE — 92526 ORAL FUNCTION THERAPY: CPT

## 2020-01-20 PROCEDURE — 97112 NEUROMUSCULAR REEDUCATION: CPT

## 2020-01-20 RX ADMIN — LEVETIRACETAM 500 MG: 100 SOLUTION ORAL at 08:05

## 2020-01-20 RX ADMIN — FOLIC ACID 1 MG: 1 TABLET ORAL at 08:07

## 2020-01-20 RX ADMIN — PHENYTOIN SODIUM 100 MG: 100 CAPSULE, EXTENDED RELEASE ORAL at 13:28

## 2020-01-20 RX ADMIN — CLONAZEPAM 1 MG: 0.5 TABLET ORAL at 13:29

## 2020-01-20 RX ADMIN — PHENYTOIN SODIUM 100 MG: 100 CAPSULE, EXTENDED RELEASE ORAL at 05:41

## 2020-01-20 RX ADMIN — Medication 1000 MCG: at 08:07

## 2020-01-20 RX ADMIN — DEXTROAMPHETAMINE SACCHARATE, AMPHETAMINE ASPARTATE MONOHYDRATE, DEXTROAMPHETAMINE SULFATE, AND AMPHETAMINE SULFATE 30 MG: 2.5; 2.5; 2.5; 2.5 CAPSULE, EXTENDED RELEASE ORAL at 08:06

## 2020-01-20 RX ADMIN — CHOLECALCIFEROL TAB 10 MCG (400 UNIT) 400 UNITS: 10 TAB at 08:07

## 2020-01-20 RX ADMIN — ATORVASTATIN CALCIUM 10 MG: 10 TABLET, FILM COATED ORAL at 08:06

## 2020-01-20 RX ADMIN — POLYETHYLENE GLYCOL 3350 17 G: 17 POWDER, FOR SOLUTION ORAL at 08:07

## 2020-01-20 RX ADMIN — METOPROLOL TARTRATE 50 MG: 50 TABLET, FILM COATED ORAL at 21:12

## 2020-01-20 RX ADMIN — TOPIRAMATE 25 MG: 25 TABLET, FILM COATED ORAL at 08:06

## 2020-01-20 RX ADMIN — NYSTATIN 1 APPLICATION: 100000 POWDER TOPICAL at 21:12

## 2020-01-20 RX ADMIN — PHENYTOIN SODIUM 100 MG: 100 CAPSULE, EXTENDED RELEASE ORAL at 21:12

## 2020-01-20 RX ADMIN — LEVETIRACETAM 500 MG: 100 SOLUTION ORAL at 21:12

## 2020-01-20 RX ADMIN — NYSTATIN 1 APPLICATION: 100000 POWDER TOPICAL at 08:10

## 2020-01-20 RX ADMIN — POTASSIUM CHLORIDE 20 MEQ: 750 CAPSULE, EXTENDED RELEASE ORAL at 08:07

## 2020-01-20 RX ADMIN — PANTOPRAZOLE SODIUM 40 MG: 40 TABLET, DELAYED RELEASE ORAL at 05:41

## 2020-01-20 RX ADMIN — METOPROLOL TARTRATE 50 MG: 50 TABLET, FILM COATED ORAL at 08:07

## 2020-01-20 RX ADMIN — CLONAZEPAM 1 MG: 0.5 TABLET ORAL at 05:41

## 2020-01-20 RX ADMIN — LACOSAMIDE 200 MG: 100 TABLET, FILM COATED ORAL at 08:06

## 2020-01-20 RX ADMIN — CLONAZEPAM 1 MG: 0.5 TABLET ORAL at 21:12

## 2020-01-20 RX ADMIN — LACOSAMIDE 200 MG: 100 TABLET, FILM COATED ORAL at 21:12

## 2020-01-20 NOTE — THERAPY TREATMENT NOTE
Inpatient Rehabilitation - Physical Therapy Treatment Note  [facility]     Patient Name: Tye JONES Junior  : 1975  MRN: [id number]    Today's Date: 2020                 Admit Date: 2020      Visit Dx:      ICD-10-CM ICD-9-CM   1. Impaired mobility Z74.09 799.89   2. Seizure (CMS/HCC) R56.9 780.39       Patient Active Problem List   Diagnosis   • Mixed hyperlipidemia   • Essential hypertension   • Traumatic brain injury (CMS/HCC)   • Acute allergic rhinitis   • Seizure (CMS/HCC)   • Screen for colon cancer   • H/O traumatic brain injury   • Anemia   • Serum gamma globulin increased       Therapy Treatment    IRF Treatment Summary     Row Name 20 1139 20 1100 20 1046       Evaluation/Treatment Time and Intent    Subjective Information  no complaints  -FAB,NM,LH2  no complaints  -SL  no complaints  -CC    Existing Precautions/Restrictions  fall  -JACI SANON,LH2  fall swallow  -SL  fall swallow, vision  -CC    Document Type  therapy note (daily note)  -JACI SANON,FAB2  therapy note (daily note)  -  therapy note (daily note)  -CC    Mode of Treatment  physical therapy  -FAB,NM,LH2  individual therapy;speech-language pathology  -SL  occupational therapy  -CC    Patient/Family Observations  up in  c SLP  -JACI SANON LH2  cooperative  -SL  up in recliner w/c   -CC    Recorded by [FABNM,LH2] Pita Roth, PT (r) Anmol Joseph, PT Student (t) Pita Roth, PT (c) [SL] Jayna Coker MS CCC-SLP [CC] Neris Morales OTR    Row Name 20 1139             Living Environment    Living Environment Comment  Discussed pt's home environment and equipment c pt and pt's mother in preparation for d/c. Discussed c pt's mother plans to do family training prior to d/c to home.   -LH,NM,LH2      Recorded by [FAB,NM,LH2] Pita Roth, PT (r) Anmol Joseph, PT Student (t) Pita Roth, PT (c)      Row Name 20 1133             Home Use of Assistive/Adaptive Equipment    Equipment Currently Used at Home   other (see comments) pt's mother reports they don't have a wc or walker at home   -LH,NM,LH2      Recorded by [,NM,LH2] Pita Roth, PT (r) Anmol Joseph, PT Student (t) Pita Roth, PT (c)      Row Name 01/20/20 1139             Coping/Community Reintegration    Additional Documentation  Caregiver Training (Group)  -      Recorded by [] Pita oRth, PT      Row Name 01/20/20 1139             Safety Awareness/Health Promotion    Additional Documentation  Fall Prevention (Row)  -      Recorded by [] Pita Roth, PT      Row Name 01/20/20 1139 01/20/20 1046          Cognition/Psychosocial- PT/OT    Affect/Mental Status (Cognitive)  WFL  -LH,NM,LH2  WFL  -     Orientation Status (Cognition)  oriented to;person;situation  -LH,NM,LH2  --     Follows Commands (Cognition)  follows one step commands;75-90% accuracy;verbal cues/prompting required;physical/tactile prompts required  -LH,NM,LH2  --     Personal Safety Interventions  fall prevention program maintained;gait belt;supervised activity;nonskid shoes/slippers when out of bed  -LH,NM,LH2  fall prevention program maintained;gait belt;nonskid shoes/slippers when out of bed  -CC     Cognitive Function (Cognitive)  safety deficit  -LH,NM,LH2  --     Safety Deficit (Cognitive)  judgment;safety precautions awareness  -LH,NM,LH2  --     Recorded by [,NM,LH2] Pita Roth, PT (r) Anmol Joseph, PT Student (t) Pita Roth, PT (c) [CC] Neris Morales OTR     Row Name 01/20/20 1139 01/20/20 1046          Bed Mobility Assessment/Treatment    Comment (Bed Mobility)  pt ed on use of call light and waiting for help in order to prevent future falls; pt denies pain following fall over the weekend; states understanding of precautions and intent to use call light/staff assist   -LH,NM,LH2  up in w/c  -CC     Recorded by [,NM,LH2] Pita Roth, PT (r) Anmol Joseph, PT Student (t) Pita Roth, PT (c) [CC] Neris Morales, OTR     Antonio Name  01/20/20 1139             Transfer Assessment/Treatment    Transfer Assessment/Treatment  car transfer  -LH,NM,LH2      Recorded by [LH,NM,LH2] Pita Roth, PT (r) Anmol Joseph, PT Student (t) Pita Roth, PT (c)      Row Name 01/20/20 1139 01/20/20 1046          Sit-Stand Transfer    Sit-Stand [place] (Transfers)  contact guard;verbal cues;nonverbal cues (demo/gesture)  -LH,NM,LH2  minimum assist (75% patient effort);verbal cues;nonverbal cues (demo/gesture)  -CC     Assistive Device (Sit-Stand Transfers)  wheelchair car   -LH,NM,LH2  wheelchair  -CC     Recorded by [LH,NM,LH2] Pita Roth, PT (r) Anmol Joseph, PT Student (t) Pita Roth, PT (c) [CC] Neris Morales OTR     Row Name 01/20/20 1139 01/20/20 1046          Stand-Sit Transfer    Stand-Sit [place] (Transfers)  contact guard;minimum assist (75% patient effort);verbal cues;nonverbal cues (demo/gesture)  -LH,NM,LH2  minimum assist (75% patient effort);verbal cues;nonverbal cues (demo/gesture)  -CC     Assistive Device (Stand-Sit Transfers)  wheelchair car  -LH,NM,LH2  wheelchair  -CC     Recorded by [LH,NM,LH2] Pita Roth, PT (r) Anmol Joseph, PT Student (t) Pita Roth, PT (c) [CC] Neris Morales OTR     Row Name 01/20/20 1046             Shower Transfer    Type (Shower Transfer)  stand pivot/stand step;sit-stand;stand-sit  -CC      [place] Level (Shower Transfer)  minimum assist (75% patient effort);verbal cues;nonverbal cues (demo/gesture)  -CC      Assistive Device (Shower Transfer)  grab bars/tub rail;shower chair;wheelchair  -CC      Recorded by [CC] Neris Morales OTR      Row Name 01/20/20 1139             Car Transfer    Type (Car Transfer)  stand pivot/stand step pt sat in the backseat   -LH,NM,LH2      [place] Level (Car Transfer)  contact guard;verbal cues;nonverbal cues (demo/gesture)  -FAB,NM,LH2      Assistive Device (Car Transfer)  -- walking aide/stick   -JACI SANON,FAB2      Recorded by  [LH,NM,LH2] Pita Roth, PT (r) Anmol Joseph, PT Student (t) Pita Roth, PT (c)      Row Name 01/20/20 1139             Gait/Stairs Assessment/Training    [place] Level (Gait)  minimum assist (75% patient effort);verbal cues;nonverbal cues (demo/gesture)  -LH,NM,LH2      Assistive Device (Gait)  other (see comments) walking aide/stick   -LH,NM,LH2      Distance in Feet (Gait)  40-45' x 2  80' in the afternoon   -LH,NM,LH2      Pattern (Gait)  step-through  -LH,NM,LH2      Deviations/Abnormal Patterns (Gait)  bilateral deviations;base of support, narrow;stride length decreased;gait speed decreased  -LH,NM,LH2      Bilateral Gait Deviations  heel strike decreased;weight shift ability decreased  -LH,NM,LH2      Comment (Gait/Stairs)  pt amb c very slow speed and narrow RAJI, but was able to amb without HHA and onyl use of walkign stick. VC for orientation to surroundings in the gym  CGA/min in PM during turns; 1 lat LOB c LLE crossing midline  -LH,NM,LH2      Recorded by [LH,NM,LH2] Pita Roth, PT (r) Anmol Joesph, PT Student (t) Pita Roth, PT (c)      Row Name 01/20/20 1139             Safety Issues, Functional Mobility    Impairments Affecting Function (Mobility)  visual/perceptual  -LH,NM,LH2      Recorded by [LH,NM,LH2] Pita Roth, PT (r) Anmol Joseph, PT Student (t) Pita Roth, PT (c)      Row Name 01/20/20 1046             Bathing Assessment/Treatment    Bathing [place] Level  bathing skills;upper body;lower body;minimum assist (75% patient effort);verbal cues  -CC      Assistive Device (Bathing)  grab bar/tub rail;hand held shower spray hose;shower chair  -CC      Bathing Position  supported sitting;supported standing  -CC      Bathing Setup Assistance  adjust water temperature;obtain supplies  -CC      Recorded by [CC] Neris Morales OTR      Row Name 01/20/20 1046             Upper Body Dressing Assessment/Treatment    Upper Body Dressing Task  upper body dressing  skills;don;pull over garment;verbal cues;nonverbal cues (demo/gesture);minimum assist (75% or more patient effort);moderate assist (50-74% patient effort)  -CC      Upper Body Dressing Position  supported sitting  -CC      Set-up Assistance (Upper Body Dressing)  obtain clothing  -CC      Comment (Upper Body Dressing)  max to get started  -CC      Recorded by [CC] Andrew Neris VAA      Row Name 01/20/20 1046             Lower Body Dressing Assessment/Treatment    Lower Body Dressing [place] Level  doff;don;pants/bottoms;shoes/slippers;socks;shoelaces;verbal cues;nonverbal cues (demo/gesture);minimum assist (75% patient effort)  -CC      Lower Body Dressing Position  supported sitting;supported standing  -CC      Lower Body Dressing Setup Assistance  obtain clothing  -CC      Comment (Lower Body Dressing)  second person for safety  -CC      Recorded by [CC] Andrew Neris, AVA      Row Name 01/20/20 1046             Grooming Assessment/Treatment    Grooming [place] Level  grooming skills;deodorant application;oral care regimen;wash face, hands;set up;supervision;verbal cues  -CC      Grooming Position  sink side;supported sitting  -CC      Grooming Setup Assistance  obtain supplies  -CC      Recorded by [CC] Andrew Neris, AVA      Row Name 01/20/20 1139 01/20/20 1046          Pain Scale: Numbers Pre/Post-Treatment    Pain Scale: Numbers, Pretreatment  0/10 - no pain  -LH,NM,LH2  0/10 - no pain  -CC     Pain Scale: Numbers, Post-Treatment  0/10 - no pain  -LH,NM,LH2  0/10 - no pain  -CC     Recorded by [LH,NM,LH2] Pita Roth, PT (r) Anmol Joseph, MAILE Student (t) Pita Roth, PT (c) [CC] Neris Morales, AVA     Row Name 01/20/20 1139             Standing Balance Activity    Activities Performed (Standing, Balance Training)  standing on foam roll foam square inside // bars   -LH,NM,LH2      Support Needed for Balance (Standing, Balance Training)  CGA;minimal external support for balance,  75% patient effort;balances without upper extremity support;uses at least one upper extremity for support support varied shreyas BUE support to no UE support  -LH,NM,LH2      Progressive Balance Activity (Standing, Balance Training)  -- reduction in UE support throughout   -LH,NM,LH2      Restrictions (Standing, Balance Training)  vision   -LH,NM,LH2      Comment (Standing, Balance Training)  standing on foam square, pt able to hold balance x 5-10 seconds unsupported c CGA. Demos posterior lean that required min A to correct.   -LH,NM,LH2      Recorded by [,NM,LH2] Pita Roth, PT (r) Anmol Joseph, PT Student (t) Pita Roth, PT (c)      Row Name 01/20/20 1139 01/20/20 1046          Positioning and Restraints    Pre-Treatment Position  sitting in chair/recliner  -LH,NM,LH2  sitting in chair/recliner  -CC     Post Treatment Position  wheelchair  -LH,NM,LH2  wheelchair  -CC     In Wheelchair  call light within reach;encouraged to call for assist;exit alarm on;with family/caregiver mother present   -LH,NM,LH2  sitting;call light within reach;encouraged to call for assist;exit alarm on;with family/caregiver mother  -CC     Recorded by [,NM,2] Pita Roth, PT (r) Anmol Joseph, PT Student (t) Pita Roth, PT (c) [CC] Neris Morales OTR       User Key  (r) = Recorded By, (t) = Taken By, (c) = Cosigned By    Initials Name Effective Dates    CC Neris Morales OTR 06/08/18 -     Jayna Barnes MS CCC-SLP 06/08/18 -      Pita Roth, PT 04/03/18 -     Anmol Cordero, PT Student 01/03/20 -         Wound 01/06/20 2200 head Incision (Active)   Dressing Appearance open to air 1/20/2020  8:14 AM   Closure Approximated 1/20/2020  8:14 AM   Base clean;dry 1/20/2020  8:14 AM   Periwound dry 1/19/2020  7:38 PM   Drainage Amount none 1/19/2020  7:38 PM   Dressing Care, Wound open to air 1/19/2020  7:38 PM     Physical Therapy Education                 Title: PT OT SLP Therapies (Done)     Topic:  Physical Therapy (Done)     Point: Mobility training (Done)     Description:   Instruct learner(s) on safety and technique for assisting patient out of bed, chair or wheelchair.  Instruct in the proper use of assistive devices, such as walker, crutches, cane or brace.              Patient Friendly Description:   It's important to get you on your feet again, but we need to do so in a way that is safe for you. Falling has serious consequences, and your personal safety is the most important thing of all.        When it's time to get out of bed, one of us or a family member will sit next to you on the bed to give you support.     If your doctor or nurse tells you to use a walker, crutches, a cane, or a brace, be sure you use it every time you get out of bed, even if you think you don't need it.    Learning Progress Summary           Patient Acceptance, E,TB, VU,NR by NM at 1/20/2020 1501    Comment:  Pt education on use of call light and need for assistance at this to prevent falls c stated understanding. Discussed c pt and mother d/c plans and equipment needs for d/c home.    Acceptance, E, VU by WN at 1/18/2020 2246    Acceptance, E,TB,D, VU,NR by  at 1/18/2020 1525    Acceptance, E, VU by  at 1/17/2020 2256    Acceptance, E, NR by  at 1/17/2020 0922    Acceptance, E, NR by  at 1/16/2020 0920    Acceptance, E, NR,VU by NM at 1/15/2020 1543    Acceptance, E, NR by NM at 1/14/2020 1111    Comment:  Reinforcement of setup for transfers in order to increase independence and safety    Acceptance, E, VU,NR by NM at 1/13/2020 1545    Comment:  education regarding safety during transfers    Acceptance, E, NR by  at 1/11/2020 0919    Acceptance, E, VU,NR by  at 1/10/2020 1033    Acceptance, E, NR by JK at 1/9/2020 1449    Acceptance, E, NR by  at 1/8/2020 0949    Acceptance, E, NR by  at 1/7/2020 1158   Family Acceptance, E,TB, VU,NR by NM at 1/20/2020 1501    Comment:  Pt education on use of call light and  need for assistance at this to prevent falls c stated understanding. Discussed c pt and mother d/c plans and equipment needs for d/c home.    Acceptance, E, VU by WN at 1/18/2020 2246    Acceptance, E, VU by WN at 1/17/2020 2256    Acceptance, E, NR by  at 1/7/2020 1158                   Point: Home exercise program (Done)     Description:   Instruct learner(s) on appropriate technique for monitoring, assisting and/or progressing patient with therapeutic exercises and activities.              Learning Progress Summary           Patient Acceptance, E, VU by WN at 1/18/2020 2246    Acceptance, E,TB,D, VU,NR by  at 1/18/2020 1525    Acceptance, E, VU by WN at 1/17/2020 2256    Acceptance, E, VU,NR by  at 1/10/2020 1033    Acceptance, E, NR by  at 1/7/2020 1158   Family Acceptance, E, VU by WN at 1/18/2020 2246    Acceptance, E, VU by WN at 1/17/2020 2256    Acceptance, E, NR by  at 1/7/2020 1158                   Point: Body mechanics (Done)     Description:   Instruct learner(s) on proper positioning and spine alignment for patient and/or caregiver during mobility tasks and/or exercises.              Learning Progress Summary           Patient Acceptance, E, VU by WN at 1/18/2020 2246    Acceptance, E,TB,D, VU,NR by  at 1/18/2020 1525    Acceptance, E, VU by WN at 1/17/2020 2256    Acceptance, E, NR by  at 1/17/2020 0922    Acceptance, E, NR by NM at 1/14/2020 1111    Comment:  Reinforcement of setup for transfers in order to increase independence and safety    Acceptance, E, VU,NR by NM at 1/13/2020 1545    Comment:  education regarding safety during transfers    Acceptance, E, NR by  at 1/11/2020 0919    Acceptance, E, NR by  at 1/7/2020 1158   Family Acceptance, E, VU by WN at 1/18/2020 2246    Acceptance, E, VU by WN at 1/17/2020 2256    Acceptance, E, NR by  at 1/7/2020 1158                   Point: Precautions (Done)     Description:   Instruct learner(s) on prescribed precautions during  mobility and gait tasks              Learning Progress Summary           Patient Acceptance, E,TB, VU,NR by NM at 1/20/2020 1501    Comment:  Pt education on use of call light and need for assistance at this to prevent falls c stated understanding. Discussed c pt and mother d/c plans and equipment needs for d/c home.    Acceptance, E, VU by WN at 1/18/2020 2246    Acceptance, E,TB,D, VU,NR by JS at 1/18/2020 1525    Acceptance, E, VU by WN at 1/17/2020 2256    Acceptance, E, NR,VU by NM at 1/15/2020 1543    Acceptance, E, NR by NM at 1/14/2020 1111    Comment:  Reinforcement of setup for transfers in order to increase independence and safety    Acceptance, E, VU,NR by NM at 1/13/2020 1545    Comment:  education regarding safety during transfers    Acceptance, E, NR by  at 1/8/2020 0949   Family Acceptance, E,TB, VU,NR by NM at 1/20/2020 1501    Comment:  Pt education on use of call light and need for assistance at this to prevent falls c stated understanding. Discussed c pt and mother d/c plans and equipment needs for d/c home.    Acceptance, E, VU by WN at 1/18/2020 2246    Acceptance, E, VU by WN at 1/17/2020 2256                               User Key     Initials Effective Dates Name Provider Type Discipline     04/06/17 -  Ainsley Servin, PT Physical Therapist PT     04/03/18 -  Pita Roth, PT Physical Therapist PT    JK 04/03/18 -  Binta Hartman, PT Physical Therapist PT     06/24/19 -  Hai Serrano, RN Registered Nurse Nurse    NM 01/03/20 -  Anmol Joseph, MAILE Student PT Student PT                  PT Recommendation and Plan                        Time Calculation:     PT Charges     Row Name 01/20/20 1451 01/20/20 1138          Time Calculation    Start Time  1330  -LH (r) NM (t) LH (c)  0900  -LH (r) NM (t) LH (c)     Stop Time  1400  -LH (r) NM (t) LH (c)  0930  -LH (r) NM (t) LH (c)     Time Calculation (min)  30 min  -LH (r) NM (t)  30 min  -LH (r) NM (t)     PT Received On  --  01/20/20   -LH (r) NM (t) LH (c)     PT - Next Appointment  --  01/21/20  -LH (r) NM (t) LH (c)       User Key  (r) = Recorded By, (t) = Taken By, (c) = Cosigned By    Initials Name Provider Type     Pita Roth, PT Physical Therapist    NM Anmol Joseph, PT Student PT Student          Therapy Charges for Today     Code Description Service Date Service Provider Modifiers Qty    41303627936 HC GAIT TRAINING EA 15 MIN 1/20/2020 Anmol Joseph, PT Student GP 1    [id number]  PT NEUROMUSC RE EDUCATION EA 15 MIN 1/20/2020 Anmol Joseph, PT Student GP 1    [id number]  PT THERAPEUTIC ACT EA 15 MIN 1/20/2020 Anmol Joseph, PT Student GP 2                   Anmol Joseph, PT Student  1/20/2020

## 2020-01-20 NOTE — PROGRESS NOTES
Inpatient Rehabilitation Plan of Care Note    Plan of Care  Care Plan Reviewed - Updates as Follows    Body Systems    [RN] Integumentary(Active)  Current Status(01/20/2020): Small rash dry skin breakdown to groin/inner thigh.  Buttocks intact with a small spot of peeling area. Pink areas noted around  penis.No open area noted. Head incision healing.  Weekly Goal(01/26/2020): No further skin breakdown  Discharge Goal: Intact Skin.    Performed Intervention(s)  Skin care q shift and PRN  Assist to turn patient q 2-3hrs.      Psychosocial    [RN] Coping/Adjustment(Active)  Current Status(01/20/2020): Patient with difficult verbalizing and slow process.  Mother and sister present and very supportive.  Weekly Goal(01/26/2020): Patient will demonstrate appropriate coping mechanisms  Discharge Goal: Patient will demonstrate health coping strategies    Performed Intervention(s)  Offer support/Encourage patient to verbalize any concerns      Safety    [RN] Potential for Injury(Active)  Current Status(01/20/2020): Patient with generalized weakness. Very weak all  over, at high risk for fall. Assist of 2 with transfers.  Weekly Goal(01/26/2020): No injuries  Discharge Goal: Pt/family aware of fall/safety in the home setting.    Performed Intervention(s)  Safety rounds/bed alarm/ chair alarm on  Falls precautio/call light within easy reach      Sphincter Control    [RN] Bladder Management(Active)  Current Status(01/20/2020): Incontinent episodes, patient has urgency when  needing to void.  1/14/20 Incont. 100% usually at night except did use urinal  with assist x1 tonight.  Weekly Goal(01/26/2020): Continent 50%  Discharge Goal: Continent 100%    [RN] Bowel Management(Active)  Current Status(01/20/2020): Incontinent of bowel 100%  Weekly Goal(01/26/2020): Continent 50%  Discharge Goal: Continent 100%    Performed Intervention(s)  Monitor I&O  check for incontinence, offer toileting q2hrs  miralax daily-hold if  needed.    Signed by: Mago Mancia RN

## 2020-01-20 NOTE — PROGRESS NOTES
Occupational Therapy: Branch    Physical Therapy: Individual: 60 minutes.    Speech Language Pathology:  Branch    Signed by: Anmol Joseph PT Student     - CoSigned By: Pita Roth PT 1/20/2020 3:43:02 PM

## 2020-01-20 NOTE — THERAPY TREATMENT NOTE
Inpatient Rehabilitation - Speech Language Pathology Treatment Note    [facility]       Patient Name: Tye JONES Junior  : 1975  MRN: [id number]    Today's Date: 2020           Admit Date: 2020      Visit Dx:        ICD-10-CM ICD-9-CM   1. Impaired mobility Z74.09 799.89   2. Seizure (CMS/HCC) R56.9 780.39       Patient Active Problem List   Diagnosis   • Mixed hyperlipidemia   • Essential hypertension   • Traumatic brain injury (CMS/HCC)   • Acute allergic rhinitis   • Seizure (CMS/HCC)   • Screen for colon cancer   • H/O traumatic brain injury   • Anemia   • Serum gamma globulin increased          Therapy Treatment    Evaluation/Coping    Evaluation/Treatment Time and Intent  Subjective Information: no complaints (20 1100 : Jayna Coker MS CCC-SLP)  Existing Precautions/Restrictions: fall(swallow) (20 1100 : Jayna Coker MS CCC-SLP)  Document Type: therapy note (daily note) (20 1100 : Jayna Coker MS CCC-SLP)  Mode of Treatment: individual therapy, speech-language pathology (20 1100 : Jayna Coker, MS CCC-SLP)  Patient/Family Observations: cooperative (20 1100 : Jayna Coker, MS CCC-SLP)    Vitals/Pain/Safety         Cognition/Communication         Oral Motor/Eating         Mobility/Basic Activities/Instrumental Activities/Motor/Modality                   ROM/MMT                   Sensory/Myotome/Dermatome/Edema               Posture/Balance/Special Tests/Exercise/Transportation/Sexual Function                   Orthotics/Residual Limb/Prosthetic Management              Outcome Summary         EDUCATION    The patient has been educated in the following areas:     Cognitive Impairment Communication Impairment Dysphagia (Swallowing Impairment).    SLP Recommendation and Plan                                                          SLP GOALS     Row Name 20 1100             Oral Nutrition/Hydration Goal 1 (SLP)    Barriers (Oral Nutrition/Hydration Goal 1, SLP)  trial  of soft win peaches- improved mastication and bolus control/manipulation noted- mild oral residue; laryngeal elevation still appears slightly reduced; no overt clinical s/s noted on juicy soft solids or drained items- will rec: repeat VFSS to assess and determine possibility of diet upgrade  -SL      Progress/Outcomes (Oral Nutrition/Hydration Goal 1, SLP)  continuing progress toward goal;goal ongoing  -SL         Lingual Strengthening Goal 1 (SLP)    Increase Tongue Back Strength  lingual movement exercises  -SL      Yates/Accuracy (Lingual Strengthening Goal 1, SLP)  with moderate cues (50-74% accuracy);with minimal cues (75-90% accuracy)  -SL      Barriers (Lingual Strengthening Goal 1, SLP)  movements are still slow, but improved in ROM  -SL      Progress/Outcomes (Lingual Strengthening Goal 1, SLP)  goal ongoing  -SL         Pharyngeal Strengthening Exercise Goal 1 (SLP)    Increase Superior Movement of the Hyolaryngeal Complex  carmelita;breath hold exercises;effortful pitch glide (falsetto + pharyngeal squeeze);hard effortful swallow;falsetto  -SL      Increase Anterior Movement of the Hyolaryngeal Complex  carmelita;breath hold exercises;effortful pitch glide (falsetto + pharyngeal squeeze);hard effortful swallow;falsetto  -SL      Increase Squeeze/Positive Pressure Generation  hard effortful swallow;effortful pitch glide (falsetto + pharyngeal squeeze)  -SL      Increase Tongue Base Retraction  carmelita  -SL      Yates/Accuracy (Pharyngeal Strengthening Goal 1, SLP)  with moderate cues (50-74% accuracy)  -SL      Barriers (Pharyngeal Strengthening Goal 1, SLP)  difficulty completing carmelita  -SL      Progress/Outcomes (Pharyngeal Strengthening Goal 1, SLP)  goal ongoing  -SL         Comprehend Questions Goal 1 (SLP)    Progress (Ability to Comprehend Questions Goal 1, SLP)  80%;independently (over 90% accuracy) mod complex comparative questions  -SL      Progress/Outcomes (Comprehend Questions Goal  1, SLP)  goal ongoing  -SL         Orientation Goal 1 (SLP)    Progress (Orientation Goal 1, SLP)  70%;with minimal cues (75-90%)  -SL      Progress/Outcomes (Orientation Goal 1, SLP)  goal ongoing  -SL         Memory Skills Goal 1 (SLP)    Progress (Memory Skills Goal 1, SLP)  50%;with minimal cues (75-90%);with moderate cues (50-74%) mental manipulation- 4 word stimulus- ordering  -SL      Progress/Outcomes (Memory Skills Goal 1, SLP)  goal ongoing  -SL      Comment (Memory Skills Goal 1, SLP)  very slow processing of info for mental manip task - multiple reps of stimulus and intermittent mod cues and prompts required  -SL         Organizational Skills Goal 1 (SLP)    Progress (Thought Organization Skills Goal 1, SLP)  90%;independently (over 90% accuracy);with minimal cues (75-90%) abstract convergent categorization  -SL      Progress/Outcomes (Thought Organization Skills Goal 1, SLP)  goal ongoing  -SL      Comment (Thought Organization Skills Goal 1, SLP)  25% for adding 1 item to loist of abstract category members  -        User Key  (r) = Recorded By, (t) = Taken By, (c) = Cosigned By    Initials Name Provider Type    Jayna Barnes MS CCC-SLP Speech and Language Pathologist                  Time Calculation:       Time Calculation- SLP     Row Name 01/20/20 1143 01/20/20 0900          Time Calculation- [facility]    SLP Start Time  1100  -SL  0845  -     SLP Stop Time  1145  -SL  0900  -     SLP Time Calculation (min)  45 min  -SL  15 min  -       User Key  (r) = Recorded By, (t) = Taken By, (c) = Cosigned By    Initials Name Provider Type    Jayna Barnes MS CCC-SLP Speech and Language Pathologist            Therapy Charges for Today     Code Description Service Date Service Provider Modifiers Qty    99561916199 HC ST DEV OF COGN SKILLS EACH ADDT'L 15 MIN 1/20/2020 Jayna Coker MS CCC-SLP  2    81816438938 HC ST TREATMENT SWALLOW 1 1/20/2020 Jayna Coker MS CCC-SLP GN 1                           Jayna Coker  MS CCC-SLP  1/20/2020

## 2020-01-20 NOTE — PROGRESS NOTES
LOS: 14 days   Patient Care Team:  Jolene Laurent MD as PCP - General (Family Medicine)  Efren Martínez MD as PCP - Claims Attributed    Chief Complaint:   Status post infected  shunt-removed-CNS infection/pneumocephalus  Status post 12/23/ 2019 - Removal of ventriculoperitoneal shunt intracranial portion, valve, partial peritoneal down to the cervical region  Status post 12/31/2019 endoscopic repair of paranasal sinus defect  ID-ceftriaxone-antibiotics until January 14, 2020  Seizure disorder-multidrug regimen-phenytoin/Vimpat/Keppra/clonazepam/Topamax  Remote traumatic brain injury  Neuro stimulation-Adderall  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  Bilateral SCDs.  Status post acute renal hewvsidwcpial-YAU-iilmko creatinine.  Avoid NSAID/ACE inhibitor's.    Subjective     History of Present Illness     Had a fall out of bed yesterday morning.  He denies any headache or cognitive changes.  Continues to participate well in therapies.  No new complaints.        History taken from: patient chart    Objective     Vital Signs  Temp:  [96.7 °F (35.9 °C)-97.9 °F (36.6 °C)] 97.9 °F (36.6 °C)  Heart Rate:  [95-98] 95  Resp:  [16] 16  BP: (124-131)/(81-90) 127/81    Physical Exam  Mental status: awake and alert.  HEENT-craniotomy incision healed  Pulm: Normal respirations  Heart: S1, S2  Abdomen: normoactive bowel sounds, soft, NT , non-distended   Extremities: No cyanosis or edema   Neuro: awake   good speed with responses, following commands.   Blindness-chronic  Strength: Continues improved strength      Results Review:    I reviewed the patient's new clinical results.     Results from last 7 days   Lab Units 01/20/20  0607 01/19/20  0630  01/18/20  0630   WBC 10*3/mm3 7.00 6.67 7.24   HEMOGLOBIN g/dL 9.3* 9.4* 7.6*   HEMATOCRIT % 27.5* 28.0* 23.5*   PLATELETS 10*3/mm3 282 290 358     Results from last 7 days   Lab Units 01/20/20  0607 01/19/20  0630 01/16/20  0624   SODIUM mmol/L 139 141 134*   POTASSIUM mmol/L 4.2 4.4 3.3*   CHLORIDE mmol/L 101 102 97*   CO2 mmol/L 24.9 26.3 26.1   BUN mg/dL 13 14 15   CREATININE mg/dL 0.93 1.03 1.06   CALCIUM mg/dL 9.2 9.4 8.8   BILIRUBIN mg/dL 0.2 0.2  --    ALK PHOS U/L 103 110  --    ALT (SGPT) U/L 26 27  --    AST (SGOT) U/L 20 21  --    GLUCOSE mg/dL 86 85 97       Medication Review:   Scheduled Meds:    amphetamine-dextroamphetamine XR 30 mg Oral Daily   atorvastatin 10 mg Oral Daily   cholecalciferol 400 Units Oral Daily   clonazePAM 1 mg Oral Q8H   folic acid 1 mg Oral Daily   lacosamide 200 mg Oral Q12H   levETIRAcetam 500 mg Oral Q12H   metoprolol tartrate 50 mg Oral Q12H   nystatin 1 application Topical Q12H   pantoprazole 40 mg Oral Q AM   phenytoin 100 mg Oral Q8H   polyethylene glycol 17 g Oral Daily   potassium chloride 20 mEq Oral Daily   topiramate 25 mg Oral Daily   vitamin B-12 1,000 mcg Oral Daily     Continuous Infusions:   PRN Meds:.•  acetaminophen  •  hydrOXYzine pamoate    Assessment/Plan       H/O traumatic brain injury    Anemia    Serum gamma globulin increased  Status post infected  shunt-removed-CNS infection/pneumocephalus  -Status post 12/23/ 2019 - Removal of ventriculoperitoneal shunt intracranial portion, valve, partial peritoneal down to the cervical region  -Status post 12/31/2019 endoscopic repair of paranasal sinus defect  -ID-ceftriaxone-antibiotics until January 14, 2020    Seizure disorder-multidrug regimen-phenytoin/Vimpat/Keppra/clonazepam/Topamax  -January 9-patient was on phenytoin at home which was increased at the outside hospital, on Topamax but less than antiepileptic dose.  He had Vimpat, Keppra, clonazepam added at the outside hospital.  Consulted neurology for  input patient has fatigue felt possibly related to his increased antiepileptic medication.  Reviewed with neurology and Keppra dose being decreased.  -January 10- Keppra decreased from 2000 mg BID to 50 mg BID with improved alertness. Neurology continues to follow.   -January 15-Discussed having neurology see him in follow-up at some point to review possibly decreasing the additional seizure medications further.  -January 17 - neurologist who same him at this hospital out until next week. Pharmacy checked and Vimpat will be covered as outpatient. Discussed with patient and mother possibly taper off some of additional seizure meds - clonazepam or Vimpat as continues without seizure but will await neurology input next week unless hematology recommends discontinue one due to anemia.   - January 20 - reviewed with Neurology - as tolerating current regimen, will continue same for now and have follow up with his [facility]ist as an outpatient for further adjustment/taper.       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  Bilateral SCDs.    Status post acute renal nmtpvdgggwxki-DCT-tqhgqx creatinine.  Avoid NSAID/ACE inhibitor's.  Jan 16 - Cr improved to 1.06    Anemia-trend downward.    Placed him back on a protein pump inhibitor .Hemoccult was negative.   ? If due to recent medical issues vs medication effect.    Patient with progressive anemia.  HGB 13.2 on Dec 22. HGB 10.6 on Jan 6. HGB 8.3 on Jan 16, HGB 7.6 on Jan 18, spontaneously improved to 9.4 on Jan 19 without transfusion  Hemoccult was negative x 2.  Platelets normal.  Retic count  increased at 3.56.  Iron 99, iron saturation 137, transferrin 137.  .  Patient's mother thinks his father had sickle cell trait but does remember patient ever being testing.   He has been on Dilantin and low dose Topamax chronically. Keppra is new but dose recently decreased. Vimpat is new but on quick review do not see that associated with anemia. Has been on Heparin subcutaneous for DVT prophylaxis. More mobile now and now ~ 4 weeks out so will discontinue Heparin at this point and continue with SCDs.  PPI Protonix was added Tues Jan 14 after anemia started.    Haptoglobin -157 - WNL.  Jan 20 - HGB 9.3- hematology evaluating         Sleep-January 13- Family reports restlessness at night. Vistaril added PRN last night without response. Since vistaril has only been tried one night, will continue with Vistaril PRN at bedtime for now and continue to monitor.      Skin-dry rash to bilateral inner thighs-try Mycostatin powder     Now admit for comprehensive acute inpatient rehabilitation .  This would be an interdisciplinary program with physical therapy 1 hour,  occupational therapy 1 hour, and speech therapy 1 hour, 5 days a week.  Rehabilitation nursing for carryover, monitoring of incision and neurologic   status, bowel and bladder, and skin  Ongoing physician follow-up.  Weekly team conferences.  Goals are indeterminate.   Rehabilitation prognosis indeterminate.  Medical prognosis indeterminate.  Estimated length of stay is indeterminate  The patient's functional status and clinical status is unchanged from preadmission assessment and the patient continues appropriate for acute inpatient rehabilitation.  Goal is for home with outpatient   therapies.  Barrier to discharge: Cognition/mobility- work on trunk control, strength, balance to overcome.     TEAM CONF - JAN 9 - BED MAX 2.  TRANSFERS MAX 2.  GAIT 8 FEET PARALLEL BARS MOD 2. TOILET TRANSFERS MAX 2.  BLIND.  SLOW PROCESSING.  GROOMING MOD. UBD MAX. LBD  DEP. DYSPHAGIA - PUREE/THINS.  FATIGUES WITH COGNITIVE  THERAPY.  ELOS :  4 WEEKS    TEAM CONF - JAN 16- BED MOD ASSIST. GAIT 120 FEET MIN ASSIST WITH WALKER.  TRANSFERS MIN ASSIST. WILL START TRAINING WITH HIS WALKER AID FROM FROM. TOILET TRANSFERS MIN ASSIST. BATH MIN ASSIST. UBD SBA-MIN. LBD MOD-MAX. ENDURANCE IMPROVED. SWALLOW - PUREE/THIN LIQUIDS, SLOW RATE, ALTERNATE LIQUID AND SOLIDS. WILL TRY TEST TRAY BUT DOES NOT CHEW WELL OR CONTROL BOLUS. AFTERNOON FATIGUES. MOD-SEVERE COGNITIVE DEFICITS. SLOW PROCESSING. DIFFICULTY WITH THOUGHT ORGANIZATION. STILL NOT ORIENTED TO DAY/PLACE. BNE PENDING.NO SAFETY ISSUES. BLADDER CONTINENT/INCONTINENT. WILL DO TIMED VOIDS.  WILL D/C MIDLINE.   ELOS -  END OF NEXT WEEK. FAMILY CONF PENDING.         Janusz Solitario MD  01/20/20  11:43 AM

## 2020-01-20 NOTE — THERAPY TREATMENT NOTE
Inpatient Rehabilitation - Occupational Therapy Treatment Note    [facility]     Patient Name: Tye JONES Junior  : 1975  MRN: [id number]    Today's Date: 2020                 Admit Date: 2020      Visit Dx:    ICD-10-CM ICD-9-CM   1. Impaired mobility Z74.09 799.89   2. Seizure (CMS/HCC) R56.9 780.39       Patient Active Problem List   Diagnosis   • Mixed hyperlipidemia   • Essential hypertension   • Traumatic brain injury (CMS/HCC)   • Acute allergic rhinitis   • Seizure (CMS/HCC)   • Screen for colon cancer   • H/O traumatic brain injury   • Anemia   • Serum gamma globulin increased         Therapy Treatment    IRF Treatment Summary     Row Name 20 1046             Evaluation/Treatment Time and Intent    Subjective Information  no complaints  -CC      Existing Precautions/Restrictions  fall swallow, vision  -CC      Document Type  therapy note (daily note)  -CC      Mode of Treatment  occupational therapy  -CC      Patient/Family Observations  up in recliner w/c   -CC      Recorded by [CC] Neris Morales OTR      Row Name 20 1046             Cognition/Psychosocial- PT/OT    Affect/Mental Status (Cognitive)  WFL  -CC      Personal Safety Interventions  fall prevention program maintained;gait belt;nonskid shoes/slippers when out of bed  -CC      Recorded by [CC] Neris Morales OTR      Row Name 20 1046             Bed Mobility Assessment/Treatment    Comment (Bed Mobility)  up in w/c  -CC      Recorded by [CC] Neris Morales OTR      Row Name 20 1046             Sit-Stand Transfer    Sit-Stand [place] (Transfers)  minimum assist (75% patient effort);verbal cues;nonverbal cues (demo/gesture)  -CC      Assistive Device (Sit-Stand Transfers)  wheelchair  -CC      Recorded by [CC] Neris Morales OTR      Row Name 20 1046             Stand-Sit Transfer    Stand-Sit [place] (Transfers)  minimum assist (75% patient effort);verbal cues;nonverbal cues  (demo/gesture)  -CC      Assistive Device (Stand-Sit Transfers)  wheelchair  -CC      Recorded by [CC] Neris Morales, AVA      Row Name 01/20/20 1046             Shower Transfer    Type (Shower Transfer)  stand pivot/stand step;sit-stand;stand-sit  -CC      [place] Level (Shower Transfer)  minimum assist (75% patient effort);verbal cues;nonverbal cues (demo/gesture)  -CC      Assistive Device (Shower Transfer)  grab bars/tub rail;shower chair;wheelchair  -CC      Recorded by [CC] Neris Morales, MALIHAR      Row Name 01/20/20 1046             Bathing Assessment/Treatment    Bathing [place] Level  bathing skills;upper body;lower body;minimum assist (75% patient effort);verbal cues  -CC      Assistive Device (Bathing)  grab bar/tub rail;hand held shower spray hose;shower chair  -CC      Bathing Position  supported sitting;supported standing  -CC      Bathing Setup Assistance  adjust water temperature;obtain supplies  -CC      Recorded by [CC] Neris Morales OTR      Row Name 01/20/20 1046             Upper Body Dressing Assessment/Treatment    Upper Body Dressing Task  upper body dressing skills;don;pull over garment;verbal cues;nonverbal cues (demo/gesture);minimum assist (75% or more patient effort);moderate assist (50-74% patient effort)  -CC      Upper Body Dressing Position  supported sitting  -CC      Set-up Assistance (Upper Body Dressing)  obtain clothing  -CC      Comment (Upper Body Dressing)  max to get started  -CC      Recorded by [CC] Neris Morales OTR      Row Name 01/20/20 1046             Lower Body Dressing Assessment/Treatment    Lower Body Dressing [place] Level  doff;don;pants/bottoms;shoes/slippers;socks;shoelaces;verbal cues;nonverbal cues (demo/gesture);minimum assist (75% patient effort)  -CC      Lower Body Dressing Position  supported sitting;supported standing  -CC      Lower Body Dressing Setup Assistance  obtain clothing  -CC      Comment (Lower Body Dressing)   second person for safety  -CC      Recorded by [CC] Neris Morales OTR      Row Name 01/20/20 1046             Grooming Assessment/Treatment    Grooming [place] Level  grooming skills;deodorant application;oral care regimen;wash face, hands;set up;supervision;verbal cues  -CC      Grooming Position  sink side;supported sitting  -CC      Grooming Setup Assistance  obtain supplies  -CC      Recorded by [CC] Neris Morales OTR      Row Name 01/20/20 1046             Pain Scale: Numbers Pre/Post-Treatment    Pain Scale: Numbers, Pretreatment  0/10 - no pain  -CC      Pain Scale: Numbers, Post-Treatment  0/10 - no pain  -CC      Recorded by [CC] Neris Morales OTR      Row Name 01/20/20 1046             Positioning and Restraints    Pre-Treatment Position  sitting in chair/recliner  -CC      Post Treatment Position  wheelchair  -CC      In Wheelchair  sitting;call light within reach;encouraged to call for assist;exit alarm on;with family/caregiver mother  -CC      Recorded by [CC] Neris Morales OTR        User Key  (r) = Recorded By, (t) = Taken By, (c) = Cosigned By    Initials Name Effective Dates    CC Neris Morales OTR 06/08/18 -           Wound 01/06/20 2200 head Incision (Active)   Dressing Appearance open to air 1/20/2020  8:14 AM   Closure Approximated 1/20/2020  8:14 AM   Base clean;dry 1/20/2020  8:14 AM   Periwound dry 1/19/2020  7:38 PM   Drainage Amount none 1/19/2020  7:38 PM   Dressing Care, Wound open to air 1/19/2020  7:38 PM         OT Recommendation and Plan                 OT IRF GOALS     Row Name 01/15/20 1541 01/07/20 1527          Transfer Goal 1 (OT-IRF)    Activity/Assistive Device (Transfer Goal 1, OT-IRF)  toilet;commode, bedside without drop arms  -CC  toilet;commode, bedside without drop arms  -SG     [place] Level (Transfer Goal 1, OT-IRF)  maximum assist (25-49% patient effort)  -CC  maximum assist (25-49% patient effort)  -SG     Time Frame (Transfer Goal 1,  OT-IRF)  short term goal (STG);1 week  -CC  short term goal (STG);1 week  -SG     Progress/Outcomes (Transfer Goal 1, OT-IRF)  goal met  -CC  goal ongoing  -SG        Transfer Goal 2 (OT-IRF)    Activity/Assistive Device (Transfer Goal 2, OT-IRF)  toilet  -CC  toilet  -SG     [place] Level (Transfer Goal 2, OT-IRF)  minimum assist (75% or more patient effort);contact guard assist  -CC  minimum assist (75% or more patient effort);moderate assist (50-74% patient effort)  -SG     Time Frame (Transfer Goal 2, OT-IRF)  long term goal (LTG);by discharge  -CC  long term goal (LTG);by discharge  -SG     Progress/Outcomes (Transfer Goal 2, OT-IRF)  goal revised this date  -CC  goal ongoing  -SG        Transfer Goal 3 (OT-IRF)    Activity/Assistive Device (Transfer Goal 3, OT-IRF)  shower chair  -CC  shower chair  -SG     [place] Level (Transfer Goal 3, OT-IRF)  maximum assist (25-49% patient effort)  -CC  maximum assist (25-49% patient effort)  -SG     Time Frame (Transfer Goal 3, OT-IRF)  short term goal (STG);1 week  -CC  short term goal (STG);1 week  -SG     Progress/Outcomes (Transfer Goal 3, OT-IRF)  goal met  -CC  goal ongoing  -SG        Transfer Goal 4 (OT-IRF)    Activity/Assistive Device (Transfer Goal 4, OT-IRF)  shower chair  -CC  shower chair  -SG     [place] Level (Transfer Goal 4, OT-IRF)  minimum assist (75% or more patient effort);moderate assist (50-74% patient effort)  -CC  minimum assist (75% or more patient effort);moderate assist (50-74% patient effort)  -SG     Time Frame (Transfer Goal 4, OT-IRF)  long term goal (LTG);by discharge  -CC  long term goal (LTG);by discharge  -SG     Progress/Outcomes (Transfer Goal 4, OT-IRF)  goal ongoing  -CC  goal ongoing  -SG        Bathing Goal 1 (OT-IRF)    Activity/Device (Bathing Goal 1, OT-IRF)  bathing skills, all  -CC  bathing skills, all  -SG     [place] Level (Bathing Goal 1, OT-IRF)  moderate assist (50-74% patient effort)  -CC  moderate  assist (50-74% patient effort)  -SG     Time Frame (Bathing Goal 1, OT-IRF)  short term goal (STG);1 week  -CC  short term goal (STG);1 week  -SG     Progress/Outcomes (Bathing Goal 1, OT-IRF)  goal met  -CC  goal ongoing  -SG        Bathing Goal 2 (OT-IRF)    Activity/Device (Bathing Goal 2, OT-IRF)  bathing skills, all  -CC  bathing skills, all  -SG     [place] Level (Bathing Goal 2, OT-IRF)  minimum assist (75% or more patient effort)  -CC  minimum assist (75% or more patient effort)  -SG     Time Frame (Bathing Goal 2, OT-IRF)  long term goal (LTG);by discharge  -CC  long term goal (LTG);by discharge  -SG     Progress/Outcomes (Bathing Goal 2, OT-IRF)  goal ongoing  -CC  goal ongoing  -SG        UB Dressing Goal 1 (OT-IRF)    Activity/Device (UB Dressing Goal 1, OT-IRF)  upper body dressing  -CC  upper body dressing  -SG     [place] (UB Dress Goal 1, OT-IRF)  moderate assist (50-74% patient effort)  -CC  moderate assist (50-74% patient effort)  -SG     Time Frame (UB Dressing Goal 1, OT-IRF)  short term goal (STG);1 week  -CC  short term goal (STG);1 week  -SG     Progress/Outcomes (UB Dressing Goal 1, OT-IRF)  goal met  -CC  goal ongoing  -SG        UB Dressing Goal 2 (OT-IRF)    Activity/Device (UB Dressing Goal 2, OT-IRF)  upper body dressing  -CC  upper body dressing  -SG     [place] (UB Dress Goal 2, OT-IRF)  supervision required  -CC  minimum assist (75% or more patient effort)  -SG     Time Frame (UB Dressing Goal 2, OT-IRF)  long term goal (LTG);by discharge  -CC  long term goal (LTG);by discharge  -SG     Progress/Outcomes (UB Dressing Goal 2, OT-IRF)  goal revised this date  -CC  goal ongoing  -SG        LB Dressing Goal 1 (OT-IRF)    Activity/Device (LB Dressing Goal 1, OT-IRF)  lower body dressing  -CC  lower body dressing  -SG     [place] (LB Dressing Goal 1, OT-IRF)  maximum assist (25-49% patient effort)  -CC  maximum assist (25-49% patient effort)  -SG     Time Frame (LB  Dressing Goal 1, OT-IRF)  short term goal (STG);1 week  -CC  short term goal (STG);1 week  -SG     Progress/Outcomes (LB Dressing Goal 1, OT-IRF)  goal met  -CC  goal ongoing  -SG        LB Dressing Goal 2 (OT-IRF)    Activity/Device (LB Dressing Goal 2, OT-IRF)  lower body dressing  -CC  lower body dressing  -SG     [place] (LB Dressing Goal 2, OT-IRF)  minimum assist (75% or more patient effort);contact guard assist  -CC  moderate assist (50-74% patient effort)  -SG     Time Frame (LB Dressing Goal 2, OT-IRF)  long term goal (LTG);by discharge  -CC  long term goal (LTG);by discharge  -SG     Progress/Outcomes (LB Dressing Goal 2, OT-IRF)  goal revised this date  -CC  goal ongoing  -SG        Toileting Goal 1 (OT-IRF)    Activity/Device (Toileting Goal 1, OT-IRF)  toileting skills, all  -CC  toileting skills, all  -SG     [place] Level (Toileting Goal 1, OT-IRF)  moderate assist (50-74% patient effort)  -CC  moderate assist (50-74% patient effort)  -SG     Time Frame (Toileting Goal 1, OT-IRF)  long term goal (LTG);by discharge  -CC  long term goal (LTG);by discharge  -SG     Progress/Outcomes (Toileting Goal 1, OT-IRF)  goal ongoing  -CC  --        Strength Goal 1 (OT-IRF)    Strength Goal 1 (OT-IRF)  Pt to tolerate UE strengthing to improve overall ROM and functional use of UE.  -CC  Pt to tolerate UE strengthing to improve overall ROM and functional use of UE.  -SG     Time Frame (Strength Goal 1, OT-IRF)  long term goal (LTG);by discharge  -CC  long term goal (LTG);by discharge  -SG     Progress/Outcomes (Strength Goal 1, OT-IRF)  goal ongoing  -CC  goal ongoing  -SG        Balance Goal 1 (OT)    Activity/Assistive Device (Balance Goal 1, OT)  sitting, static  -CC  sitting, static  -SG     [place] Level/Cues Needed (Balance Goal 1, OT)  contact guard assist  -CC  contact guard assist  -SG     Time Frame (Balance Goal 1, OT)  long term goal (LTG);by discharge  -CC  long term goal (LTG);by  discharge  -SG     Progress/Outcomes (Balance Goal 1, OT)  goal met  -CC  goal ongoing  -SG        Caregiver Training Goal 1 (OT-IRF)    Caregiver Training Goal 1 (OT-IRF)  Pt and family to be indep with ADLs, functional transfers, AD/AE, and HEP for safe d/c home.  -CC  Pt and family to be indep with ADLs, functional transfers, AD/AE, and HEP for safe d/c home.  -SG     Time Frame (Caregiver Training Goal 1, OT-IRF)  long term goal (LTG);by discharge  -CC  long term goal (LTG);by discharge  -SG     Progress/Outcomes (Caregiver Training Goal 1, OT-IRF)  goal ongoing  -CC  goal ongoing  -SG       User Key  (r) = Recorded By, (t) = Taken By, (c) = Cosigned By    Initials Name Provider Type    Neris Linn OTR Occupational Therapist    SG Sudha Marte OTR Occupational Therapist          Occupational Therapy Education                 Title: PT OT SLP Therapies (Done)     Topic: Occupational Therapy (Done)     Point: ADL training (Done)     Description:   Instruct learner(s) on proper safety adaptation and remediation techniques during self care or transfers.   Instruct in proper use of assistive devices.              Learning Progress Summary           Patient Acceptance, E, VU by WN at 1/18/2020 2246    Acceptance, E,TB,D, VU,NR by RAJAT at 1/18/2020 1525    Acceptance, E, VU by WN at 1/17/2020 2256   Family Acceptance, E, VU by WN at 1/18/2020 2246    Acceptance, E, VU by WN at 1/17/2020 2256    Acceptance, E,TB, VU by SG at 1/7/2020 1545    Comment:  OT role and goals, POC.                   Point: Home exercise program (Done)     Description:   Instruct learner(s) on appropriate technique for monitoring, assisting and/or progressing therapeutic exercises/activities.              Learning Progress Summary           Patient Acceptance, E, VU by WN at 1/18/2020 2246    Acceptance, E,TB,D, VU,NR by JS at 1/18/2020 1525    Acceptance, E, VU by WN at 1/17/2020 2256   Family Acceptance, E, VU by WN at 1/18/2020  2246    Acceptance, E, VU by WN at 1/17/2020 2256                   Point: Precautions (Done)     Description:   Instruct learner(s) on prescribed precautions during self-care and functional transfers.              Learning Progress Summary           Patient Acceptance, E, VU by WN at 1/18/2020 2246    Acceptance, E,TB,D, VU,NR by JS at 1/18/2020 1525    Acceptance, E, VU by WN at 1/17/2020 2256   Family Acceptance, E, VU by WN at 1/18/2020 2246    Acceptance, E, VU by WN at 1/17/2020 2256                   Point: Body mechanics (Done)     Description:   Instruct learner(s) on proper positioning and spine alignment during self-care, functional mobility activities and/or exercises.              Learning Progress Summary           Patient Acceptance, E, VU by WN at 1/18/2020 2246    Acceptance, E,TB,D, VU,NR by JS at 1/18/2020 1525    Acceptance, E, VU by WN at 1/17/2020 2256   Family Acceptance, E, VU by WN at 1/18/2020 2246    Acceptance, E, VU by WN at 1/17/2020 2256                               User Key     Initials Effective Dates Name Provider Type Discipline    SG 12/26/18 -  Sudha Marte OTR Occupational Therapist OT    JS 04/06/17 -  Ainsley Servin PT Physical Therapist PT    WN 06/24/19 -  Hai Serrano, RN Registered Nurse Nurse                       Time Calculation:     Time Calculation- OT     Row Name 01/20/20 1000             Time Calculation- OT    OT Start Time  1000  -CC      OT Stop Time  1045  -CC      OT Time Calculation (min)  45 min  -CC      OT Non-Billable Time (min)  45 min tech  -CC        User Key  (r) = Recorded By, (t) = Taken By, (c) = Cosigned By    Initials Name Provider Type    CC Neris Morales OTR Occupational Therapist          Therapy Charges for Today     Code Description Service Date Service Provider Modifiers Qty    40286170108 HC OT SELF CARE/MGMT/TRAIN EA 15 MIN 1/20/2020 Neris Morales OTR GO 3    77726419038 HC OT THER SUPP EA 15 MIN 1/20/2020 Andrew  Neris, OTR GO 3                   Neris Morales, OTR  1/20/2020
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
## 2020-01-20 NOTE — PROGRESS NOTES
Inpatient Rehabilitation Functional Measures Assessment and Plan of Care    Plan of Care  Updated Problems/Interventions  Field    Functional Measures  JENNIFER Eating:  [facility] Grooming: [facility] Bathing:  [facility] Upper Body Dressing:  [facility] Lower Body Dressing:  [facility] Toileting:  [facility] Bladder Management  Level of Assistance:  [place]  Frequency/Number of Accidents this Shift:  [facility] Bowel Management  Level of Assistance: [place]  Frequency/Number of Accidents this Shift: [facility] Bed/Chair/Wheelchair Transfer:  [facility] Toilet Transfer:  [facility] Tub/Shower Transfer:  [place]    Previously Documented Mode of Locomotion at Discharge: Field  JENNIFER Expected Mode of Locomotion at Discharge: [facility] Walk/Wheelchair:  [facility] Stairs:  [facility] Comprehension:  [facility] Expression:  [facility] Social Interaction:  [facility] Problem Solving:  [facility] Memory:  [place]    Therapy Mode Minutes  Occupational Therapy: Individual: 75 minutes.  Physical Therapy: [place]  Speech Language Pathology:  [place]    Signed by: AVA Dodd/ZOË

## 2020-01-20 NOTE — PROGRESS NOTES
Inpatient Rehabilitation Plan of Care Note    Plan of Care  Care Plan Reviewed - Updates as Follows    Safety    [RN] Potential for Injury(Active)  Current Status(01/20/2020): Patient with generalized weakness. Very weak all  over, at high risk for fall. Assist of 2 with transfers. Pt rolled out of bed  during night of 1/19/20.  Weekly Goal(01/26/2020): No injuries  Discharge Goal: Pt/family aware of fall/safety in the home setting.    Signed by: Mago Mancia RN

## 2020-01-20 NOTE — THERAPY TREATMENT NOTE
Inpatient Rehabilitation - Occupational Therapy Treatment Note    [facility]     Patient Name: Tye JONES Junior  : 1975  MRN: [id number]    Today's Date: 2020                 Admit Date: 2020      Visit Dx:    ICD-10-CM ICD-9-CM   1. Impaired mobility Z74.09 799.89   2. Seizure (CMS/HCC) R56.9 780.39       Patient Active Problem List   Diagnosis   • Mixed hyperlipidemia   • Essential hypertension   • Traumatic brain injury (CMS/HCC)   • Acute allergic rhinitis   • Seizure (CMS/HCC)   • Screen for colon cancer   • H/O traumatic brain injury   • Anemia   • Serum gamma globulin increased         Therapy Treatment    IRF Treatment Summary     Row Name 20 1600 20 1139 20 1100       Evaluation/Treatment Time and Intent    Subjective Information  no complaints  -CC  no complaints  -LH,NM,LH2  no complaints  -SL    Existing Precautions/Restrictions  fall  -CC  fall  -LH,NM,LH2  fall swallow  -SL    Document Type  therapy note (daily note)  -CC  therapy note (daily note)  -LH,NM,LH2  therapy note (daily note)  -    Mode of Treatment  occupational therapy  -CC  physical therapy  -LH,NM,LH2  individual therapy;speech-language pathology  -SL    Patient/Family Observations  --  up in  c SLP  -LH,NM,LH2  cooperative  -SL    Recorded by [CC] Neris Morales OTR [LH,NM,LH2] Pita Roth, PT (r) Anmol Joseph, PT Student (t) Pita Roth, PT (c) [SL] Jayan Coker, MS CCC-SLP    Row Name 20 1046             Evaluation/Treatment Time and Intent    Subjective Information  no complaints  -CC      Existing Precautions/Restrictions  fall swallow, vision  -CC      Document Type  therapy note (daily note)  -CC      Mode of Treatment  occupational therapy  -CC      Patient/Family Observations  up in [facility] w/c   -CC      Recorded by [CC] Neris Morales OTR      Row Name 20 1139             Living Environment    Living Environment Comment  Discussed pt's home environment and  equipment c pt and pt's mother in preparation for d/c. Discussed c pt's mother plans to do family training prior to d/c to home.   -,NM,LH2      Recorded by [,NM,2] Pita Roth, PT (r) Anmol Joseph, PT Student (t) Pita Roth, PT (c)      Row Name 01/20/20 1139             Home Use of Assistive/Adaptive Equipment    Equipment Currently Used at Home  other (see comments) pt's mother reports they don't have a wc or walker at home   -,NM,LH2      Recorded by [,NM,2] Pita Roth, PT (r) Anmol Joseph, PT Student (t) Pita Roth, PT (c)      Row Name 01/20/20 1139             Coping/Community Reintegration    Additional Documentation  Caregiver Training (Group)  -      Recorded by [] Pita Roth, PT      Row Name 01/20/20 1139             Safety Awareness/Health Promotion    Additional Documentation  Fall Prevention (Row)  -      Recorded by [] Pita Roth, PT      Row Name 01/20/20 1600 01/20/20 1139 01/20/20 1046       Cognition/Psychosocial- PT/OT    Affect/Mental Status (Cognitive)  WFL  -CC  WFL  -,NM,2  WFL  -    Orientation Status (Cognition)  --  oriented to;person;situation  -,NM,LH2  --    Follows Commands (Cognition)  --  follows one step commands;75-90% accuracy;verbal cues/prompting required;physical/tactile prompts required  -,NM,LH2  --    Personal Safety Interventions  fall prevention program maintained;gait belt;nonskid shoes/slippers when out of bed  -  fall prevention program maintained;gait belt;supervised activity;nonskid shoes/slippers when out of bed  -,NM,2  fall prevention program maintained;gait belt;nonskid shoes/slippers when out of bed  -    Cognitive Function (Cognitive)  --  safety deficit  -,NM,LH2  --    Safety Deficit (Cognitive)  --  judgment;safety precautions awareness  -,NM,LH2  --    Recorded by [CC] Neris Morales, OTR [,NM,2] Pita Roth, PT (r) Anmol Joseph, PT Student (t) Pita Roth, PT (c) [CC]  Neris Morales OTR    Row Name 01/20/20 1600 01/20/20 1139 01/20/20 1046       Bed Mobility Assessment/Treatment    Comment (Bed Mobility)  up in w/c  -CC  pt ed on use of call light and waiting for help in order to prevent future falls; pt denies pain following fall over the weekend; states understanding of precautions and intent to use call light/staff assist   -LH,NM,LH2  up in w/c  -CC    Recorded by [CC] Neris Morales OTR [LH,NM,LH2] Pita Roth, PT (r) Anmol Joseph, PT Student (t) Pita Roth, PT (c) [CC] Neris Morales OTR    Row Name 01/20/20 1139             Transfer Assessment/Treatment    Transfer Assessment/Treatment  car transfer  -LH,NM,LH2      Recorded by [LH,NM,LH2] Pita Roth, PT (r) Anmol Joseph, PT Student (t) Pita Roth, PT (c)      Row Name 01/20/20 1139 01/20/20 1046          Sit-Stand Transfer    Sit-Stand [place] (Transfers)  contact guard;verbal cues;nonverbal cues (demo/gesture)  -LH,NM,LH2  minimum assist (75% patient effort);verbal cues;nonverbal cues (demo/gesture)  -CC     Assistive Device (Sit-Stand Transfers)  wheelchair car   -LH,NM,LH2  wheelchair  -CC     Recorded by [LH,NM,LH2] Pita Roth, PT (r) Anmol Joseph, PT Student (t) Pita Roth, PT (c) [CC] Neris Morales OTR     Row Name 01/20/20 1139 01/20/20 1046          Stand-Sit Transfer    Stand-Sit [place] (Transfers)  contact guard;minimum assist (75% patient effort);verbal cues;nonverbal cues (demo/gesture)  -LH,NM,LH2  minimum assist (75% patient effort);verbal cues;nonverbal cues (demo/gesture)  -CC     Assistive Device (Stand-Sit Transfers)  wheelchair car  -LH,NM,LH2  wheelchair  -CC     Recorded by [LH,NM,LH2] Pita Roth, PT (r) Anmol Joseph, PT Student (t) Pita Roth, PT (c) [CC] Neris Morales OTR     Row Name 01/20/20 1046             Shower Transfer    Type (Shower Transfer)  stand pivot/stand step;sit-stand;stand-sit  -CC      [place] Level (Shower  Transfer)  minimum assist (75% patient effort);verbal cues;nonverbal cues (demo/gesture)  -CC      Assistive Device (Shower Transfer)  grab bars/tub rail;shower chair;wheelchair  -CC      Recorded by [CC] eNris Morales OTR      Row Name 01/20/20 1139             Car Transfer    Type (Car Transfer)  stand pivot/stand step pt sat in the backseat   -LH,NM,LH2      [place] Level (Car Transfer)  contact guard;verbal cues;nonverbal cues (demo/gesture)  -LH,NM,LH2      Assistive Device (Car Transfer)  -- walking aide/stick   -LH,NM,LH2      Recorded by [LH,NM,LH2] Pita Roth, PT (r) Anmol Joseph, PT Student (t) Pita Roth, PT (c)      Row Name 01/20/20 1139             Gait/Stairs Assessment/Training    [place] Level (Gait)  minimum assist (75% patient effort);verbal cues;nonverbal cues (demo/gesture)  -LH,NM,LH2      Assistive Device (Gait)  other (see comments) walking aide/stick   -LH,NM,LH2      Distance in Feet (Gait)  40-45' x 2  80' in the afternoon   -LH,NM,LH2      Pattern (Gait)  step-through  -LH,NM,LH2      Deviations/Abnormal Patterns (Gait)  bilateral deviations;base of support, narrow;stride length decreased;gait speed decreased  -LH,NM,LH2      Bilateral Gait Deviations  heel strike decreased;weight shift ability decreased  -LH,NM,LH2      Comment (Gait/Stairs)  pt amb c very slow speed and narrow RAJI, but was able to amb without HHA and onyl use of walkign stick. VC for orientation to surroundings in the gym  CGA/min in PM during turns; 1 lat LOB c LLE crossing midline  -LH,NM,LH2      Recorded by [LH,NM,LH2] Pita Roth, PT (r) Anmol Joseph, PT Student (t) Pita Roth, PT (c)      Row Name 01/20/20 1139             Safety Issues, Functional Mobility    Impairments Affecting Function (Mobility)  visual/perceptual  -LH,NM,LH2      Recorded by [LH,NM,LH2] Pita Roth, PT (r) Anmol Joseph, PT Student (t) Pita Roth, PT (c)      Row Name 01/20/20 1046              Bathing Assessment/Treatment    Bathing [place] Level  bathing skills;upper body;lower body;minimum assist (75% patient effort);verbal cues  -CC      Assistive Device (Bathing)  grab bar/tub rail;hand held shower spray hose;shower chair  -CC      Bathing Position  supported sitting;supported standing  -CC      Bathing Setup Assistance  adjust water temperature;obtain supplies  -CC      Recorded by [CC] Neris Morales OTR      Row Name 01/20/20 1046             Upper Body Dressing Assessment/Treatment    Upper Body Dressing Task  upper body dressing skills;don;pull over garment;verbal cues;nonverbal cues (demo/gesture);minimum assist (75% or more patient effort);moderate assist (50-74% patient effort)  -CC      Upper Body Dressing Position  supported sitting  -CC      Set-up Assistance (Upper Body Dressing)  obtain clothing  -CC      Comment (Upper Body Dressing)  max to get started  -CC      Recorded by [CC] Neris Morales OTR      Row Name 01/20/20 1046             Lower Body Dressing Assessment/Treatment    Lower Body Dressing [place] Level  doff;don;pants/bottoms;shoes/slippers;socks;shoelaces;verbal cues;nonverbal cues (demo/gesture);minimum assist (75% patient effort)  -CC      Lower Body Dressing Position  supported sitting;supported standing  -CC      Lower Body Dressing Setup Assistance  obtain clothing  -CC      Comment (Lower Body Dressing)  second person for safety  -CC      Recorded by [CC] Neris Morales OTR      Row Name 01/20/20 1046             Grooming Assessment/Treatment    Grooming [place] Level  grooming skills;deodorant application;oral care regimen;wash face, hands;set up;supervision;verbal cues  -CC      Grooming Position  sink side;supported sitting  -CC      Grooming Setup Assistance  obtain supplies  -CC      Recorded by [CC] Neris Morales OTR      Row Name 01/20/20 1600 01/20/20 1139 01/20/20 1046       Pain Scale: Numbers Pre/Post-Treatment    Pain Scale:  Numbers, Pretreatment  0/10 - no pain  -CC  0/10 - no pain  -LH,NM,LH2  0/10 - no pain  -CC    Pain Scale: Numbers, Post-Treatment  0/10 - no pain  -CC  0/10 - no pain  -LH,NM,LH2  0/10 - no pain  -CC    Recorded by [CC] Neris Morales OTR [LH,NM,LH2] Pita Roth, PT (r) Anmol Joseph, PT Student (t) Pita Roth, PT (c) [CC] Neris Morales OTR    Row Name 01/20/20 1139             Standing Balance Activity    Activities Performed (Standing, Balance Training)  standing on foam roll foam square inside // bars   -LH,NM,LH2      Support Needed for Balance (Standing, Balance Training)  CGA;minimal external support for balance, 75% patient effort;balances without upper extremity support;uses at least one upper extremity for support support varied shreyas BUE support to no UE support  -LH,NM,LH2      Progressive Balance Activity (Standing, Balance Training)  -- reduction in UE support throughout   -LH,NM,LH2      Restrictions (Standing, Balance Training)  vision   -LH,NM,LH2      Comment (Standing, Balance Training)  standing on foam square, pt able to hold balance x 5-10 seconds unsupported c CGA. Demos posterior lean that required min A to correct.   -LH,NM,LH2      Recorded by [LH,NM,LH2] Pita Roth, PT (r) Anmol Joseph, PT Student (t) Pita Roth, PT (c)      Row Name 01/20/20 1600             Upper Extremity Seated Therapeutic Exercise    Performed, Seated Upper Extremity (Therapeutic Exercise)  digit flexion/extension  -CC      Device, Seated Upper Extremity (Therapeutic Exercise)  free weights, cuff;restorator/arm bike  -CC      Exercise Type, Seated Upper Extremity (Therapeutic Exercise)  resistive exercise  -CC      Expected Outcomes, Seated Upper Extremity (Therapeutic Exercise)  improve functional tolerance, self-care activity  -CC      Sets/Reps Detail, Seated Upper Extremity (Therapeutic Exercise)  20 reps hand helper; 20x2 2# hand wt; arm bike 8 min  -CC      Recorded by [CC] Andrew  AVA Cordon      Row Name 01/20/20 1600 01/20/20 1139 01/20/20 1046       Positioning and Restraints    Pre-Treatment Position  sitting in chair/recliner  -CC  sitting in chair/recliner  -LH,NM,LH2  sitting in chair/recliner  -CC    Post Treatment Position  wheelchair  -CC  wheelchair  -LH,NM,LH2  wheelchair  -CC    In Wheelchair  sitting;with nsg  -CC  call light within reach;encouraged to call for assist;exit alarm on;with family/caregiver mother present   -LH,NM,LH2  sitting;call light within reach;encouraged to call for assist;exit alarm on;with family/caregiver mother  -CC    Recorded by [CC] Neris Morales OTR [LH,NM,LH2] Pita Roth, PT (r) Anmol Joseph, PT Student (t) Pita Roth, PT (c) [CC] Neris Morales OTR      User Key  (r) = Recorded By, (t) = Taken By, (c) = Cosigned By    Initials Name Effective Dates    CC Neris Morales OTR 06/08/18 -     Jayna Barnes, MS CCC-SLP 06/08/18 -      Pita Roth, PT 04/03/18 -     Anmol Cordero, PT Student 01/03/20 -           Wound 01/06/20 2200 head Incision (Active)   Dressing Appearance open to air 1/20/2020  8:14 AM   Closure Approximated 1/20/2020  8:14 AM   Base clean;dry 1/20/2020  8:14 AM   Periwound dry 1/19/2020  7:38 PM   Drainage Amount none 1/19/2020  7:38 PM   Dressing Care, Wound open to air 1/19/2020  7:38 PM         OT Recommendation and Plan                 OT IRF GOALS     Row Name 01/15/20 1541 01/07/20 1527          Transfer Goal 1 (OT-IRF)    Activity/Assistive Device (Transfer Goal 1, OT-IRF)  toilet;commode, bedside without drop arms  -CC  toilet;commode, bedside without drop arms  -SG     [place] Level (Transfer Goal 1, OT-IRF)  maximum assist (25-49% patient effort)  -CC  maximum assist (25-49% patient effort)  -SG     Time Frame (Transfer Goal 1, OT-IRF)  short term goal (STG);1 week  -CC  short term goal (STG);1 week  -SG     Progress/Outcomes (Transfer Goal 1, OT-IRF)  goal met  -CC  goal ongoing  -SG         Transfer Goal 2 (OT-IRF)    Activity/Assistive Device (Transfer Goal 2, OT-IRF)  toilet  -CC  toilet  -SG     [place] Level (Transfer Goal 2, OT-IRF)  minimum assist (75% or more patient effort);contact guard assist  -CC  minimum assist (75% or more patient effort);moderate assist (50-74% patient effort)  -SG     Time Frame (Transfer Goal 2, OT-IRF)  long term goal (LTG);by discharge  -CC  long term goal (LTG);by discharge  -SG     Progress/Outcomes (Transfer Goal 2, OT-IRF)  goal revised this date  -CC  goal ongoing  -SG        Transfer Goal 3 (OT-IRF)    Activity/Assistive Device (Transfer Goal 3, OT-IRF)  shower chair  -CC  shower chair  -SG     [place] Level (Transfer Goal 3, OT-IRF)  maximum assist (25-49% patient effort)  -CC  maximum assist (25-49% patient effort)  -SG     Time Frame (Transfer Goal 3, OT-IRF)  short term goal (STG);1 week  -CC  short term goal (STG);1 week  -SG     Progress/Outcomes (Transfer Goal 3, OT-IRF)  goal met  -CC  goal ongoing  -SG        Transfer Goal 4 (OT-IRF)    Activity/Assistive Device (Transfer Goal 4, OT-IRF)  shower chair  -CC  shower chair  -SG     [place] Level (Transfer Goal 4, OT-IRF)  minimum assist (75% or more patient effort);moderate assist (50-74% patient effort)  -CC  minimum assist (75% or more patient effort);moderate assist (50-74% patient effort)  -SG     Time Frame (Transfer Goal 4, OT-IRF)  long term goal (LTG);by discharge  -CC  long term goal (LTG);by discharge  -SG     Progress/Outcomes (Transfer Goal 4, OT-IRF)  goal ongoing  -CC  goal ongoing  -SG        Bathing Goal 1 (OT-IRF)    Activity/Device (Bathing Goal 1, OT-IRF)  bathing skills, all  -CC  bathing skills, all  -SG     [place] Level (Bathing Goal 1, OT-IRF)  moderate assist (50-74% patient effort)  -CC  moderate assist (50-74% patient effort)  -SG     Time Frame (Bathing Goal 1, OT-IRF)  short term goal (STG);1 week  -CC  short term goal (STG);1 week  -SG      Progress/Outcomes (Bathing Goal 1, OT-IRF)  goal met  -CC  goal ongoing  -SG        Bathing Goal 2 (OT-IRF)    Activity/Device (Bathing Goal 2, OT-IRF)  bathing skills, all  -CC  bathing skills, all  -SG     [place] Level (Bathing Goal 2, OT-IRF)  minimum assist (75% or more patient effort)  -CC  minimum assist (75% or more patient effort)  -SG     Time Frame (Bathing Goal 2, OT-IRF)  long term goal (LTG);by discharge  -CC  long term goal (LTG);by discharge  -SG     Progress/Outcomes (Bathing Goal 2, OT-IRF)  goal ongoing  -CC  goal ongoing  -SG        UB Dressing Goal 1 (OT-IRF)    Activity/Device (UB Dressing Goal 1, OT-IRF)  upper body dressing  -CC  upper body dressing  -SG     [place] (UB Dress Goal 1, OT-IRF)  moderate assist (50-74% patient effort)  -CC  moderate assist (50-74% patient effort)  -SG     Time Frame (UB Dressing Goal 1, OT-IRF)  short term goal (STG);1 week  -CC  short term goal (STG);1 week  -SG     Progress/Outcomes (UB Dressing Goal 1, OT-IRF)  goal met  -CC  goal ongoing  -SG        UB Dressing Goal 2 (OT-IRF)    Activity/Device (UB Dressing Goal 2, OT-IRF)  upper body dressing  -CC  upper body dressing  -SG     [place] (UB Dress Goal 2, OT-IRF)  supervision required  -CC  minimum assist (75% or more patient effort)  -SG     Time Frame (UB Dressing Goal 2, OT-IRF)  long term goal (LTG);by discharge  -CC  long term goal (LTG);by discharge  -SG     Progress/Outcomes (UB Dressing Goal 2, OT-IRF)  goal revised this date  -CC  goal ongoing  -SG        LB Dressing Goal 1 (OT-IRF)    Activity/Device (LB Dressing Goal 1, OT-IRF)  lower body dressing  -CC  lower body dressing  -SG     [place] (LB Dressing Goal 1, OT-IRF)  maximum assist (25-49% patient effort)  -CC  maximum assist (25-49% patient effort)  -SG     Time Frame (LB Dressing Goal 1, OT-IRF)  short term goal (STG);1 week  -CC  short term goal (STG);1 week  -SG     Progress/Outcomes (LB Dressing Goal 1, OT-IRF)  goal met   -CC  goal ongoing  -SG        LB Dressing Goal 2 (OT-IRF)    Activity/Device (LB Dressing Goal 2, OT-IRF)  lower body dressing  -CC  lower body dressing  -SG     [place] (LB Dressing Goal 2, OT-IRF)  minimum assist (75% or more patient effort);contact guard assist  -CC  moderate assist (50-74% patient effort)  -SG     Time Frame (LB Dressing Goal 2, OT-IRF)  long term goal (LTG);by discharge  -CC  long term goal (LTG);by discharge  -SG     Progress/Outcomes (LB Dressing Goal 2, OT-IRF)  goal revised this date  -CC  goal ongoing  -SG        Toileting Goal 1 (OT-IRF)    Activity/Device (Toileting Goal 1, OT-IRF)  toileting skills, all  -CC  toileting skills, all  -SG     [place] Level (Toileting Goal 1, OT-IRF)  moderate assist (50-74% patient effort)  -CC  moderate assist (50-74% patient effort)  -SG     Time Frame (Toileting Goal 1, OT-IRF)  long term goal (LTG);by discharge  -CC  long term goal (LTG);by discharge  -SG     Progress/Outcomes (Toileting Goal 1, OT-IRF)  goal ongoing  -CC  --        Strength Goal 1 (OT-IRF)    Strength Goal 1 (OT-IRF)  Pt to tolerate UE strengthing to improve overall ROM and functional use of UE.  -CC  Pt to tolerate UE strengthing to improve overall ROM and functional use of UE.  -SG     Time Frame (Strength Goal 1, OT-IRF)  long term goal (LTG);by discharge  -CC  long term goal (LTG);by discharge  -SG     Progress/Outcomes (Strength Goal 1, OT-IRF)  goal ongoing  -CC  goal ongoing  -SG        Balance Goal 1 (OT)    Activity/Assistive Device (Balance Goal 1, OT)  sitting, static  -CC  sitting, static  -SG     [place] Level/Cues Needed (Balance Goal 1, OT)  contact guard assist  -CC  contact guard assist  -SG     Time Frame (Balance Goal 1, OT)  long term goal (LTG);by discharge  -CC  long term goal (LTG);by discharge  -SG     Progress/Outcomes (Balance Goal 1, OT)  goal met  -CC  goal ongoing  -SG        Caregiver Training Goal 1 (OT-IRF)    Caregiver Training Goal 1  (OT-IRF)  Pt and family to be indep with ADLs, functional transfers, AD/AE, and HEP for safe d/c home.  -CC  Pt and family to be indep with ADLs, functional transfers, AD/AE, and HEP for safe d/c home.  -SG     Time Frame (Caregiver Training Goal 1, OT-IRF)  long term goal (LTG);by discharge  -CC  long term goal (LTG);by discharge  -SG     Progress/Outcomes (Caregiver Training Goal 1, OT-IRF)  goal ongoing  -CC  goal ongoing  -SG       User Key  (r) = Recorded By, (t) = Taken By, (c) = Cosigned By    Initials Name Provider Type    Neris Linn OTR Occupational Therapist    SG Sudha Marte OTR Occupational Therapist          Occupational Therapy Education                 Title: PT OT SLP Therapies (Done)     Topic: Occupational Therapy (Done)     Point: ADL training (Done)     Description:   Instruct learner(s) on proper safety adaptation and remediation techniques during self care or transfers.   Instruct in proper use of assistive devices.              Learning Progress Summary           Patient Acceptance, E, VU by WN at 1/18/2020 2246    Acceptance, E,TB,D, VU,NR by RAJAT at 1/18/2020 1525    Acceptance, E, VU by WN at 1/17/2020 2256   Family Acceptance, E, VU by WN at 1/18/2020 2246    Acceptance, E, VU by WN at 1/17/2020 2256    Acceptance, E,TB, VU by SG at 1/7/2020 1545    Comment:  OT role and goals, POC.                   Point: Home exercise program (Done)     Description:   Instruct learner(s) on appropriate technique for monitoring, assisting and/or progressing therapeutic exercises/activities.              Learning Progress Summary           Patient Acceptance, E, VU by WN at 1/18/2020 2246    Acceptance, E,TB,D, VU,NR by JS at 1/18/2020 1525    Acceptance, E, VU by WN at 1/17/2020 2256   Family Acceptance, E, VU by WN at 1/18/2020 2246    Acceptance, E, VU by WN at 1/17/2020 2256                   Point: Precautions (Done)     Description:   Instruct learner(s) on prescribed precautions  during self-care and functional transfers.              Learning Progress Summary           Patient Acceptance, E, VU by WN at 1/18/2020 2246    Acceptance, E,TB,D, VU,NR by JS at 1/18/2020 1525    Acceptance, E, VU by WN at 1/17/2020 2256   Family Acceptance, E, VU by WN at 1/18/2020 2246    Acceptance, E, VU by WN at 1/17/2020 2256                   Point: Body mechanics (Done)     Description:   Instruct learner(s) on proper positioning and spine alignment during self-care, functional mobility activities and/or exercises.              Learning Progress Summary           Patient Acceptance, E, VU by WN at 1/18/2020 2246    Acceptance, E,TB,D, VU,NR by JS at 1/18/2020 1525    Acceptance, E, VU by WN at 1/17/2020 2256   Family Acceptance, E, VU by WN at 1/18/2020 2246    Acceptance, E, VU by WN at 1/17/2020 2256                               User Key     Initials Effective Dates Name Provider Type Discipline     12/26/18 -  Sudha Marte OTR Occupational Therapist OT    JS 04/06/17 -  Ainsley Servin PT Physical Therapist PT    WN 06/24/19 -  Hai Serrano, RN Registered Nurse Nurse                       Time Calculation:     Time Calculation- OT     Row Name 01/20/20 1400 01/20/20 1000          Time Calculation- OT    OT Start Time  1400  -CC  1000  -CC     OT Stop Time  1430  -CC  1045  -CC     OT Time Calculation (min)  30 min  -CC  45 min  -CC     OT Non-Billable Time (min)  --  45 min tech  -CC       User Key  (r) = Recorded By, (t) = Taken By, (c) = Cosigned By    Initials Name Provider Type    CC Neris Morales OTR Occupational Therapist          Therapy Charges for Today     Code Description Service Date Service Provider Modifiers Qty    75595695699 HC OT SELF CARE/MGMT/TRAIN EA 15 MIN 1/20/2020 Neris Morales OTR GO 3    69257187508 HC OT THER SUPP EA 15 MIN 1/20/2020 Neris Morales OTR GO 3    64061140378 HC OT THER PROC EA 15 MIN 1/20/2020 Neris Morales OTR GO 2                   Neris  Andrew, OTR  1/20/2020

## 2020-01-20 NOTE — PLAN OF CARE
Patient is calm and cooperative. Denied pain. Continent and incontinent of bladder during the night. 2 person assist with turning and repositioning. Restless and attempt to exit bed at the beginning of night. Bed alarm set for zone 2 and PRN vistaril given with positive result. Mom at bedside.

## 2020-01-20 NOTE — PROGRESS NOTES
Inpatient Rehabilitation Functional Measures Assessment and Plan of Care    Plan of Care  Updated Problems/Interventions  Cognition    [ST] Memory(Active)  Current Status(01/20/2020): improved orientation- not consistent; slow  processing still, but slightly improved simple reasoning, problem solving  Weekly Goal(01/28/2020): orientation to place, time, bio info indep  Discharge Goal: Follow a schedule and return home with supervision        Swallow Function    [ST] Swallowing(Active)  Current Status(01/20/2020): plan to  repeat VFSS this week;   Mild to moderate  oropharyngeal dysphagia, VFSS at [facility] 1/3/20 recommended puree and thins,  meds crushed with puree  Weekly Goal(01/08/2020): Follow safe swallow precautions with supervision with  min cues  Discharge Goal: Independent with safe swallow precautions and tolerating least  restrictive diet    Functional Measures  [facility] Eating:  [facility] Grooming: [facility] Bathing:  [facility] Upper Body Dressing:  [facility] Lower Body Dressing:  [facility] Toileting:  [facility] Bladder Management  Level of Assistance:  [place]  Frequency/Number of Accidents this Shift:  [facility] Bowel Management  Level of Assistance: [place]  Frequency/Number of Accidents this Shift: [facility] Bed/Chair/Wheelchair Transfer:  [facility] Toilet Transfer:  [facility] Tub/Shower Transfer:  [place]    Previously Documented Mode of Locomotion at Discharge: Field  JENNIFER Expected Mode of Locomotion at Discharge: [facility] Walk/Wheelchair:  [facility] Stairs:  [facility] Comprehension:  [facility] Expression:  [facility] Social Interaction:  [facility] Problem Solving:  [facility] Memory:  [place]    Therapy Mode Minutes  Occupational Therapy: Branch  Physical Therapy: [place]  Speech Language Pathology:  Individual: 60 minutes.    Signed by: Jayna Coker, SLP

## 2020-01-20 NOTE — PLAN OF CARE
Problem: Patient Care Overview  Goal: Plan of Care Review  Outcome: Ongoing (interventions implemented as appropriate)  Flowsheets  Taken 1/19/2020 1725 by Joanne Weiner RN  Outcome Summary: Mr Christine has been pleasant and cooperative, no complaints of pain and assist x1. His occult stool saample negative, H & H trending up, and Vistaril increased at bedtime. Family at bedside at all times, no other issues or concerns at this time.  Progress, Functional Goals: demonstrating adequate progress  IRF Plan of Care Review: progress ongoing, continue  Taken 1/20/2020 0814 by Mago Mancia, RN  Plan of Care Reviewed With: patient;mother  Note:   Pt alert with some confusion & forgetfulness. Oriented to self and situation, unable to identify place and time. Pleasant and cooperative with staff. Meds whole in applesauce one pill at a time. Denies pain this shift. Transfers x1 assist. Pt did roll out of bed during night shift on 1/19/20. Participating in therapies. Cont B&B this shift. Occult stool sample was negative. Family at bedside this shift.

## 2020-01-21 LAB — ZINC BLD-MCNC: 573 UG/DL (ref 440–860)

## 2020-01-21 PROCEDURE — 97129 THER IVNTJ 1ST 15 MIN: CPT

## 2020-01-21 PROCEDURE — 97130 THER IVNTJ EA ADDL 15 MIN: CPT

## 2020-01-21 PROCEDURE — 97110 THERAPEUTIC EXERCISES: CPT

## 2020-01-21 PROCEDURE — 97535 SELF CARE MNGMENT TRAINING: CPT

## 2020-01-21 PROCEDURE — 92526 ORAL FUNCTION THERAPY: CPT

## 2020-01-21 RX ORDER — HYDROXYZINE PAMOATE 50 MG/1
50 CAPSULE ORAL NIGHTLY
Status: DISCONTINUED | OUTPATIENT
Start: 2020-01-21 | End: 2020-01-24 | Stop reason: HOSPADM

## 2020-01-21 RX ADMIN — HYDROXYZINE PAMOATE 50 MG: 50 CAPSULE ORAL at 21:08

## 2020-01-21 RX ADMIN — POTASSIUM CHLORIDE 20 MEQ: 750 CAPSULE, EXTENDED RELEASE ORAL at 08:37

## 2020-01-21 RX ADMIN — PANTOPRAZOLE SODIUM 40 MG: 40 TABLET, DELAYED RELEASE ORAL at 05:53

## 2020-01-21 RX ADMIN — CLONAZEPAM 1 MG: 0.5 TABLET ORAL at 12:56

## 2020-01-21 RX ADMIN — FOLIC ACID 1 MG: 1 TABLET ORAL at 08:37

## 2020-01-21 RX ADMIN — Medication 1000 MCG: at 08:36

## 2020-01-21 RX ADMIN — CLONAZEPAM 1 MG: 0.5 TABLET ORAL at 05:53

## 2020-01-21 RX ADMIN — ATORVASTATIN CALCIUM 10 MG: 10 TABLET, FILM COATED ORAL at 08:37

## 2020-01-21 RX ADMIN — CLONAZEPAM 1 MG: 0.5 TABLET ORAL at 21:08

## 2020-01-21 RX ADMIN — PHENYTOIN SODIUM 100 MG: 100 CAPSULE, EXTENDED RELEASE ORAL at 12:56

## 2020-01-21 RX ADMIN — LEVETIRACETAM 500 MG: 100 SOLUTION ORAL at 21:07

## 2020-01-21 RX ADMIN — POLYETHYLENE GLYCOL 3350 17 G: 17 POWDER, FOR SOLUTION ORAL at 09:57

## 2020-01-21 RX ADMIN — DEXTROAMPHETAMINE SACCHARATE, AMPHETAMINE ASPARTATE MONOHYDRATE, DEXTROAMPHETAMINE SULFATE, AND AMPHETAMINE SULFATE 30 MG: 2.5; 2.5; 2.5; 2.5 CAPSULE, EXTENDED RELEASE ORAL at 08:36

## 2020-01-21 RX ADMIN — METOPROLOL TARTRATE 50 MG: 50 TABLET, FILM COATED ORAL at 21:08

## 2020-01-21 RX ADMIN — NYSTATIN 1 APPLICATION: 100000 POWDER TOPICAL at 21:09

## 2020-01-21 RX ADMIN — PHENYTOIN SODIUM 100 MG: 100 CAPSULE, EXTENDED RELEASE ORAL at 05:53

## 2020-01-21 RX ADMIN — METOPROLOL TARTRATE 50 MG: 50 TABLET, FILM COATED ORAL at 08:37

## 2020-01-21 RX ADMIN — LACOSAMIDE 200 MG: 100 TABLET, FILM COATED ORAL at 09:57

## 2020-01-21 RX ADMIN — CHOLECALCIFEROL TAB 10 MCG (400 UNIT) 400 UNITS: 10 TAB at 08:37

## 2020-01-21 RX ADMIN — LEVETIRACETAM 500 MG: 100 SOLUTION ORAL at 08:37

## 2020-01-21 RX ADMIN — NYSTATIN 1 APPLICATION: 100000 POWDER TOPICAL at 08:37

## 2020-01-21 RX ADMIN — LACOSAMIDE 200 MG: 100 TABLET, FILM COATED ORAL at 21:08

## 2020-01-21 RX ADMIN — TOPIRAMATE 25 MG: 25 TABLET, FILM COATED ORAL at 08:37

## 2020-01-21 RX ADMIN — PHENYTOIN SODIUM 100 MG: 100 CAPSULE, EXTENDED RELEASE ORAL at 21:08

## 2020-01-21 NOTE — PLAN OF CARE
Problem: Patient Care Overview  Goal: Plan of Care Review  Outcome: Ongoing (interventions implemented as appropriate)  Flowsheets (Taken 1/21/2020 5988)  Outcome Summary: pt alert and confused, unchanged. no c/o pain. H&H stable this morning. family conference held today. plan for d/c friday, 1/24. x1 assist. continent during day, incontinent at times at night. will monitor.  Progress, Functional Goals: demonstrating adequate progress  Plan of Care Reviewed With: patient  IRF Plan of Care Review: progress ongoing, continue

## 2020-01-21 NOTE — PROGRESS NOTES
Inpatient Rehabilitation Plan of Care Note    Plan of Care  Care Plan Reviewed - No updates at this time.    Sphincter Control    Performed Intervention(s)  Monitor I&O  check for incontinence, offer toileting q2hrs  miralax daily-hold if needed.      Safety    Performed Intervention(s)  Safety rounds/bed alarm/ chair alarm on  Falls precautio/call light within easy reach      Psychosocial    Performed Intervention(s)  Offer support/Encourage patient to verbalize any concerns      Body Systems    Performed Intervention(s)  Skin care q shift and PRN  Assist to turn patient q 2-3hrs.    Signed by: Asia Cardona RN

## 2020-01-21 NOTE — PROGRESS NOTES
LOS: 15 days   Patient Care Team:  Jolene Laurent MD as PCP - General (Family Medicine)  Efren Martínez MD as PCP - Claims Attributed    Chief Complaint:   Status post infected  shunt-removed-CNS infection/pneumocephalus  Status post 12/23/ 2019 - Removal of ventriculoperitoneal shunt intracranial portion, valve, partial peritoneal down to the cervical region  Status post 12/31/2019 endoscopic repair of paranasal sinus defect  ID-ceftriaxone-antibiotics until January 14, 2020  Seizure disorder-multidrug regimen-phenytoin/Vimpat/Keppra/clonazepam/Topamax  Remote traumatic brain injury  Neuro stimulation-Adderall  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  Bilateral SCDs.  Status post acute renal oamglayspcouw-GDT-mnuotp creatinine.  Avoid NSAID/ACE inhibitor's.    Subjective     History of Present Illness     Patient seen and examined this morning. NAEO. Mom reports that patient was restless las night and did not get very much sleep. She is requesting an increase in medication for sleep at night. Denies headache and pain. Reports that he is having good bowel movements. No additional concerns per patient or family.    History taken from: patient chart    Objective     Vital Signs  Temp:  [98.2 °F (36.8 °C)-98.3 °F (36.8 °C)] 98.2 °F (36.8 °C)  Heart Rate:  [] 98  Resp:  [18-20] 18  BP: (120-128)/(81-93) 120/81    Physical Exam  Mental status: awake and alert. NAD  HEENT: craniotomy incision healed  Pulm:CTAB, no wheezing, rales, or rhonchi  Heart: RRR, well perfused    Abdomen: normoactive bowel sounds, soft, NT , non-distended   Extremities: No cyanosis or edema   Neuro: awake   good speed with responses, following commands.    Blindness-chronic  Strength: Continues improved strength      Results Review:    I reviewed the patient's new clinical results.     Results from last 7 days   Lab Units 01/20/20  0607 01/19/20  0630 01/18/20  0630   WBC 10*3/mm3 7.00 6.67 7.24   HEMOGLOBIN g/dL 9.3* 9.4* 7.6*   HEMATOCRIT % 27.5* 28.0* 23.5*   PLATELETS 10*3/mm3 282 290 358     Results from last 7 days   Lab Units 01/20/20  0607 01/19/20  0630 01/16/20  0624   SODIUM mmol/L 139 141 134*   POTASSIUM mmol/L 4.2 4.4 3.3*   CHLORIDE mmol/L 101 102 97*   CO2 mmol/L 24.9 26.3 26.1   BUN mg/dL 13 14 15   CREATININE mg/dL 0.93 1.03 1.06   CALCIUM mg/dL 9.2 9.4 8.8   BILIRUBIN mg/dL 0.2 0.2  --    ALK PHOS U/L 103 110  --    ALT (SGPT) U/L 26 27  --    AST (SGOT) U/L 20 21  --    GLUCOSE mg/dL 86 85 97       Medication Review:   Scheduled Meds:    amphetamine-dextroamphetamine XR 30 mg Oral Daily   atorvastatin 10 mg Oral Daily   cholecalciferol 400 Units Oral Daily   clonazePAM 1 mg Oral Q8H   folic acid 1 mg Oral Daily   hydrOXYzine pamoate 50 mg Oral Nightly   lacosamide 200 mg Oral Q12H   levETIRAcetam 500 mg Oral Q12H   metoprolol tartrate 50 mg Oral Q12H   nystatin 1 application Topical Q12H   pantoprazole 40 mg Oral Q AM   phenytoin 100 mg Oral Q8H   polyethylene glycol 17 g Oral Daily   potassium chloride 20 mEq Oral Daily   topiramate 25 mg Oral Daily   vitamin B-12 1,000 mcg Oral Daily     Continuous Infusions:   PRN Meds:.•  acetaminophen    Assessment/Plan       H/O traumatic brain injury    Anemia    Serum gamma globulin increased  Status post infected  shunt-removed-CNS infection/pneumocephalus  -Status post 12/23/ 2019 - Removal of ventriculoperitoneal shunt intracranial portion, valve, partial peritoneal down to the cervical region  -Status post 12/31/2019 endoscopic repair of paranasal sinus defect  -ID-ceftriaxone-antibiotics until January 14, 2020    Seizure disorder-multidrug  regimen-phenytoin/Vimpat/Keppra/clonazepam/Topamax  -January 9-patient was on phenytoin at home which was increased at the outside hospital, on Topamax but less than antiepileptic dose.  He had Vimpat, Keppra, clonazepam added at the outside hospital.  Consulted neurology for input patient has fatigue felt possibly related to his increased antiepileptic medication.  Reviewed with neurology and Keppra dose being decreased.  -January 10- Keppra decreased from 2000 mg BID to 50 mg BID with improved alertness. Neurology continues to follow.   -January 15-Discussed having neurology see him in follow-up at some point to review possibly decreasing the additional seizure medications further.  -January 17 - neurologist who same him at this hospital out until next week. Pharmacy checked and Vimpat will be covered as outpatient. Discussed with patient and mother possibly taper off some of additional seizure meds - clonazepam or Vimpat as continues without seizure but will await neurology input next week unless hematology recommends discontinue one due to anemia.   - January 20 - reviewed with Neurology - as tolerating current regimen, will continue same for now and have follow up with his [place] Neurologist as an outpatient for further adjustment/taper.       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  Bilateral SCDs.    Status post acute renal lgqzrqtldhqxi-KZT-fhfxef creatinine.  Avoid NSAID/ACE inhibitor's.  Jan 16 - Cr improved to 1.06    Anemia-trend downward.    Placed him back on a protein pump inhibitor .Hemoccult was negative.   ? If due to  recent medical issues vs medication effect.    Patient with progressive anemia.  HGB 13.2 on Dec 22. HGB 10.6 on Jan 6. HGB 8.3 on Jan 16, HGB 7.6 on Jan 18, spontaneously improved to 9.4 on Jan 19 without transfusion  Hemoccult was negative x 2.  Platelets normal.  Retic count increased at 3.56.  Iron 99, iron saturation 137, transferrin 137.  .  Patient's mother thinks his father had sickle cell trait but does remember patient ever being testing.   He has been on Dilantin and low dose Topamax chronically. Keppra is new but dose recently decreased. Vimpat is new but on quick review do not see that associated with anemia. Has been on Heparin subcutaneous for DVT prophylaxis. More mobile now and now ~ 4 weeks out so will discontinue Heparin at this point and continue with SCDs.  PPI Protonix was added Tues Jan 14 after anemia started.    Haptoglobin -157 - WNL.  Jan 20 - HGB 9.3- hematology evaluating         Sleep  -January 13- Family reports restlessness at night. Vistaril added PRN last night without response. Since vistaril has only been tried one night, will continue with Vistaril PRN at bedtime for now and continue to monitor.    -January 21- Vistaril increased to 50mg over the weekend. Will change vistaril to scheduled       Skin-dry rash to bilateral inner thighs-try Mycostatin powder     Now admit for comprehensive acute inpatient rehabilitation .  This would be an interdisciplinary program with physical therapy 1 hour,  occupational therapy 1 hour, and speech therapy 1 hour, 5 days a week.  Rehabilitation nursing for carryover, monitoring of incision and neurologic   status, bowel and bladder, and skin  Ongoing physician follow-up.  Weekly team conferences.  Goals are indeterminate.   Rehabilitation prognosis indeterminate.  Medical prognosis indeterminate.  Estimated length of stay is indeterminate  The patient's functional status and clinical status is unchanged from preadmission assessment and  the patient continues appropriate for acute inpatient rehabilitation.  Goal is for home with outpatient   therapies.  Barrier to discharge: Cognition/mobility- work on trunk control, strength, balance to overcome.     TEAM CONF - JAN 9 - BED MAX 2.  TRANSFERS MAX 2.  GAIT 8 FEET PARALLEL BARS MOD 2. TOILET TRANSFERS MAX 2.  BLIND.  SLOW PROCESSING.  GROOMING MOD. UBD MAX. LBD DEP. DYSPHAGIA - PUREE/THINS.  FATIGUES WITH COGNITIVE  THERAPY.  ELOS :  4 WEEKS    TEAM CONF - JAN 16- BED MOD ASSIST. GAIT 120 FEET MIN ASSIST WITH WALKER.  TRANSFERS MIN ASSIST. WILL START TRAINING WITH HIS WALKER AID FROM FROM. TOILET TRANSFERS MIN ASSIST. BATH MIN ASSIST. UBD SBA-MIN. LBD MOD-MAX. ENDURANCE IMPROVED. SWALLOW - PUREE/THIN LIQUIDS, SLOW RATE, ALTERNATE LIQUID AND SOLIDS. WILL TRY TEST TRAY BUT DOES NOT CHEW WELL OR CONTROL BOLUS. AFTERNOON FATIGUES. MOD-SEVERE COGNITIVE DEFICITS. SLOW PROCESSING. DIFFICULTY WITH THOUGHT ORGANIZATION. STILL NOT ORIENTED TO DAY/PLACE. BNE PENDING.NO SAFETY ISSUES. BLADDER CONTINENT/INCONTINENT. WILL DO TIMED VOIDS.  WILL D/C MIDLINE.   ELOS -  END OF NEXT WEEK. FAMILY CONF PENDING.         Tova Mercedes,   01/21/20  4:41 PM

## 2020-01-21 NOTE — PROGRESS NOTES
Inpatient Rehabilitation Functional Measures Assessment and Plan of Care    Plan of Care  Updated Problems/Interventions  Field    Functional Measures  JENNIFER Eating:  [facility] Grooming: [facility] Bathing:  [facility] Upper Body Dressing:  [facility] Lower Body Dressing:  [facility] Toileting:  [facility] Bladder Management  Level of Assistance:  [place]  Frequency/Number of Accidents this Shift:  [facility] Bowel Management  Level of Assistance: [place]  Frequency/Number of Accidents this Shift: [facility] Bed/Chair/Wheelchair Transfer:  [facility] Toilet Transfer:  [facility] Tub/Shower Transfer:  [place]    Previously Documented Mode of Locomotion at Discharge: Field  JENNIFER Expected Mode of Locomotion at Discharge: [facility] Walk/Wheelchair:  [facility] Stairs:  [facility] Comprehension:  [facility] Expression:  [facility] Social Interaction:  [facility] Problem Solving:  [facility] Memory:  [place]    Therapy Mode Minutes  Occupational Therapy: Individual: 60 minutes.  Physical Therapy: [place]  Speech Language Pathology:  [place]    Signed by: AVA Dodd/ZOË

## 2020-01-21 NOTE — THERAPY TREATMENT NOTE
Inpatient Rehabilitation - Occupational Therapy Treatment Note    [facility]     Patient Name: Tye JONES Junior  : 1975  MRN: [id number]    Today's Date: 2020                 Admit Date: 2020      Visit Dx:    ICD-10-CM ICD-9-CM   1. Impaired mobility Z74.09 799.89   2. Seizure (CMS/HCC) R56.9 780.39       Patient Active Problem List   Diagnosis   • Mixed hyperlipidemia   • Essential hypertension   • Traumatic brain injury (CMS/HCC)   • Acute allergic rhinitis   • Seizure (CMS/HCC)   • Screen for colon cancer   • H/O traumatic brain injury   • Anemia   • Serum gamma globulin increased         Therapy Treatment    IRF Treatment Summary     Row Name 20 1500 20 1103 20 0939       Evaluation/Treatment Time and Intent    Subjective Information  no complaints  -CC  no complaints  -SL  no complaints mother reports pt didnt sleep well  -    Existing Precautions/Restrictions  fall  -CC  fall  -SL  fall  -    Document Type  therapy note (daily note)  -CC  therapy note (daily note)  -  therapy note (daily note)  -    Mode of Treatment  occupational therapy  -CC  individual therapy;speech-language pathology  -  individual therapy;physical therapy  -    Patient/Family Observations  --  cooperative  -SL  pt seated in WC post ST no acute distress  -    Recorded by [CC] Neris Morales, OTR [SL] Jayna Coker, MS CCC-SLP [] Pita Roth, PT    Row Name 20 0842             Evaluation/Treatment Time and Intent    Subjective Information  no complaints  -SL      Existing Precautions/Restrictions  fall  -      Document Type  therapy note (daily note)  -      Mode of Treatment  individual therapy;speech-language pathology  -      Patient/Family Observations  cooperative  -      Recorded by [] Jayna Coker MS CCC-SLP      Row Name 20 0939             Caregiver Training    Caregiver(s) to be Trained  sibling(s);parent(s)  -      Caregiver Training Plan  ambulation  using assistive device  -      Comment, Caregiver Training Plan  mother present for PM session- observed ambulation, car tsf and tsfs w rwx and walking aide. recommended that pt have 24 hr hands on assist at home and to use rwx primarily for improved balance and support. mother and pt receptive to PT recommendations  -      Recorded by [] Pita Roth, PT      Row Name 01/21/20 1500 01/21/20 0939          Cognition/Psychosocial- PT/OT    Affect/Mental Status (Cognitive)  --  confused  -     Orientation Status (Cognition)  --  oriented to;person;situation  -     Follows Commands (Cognition)  --  follows one step commands;75-90% accuracy;repetition of directions required;physical/tactile prompts required;initiation impaired;increased processing time needed  -     Personal Safety Interventions  fall prevention program maintained;gait belt;nonskid shoes/slippers when out of bed  -  fall prevention program maintained;gait belt;supervised activity  -     Cognitive Function (Cognitive)  --  safety deficit  -     Safety Deficit (Cognitive)  --  safety precautions awareness;judgment  -LH     Recorded by [CC] Neris Morales, OTR [LH] Pita Roth, PT     Row Name 01/21/20 1500 01/21/20 0939          Bed Mobility Assessment/Treatment    Rolling Left [place] (Bed Mobility)  --  supervision  -     Rolling Right [place] (Bed Mobility)  --  supervision  -     Supine-Sit [place] (Bed Mobility)  --  supervision;verbal cues;nonverbal cues (demo/gesture)  -     Sit-Supine [place] (Bed Mobility)  --  supervision;verbal cues;nonverbal cues (demo/gesture)  -     Comment (Bed Mobility)  up in w/c  -CC  tx mat at pts approx bed height, cued pt to place walking aide stick in bed with him vs on floor to avoid reaching down. when getting OOB, pt forgot he placed walking aide in bed with him and attempted to reach to floor _VCs to remind pt where he put the walking aide  -     Recorded by  [CC] Neris Morlaes OTR [] Pita Roth, PT     Row Name 01/21/20 0939             Functional Mobility    Functional Mobility- Ind. Level  contact guard assist;verbal cues required  -      Functional Mobility- Device  rolling walker  -      Functional Mobility-Distance (Feet)  30  -      Functional Mobility- Safety Issues  balance decreased during turns;step length decreased  -      Functional Mobility- Comment  ambulated to bathroom and to WC in room  -      Recorded by [LH] Pita Roth, PT      Row Name 01/21/20 1500 01/21/20 0939          Sit-Stand Transfer    Sit-Stand [place] (Transfers)  contact guard;verbal cues;nonverbal cues (demo/gesture)  -  contact guard;verbal cues;nonverbal cues (demo/gesture)  -     Assistive Device (Sit-Stand Transfers)  walker, front-wheeled  -CC  walker, front-wheeled  -     Recorded by [CC] Neris Morales OTR [] Pita Roth, PT     Row Name 01/21/20 1500 01/21/20 0939          Stand-Sit Transfer    Stand-Sit [place] (Transfers)  contact guard;verbal cues;nonverbal cues (demo/gesture)  -  contact guard;verbal cues;nonverbal cues (demo/gesture)  -     Assistive Device (Stand-Sit Transfers)  walker, front-wheeled  -CC  walker, front-wheeled  -     Recorded by [CC] Neris Morales OTR [] Pita Roth, PT     Row Name 01/21/20 0939             Toilet Transfer    Type (Toilet Transfer)  sit-stand;stand-sit  -      [place] Level (Toilet Transfer)  contact guard;verbal cues;nonverbal cues (demo/gesture)  -      Assistive Device (Toilet Transfer)  walker, front-wheeled;grab bars/safety frame  -      Recorded by [] Pita Roth, PT      Row Name 01/21/20 1500             Shower Transfer    Type (Shower Transfer)  stand pivot/stand step;sit-stand;stand-sit  -      [place] Level (Shower Transfer)  contact guard  -      Assistive Device (Shower Transfer)  grab bars/tub rail;shower chair  -      Recorded by [CC]  Neris Morales OTR      Row Name 01/21/20 0939             Car Transfer    Type (Car Transfer)  sit-stand;stand-sit  -      [place] Level (Car Transfer)  contact guard;verbal cues;nonverbal cues (demo/gesture)  -      Assistive Device (Car Transfer)  walker, front-wheeled  -LH      Recorded by [] Pita Roth, PT      Row Name 01/21/20 0939             Gait/Stairs Assessment/Training    [place] Level (Gait)  contact guard;verbal cues;nonverbal cues (demo/gesture);minimum assist (75% patient effort) Min A to guide rwx 2' visual deficits  -LH      Assistive Device (Gait)  walker, front-wheeled  -LH      Distance in Feet (Gait)  80  -LH      Pattern (Gait)  step-through  -LH      Deviations/Abnormal Patterns (Gait)  [place] decreased;base of support, narrow  -LH      Bilateral Gait Deviations  heel strike decreased;forward flexed posture;weight shift ability decreased  -      Comment (Gait/Stairs)  Min A 80ft w walking aide, slower [place] vs rwx use - VCs to guide ambulation 2' visual deficits   -LH      Recorded by [] Pita Roth, PT      Row Name 01/21/20 1500             Bathing Assessment/Treatment    Bathing [place] Level  bathing skills;lower body;upper body;verbal cues;contact guard assist  -CC      Assistive Device (Bathing)  grab bar/tub rail;hand held shower spray hose;shower chair  -CC      Bathing Position  supported sitting;supported standing  -CC      Bathing Setup Assistance  adjust water temperature;obtain supplies  -CC      Recorded by [CC] Neris Morales OTR      Row Name 01/21/20 1500             Upper Body Dressing Assessment/Treatment    Upper Body Dressing Task  upper body dressing skills;doff;don;pull over garment;verbal cues;nonverbal cues (demo/gesture);minimum assist (75% or more patient effort)  -      Upper Body Dressing Position  supported sitting  -CC      Set-up Assistance (Upper Body Dressing)  obtain clothing  -CC      Recorded by [CC] Andrew  AVA Cordon      Row Name 01/21/20 1500             Lower Body Dressing Assessment/Treatment    Lower Body Dressing [place] Level  doff;don;pants/bottoms;shoes/slippers;socks;shoelaces;verbal cues;nonverbal cues (demo/gesture);minimum assist (75% patient effort)  -CC      Lower Body Dressing Position  supported sitting;supported standing  -CC      Lower Body Dressing Setup Assistance  obtain clothing  -CC      Recorded by [CC] Neris Morales OTR      Row Name 01/21/20 1500             Grooming Assessment/Treatment    Grooming [place] Level  grooming skills;deodorant application;oral care regimen;wash face, hands;set up;supervision;verbal cues  -CC      Grooming Position  sink side;supported sitting  -CC      Grooming Setup Assistance  obtain supplies  -CC      Recorded by [CC] Neris Morales OTR      Row Name 01/21/20 0939             Vision Assessment/Intervention    Visual Impairment/Limitations  legally blind  -      Recorded by [LH] Pita Roth, PT      Row Name 01/21/20 1500 01/21/20 0939          Pain Scale: Numbers Pre/Post-Treatment    Pain Scale: Numbers, Pretreatment  0/10 - no pain  -  0/10 - no pain  -     Pain Scale: Numbers, Post-Treatment  0/10 - no pain  -CC  0/10 - no pain  -     Recorded by [CC] Neris Morales OTR [] Pita Roth, PT     Row Name 01/21/20 1500             Upper Extremity Seated Therapeutic Exercise    Performed, Seated Upper Extremity (Therapeutic Exercise)  shoulder flexion/extension;scapular protraction/retraction;elbow flexion/extension;wrist flexion/extension  -      Device, Seated Upper Extremity (Therapeutic Exercise)  free weights, cuff;restorator/arm bike  -      Exercise Type, Seated Upper Extremity (Therapeutic Exercise)  resistive exercise  -      Expected Outcomes, Seated Upper Extremity (Therapeutic Exercise)  improve functional tolerance, self-care activity  -CC      Sets/Reps Detail, Seated Upper Extremity (Therapeutic Exercise)   20 reps x 2 2.5# dowel; 2$ hand wt  and arm bike 10  min   -CC      Recorded by [CC] Neris Morales OTR      Row Name 01/21/20 1500 01/21/20 0939          Positioning and Restraints    Pre-Treatment Position  sitting in chair/recliner  -CC  sitting in chair/recliner  -LH     Post Treatment Position  wheelchair  -CC  wheelchair  -LH     In Wheelchair  sitting;with PT  -CC  sitting;call light within reach;encouraged to call for assist;exit alarm on  -LH     Recorded by [CC] Neris Morales OTR [LH] Pita Roth, PT       User Key  (r) = Recorded By, (t) = Taken By, (c) = Cosigned By    Initials Name Effective Dates    CC Neris Morales OTR 06/08/18 -      Jayna Coker, MS CCC-SLP 06/08/18 -     LH Pita Roth, PT 04/03/18 -           Wound 01/06/20 2200 head Incision (Active)   Dressing Appearance open to air 1/21/2020  1:00 PM   Closure Approximated 1/21/2020  8:37 AM   Base dry;clean 1/21/2020  8:37 AM   Periwound dry 1/20/2020  9:05 PM   Drainage Amount none 1/21/2020  8:37 AM   Dressing Care, Wound open to air 1/21/2020  8:37 AM         OT Recommendation and Plan                 OT IRF GOALS     Row Name 01/15/20 1541             Transfer Goal 1 (OT-IRF)    Activity/Assistive Device (Transfer Goal 1, OT-IRF)  toilet;commode, bedside without drop arms  -CC      [place] Level (Transfer Goal 1, OT-IRF)  maximum assist (25-49% patient effort)  -CC      Time Frame (Transfer Goal 1, OT-IRF)  short term goal (STG);1 week  -CC      Progress/Outcomes (Transfer Goal 1, OT-IRF)  goal met  -CC         Transfer Goal 2 (OT-IRF)    Activity/Assistive Device (Transfer Goal 2, OT-IRF)  toilet  -CC      [place] Level (Transfer Goal 2, OT-IRF)  minimum assist (75% or more patient effort);contact guard assist  -CC      Time Frame (Transfer Goal 2, OT-IRF)  long term goal (LTG);by discharge  -CC      Progress/Outcomes (Transfer Goal 2, OT-IRF)  goal revised this date  -CC         Transfer Goal 3 (OT-IRF)     Activity/Assistive Device (Transfer Goal 3, OT-IRF)  shower chair  -CC      [place] Level (Transfer Goal 3, OT-IRF)  maximum assist (25-49% patient effort)  -CC      Time Frame (Transfer Goal 3, OT-IRF)  short term goal (STG);1 week  -CC      Progress/Outcomes (Transfer Goal 3, OT-IRF)  goal met  -CC         Transfer Goal 4 (OT-IRF)    Activity/Assistive Device (Transfer Goal 4, OT-IRF)  shower chair  -CC      [place] Level (Transfer Goal 4, OT-IRF)  minimum assist (75% or more patient effort);moderate assist (50-74% patient effort)  -CC      Time Frame (Transfer Goal 4, OT-IRF)  long term goal (LTG);by discharge  -CC      Progress/Outcomes (Transfer Goal 4, OT-IRF)  goal ongoing  -CC         Bathing Goal 1 (OT-IRF)    Activity/Device (Bathing Goal 1, OT-IRF)  bathing skills, all  -CC      [place] Level (Bathing Goal 1, OT-IRF)  moderate assist (50-74% patient effort)  -CC      Time Frame (Bathing Goal 1, OT-IRF)  short term goal (STG);1 week  -CC      Progress/Outcomes (Bathing Goal 1, OT-IRF)  goal met  -CC         Bathing Goal 2 (OT-IRF)    Activity/Device (Bathing Goal 2, OT-IRF)  bathing skills, all  -CC      [place] Level (Bathing Goal 2, OT-IRF)  minimum assist (75% or more patient effort)  -CC      Time Frame (Bathing Goal 2, OT-IRF)  long term goal (LTG);by discharge  -CC      Progress/Outcomes (Bathing Goal 2, OT-IRF)  goal ongoing  -CC         UB Dressing Goal 1 (OT-IRF)    Activity/Device (UB Dressing Goal 1, OT-IRF)  upper body dressing  -CC      [place] (UB Dress Goal 1, OT-IRF)  moderate assist (50-74% patient effort)  -CC      Time Frame (UB Dressing Goal 1, OT-IRF)  short term goal (STG);1 week  -CC      Progress/Outcomes (UB Dressing Goal 1, OT-IRF)  goal met  -CC         UB Dressing Goal 2 (OT-IRF)    Activity/Device (UB Dressing Goal 2, OT-IRF)  upper body dressing  -CC      [place] (UB Dress Goal 2, OT-IRF)  supervision required  -CC      Time Frame (UB Dressing  Goal 2, OT-IRF)  long term goal (LTG);by discharge  -CC      Progress/Outcomes (UB Dressing Goal 2, OT-IRF)  goal revised this date  -CC         LB Dressing Goal 1 (OT-IRF)    Activity/Device (LB Dressing Goal 1, OT-IRF)  lower body dressing  -CC      [place] (LB Dressing Goal 1, OT-IRF)  maximum assist (25-49% patient effort)  -CC      Time Frame (LB Dressing Goal 1, OT-IRF)  short term goal (STG);1 week  -CC      Progress/Outcomes (LB Dressing Goal 1, OT-IRF)  goal met  -CC         LB Dressing Goal 2 (OT-IRF)    Activity/Device (LB Dressing Goal 2, OT-IRF)  lower body dressing  -CC      [place] (LB Dressing Goal 2, OT-IRF)  minimum assist (75% or more patient effort);contact guard assist  -CC      Time Frame (LB Dressing Goal 2, OT-IRF)  long term goal (LTG);by discharge  -CC      Progress/Outcomes (LB Dressing Goal 2, OT-IRF)  goal revised this date  -CC         Toileting Goal 1 (OT-IRF)    Activity/Device (Toileting Goal 1, OT-IRF)  toileting skills, all  -CC      [place] Level (Toileting Goal 1, OT-IRF)  moderate assist (50-74% patient effort)  -CC      Time Frame (Toileting Goal 1, OT-IRF)  long term goal (LTG);by discharge  -CC      Progress/Outcomes (Toileting Goal 1, OT-IRF)  goal ongoing  -CC         Strength Goal 1 (OT-IRF)    Strength Goal 1 (OT-IRF)  Pt to tolerate UE strengthing to improve overall ROM and functional use of UE.  -CC      Time Frame (Strength Goal 1, OT-IRF)  long term goal (LTG);by discharge  -CC      Progress/Outcomes (Strength Goal 1, OT-IRF)  goal ongoing  -CC         Balance Goal 1 (OT)    Activity/Assistive Device (Balance Goal 1, OT)  sitting, static  -CC      [place] Level/Cues Needed (Balance Goal 1, OT)  contact guard assist  -CC      Time Frame (Balance Goal 1, OT)  long term goal (LTG);by discharge  -CC      Progress/Outcomes (Balance Goal 1, OT)  goal met  -CC         Caregiver Training Goal 1 (OT-IRF)    Caregiver Training Goal 1 (OT-IRF)  Pt and family  to be indep with ADLs, functional transfers, AD/AE, and HEP for safe d/c home.  -CC      Time Frame (Caregiver Training Goal 1, OT-IRF)  long term goal (LTG);by discharge  -CC      Progress/Outcomes (Caregiver Training Goal 1, OT-IRF)  goal ongoing  -CC        User Key  (r) = Recorded By, (t) = Taken By, (c) = Cosigned By    Initials Name Provider Type    CC Neris Morales OTR Occupational Therapist          Occupational Therapy Education                 Title: PT OT SLP Therapies (In Progress)     Topic: Occupational Therapy (Done)     Point: ADL training (Done)     Description:   Instruct learner(s) on proper safety adaptation and remediation techniques during self care or transfers.   Instruct in proper use of assistive devices.              Learning Progress Summary           Patient Acceptance, E, VU,NR by CC at 1/21/2020 1517    Comment:  family conf with pt, mom, sister and case manger    Acceptance, E, VU by WN at 1/18/2020 2246    Acceptance, E,TB,D, VU,NR by JS at 1/18/2020 1525    Acceptance, E, VU by WN at 1/17/2020 2256   Family Acceptance, E, VU,NR by CC at 1/21/2020 1517    Comment:  family conf with pt, mom, sister and case manger    Acceptance, E, VU by WN at 1/18/2020 2246    Acceptance, E, VU by WN at 1/17/2020 2256    Acceptance, E,TB, VU by SG at 1/7/2020 1545    Comment:  OT role and goals, POC.                   Point: Home exercise program (Done)     Description:   Instruct learner(s) on appropriate technique for monitoring, assisting and/or progressing therapeutic exercises/activities.              Learning Progress Summary           Patient Acceptance, E, VU,NR by CC at 1/21/2020 1517    Comment:  family conf with pt, mom, sister and case manger    Acceptance, E, VU by WN at 1/18/2020 2246    Acceptance, E,TB,D, VU,NR by JS at 1/18/2020 1525    Acceptance, E, VU by WN at 1/17/2020 2256   Family Acceptance, E, VU,NR by CC at 1/21/2020 1517    Comment:  family conf with pt, mom, sister and  case manger    Acceptance, E, VU by WN at 1/18/2020 2246    Acceptance, E, VU by WN at 1/17/2020 2256                   Point: Precautions (Done)     Description:   Instruct learner(s) on prescribed precautions during self-care and functional transfers.              Learning Progress Summary           Patient Acceptance, E, VU,NR by CC at 1/21/2020 1517    Comment:  family conf with pt, mom, sister and case manger    Acceptance, E, VU by WN at 1/18/2020 2246    Acceptance, E,TB,D, VU,NR by JS at 1/18/2020 1525    Acceptance, E, VU by WN at 1/17/2020 2256   Family Acceptance, E, VU,NR by CC at 1/21/2020 1517    Comment:  family conf with pt, mom, sister and case manger    Acceptance, E, VU by WN at 1/18/2020 2246    Acceptance, E, VU by WN at 1/17/2020 2256                   Point: Body mechanics (Done)     Description:   Instruct learner(s) on proper positioning and spine alignment during self-care, functional mobility activities and/or exercises.              Learning Progress Summary           Patient Acceptance, E, VU,NR by CC at 1/21/2020 1517    Comment:  family conf with pt, mom, sister and case manger    Acceptance, E, VU by WN at 1/18/2020 2246    Acceptance, E,TB,D, VU,NR by JS at 1/18/2020 1525    Acceptance, E, VU by WN at 1/17/2020 2256   Family Acceptance, E, VU,NR by CC at 1/21/2020 1517    Comment:  family conf with pt, mom, sister and case manger    Acceptance, E, VU by WN at 1/18/2020 2246    Acceptance, E, VU by WN at 1/17/2020 2256                               User Key     Initials Effective Dates Name Provider Type Discipline    CC 06/08/18 -  Neris Morales OTR Occupational Therapist OT    SG 12/26/18 -  Sudha Marte OTR Occupational Therapist OT    JS 04/06/17 -  Ainsley Servin PT Physical Therapist PT    WN 06/24/19 -  Hai Serrano, RN Registered Nurse Nurse                       Time Calculation:     Time Calculation- OT     Row Name 01/21/20 1000             Time Calculation- OT     OT Start Time  1000  -CC      OT Stop Time  1100  -CC      OT Time Calculation (min)  60 min  -CC        User Key  (r) = Recorded By, (t) = Taken By, (c) = Cosigned By    Initials Name Provider Type    CC Neris Morales OTR Occupational Therapist          Therapy Charges for Today     Code Description Service Date Service Provider Modifiers Qty    48632412847 HC OT SELF CARE/MGMT/TRAIN EA 15 MIN 1/20/2020 Neris Morales OTR GO 3    72881813470 HC OT THER SUPP EA 15 MIN 1/20/2020 Neris Morales OTR GO 3    82407481291 HC OT THER PROC EA 15 MIN 1/20/2020 Neris Morales OTR GO 2    66880609013 HC OT SELF CARE/MGMT/TRAIN EA 15 MIN 1/21/2020 Neris Morales OTR GO 2    36073772917 HC OT THER PROC EA 15 MIN 1/21/2020 Neris Morales OTR GO 2                   AVA Dodd  1/21/2020

## 2020-01-21 NOTE — THERAPY TREATMENT NOTE
Inpatient Rehabilitation - Speech Language Pathology Treatment Note    [facility]       Patient Name: Tye JONES Junior  : 1975  MRN: [id number]    Today's Date: 2020           Admit Date: 2020      Visit Dx:        ICD-10-CM ICD-9-CM   1. Impaired mobility Z74.09 799.89   2. Seizure (CMS/HCC) R56.9 780.39       Patient Active Problem List   Diagnosis   • Mixed hyperlipidemia   • Essential hypertension   • Traumatic brain injury (CMS/HCC)   • Acute allergic rhinitis   • Seizure (CMS/HCC)   • Screen for colon cancer   • H/O traumatic brain injury   • Anemia   • Serum gamma globulin increased          Therapy Treatment    Evaluation/Coping    Evaluation/Treatment Time and Intent  Subjective Information: no complaints (20 1103 : Jayna Coker, MS CCC-SLP)  Existing Precautions/Restrictions: fall (20 1103 : Jayna Coker, MS CCC-SLP)  Document Type: therapy note (daily note) (20 1103 : Jayna Coker, MS CCC-SLP)  Mode of Treatment: individual therapy, speech-language pathology (20 1103 : Jayna Coker, MS CCC-SLP)  Patient/Family Observations: cooperative (20 1103 : Jayna Coker, MS CCC-SLP)    Vitals/Pain/Safety         Cognition/Communication         Oral Motor/Eating         Mobility/Basic Activities/Instrumental Activities/Motor/Modality                   ROM/MMT                   Sensory/Myotome/Dermatome/Edema               Posture/Balance/Special Tests/Exercise/Transportation/Sexual Function                   Orthotics/Residual Limb/Prosthetic Management              Outcome Summary         EDUCATION    The patient has been educated in the following areas:     Cognitive Impairment Communication Impairment Dysphagia (Swallowing Impairment).    SLP Recommendation and Plan                                                          SLP GOALS     Row Name 20 1100 20 0800 20 1100       Oral Nutrition/Hydration Goal 1 (SLP)    Barriers (Oral Nutrition/Hydration Goal 1,  SLP)  repeat VFSS deferred today due to radiology /acute schedule- will complete tomorrow   -SL  --  trial of soft win peaches- improved mastication and bolus control/manipulation noted- mild oral residue; laryngeal elevation still appears slightly reduced; no overt clinical s/s noted on juicy soft solids or drained items- will rec: repeat VFSS to assess and determine possibility of diet upgrade  -SL    Progress/Outcomes (Oral Nutrition/Hydration Goal 1, SLP)  goal ongoing;continuing progress toward goal  -SL  --  continuing progress toward goal;goal ongoing  -SL       Labial Strengthening Goal 1 (SLP)    Activity (Labial Strengthening Goal 1, SLP)  increase labial tone  -SL  --  --    [place]/Accuracy (Labial Strengthening Goal 1, SLP)  with minimal cues (75-90% accuracy)  -SL  --  --    Barriers (Labial Strengthening Goal 1, SLP)  improved ROM and strength apparent  -SL  --  --    Progress/Outcomes (Labial Strengthening Goal 1, SLP)  goal ongoing  -SL  --  --       Lingual Strengthening Goal 1 (SLP)    Increase Tongue Back Strength  lingual movement exercises;lingual resistance exercises  -SL  --  lingual movement exercises  -SL    [place]/Accuracy (Lingual Strengthening Goal 1, SLP)  with moderate cues (50-74% accuracy)  -SL  --  with moderate cues (50-74% accuracy);with minimal cues (75-90% accuracy)  -SL    Barriers (Lingual Strengthening Goal 1, SLP)  --  --  movements are still slow, but improved in ROM  -SL    Progress/Outcomes (Lingual Strengthening Goal 1, SLP)  goal ongoing  -SL  --  goal ongoing  -SL       Pharyngeal Strengthening Exercise Goal 1 (SLP)    Increase Superior Movement of the Hyolaryngeal Complex  effortful pitch glide (falsetto + pharyngeal squeeze);hard effortful swallow;falsetto;breath hold exercises;carmelita  -SL  --  carmelita;breath hold exercises;effortful pitch glide (falsetto + pharyngeal squeeze);hard effortful swallow;falsetto  -SL    Increase Anterior Movement of the  Hyolaryngeal Complex  carmelita;effortful pitch glide (falsetto + pharyngeal squeeze);hard effortful swallow;falsetto  -SL  --  carmelita;breath hold exercises;effortful pitch glide (falsetto + pharyngeal squeeze);hard effortful swallow;falsetto  -SL    Increase Squeeze/Positive Pressure Generation  hard effortful swallow;falsetto;effortful pitch glide (falsetto + pharyngeal squeeze)  -SL  --  hard effortful swallow;effortful pitch glide (falsetto + pharyngeal squeeze)  -SL    Increase Tongue Base Retraction  carmelita posterior phoneme productions  -SL  --  carmelita  -SL    [place]/Accuracy (Pharyngeal Strengthening Goal 1, SLP)  with moderate cues (50-74% accuracy)  -SL  --  with moderate cues (50-74% accuracy)  -SL    Barriers (Pharyngeal Strengthening Goal 1, SLP)  --  --  difficulty completing carmelita  -SL    Progress/Outcomes (Pharyngeal Strengthening Goal 1, SLP)  goal ongoing  -SL  --  goal ongoing  -SL       Comprehend Questions Goal 1 (SLP)    Progress (Ability to Comprehend Questions Goal 1, SLP)  --  --  80%;independently (over 90% accuracy) mod complex comparative questions  -SL    Progress/Outcomes (Comprehend Questions Goal 1, SLP)  --  --  goal ongoing  -SL       Word Retrieval Skills Goal 1 (SLP)    Progress (Word Retrieval Skills Goal 1, SLP)  --  80%;independently (over 90% accuracy) synonyms  -SL  --    Progress/Outcomes (Word Retrieval Goal 1, SLP)  --  goal ongoing  -SL  --       Connected Speech to Express Thoughts Goal 1 (SLP)    Improve Narrative Discourse to Express Thoughts By Goal 1 (SLP)  --  giving basic definition of a word;80%;independently (over 90% accuracy) formulating sent with key words  -SL  --    Time Frame (Connected Speech Goal 1, SLP)  --  by discharge  -SL  --    Progress (Connected Speech Goal 1, SLP)  --  60%;70%;independently (over 90% accuracy)  -SL  --    Progress/Outcomes (Connected Speech Goal 1, SLP)  --  goal ongoing  -SL  --       Orientation Goal 1 (SLP)    Progress  (Orientation Goal 1, SLP)  --  70%;independently (over 90% accuracy);with minimal cues (75-90%)  -SL  70%;with minimal cues (75-90%)  -SL    Progress/Outcomes (Orientation Goal 1, SLP)  --  goal ongoing  -SL  goal ongoing  -SL       Memory Skills Goal 1 (SLP)    Progress (Memory Skills Goal 1, SLP)  --  --  50%;with minimal cues (75-90%);with moderate cues (50-74%) mental manipulation- 4 word stimulus- ordering  -SL    Progress/Outcomes (Memory Skills Goal 1, SLP)  --  --  goal ongoing  -SL    Comment (Memory Skills Goal 1, SLP)  --  --  very slow processing of info for mental manip task - multiple reps of stimulus and intermittent mod cues and prompts required  -SL       Organizational Skills Goal 1 (SLP)    Progress (Thought Organization Skills Goal 1, SLP)  --  70%;with minimal cues (75-90%);independently (over 90% accuracy) convergent- feature analysis  -SL  90%;independently (over 90% accuracy);with minimal cues (75-90%) abstract convergent categorization  -SL    Progress/Outcomes (Thought Organization Skills Goal 1, SLP)  --  goal ongoing  -SL  goal ongoing  -SL    Comment (Thought Organization Skills Goal 1, SLP)  --  --  25% for adding 1 item to loist of abstract category members  -      User Key  (r) = Recorded By, (t) = Taken By, (c) = Cosigned By    Initials Name Provider Type    Jayna Barnes MS CCC-SLP Speech and Language Pathologist                  Time Calculation:       Time Calculation- SLP     Row Name 01/21/20 1125 01/21/20 0858          Time Calculation- [facility]    SLP Start Time  1100  -  0830  -     SLP Stop Time  1130  -SL  0900  -[facility] Time Calculation (min)  30 min  -  30 min  -       User Key  (r) = Recorded By, (t) = Taken By, (c) = Cosigned By    Initials Name Provider Type    Jayna Barnes MS CCC-SLP Speech and Language Pathologist            Therapy Charges for Today     Code Description Service Date Service Provider Modifiers Qty    [id number] [facility] DEV OF COGN SKILLS  EACH ADDT'L 15 MIN 1/20/2020 Jayna Coker, MS CCC-SLP  2    [id number] [facility] ST TREATMENT SWALLOW 1 1/20/2020 Jayna Coker, MS CCC-SLP GN 1    84764119410 HC ST DEV OF COGN SKILLS INITIAL 15 MIN 1/21/2020 Jayna Coker, MS CCC-SLP  1    [id number] HC ST DEV OF COGN SKILLS EACH ADDT'L 15 MIN 1/21/2020 Jayna Coker, MS CCC-SLP  1    [id number] HC ST TREATMENT SWALLOW 2 1/21/2020 Jayna Coker, MS CCC-SLP GN 1                           Jayna Coker MS CCC-SLP  1/21/2020

## 2020-01-21 NOTE — THERAPY TREATMENT NOTE
Inpatient Rehabilitation - Physical Therapy Treatment Note  [facility]     Patient Name: Tye JONES Junior  : 1975  MRN: [id number]    Today's Date: 2020                 Admit Date: 2020      Visit Dx:      ICD-10-CM ICD-9-CM   1. Impaired mobility Z74.09 799.89   2. Seizure (CMS/HCC) R56.9 780.39       Patient Active Problem List   Diagnosis   • Mixed hyperlipidemia   • Essential hypertension   • Traumatic brain injury (CMS/HCC)   • Acute allergic rhinitis   • Seizure (CMS/HCC)   • Screen for colon cancer   • H/O traumatic brain injury   • Anemia   • Serum gamma globulin increased       Therapy Treatment    IRF Treatment Summary     Row Name 20 1103 20 0939 20 0842       Evaluation/Treatment Time and Intent    Subjective Information  no complaints  -  no complaints mother reports pt didnt sleep well  -  no complaints  -    Existing Precautions/Restrictions  fall  -  fall  -  fall  -SL    Document Type  therapy note (daily note)  -  therapy note (daily note)  -  therapy note (daily note)  -    Mode of Treatment  individual therapy;speech-language pathology  -  individual therapy;physical therapy  -  individual therapy;speech-language pathology  -SL    Patient/Family Observations  cooperative  -  pt seated in WC post ST no acute distress  -  cooperative  -    Recorded by [] Jayna Coker, MS CCC-SLP [] Pita Roth, PT [] Jayna Coker, MS CCC-SLP    Row Name 20 0939             Caregiver Training    Caregiver(s) to be Trained  sibling(s);parent(s)  -      Caregiver Training Plan  ambulation using assistive device  -      Comment, Caregiver Training Plan  mother present for PM session- observed ambulation, car tsf and tsfs w rwx and walking aide. recommended that pt have 24 hr hands on assist at home and to use rwx primarily for improved balance and support. mother and pt receptive to PT recommendations  -      Recorded by [] Pita Roth,  PT      Row Name 01/21/20 0939             Cognition/Psychosocial- PT/OT    Affect/Mental Status (Cognitive)  confused  -      Orientation Status (Cognition)  oriented to;person;situation  -      Follows Commands (Cognition)  follows one step commands;75-90% accuracy;repetition of directions required;physical/tactile prompts required;initiation impaired;increased processing time needed  -      Personal Safety Interventions  fall prevention program maintained;gait belt;supervised activity  -      Cognitive Function (Cognitive)  safety deficit  -      Safety Deficit (Cognitive)  safety precautions awareness;judgment  -      Recorded by [] Pita Roth, PT      Row Name 01/21/20 0939             Bed Mobility Assessment/Treatment    Rolling Left [place] (Bed Mobility)  supervision  -      Rolling Right [place] (Bed Mobility)  supervision  -      Supine-Sit [place] (Bed Mobility)  supervision;verbal cues;nonverbal cues (demo/gesture)  -      Sit-Supine [place] (Bed Mobility)  supervision;verbal cues;nonverbal cues (demo/gesture)  -      Comment (Bed Mobility)  tx mat at pts approx bed height, cued pt to place walking aide stick in bed with him vs on floor to avoid reaching down. when getting OOB, pt forgot he placed walking aide in bed with him and attempted to reach to floor _VCs to remind pt where he put the walking aide  -      Recorded by [] Pita Roth, PT      Row Name 01/21/20 0939             Functional Mobility    Functional Mobility- Ind. Level  contact guard assist;verbal cues required  -      Functional Mobility- Device  rolling walker  -      Functional Mobility-Distance (Feet)  30  -      Functional Mobility- Safety Issues  balance decreased during turns;step length decreased  -      Functional Mobility- Comment  ambulated to bathroom and to WC in room  -      Recorded by [] Pita Roth, PT      Row Name 01/21/20 0939             Sit-Stand  Transfer    Sit-Stand [place] (Transfers)  contact guard;verbal cues;nonverbal cues (demo/gesture)  -      Assistive Device (Sit-Stand Transfers)  walker, front-wheeled  -LH      Recorded by [] Pita Roth, PT      Row Name 01/21/20 0939             Stand-Sit Transfer    Stand-Sit [place] (Transfers)  contact guard;verbal cues;nonverbal cues (demo/gesture)  -      Assistive Device (Stand-Sit Transfers)  walker, front-wheeled  -LH      Recorded by [] Pita Roth, PT      Row Name 01/21/20 0939             Toilet Transfer    Type (Toilet Transfer)  sit-stand;stand-sit  -      [place] Level (Toilet Transfer)  contact guard;verbal cues;nonverbal cues (demo/gesture)  -      Assistive Device (Toilet Transfer)  walker, front-wheeled;grab bars/safety frame  -LH      Recorded by [] Pita Roth, PT      Row Name 01/21/20 0939             Car Transfer    Type (Car Transfer)  sit-stand;stand-sit  -      [place] Level (Car Transfer)  contact guard;verbal cues;nonverbal cues (demo/gesture)  -      Assistive Device (Car Transfer)  walker, front-wheeled  -LH      Recorded by [] Pita Roth, PT      Row Name 01/21/20 0939             Gait/Stairs Assessment/Training    [place] Level (Gait)  contact guard;verbal cues;nonverbal cues (demo/gesture);minimum assist (75% patient effort) Min A to guide rwx 2' visual deficits  -      Assistive Device (Gait)  walker, front-wheeled  -      Distance in Feet (Gait)  80  -      Pattern (Gait)  step-through  -      Deviations/Abnormal Patterns (Gait)  [place] decreased;base of support, narrow  -      Bilateral Gait Deviations  heel strike decreased;forward flexed posture;weight shift ability decreased  -      Comment (Gait/Stairs)  Min A 80ft w walking aide, slower renuka vs rwx use - VCs to guide ambulation 2' visual deficits   -LH      Recorded by [] Pita Roth, PT      Row Name 01/21/20 0939             Vision  Assessment/Intervention    Visual Impairment/Limitations  legally blind  -      Recorded by [] Pita Roth, PT      Row Name 01/21/20 0939             Pain Scale: Numbers Pre/Post-Treatment    Pain Scale: Numbers, Pretreatment  0/10 - no pain  -      Pain Scale: Numbers, Post-Treatment  0/10 - no pain  -      Recorded by [] Pita Roth PT      Row Name 01/21/20 0939             Positioning and Restraints    Pre-Treatment Position  sitting in chair/recliner  -      Post Treatment Position  wheelchair  -      In Wheelchair  sitting;call light within reach;encouraged to call for assist;exit alarm on  -      Recorded by [] Pita Roth, PT        User Key  (r) = Recorded By, (t) = Taken By, (c) = Cosigned By    Initials Name Effective Dates    Jayna Barnes, MS CCC-SLP 06/08/18 -      Pita Roth, PT 04/03/18 -         Wound 01/06/20 2200 head Incision (Active)   Dressing Appearance open to air 1/21/2020  1:00 PM   Closure Approximated 1/21/2020  8:37 AM   Base dry;clean 1/21/2020  8:37 AM   Periwound dry 1/20/2020  9:05 PM   Drainage Amount none 1/21/2020  8:37 AM   Dressing Care, Wound open to air 1/21/2020  8:37 AM     Physical Therapy Education                 Title: PT OT SLP Therapies (In Progress)     Topic: Physical Therapy (In Progress)     Point: Mobility training (In Progress)     Description:   Instruct learner(s) on safety and technique for assisting patient out of bed, chair or wheelchair.  Instruct in the proper use of assistive devices, such as walker, crutches, cane or brace.              Patient Friendly Description:   It's important to get you on your feet again, but we need to do so in a way that is safe for you. Falling has serious consequences, and your personal safety is the most important thing of all.        When it's time to get out of bed, one of us or a family member will sit next to you on the bed to give you support.     If your doctor or nurse tells you to use a  walker, crutches, a cane, or a brace, be sure you use it every time you get out of bed, even if you think you don't need it.    Learning Progress Summary           Patient Acceptance, E, NR by  at 1/21/2020 0943    Acceptance, E,TB, VU,NR by NM at 1/20/2020 1501    Comment:  Pt education on use of call light and need for assistance at this to prevent falls c stated understanding. Discussed c pt and mother d/c plans and equipment needs for d/c home.    Acceptance, E, VU by WN at 1/18/2020 2246    Acceptance, E,TB,D, VU,NR by  at 1/18/2020 1525    Acceptance, E, VU by WN at 1/17/2020 2256    Acceptance, E, NR by  at 1/17/2020 0922    Acceptance, E, NR by  at 1/16/2020 0920    Acceptance, E, NR,VU by NM at 1/15/2020 1543    Acceptance, E, NR by NM at 1/14/2020 1111    Comment:  Reinforcement of setup for transfers in order to increase independence and safety    Acceptance, E, VU,NR by NM at 1/13/2020 1545    Comment:  education regarding safety during transfers    Acceptance, E, NR by  at 1/11/2020 0919    Acceptance, E, VU,NR by  at 1/10/2020 1033    Acceptance, E, NR by  at 1/9/2020 1449    Acceptance, E, NR by  at 1/8/2020 0949    Acceptance, E, NR by  at 1/7/2020 1158   Family Acceptance, E,TB, VU,NR by NM at 1/20/2020 1501    Comment:  Pt education on use of call light and need for assistance at this to prevent falls c stated understanding. Discussed c pt and mother d/c plans and equipment needs for d/c home.    Acceptance, E, VU by WN at 1/18/2020 2246    Acceptance, E, VU by WN at 1/17/2020 2256    Acceptance, E, NR by  at 1/7/2020 1158                   Point: Home exercise program (In Progress)     Description:   Instruct learner(s) on appropriate technique for monitoring, assisting and/or progressing patient with therapeutic exercises and activities.              Learning Progress Summary           Patient Acceptance, E, NR by  at 1/21/2020 0943    Acceptance, E, VU by ANDRE at 1/18/2020  2246    Acceptance, E,TB,D, VU,NR by JS at 1/18/2020 1525    Acceptance, E, VU by WN at 1/17/2020 2256    Acceptance, E, VU,NR by  at 1/10/2020 1033    Acceptance, E, NR by  at 1/7/2020 1158   Family Acceptance, E, VU by WN at 1/18/2020 2246    Acceptance, E, VU by WN at 1/17/2020 2256    Acceptance, E, NR by  at 1/7/2020 1158                   Point: Body mechanics (In Progress)     Description:   Instruct learner(s) on proper positioning and spine alignment for patient and/or caregiver during mobility tasks and/or exercises.              Learning Progress Summary           Patient Acceptance, E, NR by  at 1/21/2020 0943    Acceptance, E, VU by WN at 1/18/2020 2246    Acceptance, E,TB,D, VU,NR by  at 1/18/2020 1525    Acceptance, E, VU by WN at 1/17/2020 2256    Acceptance, E, NR by  at 1/17/2020 0922    Acceptance, E, NR by NM at 1/14/2020 1111    Comment:  Reinforcement of setup for transfers in order to increase independence and safety    Acceptance, E, VU,NR by NM at 1/13/2020 1545    Comment:  education regarding safety during transfers    Acceptance, E, NR by  at 1/11/2020 0919    Acceptance, E, NR by  at 1/7/2020 1158   Family Acceptance, E, VU by WN at 1/18/2020 2246    Acceptance, E, VU by WN at 1/17/2020 2256    Acceptance, E, NR by  at 1/7/2020 1158                   Point: Precautions (In Progress)     Description:   Instruct learner(s) on prescribed precautions during mobility and gait tasks              Learning Progress Summary           Patient Acceptance, E, NR by  at 1/21/2020 0943    Acceptance, E,TB, VU,NR by NM at 1/20/2020 1501    Comment:  Pt education on use of call light and need for assistance at this to prevent falls c stated understanding. Discussed c pt and mother d/c plans and equipment needs for d/c home.    Acceptance, E, VU by WN at 1/18/2020 2246    Acceptance, E,TB,D, VU,NR by  at 1/18/2020 1525    Acceptance, E, VU by WN at 1/17/2020 2256    Acceptance, E,  NR,VU by NM at 1/15/2020 1543    Acceptance, E, NR by NM at 1/14/2020 1111    Comment:  Reinforcement of setup for transfers in order to increase independence and safety    Acceptance, E, VU,NR by NM at 1/13/2020 1545    Comment:  education regarding safety during transfers    Acceptance, E, NR by  at 1/8/2020 0949   Family Acceptance, E,TB, VU,NR by NM at 1/20/2020 1501    Comment:  Pt education on use of call light and need for assistance at this to prevent falls c stated understanding. Discussed c pt and mother d/c plans and equipment needs for d/c home.    Acceptance, E, VU by WN at 1/18/2020 2246    Acceptance, E, VU by WN at 1/17/2020 2256                               User Key     Initials Effective Dates Name Provider Type Discipline     04/06/17 -  Ainsley Servin, PT Physical Therapist PT     04/03/18 -  Pita Roth, PT Physical Therapist PT    JK 04/03/18 -  Binta Hartman, PT Physical Therapist PT    WN 06/24/19 -  Hai Serrano, RN Registered Nurse Nurse    NM 01/03/20 -  Anmlo Joseph, MAILE Student PT Student PT                  PT Recommendation and Plan  Planned Therapy Interventions (PT Eval): balance training, bed mobility training, gait training, home exercise program, ROM (range of motion), stretching, strengthening, stair training, transfer training, wheelchair management/propulsion training  Frequency of Treatment (PT Eval): 60 minutes per session            PT IRF GOALS     Row Name 01/21/20 0800             Bed Mobility Goal 2 (PT-IRF)    Time Frame (Bed Mobility Goal 2, PT-IRF)  1 week  -      Progress/Outcomes (Bed Mobility Goal 2, PT-IRF)  goal ongoing  -         Transfer Goal 2 (PT-IRF)    [place] Level (Transfer Goal 2, PT-IRF)  contact guard assist  -      Time Frame (Transfer Goal 2, PT-IRF)  1 week  -      Progress/Outcomes (Transfer Goal 2, PT-IRF)  goal revised this date  -         Transfer Goal 3 (PT-IRF)    Time Frame (Transfer Goal 3, PT-IRF)  1 week  -       Progress/Outcomes (Transfer Goal 3, PT-IRF)  goal ongoing  -         Gait/Walking Locomotion Goal 2 (PT-IRF)    Gait/Walking Locomotion Distance Goal 2 (PT-IRF)  100  -      [place] Level (Gait/Walking Locomotion Goal 2, PT-IRF)  contact guard assist  -      Time Frame (Gait/Walking Locomotion Goal 2, PT-IRF)  1 week  -      Progress/Outcomes (Gait/Walking Locomotion Goal 2, PT-IRF)  goal revised this date  -        User Key  (r) = Recorded By, (t) = Taken By, (c) = Cosigned By    Initials Name Provider Type     Pita Roth, PT Physical Therapist               Time Calculation:     PT Charges     Row Name 01/21/20 1410 01/21/20 0942 01/21/20 0849       Time Calculation    Start Time  1300  -  0900  [facility]  --    Stop Time  1330  -  0930  [facility]  --    Time Calculation (min)  30 min  -  30 min  -  --    PT Received On  --  01/21/20  [facility]  --    PT - Next Appointment  --  01/22/20  [facility]  --    PT Goal Re-Cert Due Date  --  --  01/28/20  -      User Key  (r) = Recorded By, (t) = Taken By, (c) = Cosigned By    Initials Name Provider Type     Pita Roth, PT Physical Therapist          Therapy Charges for Today     Code Description Service Date Service Provider Modifiers Qty    [id number]  PT THER PROC EA 15 MIN 1/21/2020 Pita Roth, PT GP 4                   Pita Roth PT  1/21/2020

## 2020-01-21 NOTE — PROGRESS NOTES
Family conference held today with patient, sister, mother, and , Joanna Rodriguez. Joanna also had nurse from [facility] on speaker phone for part of conference. PT, OT, ST, NSG, and SW present. Discussed progress and d/c recommendations.  PT reported patient at supervision level with bed mobility. Recommended patient keep his walk cane in bed with him vs keeping it on floor next to him due to risk of patient falling out of bed. Patient able to do bed-chair and car transfers with CGA. PT has practiced with patient using rolling walker for ambulation and with his cane. PT feels at this time patient has better gait and balance with rolling walker so recommended patient continue to use rolling walker at home. Patient requires Min assist with walking if using cane due to needing more assist with balance.   OT reported patient transfers to commode and able to do toileting with CGA. Does require verbal cues for ADL tasks as he is slower to process through tasks. Patient able to do bathing with CGA when stands. Patient now able to get socks on and start pants. OT reported patient requiring more help with UE dressing. Patient does grooming tasks at sink seated. OT is shaving patient due to vision. OT also working on endurance and strength tasks.   ST reported patient working on swallow exercise. ST has done trial of soft solids and patient did okay with this. Plans is to have repeat VFSS tomorrow to see if can upgrade diet. ST reported processing is slow but improved since admission. Patient benefits from repetition. Patient has improved with thought organization but still as some difficulty with orientation of month and year.   NSG discussed and gave list of medications to sister. Discussed follow ups with ENT, Neurosurgery, PCP, Nephrology, ID, and . Patient has not been sleeping well at night. MD looking at medication to see if will help with sleep. NSG also reported some incontinence  of bladder, mainly at night. Skin is okay but NSG does apply powder to groin area.  Discussed equipment for home. Recommended bed rail for bed at home. Will order rolling walker.   Team recommended home PT, OT, ST, NSG after d/c. Family reported patient has used [facility] in past and patient stated he wants to use this agency again. Will make referral.  [facility] program has availability of continued PT, OT, ST at their center and will provide to patient after home health therapy has discharged patient if patient still needs and has MD order to continue. Team discussed not returning to day program until home health therapy is d/c.  also has asked for approval of home aide to assist patient and family through waiver program. Team recommended patient have 24 hour assistance at home. Family will provide.  D/C date set for Friday, 1/24. Patient with d/c home with sister and her family. Mother will plan to stay a couple of months if needed.   Will assist with plans.

## 2020-01-21 NOTE — PLAN OF CARE
Problem: Patient Care Overview  Goal: Plan of Care Review  Outcome: Ongoing (interventions implemented as appropriate)  Flowsheets  Taken 1/19/2020 1725 by Joanne Weiner RN  Progress, Functional Goals: demonstrating adequate progress  IRF Plan of Care Review: progress ongoing, continue  Taken 1/21/2020 0335 by Asia Cardona RN  Plan of Care Reviewed With: patient  Note:   Patient alert, cooperative, no complaints of pain this shift,mother at bedside at all times. Call light use appropriate. Will continue to monitor.      Problem: Skin Injury Risk (Adult)  Goal: Skin Health and Integrity  Description  Patient will demonstrate the desired outcomes by discharge/transition of care.  Outcome: Ongoing (interventions implemented as appropriate)     Problem: Fall Risk (Adult)  Goal: Absence of Fall  Description  Patient will demonstrate the desired outcomes by discharge/transition of care.  Outcome: Ongoing (interventions implemented as appropriate)

## 2020-01-21 NOTE — PROGRESS NOTES
Adult Nutrition  Assessment/PES    Patient Name:  Tye JONES Junior  YOB: 1975  MRN: [id number]  Admit Date:  1/6/2020    Assessment Date:  1/21/2020      Reason for Assessment     Row Name 01/21/20 1212          Reason for Assessment    Reason For Assessment  follow-up protocol         Nutrition/Diet History     Row Name 01/21/20 1212          Nutrition/Diet History    Typical Food/Fluid Intake  Eats in room with his mom at bedside. Intake/appetite good. She helps with set up & orientation of food.      Factors Affecting Nutritional Intake chewing difficulties/inability to chew food;other (see comments);impaired cognitive status/motor control           Labs/Tests/Procedures/Meds     Row Name 01/21/20 1213          Labs/Procedures/Meds    Lab Results Reviewed  reviewed     Lab Results Comments  1/20 wnl        Diagnostic Tests/Procedures    Diagnostic Test/Procedure Reviewed  reviewed        Medications    Pertinent Medications Reviewed  reviewed         Physical Findings     Row Name 01/21/20 1213          Physical Findings    Overall Physical Appearance  hemiplegia;other (see comments) blind     Skin  surgical incision;other (see comments) head incision, iker 19           Nutrition Prescription Ordered     Row Name 01/21/20 1214          Nutrition Prescription PO    Current PO Diet  Pureed     Fluid Consistency  Thin     Supplement  Boost Glucose Control (Glucerna Shake)     Supplement Frequency  3 times a day         Evaluation of Received Nutrient/Fluid Intake     Row Name 01/21/20 1214          PO Evaluation    Number of Days PO Intake Evaluated  3 days     % PO Intake  100% most every meal           Problem/Interventions:      Intervention Goal     Row Name 01/21/20 1214          Intervention Goal    General  Maintain nutrition     PO  Maintain intake;PO intake (%)     PO Intake %  100 %     Weight  Maintain weight         Nutrition Intervention     Row Name 01/21/20 1225          Nutrition  Intervention    RD/Tech Action  Follow Tx progress;Care plan reviewd;Encourage intake;Menu provided           Education/Evaluation     Row Name 01/21/20 1226          Education    Education  No discharge needs identified at this time        Monitor/Evaluation    Monitor  Per protocol           Electronically signed by:  Shawnee Gaitan RD  01/21/20 12:26 PM

## 2020-01-22 ENCOUNTER — APPOINTMENT (OUTPATIENT)
Dept: GENERAL RADIOLOGY | Facility: HOSPITAL | Age: 45
End: 2020-01-22

## 2020-01-22 LAB
B19V IGG SER IA-ACNC: 1.2 INDEX (ref 0–0.8)
B19V IGM SER IA-ACNC: 0.4 INDEX (ref 0–0.8)
COPPER SERPL-MCNC: 143 UG/DL (ref 72–166)

## 2020-01-22 PROCEDURE — 97530 THERAPEUTIC ACTIVITIES: CPT

## 2020-01-22 PROCEDURE — 97110 THERAPEUTIC EXERCISES: CPT

## 2020-01-22 PROCEDURE — 92611 MOTION FLUOROSCOPY/SWALLOW: CPT

## 2020-01-22 PROCEDURE — 97116 GAIT TRAINING THERAPY: CPT

## 2020-01-22 PROCEDURE — 74230 X-RAY XM SWLNG FUNCJ C+: CPT

## 2020-01-22 PROCEDURE — 97535 SELF CARE MNGMENT TRAINING: CPT

## 2020-01-22 PROCEDURE — 97129 THER IVNTJ 1ST 15 MIN: CPT

## 2020-01-22 PROCEDURE — 97130 THER IVNTJ EA ADDL 15 MIN: CPT

## 2020-01-22 RX ADMIN — FOLIC ACID 1 MG: 1 TABLET ORAL at 08:07

## 2020-01-22 RX ADMIN — LEVETIRACETAM 500 MG: 100 SOLUTION ORAL at 08:07

## 2020-01-22 RX ADMIN — LACOSAMIDE 200 MG: 100 TABLET, FILM COATED ORAL at 21:03

## 2020-01-22 RX ADMIN — METOPROLOL TARTRATE 50 MG: 50 TABLET, FILM COATED ORAL at 08:07

## 2020-01-22 RX ADMIN — PHENYTOIN SODIUM 100 MG: 100 CAPSULE, EXTENDED RELEASE ORAL at 15:03

## 2020-01-22 RX ADMIN — NYSTATIN 1 APPLICATION: 100000 POWDER TOPICAL at 21:04

## 2020-01-22 RX ADMIN — BARIUM SULFATE 55 ML: 0.81 POWDER, FOR SUSPENSION ORAL at 14:33

## 2020-01-22 RX ADMIN — TOPIRAMATE 25 MG: 25 TABLET, FILM COATED ORAL at 08:07

## 2020-01-22 RX ADMIN — PHENYTOIN SODIUM 100 MG: 100 CAPSULE, EXTENDED RELEASE ORAL at 06:25

## 2020-01-22 RX ADMIN — DEXTROAMPHETAMINE SACCHARATE, AMPHETAMINE ASPARTATE MONOHYDRATE, DEXTROAMPHETAMINE SULFATE, AND AMPHETAMINE SULFATE 30 MG: 2.5; 2.5; 2.5; 2.5 CAPSULE, EXTENDED RELEASE ORAL at 08:07

## 2020-01-22 RX ADMIN — CLONAZEPAM 1 MG: 0.5 TABLET ORAL at 21:03

## 2020-01-22 RX ADMIN — PHENYTOIN SODIUM 100 MG: 100 CAPSULE, EXTENDED RELEASE ORAL at 21:03

## 2020-01-22 RX ADMIN — NYSTATIN 1 APPLICATION: 100000 POWDER TOPICAL at 08:09

## 2020-01-22 RX ADMIN — HYDROXYZINE PAMOATE 50 MG: 50 CAPSULE ORAL at 21:03

## 2020-01-22 RX ADMIN — POTASSIUM CHLORIDE 20 MEQ: 750 CAPSULE, EXTENDED RELEASE ORAL at 08:07

## 2020-01-22 RX ADMIN — Medication 1000 MCG: at 08:07

## 2020-01-22 RX ADMIN — LACOSAMIDE 200 MG: 100 TABLET, FILM COATED ORAL at 08:07

## 2020-01-22 RX ADMIN — METOPROLOL TARTRATE 50 MG: 50 TABLET, FILM COATED ORAL at 21:03

## 2020-01-22 RX ADMIN — CHOLECALCIFEROL TAB 10 MCG (400 UNIT) 400 UNITS: 10 TAB at 08:07

## 2020-01-22 RX ADMIN — BARIUM SULFATE 4 ML: 980 POWDER, FOR SUSPENSION ORAL at 14:33

## 2020-01-22 RX ADMIN — CLONAZEPAM 1 MG: 0.5 TABLET ORAL at 06:25

## 2020-01-22 RX ADMIN — PANTOPRAZOLE SODIUM 40 MG: 40 TABLET, DELAYED RELEASE ORAL at 06:25

## 2020-01-22 RX ADMIN — LEVETIRACETAM 500 MG: 100 SOLUTION ORAL at 21:04

## 2020-01-22 RX ADMIN — ATORVASTATIN CALCIUM 10 MG: 10 TABLET, FILM COATED ORAL at 08:07

## 2020-01-22 RX ADMIN — CLONAZEPAM 1 MG: 0.5 TABLET ORAL at 15:03

## 2020-01-22 NOTE — PROGRESS NOTES
Inpatient Rehabilitation Functional Measures Assessment and Plan of Care    Plan of Care  Updated Problems/Interventions  Field    Functional Measures  JENNIFER Eating:  [facility] Grooming: [facility] Bathing:  [facility] Upper Body Dressing:  [facility] Lower Body Dressing:  [facility] Toileting:  [facility] Bladder Management  Level of Assistance:  [facility]  Frequency/Number of Accidents this Shift:  [facility] Bowel Management  Level of Assistance: [facility]  Frequency/Number of Accidents this Shift: [facility] Bed/Chair/Wheelchair Transfer:  [facility] Toilet Transfer:  [facility] Tub/Shower Transfer:  [facility]    Previously Documented Mode of Locomotion at Discharge: Field  JENNIFER Expected Mode of Locomotion at Discharge: [facility] Walk/Wheelchair:  [facility] Stairs:  [facility] Comprehension:  [facility] Expression:  [facility] Social Interaction:  [facility] Problem Solving:  [facility] Memory:  [facility]    Therapy Mode Minutes  Occupational Therapy: Individual: 60 minutes.  Physical Therapy: [facility]  Speech Language Pathology:  [facility]    Signed by: AVA Dodd/ZOË

## 2020-01-22 NOTE — PROGRESS NOTES
Inpatient Rehabilitation Functional Measures Assessment and Plan of Care    Plan of Care  Updated Problems/Interventions  Cognition    [ST] Memory(Active)  Current Status(01/22/2020): improved orientation- not consistent; slow  processing still, but slightly improved simple reasoning, problem solving  Weekly Goal(01/28/2020): orientation to place, time, bio info indep  Discharge Goal: Follow a schedule and return home with supervision        Swallow Function    [ST] Swallowing(Active)  Current Status(01/22/2020): repeat VFSS- 1/22- diet upgrade to reg/thin  Weekly Goal(01/08/2020): Follow safe swallow precautions with supervision with  min cues  Discharge Goal: Independent with safe swallow precautions and tolerating least  restrictive diet    Functional Measures  JENNIFER Eating:  [facility] Grooming: [facility] Bathing:  [facility] Upper Body Dressing:  [facility] Lower Body Dressing:  [facility] Toileting:  [facility] Bladder Management  Level of Assistance:  [place]  Frequency/Number of Accidents this Shift:  [facility] Bowel Management  Level of Assistance: [place]  Frequency/Number of Accidents this Shift: [facility] Bed/Chair/Wheelchair Transfer:  [facility] Toilet Transfer:  [facility] Tub/Shower Transfer:  [place]    Previously Documented Mode of Locomotion at Discharge: Field  JENNIFER Expected Mode of Locomotion at Discharge: [facility] Walk/Wheelchair:  [facility] Stairs:  [facility] Comprehension:  [facility] Expression:  [facility] Social Interaction:  [facility] Problem Solving:  [facility] Memory:  [place]    Therapy Mode Minutes  Occupational Therapy: Branch  Physical Therapy: [place]  Speech Language Pathology:  Individual: 120 minutes.    Signed by: Jayna Coker, SLP

## 2020-01-22 NOTE — PROGRESS NOTES
Inpatient Rehabilitation Plan of Care Note    Plan of Care  Care Plan Reviewed - Updates as Follows    Sphincter Control    [RN] Bladder Management(Active)  Current Status(01/20/2020): Incontinent episodes, patient has urgency when  needing to void.  1/22/20 Less incont. at night;uses urinal with assist approx.  q3h T.V.  Weekly Goal(01/26/2020): Continent 50%  Discharge Goal: Continent 100%    [RN] Bowel Management(Active)  Current Status(01/20/2020): Continent bm on toilet 1/21  Weekly Goal(01/26/2020): Continent 50%  Discharge Goal: Continent 100%    Performed Intervention(s)  Monitor I&O  check for incontinence, offer toileting q2-3hrs  miralax daily-hold if needed.      Safety    Performed Intervention(s)  Safety rounds/bed alarm/ chair alarm on  Falls precautio/call light within easy reach      Psychosocial    Performed Intervention(s)  Offer support/Encourage patient to verbalize any concerns      Body Systems    Performed Intervention(s)  Skin care q shift and PRN  Assist to turn patient q 2-3hrs.    Signed by: Angela Haider RN

## 2020-01-22 NOTE — PROGRESS NOTES
LOS: 16 days   Patient Care Team:  Jolene Laurent MD as PCP - General (Family Medicine)  Efren Martínez MD as PCP - Claims Attributed    Chief Complaint:   Status post infected  shunt-removed-CNS infection/pneumocephalus  Status post 12/23/ 2019 - Removal of ventriculoperitoneal shunt intracranial portion, valve, partial peritoneal down to the cervical region  Status post 12/31/2019 endoscopic repair of paranasal sinus defect  ID-ceftriaxone-antibiotics until January 14, 2020  Seizure disorder-multidrug regimen-phenytoin/Vimpat/Keppra/clonazepam/Topamax  Remote traumatic brain injury  Neuro stimulation-Adderall  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  Bilateral SCDs.  Status post acute renal ckrkgmrjwolbh-WHU-atdrfx creatinine.  Avoid NSAID/ACE inhibitor's.    Subjective     History of Present Illness  Patient seen and examined this morning. NAEO. Mom reports improved sleep last night. Patient denies pain. Reports good BM. No new concerns identified by patient or family .    History taken from: patient chart, family    Objective     Vital Signs  Temp:  [97 °F (36.1 °C)-98.2 °F (36.8 °C)] 98.1 °F (36.7 °C)  Heart Rate:  [94-98] 94  Resp:  [18-20] 18  BP: (102-135)/(59-90) 102/59    Physical Exam  Mental status: awake and alert. NAD  HEENT: craniotomy incision healed  Pulm:CTAB, no wheezing, rales, or rhonchi  Heart: RRR, well perfused    Abdomen: normoactive bowel sounds, soft, NT , non-distended   Extremities: No cyanosis or edema   Neuro: awake   good speed with responses, following commands.   Blindness-chronic  Strength: takes resistance to bilateral upper and lower extremities both distally and proximally      Results Review:    I  reviewed the patient's new clinical results.     Results from last 7 days   Lab Units 01/20/20  0607 01/19/20  0630 01/18/20  0630   WBC 10*3/mm3 7.00 6.67 7.24   HEMOGLOBIN g/dL 9.3* 9.4* 7.6*   HEMATOCRIT % 27.5* 28.0* 23.5*   PLATELETS 10*3/mm3 282 290 358     Results from last 7 days   Lab Units 01/20/20  0607 01/19/20  0630 01/16/20  0624   SODIUM mmol/L 139 141 134*   POTASSIUM mmol/L 4.2 4.4 3.3*   CHLORIDE mmol/L 101 102 97*   CO2 mmol/L 24.9 26.3 26.1   BUN mg/dL 13 14 15   CREATININE mg/dL 0.93 1.03 1.06   CALCIUM mg/dL 9.2 9.4 8.8   BILIRUBIN mg/dL 0.2 0.2  --    ALK PHOS U/L 103 110  --    ALT (SGPT) U/L 26 27  --    AST (SGOT) U/L 20 21  --    GLUCOSE mg/dL 86 85 97       Medication Review:   Scheduled Meds:    amphetamine-dextroamphetamine XR 30 mg Oral Daily   atorvastatin 10 mg Oral Daily   cholecalciferol 400 Units Oral Daily   clonazePAM 1 mg Oral Q8H   folic acid 1 mg Oral Daily   hydrOXYzine pamoate 50 mg Oral Nightly   lacosamide 200 mg Oral Q12H   levETIRAcetam 500 mg Oral Q12H   metoprolol tartrate 50 mg Oral Q12H   nystatin 1 application Topical Q12H   pantoprazole 40 mg Oral Q AM   phenytoin 100 mg Oral Q8H   polyethylene glycol 17 g Oral Daily   potassium chloride 20 mEq Oral Daily   topiramate 25 mg Oral Daily   vitamin B-12 1,000 mcg Oral Daily     Continuous Infusions:   PRN Meds:.•  acetaminophen    Assessment/Plan       H/O traumatic brain injury    Anemia    Serum gamma globulin increased  Status post infected  shunt-removed-CNS infection/pneumocephalus  -Status post 12/23/ 2019 - Removal of ventriculoperitoneal shunt intracranial portion, valve, partial peritoneal down to the cervical region  -Status post 12/31/2019 endoscopic repair of paranasal sinus defect  -ID-ceftriaxone-antibiotics until January 14, 2020    Seizure disorder-multidrug regimen-phenytoin/Vimpat/Keppra/clonazepam/Topamax  -January 9-patient was on phenytoin at home which was increased at the outside hospital, on  Topamax but less than antiepileptic dose.  He had Vimpat, Keppra, clonazepam added at the outside hospital.  Consulted neurology for input patient has fatigue felt possibly related to his increased antiepileptic medication.  Reviewed with neurology and Keppra dose being decreased.  -January 10- Keppra decreased from 2000 mg BID to 50 mg BID with improved alertness. Neurology continues to follow.   -January 15-Discussed having neurology see him in follow-up at some point to review possibly decreasing the additional seizure medications further.  -January 17 - neurologist who same him at this hospital out until next week. Pharmacy checked and Vimpat will be covered as outpatient. Discussed with patient and mother possibly taper off some of additional seizure meds - clonazepam or Vimpat as continues without seizure but will await neurology input next week unless hematology recommends discontinue one due to anemia.   - January 20 - reviewed with Neurology - as tolerating current regimen, will continue same for now and have follow up with his [facility]rologist as an outpatient for further adjustment/taper.       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  Bilateral SCDs.    Status post acute renal jnlrezjgzdntq-DWB-nylmgd creatinine.  Avoid NSAID/ACE inhibitor's.  Jan 16 - Cr improved to 1.06    Anemia-trend downward.    Placed him back on a protein pump inhibitor .Hemoccult was negative.   ? If due to recent medical issues vs medication effect.    Patient with progressive anemia.  HGB 13.2 on Dec 22. HGB 10.6 on Jan 6. HGB 8.3 on Jan 16, HGB 7.6 on  Jan 18, spontaneously improved to 9.4 on Jan 19 without transfusion  Hemoccult was negative x 2.  Platelets normal.  Retic count increased at 3.56.  Iron 99, iron saturation 137, transferrin 137.  .  Patient's mother thinks his father had sickle cell trait but does remember patient ever being testing.   He has been on Dilantin and low dose Topamax chronically. Keppra is new but dose recently decreased. Vimpat is new but on quick review do not see that associated with anemia. Has been on Heparin subcutaneous for DVT prophylaxis. More mobile now and now ~ 4 weeks out so will discontinue Heparin at this point and continue with SCDs.  PPI Protonix was added Tues Jan 14 after anemia started.    Haptoglobin -157 - WNL.  Jan 20 - HGB 9.3- hematology evaluating.   Per hematology- [Continue to observe blood counts for now.  Hemoglobin seems to be stable and no indication for transfusion at this point.  We suspect that he does have a component of anemia of chronic disease.  Certainly his iron studies and ferritin from earlier in the admission are consistent with this.]         Sleep  -January 13- Family reports restlessness at night. Vistaril added PRN last night without response. Since vistaril has only been tried one night, will continue with Vistaril PRN at bedtime for now and continue to monitor.    -January 21- Vistaril increased to 50mg over the weekend. Will change vistaril to scheduled   -January 22- Improved sleep last night after scheduling Vistaril 50mg. Will continue.       Skin-dry rash to bilateral inner thighs-try Mycostatin powder     Now admit for comprehensive acute inpatient rehabilitation .  This would be an interdisciplinary program with physical therapy 1 hour,  occupational therapy 1 hour, and speech therapy 1 hour, 5 days a week.  Rehabilitation nursing for carryover, monitoring of incision and neurologic   status, bowel and bladder, and skin  Ongoing physician follow-up.  Weekly team  conferences.  Goals are indeterminate.   Rehabilitation prognosis indeterminate.  Medical prognosis indeterminate.  Estimated length of stay is indeterminate  The patient's functional status and clinical status is unchanged from preadmission assessment and the patient continues appropriate for acute inpatient rehabilitation.  Goal is for home with outpatient   therapies.  Barrier to discharge: Cognition/mobility- work on trunk control, strength, balance to overcome.     TEAM CONF - JAN 9 - BED MAX 2.  TRANSFERS MAX 2.  GAIT 8 FEET PARALLEL BARS MOD 2. TOILET TRANSFERS MAX 2.  BLIND.  SLOW PROCESSING.  GROOMING MOD. UBD MAX. LBD DEP. DYSPHAGIA - PUREE/THINS.  FATIGUES WITH COGNITIVE  THERAPY.  ELOS :  4 WEEKS    TEAM CONF - JAN 16- BED MOD ASSIST. GAIT 120 FEET MIN ASSIST WITH WALKER.  TRANSFERS MIN ASSIST. WILL START TRAINING WITH HIS WALKER AID FROM FROM. TOILET TRANSFERS MIN ASSIST. BATH MIN ASSIST. UBD SBA-MIN. LBD MOD-MAX. ENDURANCE IMPROVED. SWALLOW - PUREE/THIN LIQUIDS, SLOW RATE, ALTERNATE LIQUID AND SOLIDS. WILL TRY TEST TRAY BUT DOES NOT CHEW WELL OR CONTROL BOLUS. AFTERNOON FATIGUES. MOD-SEVERE COGNITIVE DEFICITS. SLOW PROCESSING. DIFFICULTY WITH THOUGHT ORGANIZATION. STILL NOT ORIENTED TO DAY/PLACE. BNE PENDING.NO SAFETY ISSUES. BLADDER CONTINENT/INCONTINENT. WILL DO TIMED VOIDS.  WILL D/C MIDLINE.   ELOS -  END OF NEXT WEEK. FAMILY CONF PENDING.         Janusz Solitario MD  01/22/20  12:38 PM      Patient seen with Dr Mercedes, exam confirmed, plan reviewed.

## 2020-01-22 NOTE — PROGRESS NOTES
Inpatient Rehabilitation Functional Measures Assessment and Plan of Care    Plan of Care  Updated Problems/Interventions  Mobility    [OT] Toilet Transfers(Active)  Current Status(01/22/2020): CGA  Weekly Goal(01/29/2020): CGA  Discharge Goal: CGA/SBA    [OT] Tub/Shower Transfers(Active)  Current Status(01/22/2020): Min  Weekly Goal(01/28/2020): CGA  Discharge Goal: CGA        Self Care    [OT] Bathing(Active)  Current Status(01/22/2020): CGA  Weekly Goal(01/29/2020): SBA/CGA  Discharge Goal: SBA/CGA    [OT] Dressing (Lower)(Active)  Current Status(01/22/2020): min  Weekly Goal(01/30/2020): CGA  Discharge Goal: CGA    [OT] Dressing (Upper)(Active)  Current Status(01/22/2020): MIn  Weekly Goal(01/29/2020): SBA  Discharge Goal: SBA    [OT] Eating(Active)  Current Status(01/22/2020): SBA  Weekly Goal(01/22/2020): SBA  Discharge Goal: SBA    [OT] Grooming(Active)  Current Status(01/22/2020): MIn/SBA  Weekly Goal(01/30/2020): SBA  Discharge Goal: SBA    [OT] Toileting(Active)  Current Status(01/22/2020): MIn  Weekly Goal(01/29/2020): CGA  Discharge Goal: CGA    Functional Measures  JENNIFER Eating:  Branch  JENNIFER Grooming: [place]  JENNIFER Bathing:  [place]  JENNIFER Upper Body Dressing:  [place]  JENNIFER Lower Body Dressing:  [place]  JENNIFER Toileting:  [place]    JENNIFER Bladder Management  Level of Assistance:  [place]  Frequency/Number of Accidents this Shift:  [place]    JENNIFER Bowel Management  Level of Assistance: [place]  Frequency/Number of Accidents this Shift: [facility]    JENNIFER Bed/Chair/Wheelchair Transfer:  [place]  JENNIFER Toilet Transfer:  [facility]  JENNIFER Tub/Shower Transfer:  [facility]    Previously Documented Mode of Locomotion at Discharge: Field  JENNIFER Expected Mode of Locomotion at Discharge: [facility]  JENNIFER Walk/Wheelchair:  [place]anch  JENNIFER Stairs:  [facility]anch    JENNIFER Comprehension:  [place]  JENNIFER Expression:  Branch  JENNIFER Social Interaction:  [place]  JENNIFER Problem Solving:  [place]  JENNIFER Memory:  [place]    Therapy Mode Minutes  Occupational Therapy: [facility]  Physical  Therapy: [facility]  Speech Language Pathology:  [place]    Signed by: Neris Morales OTR/L

## 2020-01-22 NOTE — THERAPY TREATMENT NOTE
Inpatient Rehabilitation - Speech Language Pathology Treatment Note    [facility]       Patient Name: Tye JONES Junior  : 1975  MRN: [id number]    Today's Date: 2020           Admit Date: 2020      Visit Dx:        ICD-10-CM ICD-9-CM   1. Impaired mobility Z74.09 799.89   2. Seizure (CMS/HCC) R56.9 780.39       Patient Active Problem List   Diagnosis   • Mixed hyperlipidemia   • Essential hypertension   • Traumatic brain injury (CMS/HCC)   • Acute allergic rhinitis   • Seizure (CMS/HCC)   • Screen for colon cancer   • H/O traumatic brain injury   • Anemia   • Serum gamma globulin increased          Therapy Treatment    Evaluation/Coping    Evaluation/Treatment Time and Intent  Subjective Information: no complaints (20 1100 : Jayna Coker MS CCC-SLP)  Existing Precautions/Restrictions: fall(swallow) (20 1100 : Jayna Coker MS CCC-SLP)  Document Type: therapy note (daily note) (20 1100 : Jayna Coker MS CCC-SLP)  Mode of Treatment: individual therapy, speech-language pathology (20 1100 : Jayna Coker, MS CCC-SLP)  Patient/Family Observations: cooperative (20 1100 : Jayna Coker, MS CCC-SLP)    Vitals/Pain/Safety         Cognition/Communication         Oral Motor/Eating         Mobility/Basic Activities/Instrumental Activities/Motor/Modality                   ROM/MMT                   Sensory/Myotome/Dermatome/Edema               Posture/Balance/Special Tests/Exercise/Transportation/Sexual Function                   Orthotics/Residual Limb/Prosthetic Management              Outcome Summary         EDUCATION    The patient has been educated in the following areas:     Cognitive Impairment Dysphagia (Swallowing Impairment).    SLP Recommendation and Plan                                                          SLP GOALS     Row Name 20 1100 20 0800 20 1100       Oral Nutrition/Hydration Goal 1 (SLP)    Barriers (Oral Nutrition/Hydration Goal 1, SLP)  --  --   repeat VFSS deferred today due to radiology /acute schedule- will complete tomorrow   -SL    Progress/Outcomes (Oral Nutrition/Hydration Goal 1, SLP)  --  --  goal ongoing;continuing progress toward goal  -SL       Labial Strengthening Goal 1 (SLP)    Activity (Labial Strengthening Goal 1, SLP)  --  --  increase labial tone  -SL    [place]/Accuracy (Labial Strengthening Goal 1, SLP)  --  --  with minimal cues (75-90% accuracy)  -SL    Barriers (Labial Strengthening Goal 1, SLP)  --  --  improved ROM and strength apparent  -SL    Progress/Outcomes (Labial Strengthening Goal 1, SLP)  --  --  goal ongoing  -SL       Lingual Strengthening Goal 1 (SLP)    Increase Tongue Back Strength  --  --  lingual movement exercises;lingual resistance exercises  -SL    [place]/Accuracy (Lingual Strengthening Goal 1, SLP)  --  --  with moderate cues (50-74% accuracy)  -SL    Progress/Outcomes (Lingual Strengthening Goal 1, SLP)  --  --  goal ongoing  -SL       Pharyngeal Strengthening Exercise Goal 1 (SLP)    Increase Superior Movement of the Hyolaryngeal Complex  --  --  effortful pitch glide (falsetto + pharyngeal squeeze);hard effortful swallow;falsetto;breath hold exercises;carmelita  -SL    Increase Anterior Movement of the Hyolaryngeal Complex  --  --  carmelita;effortful pitch glide (falsetto + pharyngeal squeeze);hard effortful swallow;falsetto  -SL    Increase Squeeze/Positive Pressure Generation  --  --  hard effortful swallow;falsetto;effortful pitch glide (falsetto + pharyngeal squeeze)  -SL    Increase Tongue Base Retraction  --  --  carmelita posterior phoneme productions  -SL    [place]/Accuracy (Pharyngeal Strengthening Goal 1, SLP)  --  --  with moderate cues (50-74% accuracy)  -SL    Progress/Outcomes (Pharyngeal Strengthening Goal 1, SLP)  --  --  goal ongoing  -SL       Orientation Goal 1 (SLP)    Progress (Orientation Goal 1, SLP)  --  80%;independently (over 90% accuracy)  -SL  --    Progress/Outcomes  (Orientation Goal 1, SLP)  --  goal ongoing  -  --       Memory Skills Goal 1 (SLP)    Progress (Memory Skills Goal 1, SLP)  60%;70%;with minimal cues (75-90%) recall of 2/3 errands after 10 min  -SL  50%;with minimal cues (75-90%);with moderate cues (50-74%) recall- short stories presented auditorilly  -  --    Progress/Outcomes (Memory Skills Goal 1, SLP)  goal ongoing  -SL  goal ongoing  -SL  --       Organizational Skills Goal 1 (SLP)    Barriers (Thought Organization Skills Goal 1, SLP)  slow to formulate ideas, but able to name 5 items in simple concrete categories indep  -SL  --  --    Progress (Thought Organization Skills Goal 1, SLP)  100%;independently (over 90% accuracy) divergent naming- concrete categories- 5 items  -  --  --    Progress/Outcomes (Thought Organization Skills Goal 1, SLP)  goal ongoing  -  --  --       Reasoning Goal 1 (SLP)    Barriers (Reasoning Goal 1, SLP)  --  slow to respond; multipe reps of stimulus required, intermittent cues and prompts  -  --    Progress (Reasoning Goal 1, SLP)  --  60%;independently (over 90% accuracy);80%;with minimal cues (75-90%) simple word deduction  -  --    Progress/Outcomes (Reasoning Goal 1, SLP)  --  goal ongoing  -  --       Functional Problem Solving Skills Goal 1 (SLP)    Progress (Problem Solving Goal 1, SLP)  60%;with moderate cues (50-74%) stating effects  -SL  --  --    Progress/Outcomes (Problem Solving Goal 1, SLP)  goal ongoing  -  --  --    Comment (Problem Solving Goal 1, SLP)  tangential and perseverative responses- difficulty formulating ideas with delays and struggle- word and phrase repetitions and tangential ispeech  -  --  --    Row Name 01/21/20 0800 01/20/20 1100          Oral Nutrition/Hydration Goal 1 (SLP)    Barriers (Oral Nutrition/Hydration Goal 1, SLP)  --  trial of soft win peaches- improved mastication and bolus control/manipulation noted- mild oral residue; laryngeal elevation still appears slightly  reduced; no overt clinical s/s noted on juicy soft solids or drained items- will rec: repeat VFSS to assess and determine possibility of diet upgrade  -SL     Progress/Outcomes (Oral Nutrition/Hydration Goal 1, SLP)  --  continuing progress toward goal;goal ongoing  -SL        Lingual Strengthening Goal 1 (SLP)    Increase Tongue Back Strength  --  lingual movement exercises  -SL     [place]/Accuracy (Lingual Strengthening Goal 1, SLP)  --  with moderate cues (50-74% accuracy);with minimal cues (75-90% accuracy)  -SL     Barriers (Lingual Strengthening Goal 1, SLP)  --  movements are still slow, but improved in ROM  -SL     Progress/Outcomes (Lingual Strengthening Goal 1, SLP)  --  goal ongoing  -SL        Pharyngeal Strengthening Exercise Goal 1 (SLP)    Increase Superior Movement of the Hyolaryngeal Complex  --  carmelita;breath hold exercises;effortful pitch glide (falsetto + pharyngeal squeeze);hard effortful swallow;falsetto  -SL     Increase Anterior Movement of the Hyolaryngeal Complex  --  carmelita;breath hold exercises;effortful pitch glide (falsetto + pharyngeal squeeze);hard effortful swallow;falsetto  -SL     Increase Squeeze/Positive Pressure Generation  --  hard effortful swallow;effortful pitch glide (falsetto + pharyngeal squeeze)  -SL     Increase Tongue Base Retraction  --  carmelita  -SL     [place]/Accuracy (Pharyngeal Strengthening Goal 1, SLP)  --  with moderate cues (50-74% accuracy)  -SL     Barriers (Pharyngeal Strengthening Goal 1, SLP)  --  difficulty completing carmelita  -SL     Progress/Outcomes (Pharyngeal Strengthening Goal 1, SLP)  --  goal ongoing  -SL        Comprehend Questions Goal 1 (SLP)    Progress (Ability to Comprehend Questions Goal 1, SLP)  --  80%;independently (over 90% accuracy) mod complex comparative questions  -SL     Progress/Outcomes (Comprehend Questions Goal 1, SLP)  --  goal ongoing  -SL        Word Retrieval Skills Goal 1 (SLP)    Progress (Word Retrieval Skills  Goal 1, SLP)  80%;independently (over 90% accuracy) synonyms  -SL  --     Progress/Outcomes (Word Retrieval Goal 1, SLP)  goal ongoing  -SL  --        Connected Speech to Express Thoughts Goal 1 (SLP)    Improve Narrative Discourse to Express Thoughts By Goal 1 (SLP)  giving basic definition of a word;80%;independently (over 90% accuracy) formulating sent with key words  -SL  --     Time Frame (Connected Speech Goal 1, SLP)  by discharge  -SL  --     Progress (Connected Speech Goal 1, SLP)  60%;70%;independently (over 90% accuracy)  -SL  --     Progress/Outcomes (Connected Speech Goal 1, SLP)  goal ongoing  -SL  --        Orientation Goal 1 (SLP)    Progress (Orientation Goal 1, SLP)  70%;independently (over 90% accuracy);with minimal cues (75-90%)  -SL  70%;with minimal cues (75-90%)  -SL     Progress/Outcomes (Orientation Goal 1, SLP)  goal ongoing  -SL  goal ongoing  -SL        Memory Skills Goal 1 (SLP)    Progress (Memory Skills Goal 1, SLP)  --  50%;with minimal cues (75-90%);with moderate cues (50-74%) mental manipulation- 4 word stimulus- ordering  -SL     Progress/Outcomes (Memory Skills Goal 1, SLP)  --  goal ongoing  -SL     Comment (Memory Skills Goal 1, SLP)  --  very slow processing of info for mental manip task - multiple reps of stimulus and intermittent mod cues and prompts required  -SL        Organizational Skills Goal 1 (SLP)    Progress (Thought Organization Skills Goal 1, SLP)  70%;with minimal cues (75-90%);independently (over 90% accuracy) convergent- feature analysis  -SL  90%;independently (over 90% accuracy);with minimal cues (75-90%) abstract convergent categorization  -SL     Progress/Outcomes (Thought Organization Skills Goal 1, SLP)  goal ongoing  -SL  goal ongoing  -SL     Comment (Thought Organization Skills Goal 1, SLP)  --  25% for adding 1 item to loist of abstract category members  -SL       User Key  (r) = Recorded By, (t) = Taken By, (c) = Cosigned By    Initials Name Provider  Type    SL Jayna Coker MS CCC-SLP Speech and Language Pathologist                  Time Calculation:       Time Calculation- SLP     Row Name 01/22/20 1130 01/22/20 0900          Time Calculation- SLP    SLP Start Time  1100  -  0830  -     SLP Stop Time  1130  -  0900  -     SLP Time Calculation (min)  30 min  -  30 min  -       User Key  (r) = Recorded By, (t) = Taken By, (c) = Cosigned By    Initials Name Provider Type    Jayna Barnes MS CCC-SLP Speech and Language Pathologist            Therapy Charges for Today     Code Description Service Date Service Provider Modifiers Qty    71940412972 HC ST DEV OF COGN SKILLS INITIAL 15 MIN 1/21/2020 Jayna Coker MS CCC-SLP  1    16357293366 HC ST DEV OF COGN SKILLS EACH ADDT'L 15 MIN 1/21/2020 Jayna Coker MS CCC-SLP  1    36280252028 HC ST TREATMENT SWALLOW 2 1/21/2020 Jayna Coker MS CCC-SLP GN 1    39455710098 HC ST DEV OF COGN SKILLS INITIAL 15 MIN 1/22/2020 Jayna Coker MS CCC-SLP  1    09272805147 HC ST DEV OF COGN SKILLS EACH ADDT'L 15 MIN 1/22/2020 Jayna Coker MS CCC-SLP  1    27910552841 HC ST DEV OF COGN SKILLS EACH ADDT'L 15 MIN 1/22/2020 Jayna Coker MS CCC-SLP  2                           Jayna Coker MS CCC-SLP  1/22/2020

## 2020-01-22 NOTE — PROGRESS NOTES
Inpatient Rehabilitation Plan of Care Note    Plan of Care  Care Plan Reviewed - Updates as Follows    Body Systems    [RN] Integumentary(Active)  Current Status(01/22/2020): Small rash dry skin breakdown to groin/inner thigh.  Buttocks intact with a small spot of peeling area. Pink areas noted around  penis.No open area noted. Head incision healing.  Weekly Goal(01/26/2020): No further skin breakdown  Discharge Goal: Intact Skin.    Performed Intervention(s)  Skin care q shift and PRN  Assist to turn patient q 2-3hrs.      Psychosocial    [RN] Coping/Adjustment(Active)  Current Status(01/22/2020): Patient with difficult verbalizing and slow process.  Mother and sister present and very supportive.  Weekly Goal(01/26/2020): Patient will demonstrate appropriate coping mechanisms  Discharge Goal: Patient will demonstrate health coping strategies    Performed Intervention(s)  Offer support/Encourage patient to verbalize any concerns      Safety    [RN] Potential for Injury(Active)  Current Status(01/22/2020): Patient with generalized weakness. Very weak all  over, at high risk for fall. Assist of 2 with transfers. Pt rolled out of bed  during night of 1/19/20.  Weekly Goal(01/26/2020): No injuries  Discharge Goal: Pt/family aware of fall/safety in the home setting.    Performed Intervention(s)  Safety rounds/bed alarm/ chair alarm on  Falls precautio/call light within easy reach      Sphincter Control    [RN] Bladder Management(Active)  Current Status(01/22/2020): Incontinent episodes, patient has urgency when  needing to void.  1/22/20 Less incont. at night;uses urinal with assist approx.  q3h T.V.  Weekly Goal(01/26/2020): Continent 50%  Discharge Goal: Continent 100%    [RN] Bowel Management(Active)  Current Status(01/22/2020): Continent bm on toilet 1/21  Weekly Goal(01/26/2020): Continent 50%  Discharge Goal: Continent 100%    Performed Intervention(s)  Monitor I&O  check for incontinence, offer toileting  q2-3hrs  miralax daily-hold if needed.    Signed by: Polo Rothman RN
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
## 2020-01-22 NOTE — PLAN OF CARE
Problem: Patient Care Overview  Goal: Plan of Care Review  Outcome: Ongoing (interventions implemented as appropriate)  Flowsheets (Taken 1/22/2020 6174)  Outcome Summary: pt alert and confused. video swallow done today. continent during the day. c/o no pain. mother remains at bedside. will continue to monitor.  Progress, Functional Goals: demonstrating adequate progress  Plan of Care Reviewed With: patient  IRF Plan of Care Review: progress ongoing, continue

## 2020-01-22 NOTE — PROGRESS NOTES
LOS: 16 days   Patient Care Team:  Jolene Laurent MD as PCP - General (Family Medicine)  Efren Martínez MD as PCP - Claims Attributed    Chief Complaint:   Status post infected  shunt-removed-CNS infection/pneumocephalus  Status post 12/23/ 2019 - Removal of ventriculoperitoneal shunt intracranial portion, valve, partial peritoneal down to the cervical region  Status post 12/31/2019 endoscopic repair of paranasal sinus defect  ID-ceftriaxone-antibiotics until January 14, 2020  Seizure disorder-multidrug regimen-phenytoin/Vimpat/Keppra/clonazepam/Topamax  Remote traumatic brain injury  Neuro stimulation-Adderall  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  Bilateral SCDs.  Status post acute renal attrtdbhzftbk-CKA-dmlaau creatinine.  Avoid NSAID/ACE inhibitor's.    Subjective     History of Present Illness    Patient seen and examined this morning. NAEO. Mom reports improved sleep last night. Patient denies pain. Reports good BM. No new concerns identified by patient or family .    History taken from: patient chart, family    Objective     Vital Signs  Temp:  [97 °F (36.1 °C)-98.2 °F (36.8 °C)] 98.1 °F (36.7 °C)  Heart Rate:  [94-98] 94  Resp:  [18-20] 18  BP: (102-135)/(59-90) 102/59    Physical Exam  Mental status: awake and alert. NAD  HEENT: craniotomy incision healed  Pulm:CTAB, no wheezing, rales, or rhonchi  Heart: RRR, well perfused    Abdomen: normoactive bowel sounds, soft, NT , non-distended   Extremities: No cyanosis or edema   Neuro: awake   good speed with responses, following commands.   Blindness-chronic  Strength: takes resistance to bilateral upper and lower extremities both distally and proximally      Results Review:    I  reviewed the patient's new clinical results.     Results from last 7 days   Lab Units 01/20/20  0607 01/19/20  0630 01/18/20  0630   WBC 10*3/mm3 7.00 6.67 7.24   HEMOGLOBIN g/dL 9.3* 9.4* 7.6*   HEMATOCRIT % 27.5* 28.0* 23.5*   PLATELETS 10*3/mm3 282 290 358     Results from last 7 days   Lab Units 01/20/20  0607 01/19/20  0630 01/16/20  0624   SODIUM mmol/L 139 141 134*   POTASSIUM mmol/L 4.2 4.4 3.3*   CHLORIDE mmol/L 101 102 97*   CO2 mmol/L 24.9 26.3 26.1   BUN mg/dL 13 14 15   CREATININE mg/dL 0.93 1.03 1.06   CALCIUM mg/dL 9.2 9.4 8.8   BILIRUBIN mg/dL 0.2 0.2  --    ALK PHOS U/L 103 110  --    ALT (SGPT) U/L 26 27  --    AST (SGOT) U/L 20 21  --    GLUCOSE mg/dL 86 85 97       Medication Review:   Scheduled Meds:    amphetamine-dextroamphetamine XR 30 mg Oral Daily   atorvastatin 10 mg Oral Daily   cholecalciferol 400 Units Oral Daily   clonazePAM 1 mg Oral Q8H   folic acid 1 mg Oral Daily   hydrOXYzine pamoate 50 mg Oral Nightly   lacosamide 200 mg Oral Q12H   levETIRAcetam 500 mg Oral Q12H   metoprolol tartrate 50 mg Oral Q12H   nystatin 1 application Topical Q12H   pantoprazole 40 mg Oral Q AM   phenytoin 100 mg Oral Q8H   polyethylene glycol 17 g Oral Daily   potassium chloride 20 mEq Oral Daily   topiramate 25 mg Oral Daily   vitamin B-12 1,000 mcg Oral Daily     Continuous Infusions:   PRN Meds:.•  acetaminophen    Assessment/Plan       H/O traumatic brain injury    Anemia    Serum gamma globulin increased  Status post infected  shunt-removed-CNS infection/pneumocephalus  -Status post 12/23/ 2019 - Removal of ventriculoperitoneal shunt intracranial portion, valve, partial peritoneal down to the cervical region  -Status post 12/31/2019 endoscopic repair of paranasal sinus defect  -ID-ceftriaxone-antibiotics until January 14, 2020    Seizure disorder-multidrug regimen-phenytoin/Vimpat/Keppra/clonazepam/Topamax  -January 9-patient was on phenytoin at home which was increased at the outside hospital, on  Topamax but less than antiepileptic dose.  He had Vimpat, Keppra, clonazepam added at the outside hospital.  Consulted neurology for input patient has fatigue felt possibly related to his increased antiepileptic medication.  Reviewed with neurology and Keppra dose being decreased.  -January 10- Keppra decreased from 2000 mg BID to 50 mg BID with improved alertness. Neurology continues to follow.   -January 15-Discussed having neurology see him in follow-up at some point to review possibly decreasing the additional seizure medications further.  -January 17 - neurologist who same him at this hospital out until next week. Pharmacy checked and Vimpat will be covered as outpatient. Discussed with patient and mother possibly taper off some of additional seizure meds - clonazepam or Vimpat as continues without seizure but will await neurology input next week unless hematology recommends discontinue one due to anemia.   - January 20 - reviewed with Neurology - as tolerating current regimen, will continue same for now and have follow up with his [facility] Neurologist as an outpatient for further adjustment/taper.       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  Bilateral SCDs.    Status post acute renal eqjnjztutpvnv-BYW-ultnfd creatinine.  Avoid NSAID/ACE inhibitor's.  Jan 16 - Cr improved to 1.06    Anemia-trend downward.    Placed him back on a protein pump inhibitor .Hemoccult was negative.   ? If due to recent medical issues vs medication effect.    Patient with progressive anemia.  HGB 13.2 on Dec 22. HGB 10.6 on Jan 6. HGB 8.3 on Jan 16, HGB 7.6 on  Jan 18, spontaneously improved to 9.4 on Jan 19 without transfusion  Hemoccult was negative x 2.  Platelets normal.  Retic count increased at 3.56.  Iron 99, iron saturation 137, transferrin 137.  .  Patient's mother thinks his father had sickle cell trait but does remember patient ever being testing.   He has been on Dilantin and low dose Topamax chronically. Keppra is new but dose recently decreased. Vimpat is new but on quick review do not see that associated with anemia. Has been on Heparin subcutaneous for DVT prophylaxis. More mobile now and now ~ 4 weeks out so will discontinue Heparin at this point and continue with SCDs.  PPI Protonix was added Tues Jan 14 after anemia started.    Haptoglobin -157 - WNL.  Jan 20 - HGB 9.3- hematology evaluating.   Per hematology- [Continue to observe blood counts for now.  Hemoglobin seems to be stable and no indication for transfusion at this point.  We suspect that he does have a component of anemia of chronic disease.  Certainly his iron studies and ferritin from earlier in the admission are consistent with this.]         Sleep  -January 13- Family reports restlessness at night. Vistaril added PRN last night without response. Since vistaril has only been tried one night, will continue with Vistaril PRN at bedtime for now and continue to monitor.    -January 21- Vistaril increased to 50mg over the weekend. Will change vistaril to scheduled   -January 22- Improved sleep last night after scheduling Vistaril 50mg. Will continue.       Skin-dry rash to bilateral inner thighs-try Mycostatin powder     Now admit for comprehensive acute inpatient rehabilitation .  This would be an interdisciplinary program with physical therapy 1 hour,  occupational therapy 1 hour, and speech therapy 1 hour, 5 days a week.  Rehabilitation nursing for carryover, monitoring of incision and neurologic   status, bowel and bladder, and skin  Ongoing physician follow-up.  Weekly team  conferences.  Goals are indeterminate.   Rehabilitation prognosis indeterminate.  Medical prognosis indeterminate.  Estimated length of stay is indeterminate  The patient's functional status and clinical status is unchanged from preadmission assessment and the patient continues appropriate for acute inpatient rehabilitation.  Goal is for home with outpatient   therapies.  Barrier to discharge: Cognition/mobility- work on trunk control, strength, balance to overcome.     TEAM CONF - JAN 9 - BED MAX 2.  TRANSFERS MAX 2.  GAIT 8 FEET PARALLEL BARS MOD 2. TOILET TRANSFERS MAX 2.  BLIND.  SLOW PROCESSING.  GROOMING MOD. UBD MAX. LBD DEP. DYSPHAGIA - PUREE/THINS.  FATIGUES WITH COGNITIVE  THERAPY.  ELOS :  4 WEEKS    TEAM CONF - JAN 16- BED MOD ASSIST. GAIT 120 FEET MIN ASSIST WITH WALKER.  TRANSFERS MIN ASSIST. WILL START TRAINING WITH HIS WALKER AID FROM FROM. TOILET TRANSFERS MIN ASSIST. BATH MIN ASSIST. UBD SBA-MIN. LBD MOD-MAX. ENDURANCE IMPROVED. SWALLOW - PUREE/THIN LIQUIDS, SLOW RATE, ALTERNATE LIQUID AND SOLIDS. WILL TRY TEST TRAY BUT DOES NOT CHEW WELL OR CONTROL BOLUS. AFTERNOON FATIGUES. MOD-SEVERE COGNITIVE DEFICITS. SLOW PROCESSING. DIFFICULTY WITH THOUGHT ORGANIZATION. STILL NOT ORIENTED TO DAY/PLACE. BNE PENDING.NO SAFETY ISSUES. BLADDER CONTINENT/INCONTINENT. WILL DO TIMED VOIDS.  WILL D/C MIDLINE.   ELOS -  END OF NEXT WEEK. FAMILY CONF PENDING.         Tova Mercedes,   01/22/20  11:34 AM      Patient seen with Dr Mercedes, exam confirmed, plan reviewed.

## 2020-01-22 NOTE — DISCHARGE PLACEMENT REQUEST
"Tye Cha ROBERT (44 y.o. Male)     Date of Birth Social Security Number Address Home Phone MRN    [id number]  [id number] [facility] [address] [phone] [id number]    [employer] Marital Status          Unknown Single       Admission Date Admission Type Admitting Provider Attending Provider Department, Room/Bed    1/6/20 Elective Janusz Solitario MD Gormley, John Michael, MD [facility], 4409/1    Discharge Date Discharge Disposition Discharge Destination                       Attending Provider:  Janusz Solitario MD    Allergies:  No Known Allergies    Isolation:  None   Infection:  None   Code Status:  CPR    Ht:  182.9 cm (72\")   Wt:  83.2 kg (183 lb 6.8 oz)    Admission Cmt:  None   Principal Problem:  None                Active Insurance as of 1/6/2020     Primary Coverage     Payor Plan Insurance Group Employer/Plan Group    MEDICARE MEDICARE A & B      Payor Plan Address Payor Plan Phone Number Payor Plan Fax Number Effective Dates    [address] [phone]  12/1/1996 - None Entered    [facility] [id number]       Subscriber Name Subscriber Birth Date Member ID       TYE CHA ROBERT 1975 [id number]           Secondary Coverage     Payor Plan Insurance Group Employer/Plan Group    [place]      Payor Plan Address Payor Plan Phone Number Payor Plan Fax Number Effective Dates    [address] [phone]  3/20/2017 - None Entered    [facility] [id number]       Subscriber Name Subscriber Birth Date Member ID       TYE CHA ROBERT 1975 [id number]                 Emergency Contacts      (Rel.) Home Phone Work Phone Mobile Phone    Jada Ugalde (Sister) [phone] -- [phone]    SCOT UGALDE (Relative) -- -- [phone]              "

## 2020-01-22 NOTE — THERAPY TREATMENT NOTE
Inpatient Rehabilitation - Physical Therapy Treatment Note  [facility]     Patient Name: Tye JONES Junior  : 1975  MRN: [id number]    Today's Date: 2020                 Admit Date: 2020      Visit Dx:      ICD-10-CM ICD-9-CM   1. Impaired mobility Z74.09 799.89   2. Seizure (CMS/HCC) R56.9 780.39       Patient Active Problem List   Diagnosis   • Mixed hyperlipidemia   • Essential hypertension   • Traumatic brain injury (CMS/HCC)   • Acute allergic rhinitis   • Seizure (CMS/HCC)   • Screen for colon cancer   • H/O traumatic brain injury   • Anemia   • Serum gamma globulin increased       Therapy Treatment    IRF Treatment Summary     Row Name 20 1423 20 1100 20 0900       Evaluation/Treatment Time and Intent    Subjective Information  no complaints  -CC  no complaints  -SL  no complaints  -JACI FLORES,JMALA    Existing Precautions/Restrictions  fall swallow  -CC  fall swallow  -SL  fall  -MARK,NM,JK2    Document Type  therapy note (daily note)  -CC  therapy note (daily note)  -SL  therapy note (daily note)  -JACI FLORES,JK2    Mode of Treatment  occupational therapy  -CC  individual therapy;speech-language pathology  -SL  physical therapy  -MARK,NM,JK2    Patient/Family Observations  --  cooperative  -SL  un in wc in the gym following SLP  -JACI FLORES,JMALA    Recorded by [CC] Neris Morales, OTR [SL] Jayna Coker MS CCC-SLP [JCASI,NM,JK2] Binta Hartman, PT (r) Anmol Joseph PT Student (t) Binta Hartman, PT (c)    Row Name 20 0842             Evaluation/Treatment Time and Intent    Subjective Information  no complaints  -SL      Existing Precautions/Restrictions  fall  -SL      Document Type  therapy note (daily note)  -SL      Mode of Treatment  individual therapy;speech-language pathology  -SL      Patient/Family Observations  cooperative; slow processing  -SL      Recorded by [SL] Jayna Coker MS CCC-SLP      Row Name 20 1423 20 0900          Cognition/Psychosocial- PT/OT     Affect/Mental Status (Cognitive)  --  confused  -MARK,YASIR BEATTY     Orientation Status (Cognition)  --  oriented to;person;situation  -MARK,YASIR BEATTY     Follows Commands (Cognition)  follows one step commands;75-90% accuracy;repetition of directions required;physical/tactile prompts required;initiation impaired;increased processing time needed  -CC  follows one step commands;75-90% accuracy;verbal cues/prompting required;physical/tactile prompts required;increased processing time needed  -JACI FLORES JK2     Personal Safety Interventions  fall prevention program maintained;gait belt;nonskid shoes/slippers when out of bed  -CC  fall prevention program maintained;gait belt;supervised activity;nonskid shoes/slippers when out of bed  -JACI FLORES JK2     Cognitive Function (Cognitive)  --  safety deficit  -JACI FLORES JK2     Safety Deficit (Cognitive)  --  safety precautions awareness  -JACI FLORES JK2     Recorded by [CC] Neris Morales OTR [MARK,NM,YASIR] Binta Hartman PT (r) Anmol Joseph, PT Student (t) Binta Hartman, PT (c)     Row Name 01/22/20 1423             Bed Mobility Assessment/Treatment    Comment (Bed Mobility)  in w/c  -CC      Recorded by [CC] Neris Morales OTR      Row Name 01/22/20 1423 01/22/20 0900          Sit-Stand Transfer    Sit-Stand Crowley (Transfers)  contact guard;verbal cues;nonverbal cues (demo/gesture)  -CC  contact guard;verbal cues;nonverbal cues (demo/gesture)  -JACI FLORES JK2     Assistive Device (Sit-Stand Transfers)  walker, front-wheeled;wheelchair  -CC  wheelchair  -MARK,NM,JMALA     Recorded by [CC] Neris Morales OTR [MARK,NM,JK2] Binta Hartman PT (r) Anmol Joseph, PT Student (t) Binta Hartman, PT (c)     Row Name 01/22/20 1423 01/22/20 0900          Stand-Sit Transfer    Stand-Sit Crowley (Transfers)  contact guard;verbal cues;nonverbal cues (demo/gesture)  -CC  contact guard;verbal cues;nonverbal cues (demo/gesture)  -JK,NM,JK2     Assistive Device (Stand-Sit Transfers)  walker,  front-wheeled;wheelchair  -CC  wheelchair  -JACI FLORES,YASIR     Recorded by [CC] Neris Morales OTR [MARK,NM,JK2] Binta Hartman PT (r) Anmol Joseph, MAILE Student (t) Binta Hartman, PT (c)     Row Name 01/22/20 1423             Shower Transfer    Type (Shower Transfer)  stand pivot/stand step;sit-stand;stand-sit  -CC      Engelhard Level (Shower Transfer)  contact guard  -CC      Assistive Device (Shower Transfer)  grab bars/tub rail;shower chair  -CC      Recorded by [CC] Neris Morales OTR      Row Name 01/22/20 0900             Car Transfer    Type (Car Transfer)  stand-sit;sit-stand  -JACI FLORES JK2      Engelhard Level (Car Transfer)  contact guard;verbal cues;nonverbal cues (demo/gesture)  -JACI FLORES JK2      Assistive Device (Car Transfer)  other (see comments) walking aide/stick  -JACI FLORES JK2      Recorded by [JACI FLORES,ALEMK2] Binta Hartman, PT (r) Anmol Joseph, MAILE Student (t) Binta Hartman PT (c)      Row Name 01/22/20 0900             Gait/Stairs Assessment/Training    Engelhard Level (Gait)  minimum assist (75% patient effort);nonverbal cues (demo/gesture);verbal cues CGA and vc in PM  -JACI FLORES JK2      Assistive Device (Gait)  -- walking aide/stick   -JACI FLORES JK2      Distance in Feet (Gait)  80 AM; 10x2 PM from wc to car  -JACI FLORES JK2      Pattern (Gait)  step-through  -JACI FLORES JK2      Deviations/Abnormal Patterns (Gait)  base of support, narrow;renuka decreased;stride length decreased  -JACI FLORES JK2      Bilateral Gait Deviations  heel strike decreased;weight shift ability decreased  -JACI FLORES JK2      Engelhard Level (Stairs)  contact guard;2 person assist CGA in front and back of pt for safety, foot/hand placement   -JACI FLORES JK2      Handrail Location (Stairs)  both sides  -JACI FLORES JK2      Number of Steps (Stairs)  3  -JK,NM,JK2      Ascending Technique (Stairs)  step-to-step  -JK,NM,JK2      Descending Technique (Stairs)  step-to-step  -JK,NM,JK2      Stairs, Safety Issues  sequencing ability  decreased;balance decreased during turns  -JK,NM,JK2      Stairs, Impairments  impaired vision;impaired balance  -JK,NM,JK2      Comment (Gait/Stairs)  increased lateral sway this date c use of walking aide/stick requiring intermittent Manuel during turns  -JK,NM,JK2      Recorded by [JK,NM,JK2] Binta Hartman, PT (r) Anmol Joseph PT Student (t) Binta Hartman, PT (c)      Row Name 01/22/20 1423             Bathing Assessment/Treatment    Bathing [place] Level  bathing skills;lower body;upper body;verbal cues;contact guard assist  -CC      Assistive Device (Bathing)  grab bar/tub rail;hand held shower spray hose;shower chair  -CC      Bathing Position  supported sitting;supported standing  -CC      Recorded by [CC] Neris Morales OTR      Row Name 01/22/20 1423             Upper Body Dressing Assessment/Treatment    Upper Body Dressing Task  upper body dressing skills;doff;don;pull over garment  -CC      Upper Body Dressing Position  supported sitting  -CC      Set-up Assistance (Upper Body Dressing)  obtain clothing  -CC      Recorded by [CC] Neris Morales OTR      Row Name 01/22/20 1423             Lower Body Dressing Assessment/Treatment    Lower Body Dressing [place] Level  doff;don;pants/bottoms;shoes/slippers;socks;shoelaces;verbal cues;nonverbal cues (demo/gesture);minimum assist (75% patient effort)  -CC      Lower Body Dressing Position  supported sitting;supported standing  -CC      Lower Body Dressing Setup Assistance  obtain clothing  -CC      Recorded by [CC] Neris Morales OTR      Row Name 01/22/20 1423             Grooming Assessment/Treatment    Grooming [place] Level  grooming skills;deodorant application;oral care regimen;wash face, hands;set up;supervision;verbal cues  -CC      Grooming Position  sink side;supported sitting  -CC      Grooming Setup Assistance  obtain supplies  -CC      Recorded by [CC] Neris Morales OTR      Row Name 01/22/20 1423 01/22/20 0900           Pain Scale: Numbers Pre/Post-Treatment    Pain Scale: Numbers, Pretreatment  0/10 - no pain  -CC  0/10 - no pain  -JK,NM,JK2     Pain Scale: Numbers, Post-Treatment  0/10 - no pain  -CC  0/10 - no pain  -JK,NM,JK2     Recorded by [CC] Neris Morales OTR [JK,NM,JK2] Binta Hartman, PT (r) Anmol Joseph, PT Student (t) Binta Hartman, PT (c)     Row Name 01/22/20 0900             Standing Balance Activity    Support Needed for Balance (Standing, Balance Training)  uses both upper extremities for support;CGA  -JK,NM,JK2      Restrictions (Standing, Balance Training)  vison   -JK,NM,JK2      Comment (Standing, Balance Training)  performed alternating toe touches on a step c BUE support, CGA; progessed to step-ups, x10 each LE c BUE support in order to prep for stair training   -JK,NM,JK2      Recorded by [ALEMK,NM,JK2] Binta Hartman, PT (r) Anmol Joseph, PT Student (t) Binta Hartman, PT (c)      Row Name 01/22/20 0900             Dynamic Balance Activity    Therapeutic Training Performed (Dynamic Balance)  backward walking;side stepping  -JK      Support Needed for Balance (Dynamic Balance Training)  uses both upper extremities for support;CGA;minimal external support for balance, 75% patient effort // bars   -JK2,NM,JK3      Comment (Dynamic Balance Training)  posteior leaning on foam square required cues and occasional Manuel to correct; instructed pt to increase RAJI width for improved balance    -JK2,NM,JK3      Recorded by [JK] Binta Hartman, PT  [JK2,NM,JK3] Binta Hartman, PT (r) Anmol Joseph, PT Student (t) Binta Hartman, PT (c)      Row Name 01/22/20 1423             Upper Extremity Seated Therapeutic Exercise    Performed, Seated Upper Extremity (Therapeutic Exercise)  shoulder flexion/extension;scapular protraction/retraction;elbow flexion/extension  -CC      Device, Seated Upper Extremity (Therapeutic Exercise)  free weights, barbell;free weights, cuff  -CC      Exercise Type,  Seated Upper Extremity (Therapeutic Exercise)  resistive exercise  -CC      Expected Outcomes, Seated Upper Extremity (Therapeutic Exercise)  improve functional tolerance, self-care activity  -CC      Sets/Reps Detail, Seated Upper Extremity (Therapeutic Exercise)  20 reps x 2 2.5# dowel; 2.5 hand wt 15x3  -CC      Recorded by [CC] Neris Morales OTR      Row Name 01/22/20 1423 01/22/20 0900          Positioning and Restraints    Pre-Treatment Position  sitting in chair/recliner  -CC  sitting in chair/recliner in wc following SLP  -JACI FLORES JK2     Post Treatment Position  wheelchair  -CC  wheelchair  -JACI FLORES JK2     In Wheelchair  sitting;call light within reach;exit alarm on  -CC  call light within reach;encouraged to call for assist;exit alarm on;with family/caregiver  -JACI FLORES JK2     Recorded by [CC] Neris Morales OTR [JACI FLORES JK2] Binta Hartman, PT (r) Anmol Joseph, MAILE Student (t) Binta Hartman PT (c)       User Key  (r) = Recorded By, (t) = Taken By, (c) = Cosigned By    Initials Name Effective Dates    CC Neris Morales OTR 06/08/18 -     Jayna Barnes, MS CCC-SLP 06/08/18 -     Binta Villa PT 04/03/18 -     Anmol Cordero, PT Student 01/03/20 -         Wound 01/06/20 2200 head Incision (Active)   Dressing Appearance open to air 1/22/2020  8:15 AM   Closure Approximated 1/22/2020  8:15 AM   Base clean;dry 1/22/2020  8:15 AM   Periwound dry;intact 1/22/2020  8:15 AM   Drainage Amount none 1/22/2020  8:15 AM     Physical Therapy Education                 Title: PT OT SLP Therapies (In Progress)     Topic: Physical Therapy (In Progress)     Point: Mobility training (Done)     Description:   Instruct learner(s) on safety and technique for assisting patient out of bed, chair or wheelchair.  Instruct in the proper use of assistive devices, such as walker, crutches, cane or brace.              Patient Friendly Description:   It's important to get you on your feet again, but we need to do so  in a way that is safe for you. Falling has serious consequences, and your personal safety is the most important thing of all.        When it's time to get out of bed, one of us or a family member will sit next to you on the bed to give you support.     If your doctor or nurse tells you to use a walker, crutches, a cane, or a brace, be sure you use it every time you get out of bed, even if you think you don't need it.    Learning Progress Summary           Patient Acceptance, E,TB, VU,NR by NM at 1/22/2020 1015    Acceptance, E, NR by  at 1/21/2020 0943    Acceptance, E,TB, VU,NR by NM at 1/20/2020 1501    Comment:  Pt education on use of call light and need for assistance at this to prevent falls c stated understanding. Discussed c pt and mother d/c plans and equipment needs for d/c home.    Acceptance, E, VU by WN at 1/18/2020 2246    Acceptance, E,TB,D, VU,NR by  at 1/18/2020 1525    Acceptance, E, VU by  at 1/17/2020 2256    Acceptance, E, NR by  at 1/17/2020 0922    Acceptance, E, NR by  at 1/16/2020 0920    Acceptance, E, NR,VU by NM at 1/15/2020 1543    Acceptance, E, NR by NM at 1/14/2020 1111    Comment:  Reinforcement of setup for transfers in order to increase independence and safety    Acceptance, E, VU,NR by NM at 1/13/2020 1545    Comment:  education regarding safety during transfers    Acceptance, E, NR by  at 1/11/2020 0919    Acceptance, E, VU,NR by  at 1/10/2020 1033    Acceptance, E, NR by  at 1/9/2020 1449    Acceptance, E, NR by  at 1/8/2020 0949    Acceptance, E, NR by  at 1/7/2020 1158   Family Acceptance, E,TB, VU,NR by NM at 1/20/2020 1501    Comment:  Pt education on use of call light and need for assistance at this to prevent falls c stated understanding. Discussed c pt and mother d/c plans and equipment needs for d/c home.    Acceptance, E, VU by WN at 1/18/2020 2246    Acceptance, E, VU by WN at 1/17/2020 2256    Acceptance, E, NR by  at 1/7/2020 1158                    Point: Home exercise program (In Progress)     Description:   Instruct learner(s) on appropriate technique for monitoring, assisting and/or progressing patient with therapeutic exercises and activities.              Learning Progress Summary           Patient Acceptance, E, NR by  at 1/21/2020 0943    Acceptance, E, VU by WN at 1/18/2020 2246    Acceptance, E,TB,D, VU,NR by  at 1/18/2020 1525    Acceptance, E, VU by WN at 1/17/2020 2256    Acceptance, E, VU,NR by  at 1/10/2020 1033    Acceptance, E, NR by  at 1/7/2020 1158   Family Acceptance, E, VU by WN at 1/18/2020 2246    Acceptance, E, VU by WN at 1/17/2020 2256    Acceptance, E, NR by  at 1/7/2020 1158                   Point: Body mechanics (Done)     Description:   Instruct learner(s) on proper positioning and spine alignment for patient and/or caregiver during mobility tasks and/or exercises.              Learning Progress Summary           Patient Acceptance, E,TB, VU,NR by NM at 1/22/2020 1015    Acceptance, E, NR by  at 1/21/2020 0943    Acceptance, E, VU by WN at 1/18/2020 2246    Acceptance, E,TB,D, VU,NR by  at 1/18/2020 1525    Acceptance, E, VU by  at 1/17/2020 2256    Acceptance, E, NR by  at 1/17/2020 0922    Acceptance, E, NR by NM at 1/14/2020 1111    Comment:  Reinforcement of setup for transfers in order to increase independence and safety    Acceptance, E, VU,NR by NM at 1/13/2020 1545    Comment:  education regarding safety during transfers    Acceptance, E, NR by  at 1/11/2020 0919    Acceptance, E, NR by  at 1/7/2020 1158   Family Acceptance, E, VU by WN at 1/18/2020 2246    Acceptance, E, VU by WN at 1/17/2020 2256    Acceptance, E, NR by  at 1/7/2020 1158                   Point: Precautions (In Progress)     Description:   Instruct learner(s) on prescribed precautions during mobility and gait tasks              Learning Progress Summary           Patient Acceptance, E, NR by  at 1/21/2020 0943    Acceptance,  E,TB, VU,NR by NM at 1/20/2020 1501    Comment:  Pt education on use of call light and need for assistance at this to prevent falls c stated understanding. Discussed c pt and mother d/c plans and equipment needs for d/c home.    Acceptance, E, VU by WN at 1/18/2020 2246    Acceptance, E,TB,D, VU,NR by JS at 1/18/2020 1525    Acceptance, E, VU by WN at 1/17/2020 2256    Acceptance, E, NR,VU by NM at 1/15/2020 1543    Acceptance, E, NR by NM at 1/14/2020 1111    Comment:  Reinforcement of setup for transfers in order to increase independence and safety    Acceptance, E, VU,NR by NM at 1/13/2020 1545    Comment:  education regarding safety during transfers    Acceptance, E, NR by  at 1/8/2020 0949   Family Acceptance, E,TB, VU,NR by NM at 1/20/2020 1501    Comment:  Pt education on use of call light and need for assistance at this to prevent falls c stated understanding. Discussed c pt and mother d/c plans and equipment needs for d/c home.    Acceptance, E, VU by WN at 1/18/2020 2246    Acceptance, E, VU by WN at 1/17/2020 2256                               User Key     Initials Effective Dates Name Provider Type Discipline    JS 04/06/17 -  Ainsley Servin, PT Physical Therapist PT     04/03/18 -  Pita Roth, PT Physical Therapist PT    JK 04/03/18 -  Binta Hartman, PT Physical Therapist PT    WN 06/24/19 -  Hai Serrano, RN Registered Nurse Nurse    NM 01/03/20 -  Anmol Joseph, MAILE Student PT Student PT                  PT Recommendation and Plan                        Time Calculation:     PT Charges     Row Name 01/22/20 1423 01/22/20 1014          Time Calculation    Start Time  1300  -JK (r) NM (t) JK (c)  0900  -JK (r) NM (t) JK (c)     Stop Time  1330  -JK (r) NM (t) JK (c)  0930  -JK (r) NM (t) JK (c)     Time Calculation (min)  30 min  -JK (r) NM (t)  30 min  -JK (r) NM (t)     PT Received On  --  01/22/20  -MARK parmar) JACI maldonado) MARK (dipak)     PT - Next Appointment  --  01/23/20  -MARK parmar) JACI maldonado) MARK (dipak)        User Key  (r) = Recorded By, (t) = Taken By, (c) = Cosigned By    Initials Name Provider Type    Binta Villa, PT Physical Therapist    NM Anmol Joseph, PT Student PT Student          Therapy Charges for Today     Code Description Service Date Service Provider Modifiers Qty    28426263804 HC GAIT TRAINING EA 15 MIN 1/22/2020 Anmol Joseph, PT Student GP 1    [id number]  PT THER PROC EA 15 MIN 1/22/2020 Anmol Joseph, PT Student GP 1    [id number]  PT THERAPEUTIC ACT EA 15 MIN 1/22/2020 Anmol Joseph, PT Student GP 2                   Anmol Joseph, PT Student  1/22/2020

## 2020-01-22 NOTE — PLAN OF CARE
Problem: Patient Care Overview  Goal: Plan of Care Review  Outcome: Ongoing (interventions implemented as appropriate)  Flowsheets (Taken 1/22/2020 1527)  Outcome Summary: Meds whole with applesauce. Incont. bladder and continent with assist urinal during night. No pain. Reoriented pt prn and reminded him not to try to get up without assist. Mother stayed in room with pt.  Progress, Functional Goals: demonstrating adequate progress  Plan of Care Reviewed With: patient  IRF Plan of Care Review: progress ongoing, continue

## 2020-01-22 NOTE — PROGRESS NOTES
Inpatient Rehabilitation Plan of Care Note    Plan of Care  Updated Problems/Interventions  Medical Problem(s)    BNE (Active)  Att'n. - Min Imp.  Exec. Fx. - Mod-Severely Imp.  Rsng/Jgmnt - Mildly Imp. for abstract reasoning, Mod. Imp. for common sense  jgmnt  Arith - Severely Imp.  Verbal Mem. - Mildly Imp. for immediate recall, Severely Imp. for delayed recall  Emot - Pt denied dep/anx    Signed by: Jesus Ritter Psy.d

## 2020-01-22 NOTE — PROGRESS NOTES
Occupational Therapy: Branch    Physical Therapy: Individual: 60 minutes.    Speech Language Pathology:  Branch    Signed by: Anmol Joseph PT Student     - CoSigned By: Binta Hartman PT 1/22/2020 3:37:39 PM

## 2020-01-22 NOTE — PROGRESS NOTES
Inpatient Rehabilitation Plan of Care Note    Plan of Care  Updated Problems/Interventions  Mobility    [PT] Bed/Chair/Wheelchair(Active)  Current Status(01/22/2020): min x 1, walking stick  Weekly Goal(01/28/2020): CGA, walking stick  Discharge Goal: CGA rwx/walking stick    [PT] Bed Mobility(Active)  Current Status(01/22/2020): SBA vcs for safety (walking aide placement)  Weekly Goal(01/28/2020): SBA  Discharge Goal: SBA    [PT] Walk(Active)  Current Status(01/22/2020): 80', Manuel, walking stick, cues for direction . 160ft  CGA rwx  Weekly Goal(01/27/2020): 80', CGA, walking stick  Discharge Goal: 160ft CGA rwx/walking stick    [PT] Car Transfers(Active)  Current Status(01/22/2020): Manuel c rwx for direction  Weekly Goal(01/28/2020): CGa c rwx  Discharge Goal: CGA, rwx    [PT] Stairs(Active)  Current Status(01/22/2020): 3, B HR, CGA  Weekly Goal(01/28/2020): 6, B HR, CGA  Discharge Goal: 6, B HR, CGA    Signed by: Anmol Joseph, PT Student     - CoSigned By: Binta Hartman PT 1/22/2020 3:49:31 PM

## 2020-01-22 NOTE — PROGRESS NOTES
Case Management  Inpatient Rehabilitation Plan of Care and Discharge Plan Note    Rehabilitation Diagnosis:  Branch  Date of Onset:  Joshua    Medical Summary:  Joshua  Past Medical History: Joshua    Plan of Care  Updated Problems/Interventions  Field    Expected Intensity:  Branch  Interdisciplinary Team:  Joshua  Estimated Length of Stay/Anticipated Discharge Date: Branch  Anticipated Discharge Destination:  Anticipated discharge destination from inpatient rehabilitation is community  discharge with assistance. Patient lives with sister and sister's family in one  story home. 2 steps into home.  Family conference held with patient, mother, sister, and  on 1/21.  D/C plan is home with sister and familiy. Will have aide through Waiver Program.  [facility] to provide home PT, OT, ST, NSG.                          D/C plan is home with sister and family.      Based on the patient's medical and functional status, their prognosis and  expected level of functional improvement is:  Joshua    Signed by: PARAS Cormier

## 2020-01-22 NOTE — THERAPY TREATMENT NOTE
Inpatient Rehabilitation - Occupational Therapy Progress Note    [facility]     Patient Name: Tye JONES Junior  : 1975  MRN: [id number]    Today's Date: 2020                 Admit Date: 2020      Visit Dx:    ICD-10-CM ICD-9-CM   1. Impaired mobility Z74.09 799.89   2. Seizure (CMS/HCC) R56.9 780.39       Patient Active Problem List   Diagnosis   • Mixed hyperlipidemia   • Essential hypertension   • Traumatic brain injury (CMS/HCC)   • Acute allergic rhinitis   • Seizure (CMS/HCC)   • Screen for colon cancer   • H/O traumatic brain injury   • Anemia   • Serum gamma globulin increased         Therapy Treatment    IRF Treatment Summary     Row Name 20 1423 20 1100 20 0900       Evaluation/Treatment Time and Intent    Subjective Information  no complaints  -CC  no complaints  -SL  no complaints  (Pended)   -NM    Existing Precautions/Restrictions  fall swallow  -CC  fall swallow  -SL  fall  (Pended)   -NM    Document Type  therapy note (daily note)  -CC  therapy note (daily note)  -SL  therapy note (daily note)  (Pended)   -NM    Mode of Treatment  occupational therapy  -CC  individual therapy;speech-language pathology  -SL  physical therapy  (Pended)   -NM    Patient/Family Observations  --  cooperative  -SL  un in wc in the gym following SLP  (Pended)   -NM    Recorded by [CC] Neris Morales, OTR [SL] Jayna Coker MS CCC-SLP [NM] Anmol Joseph, PT Student    Row Name 20 0842             Evaluation/Treatment Time and Intent    Subjective Information  no complaints  -SL      Existing Precautions/Restrictions  fall  -SL      Document Type  therapy note (daily note)  -SL      Mode of Treatment  individual therapy;speech-language pathology  -SL      Patient/Family Observations  cooperative; slow processing  -SL      Recorded by [SL] Jayna Coker MS CCC-SLP      Row Name 20 1423 20 0900          Cognition/Psychosocial- PT/OT    Affect/Mental Status (Cognitive)  --   confused  (Pended)   -NM     Orientation Status (Cognition)  --  oriented to;person;situation  (Pended)   -NM     Follows Commands (Cognition)  follows one step commands;75-90% accuracy;repetition of directions required;physical/tactile prompts required;initiation impaired;increased processing time needed  -CC  follows one step commands;75-90% accuracy;verbal cues/prompting required;physical/tactile prompts required;increased processing time needed  (Pended)   -NM     Personal Safety Interventions  fall prevention program maintained;gait belt;nonskid shoes/slippers when out of bed  -CC  fall prevention program maintained;gait belt;supervised activity;nonskid shoes/slippers when out of bed  (Pended)   -NM     Cognitive Function (Cognitive)  --  safety deficit  (Pended)   -NM     Safety Deficit (Cognitive)  --  safety precautions awareness  (Pended)   -NM     Recorded by [CC] Neris Morales OTR [NM] Anmol Joseph, PT Student     Row Name 01/22/20 1423             Bed Mobility Assessment/Treatment    Comment (Bed Mobility)  in w/c  -CC      Recorded by [CC] Neris Morales OTR      Row Name 01/22/20 1423 01/22/20 0900          Sit-Stand Transfer    Sit-Stand [place] (Transfers)  contact guard;verbal cues;nonverbal cues (demo/gesture)  -CC  contact guard;verbal cues;nonverbal cues (demo/gesture)  (Pended)   -NM     Assistive Device (Sit-Stand Transfers)  walker, front-wheeled;wheelchair  -CC  wheelchair  (Pended)   -NM     Recorded by [CC] Neris Morales OTR [NM] Anmol Joseph, PT Student     Row Name 01/22/20 1423 01/22/20 0900          Stand-Sit Transfer    Stand-Sit [place] (Transfers)  contact guard;verbal cues;nonverbal cues (demo/gesture)  -CC  contact guard;verbal cues;nonverbal cues (demo/gesture)  (Pended)   -NM     Assistive Device (Stand-Sit Transfers)  walker, front-wheeled;wheelchair  -CC  wheelchair  (Pended)   -NM     Recorded by [CC] Neris Morales OTR [NM] Anmol Joseph, PT  Student     Row Name 01/22/20 1423             Shower Transfer    Type (Shower Transfer)  stand pivot/stand step;sit-stand;stand-sit  -CC      [place] Level (Shower Transfer)  contact guard  -CC      Assistive Device (Shower Transfer)  grab bars/tub rail;shower chair  -CC      Recorded by [CC] Neris Morales OTR      Row Name 01/22/20 0900             Car Transfer    Type (Car Transfer)  stand-sit;sit-stand  (Pended)   -NM      [place] Level (Car Transfer)  contact guard;verbal cues;nonverbal cues (demo/gesture)  (Pended)   -NM      Assistive Device (Car Transfer)  other (see comments)  (Pended)  walking aide/stick  -NM      Recorded by [NM] Anmol Joseph, PT Student      Row Name 01/22/20 0900             Gait/Stairs Assessment/Training    [place] Level (Gait)  minimum assist (75% patient effort);nonverbal cues (demo/gesture);verbal cues  (Pended)  CGA and vc in PM  -NM      Assistive Device (Gait)  --  (Pended)  walking aide/stick   -NM      Distance in Feet (Gait)  80 AM; 10x2 PM from  to car  (Pended)   -NM      Pattern (Gait)  step-through  (Pended)   -NM      Deviations/Abnormal Patterns (Gait)  base of support, narrow;[place] decreased;stride length decreased  (Pended)   -NM      Bilateral Gait Deviations  heel strike decreased;weight shift ability decreased  (Pended)   -NM      [place] Level (Stairs)  contact guard;2 person assist  (Pended)  CGA in front and back of pt for safety, foot/hand placement   -NM      Handrail Location (Stairs)  both sides  (Pended)   -NM      Number of Steps (Stairs)  3  (Pended)   -NM      Ascending Technique (Stairs)  step-to-step  (Pended)   -NM      Descending Technique (Stairs)  step-to-step  (Pended)   -NM      Stairs, Safety Issues  sequencing ability decreased;balance decreased during turns  (Pended)   -NM      Stairs, Impairments  impaired vision;impaired balance  (Pended)   -NM      Comment (Gait/Stairs)  increased lateral sway this date c use  of walking aide/stick requiring intermittent Manuel during turns  (Pended)   -NM      Recorded by [NM] Anmol Joseph, PT Student      Row Name 01/22/20 1423             Bathing Assessment/Treatment    Bathing [place] Level  bathing skills;lower body;upper body;verbal cues;contact guard assist  -CC      Assistive Device (Bathing)  grab bar/tub rail;hand held shower spray hose;shower chair  -CC      Bathing Position  supported sitting;supported standing  -CC      Recorded by [CC] Neris Morales OTR      Row Name 01/22/20 1423             Upper Body Dressing Assessment/Treatment    Upper Body Dressing Task  upper body dressing skills;doff;don;pull over garment  -CC      Upper Body Dressing Position  supported sitting  -CC      Set-up Assistance (Upper Body Dressing)  obtain clothing  -CC      Recorded by [CC] Neris Morales OTR      Row Name 01/22/20 1423             Lower Body Dressing Assessment/Treatment    Lower Body Dressing [place] Level  doff;don;pants/bottoms;shoes/slippers;socks;shoelaces;verbal cues;nonverbal cues (demo/gesture);minimum assist (75% patient effort)  -CC      Lower Body Dressing Position  supported sitting;supported standing  -CC      Lower Body Dressing Setup Assistance  obtain clothing  -CC      Recorded by [CC] Neris Morales OTR      Row Name 01/22/20 1423             Grooming Assessment/Treatment    Grooming [place] Level  grooming skills;deodorant application;oral care regimen;wash face, hands;set up;supervision;verbal cues  -CC      Grooming Position  sink side;supported sitting  -CC      Grooming Setup Assistance  obtain supplies  -CC      Recorded by [CC] Neris Morales OTR      Row Name 01/22/20 1423 01/22/20 0900          Pain Scale: Numbers Pre/Post-Treatment    Pain Scale: Numbers, Pretreatment  0/10 - no pain  -CC  0/10 - no pain  (Pended)   -NM     Pain Scale: Numbers, Post-Treatment  0/10 - no pain  -CC  0/10 - no pain  (Pended)   -NM     Recorded by  [CC] Neris Morales, AVA [NM] Anmol Joseph, PT Student     Row Name 01/22/20 0900             Standing Balance Activity    Support Needed for Balance (Standing, Balance Training)  uses both upper extremities for support;CGA  (Pended)   -NM      Restrictions (Standing, Balance Training)  vison   (Pended)   -NM      Comment (Standing, Balance Training)  performed alternating toe touches on a step c BUE support, CGA; progessed to step-ups, x10 each LE c BUE support in order to prep for stair training   (Pended)   -NM      Recorded by [NM] Anmol Joseph, PT Student      Row Name 01/22/20 0900             Dynamic Balance Activity    Therapeutic Training Performed (Dynamic Balance)  backward walking;side stepping  (Pended)  standing on foam square   -NM      Support Needed for Balance (Dynamic Balance Training)  uses both upper extremities for support;CGA;minimal external support for balance, 75% patient effort  (Pended)  // bars   -NM      Comment (Dynamic Balance Training)  posteior leaning on foam square required cues and occasional Manuel to correct; instructed pt to increase RAJI width for improved balance    (Pended)   -NM      Recorded by [NM] Anmol Joseph, PT Student      Row Name 01/22/20 1423             Upper Extremity Seated Therapeutic Exercise    Performed, Seated Upper Extremity (Therapeutic Exercise)  shoulder flexion/extension;scapular protraction/retraction;elbow flexion/extension  -CC      Device, Seated Upper Extremity (Therapeutic Exercise)  free weights, barbell;free weights, cuff  -CC      Exercise Type, Seated Upper Extremity (Therapeutic Exercise)  resistive exercise  -CC      Expected Outcomes, Seated Upper Extremity (Therapeutic Exercise)  improve functional tolerance, self-care activity  -CC      Sets/Reps Detail, Seated Upper Extremity (Therapeutic Exercise)  20 reps x 2 2.5# dowel; 2.5 hand wt 15x3  -CC      Recorded by [CC] Neris Morales OTR      Row Name 01/22/20 1423 01/22/20  0900          Positioning and Restraints    Pre-Treatment Position  sitting in chair/recliner  -CC  sitting in chair/recliner  (Pended)  in wc following SLP  -NM     Post Treatment Position  wheelchair  -CC  wheelchair  (Pended)   -NM     In Wheelchair  sitting;call light within reach;exit alarm on  -CC  call light within reach;encouraged to call for assist;exit alarm on;with family/caregiver  (Pended)   -NM     Recorded by [CC] Neris Morales, OTR [NM] Anmol Joseph, PT Student       User Key  (r) = Recorded By, (t) = Taken By, (c) = Cosigned By    Initials Name Effective Dates    CC Neris Morales, OTR 06/08/18 -     SL Jayna Coker MS CCC-SLP 06/08/18 -     Anmol Cordero, PT Student 01/03/20 -           Wound 01/06/20 2200 head Incision (Active)   Dressing Appearance open to air 1/22/2020  8:15 AM   Closure Approximated 1/22/2020  8:15 AM   Base clean;dry 1/22/2020  8:15 AM   Periwound dry;intact 1/22/2020  8:15 AM   Drainage Amount none 1/22/2020  8:15 AM         OT Recommendation and Plan                 OT IRF GOALS     Row Name 01/22/20 1400 01/15/20 1541          Transfer Goal 1 (OT-IRF)    Activity/Assistive Device (Transfer Goal 1, OT-IRF)  --  toilet;commode, bedside without drop arms  -CC     [place] Level (Transfer Goal 1, OT-IRF)  --  maximum assist (25-49% patient effort)  -CC     Time Frame (Transfer Goal 1, OT-IRF)  --  short term goal (STG);1 week  -CC     Progress/Outcomes (Transfer Goal 1, OT-IRF)  --  goal met  -CC        Transfer Goal 2 (OT-IRF)    Activity/Assistive Device (Transfer Goal 2, OT-IRF)  toilet  -CC  toilet  -CC     [place] Level (Transfer Goal 2, OT-IRF)  contact guard assist  -CC  minimum assist (75% or more patient effort);contact guard assist  -CC     Time Frame (Transfer Goal 2, OT-IRF)  long term goal (LTG);by discharge  -CC  long term goal (LTG);by discharge  -CC     Progress/Outcomes (Transfer Goal 2, OT-IRF)  goal met;goal revised this date  -CC  goal  revised this date  -CC        Transfer Goal 3 (OT-IRF)    Activity/Assistive Device (Transfer Goal 3, OT-IRF)  --  shower chair  -CC     [place] Level (Transfer Goal 3, OT-IRF)  --  maximum assist (25-49% patient effort)  -CC     Time Frame (Transfer Goal 3, OT-IRF)  --  short term goal (STG);1 week  -CC     Progress/Outcomes (Transfer Goal 3, OT-IRF)  --  goal met  -CC        Transfer Goal 4 (OT-IRF)    Activity/Assistive Device (Transfer Goal 4, OT-IRF)  shower chair  -CC  shower chair  -CC     [place] Level (Transfer Goal 4, OT-IRF)  minimum assist (75% or more patient effort);contact guard assist  -CC  minimum assist (75% or more patient effort);moderate assist (50-74% patient effort)  -CC     Time Frame (Transfer Goal 4, OT-IRF)  long term goal (LTG);by discharge  -CC  long term goal (LTG);by discharge  -CC     Progress/Outcomes (Transfer Goal 4, OT-IRF)  goal met;goal revised this date  -CC  goal ongoing  -CC        Bathing Goal 1 (OT-IRF)    Activity/Device (Bathing Goal 1, OT-IRF)  --  bathing skills, all  -CC     [place] Level (Bathing Goal 1, OT-IRF)  --  moderate assist (50-74% patient effort)  -CC     Time Frame (Bathing Goal 1, OT-IRF)  --  short term goal (STG);1 week  -CC     Progress/Outcomes (Bathing Goal 1, OT-IRF)  --  goal met  -CC        Bathing Goal 2 (OT-IRF)    Activity/Device (Bathing Goal 2, OT-IRF)  bathing skills, all  -CC  bathing skills, all  -CC     [place] Level (Bathing Goal 2, OT-IRF)  minimum assist (75% or more patient effort)  -CC  minimum assist (75% or more patient effort)  -CC     Time Frame (Bathing Goal 2, OT-IRF)  long term goal (LTG);by discharge  -CC  long term goal (LTG);by discharge  -CC     Progress/Outcomes (Bathing Goal 2, OT-IRF)  goal met  -CC  goal ongoing  -CC        UB Dressing Goal 1 (OT-IRF)    Activity/Device (UB Dressing Goal 1, OT-IRF)  upper body dressing  -CC  upper body dressing  -CC     [place] (UB Dress Goal 1, OT-IRF)   moderate assist (50-74% patient effort)  -CC  moderate assist (50-74% patient effort)  -CC     Time Frame (UB Dressing Goal 1, OT-IRF)  short term goal (STG);1 week  -CC  short term goal (STG);1 week  -CC     Progress/Outcomes (UB Dressing Goal 1, OT-IRF)  goal met  -CC  goal met  -CC        UB Dressing Goal 2 (OT-IRF)    Activity/Device (UB Dressing Goal 2, OT-IRF)  upper body dressing  -CC  upper body dressing  -CC     [place] (UB Dress Goal 2, OT-IRF)  supervision required  -CC  supervision required  -CC     Time Frame (UB Dressing Goal 2, OT-IRF)  long term goal (LTG);by discharge  -CC  long term goal (LTG);by discharge  -CC     Progress/Outcomes (UB Dressing Goal 2, OT-IRF)  continuing progress toward goal  -CC  goal revised this date  -CC        LB Dressing Goal 1 (OT-IRF)    Activity/Device (LB Dressing Goal 1, OT-IRF)  --  lower body dressing  -CC     [place] (LB Dressing Goal 1, OT-IRF)  --  maximum assist (25-49% patient effort)  -CC     Time Frame (LB Dressing Goal 1, OT-IRF)  --  short term goal (STG);1 week  -CC     Progress/Outcomes (LB Dressing Goal 1, OT-IRF)  --  goal met  -CC        LB Dressing Goal 2 (OT-IRF)    Activity/Device (LB Dressing Goal 2, OT-IRF)  lower body dressing  -CC  lower body dressing  -CC     [place] (LB Dressing Goal 2, OT-IRF)  minimum assist (75% or more patient effort);contact guard assist  -CC  minimum assist (75% or more patient effort);contact guard assist  -CC     Time Frame (LB Dressing Goal 2, OT-IRF)  long term goal (LTG);by discharge  -CC  long term goal (LTG);by discharge  -CC     Progress/Outcomes (LB Dressing Goal 2, OT-IRF)  continuing progress toward goal  -CC  goal revised this date  -CC        Toileting Goal 1 (OT-IRF)    Activity/Device (Toileting Goal 1, OT-IRF)  toileting skills, all  -CC  toileting skills, all  -CC     [place] Level (Toileting Goal 1, OT-IRF)  moderate assist (50-74% patient effort)  -CC  moderate assist (50-74% patient  effort)  -CC     Time Frame (Toileting Goal 1, OT-IRF)  long term goal (LTG);by discharge  -CC  long term goal (LTG);by discharge  -CC     Progress/Outcomes (Toileting Goal 1, OT-IRF)  goal met  -CC  goal ongoing  -CC        Strength Goal 1 (OT-IRF)    Strength Goal 1 (OT-IRF)  Pt to tolerate UE strengthing to improve overall ROM and functional use of UE.  -CC  Pt to tolerate UE strengthing to improve overall ROM and functional use of UE.  -CC     Time Frame (Strength Goal 1, OT-IRF)  long term goal (LTG);by discharge  -CC  long term goal (LTG);by discharge  -CC     Progress/Outcomes (Strength Goal 1, OT-IRF)  goal ongoing  -CC  goal ongoing  -CC        Balance Goal 1 (OT)    Activity/Assistive Device (Balance Goal 1, OT)  sitting, static  -CC  sitting, static  -CC     [place] Level/Cues Needed (Balance Goal 1, OT)  contact guard assist  -CC  contact guard assist  -CC     Time Frame (Balance Goal 1, OT)  long term goal (LTG);by discharge  -CC  long term goal (LTG);by discharge  -CC     Progress/Outcomes (Balance Goal 1, OT)  goal met  -CC  goal met  -CC        Caregiver Training Goal 1 (OT-IRF)    Caregiver Training Goal 1 (OT-IRF)  Pt and family to be indep with ADLs, functional transfers, AD/AE, and HEP for safe d/c home.  -CC  Pt and family to be indep with ADLs, functional transfers, AD/AE, and HEP for safe d/c home.  -CC     Time Frame (Caregiver Training Goal 1, OT-IRF)  long term goal (LTG);by discharge  -CC  long term goal (LTG);by discharge  -CC     Progress/Outcomes (Caregiver Training Goal 1, OT-IRF)  goal ongoing  -CC  goal ongoing  -CC       User Key  (r) = Recorded By, (t) = Taken By, (c) = Cosigned By    Initials Name Provider Type    Neris Linn OTR Occupational Therapist          Occupational Therapy Education                 Title: PT OT SLP Therapies (In Progress)     Topic: Occupational Therapy (Done)     Point: ADL training (Done)     Description:   Instruct learner(s) on proper  safety adaptation and remediation techniques during self care or transfers.   Instruct in proper use of assistive devices.              Learning Progress Summary           Patient Acceptance, E, VU,NR by CC at 1/21/2020 1517    Comment:  family conf with pt, mom, sister and case manger    Acceptance, E, VU by WN at 1/18/2020 2246    Acceptance, E,TB,D, VU,NR by JS at 1/18/2020 1525    Acceptance, E, VU by WN at 1/17/2020 2256   Family Acceptance, E, VU,NR by CC at 1/21/2020 1517    Comment:  family conf with pt, mom, sister and case manger    Acceptance, E, VU by WN at 1/18/2020 2246    Acceptance, E, VU by WN at 1/17/2020 2256    Acceptance, E,TB, VU by SG at 1/7/2020 1545    Comment:  OT role and goals, POC.                   Point: Home exercise program (Done)     Description:   Instruct learner(s) on appropriate technique for monitoring, assisting and/or progressing therapeutic exercises/activities.              Learning Progress Summary           Patient Acceptance, E, VU,NR by CC at 1/21/2020 1517    Comment:  family conf with pt, mom, sister and case manger    Acceptance, E, VU by WN at 1/18/2020 2246    Acceptance, E,TB,D, VU,NR by JS at 1/18/2020 1525    Acceptance, E, VU by WN at 1/17/2020 2256   Family Acceptance, E, VU,NR by CC at 1/21/2020 1517    Comment:  family conf with pt, mom, sister and case manger    Acceptance, E, VU by WN at 1/18/2020 2246    Acceptance, E, VU by WN at 1/17/2020 2256                   Point: Precautions (Done)     Description:   Instruct learner(s) on prescribed precautions during self-care and functional transfers.              Learning Progress Summary           Patient Acceptance, E, VU,NR by CC at 1/21/2020 1517    Comment:  family conf with pt, mom, sister and case manger    Acceptance, E, VU by WN at 1/18/2020 2246    Acceptance, E,TB,D, VU,NR by JS at 1/18/2020 1525    Acceptance, E, VU by WN at 1/17/2020 2256   Family Acceptance, E, VU,NR by CC at 1/21/2020 1517     Comment:  family conf with pt, mom, sister and case manger    Acceptance, E, VU by WN at 1/18/2020 2246    Acceptance, E, VU by WN at 1/17/2020 2256                   Point: Body mechanics (Done)     Description:   Instruct learner(s) on proper positioning and spine alignment during self-care, functional mobility activities and/or exercises.              Learning Progress Summary           Patient Acceptance, E, VU,NR by CC at 1/21/2020 1517    Comment:  family conf with pt, mom, sister and case manger    Acceptance, E, VU by WN at 1/18/2020 2246    Acceptance, E,TB,D, VU,NR by JS at 1/18/2020 1525    Acceptance, E, VU by WN at 1/17/2020 2256   Family Acceptance, E, VU,NR by CC at 1/21/2020 1517    Comment:  family conf with pt, mom, sister and case manger    Acceptance, E, VU by WN at 1/18/2020 2246    Acceptance, E, VU by WN at 1/17/2020 2256                               User Key     Initials Effective Dates Name Provider Type Discipline     06/08/18 -  Neris Morales OTR Occupational Therapist OT    SG 12/26/18 -  Sudha Marte OTR Occupational Therapist OT    JS 04/06/17 -  Ainsley Servin PT Physical Therapist PT    WN 06/24/19 -  Hai Serrano, RN Registered Nurse Nurse                       Time Calculation:     Time Calculation- OT     Row Name 01/22/20 1421 01/22/20 1030          Time Calculation- OT    OT Start Time  1330  -CC  1030  -CC     OT Stop Time  1400  -CC  1100  -CC     OT Time Calculation (min)  30 min  -CC  30 min  -CC       User Key  (r) = Recorded By, (t) = Taken By, (c) = Cosigned By    Initials Name Provider Type    CC Neris Morales OTR Occupational Therapist          Therapy Charges for Today     Code Description Service Date Service Provider Modifiers Qty    48477351158 HC OT SELF CARE/MGMT/TRAIN EA 15 MIN 1/21/2020 Neris Morales OTR GO 2    29573451543 HC OT THER PROC EA 15 MIN 1/21/2020 Neris Morales OTR GO 2    03316154998 HC OT SELF CARE/MGMT/TRAIN EA 15 MIN  1/22/2020 Neris Morales OTR GO 2    [id number]  OT THER PROC EA 15 MIN 1/22/2020 Neris Morales OTR GO 2                   AVA Dodd  1/22/2020

## 2020-01-23 LAB
ANION GAP SERPL CALCULATED.3IONS-SCNC: 11.3 MMOL/L (ref 5–15)
BASOPHILS # BLD AUTO: 0.03 10*3/MM3 (ref 0–0.2)
BASOPHILS NFR BLD AUTO: 0.5 % (ref 0–1.5)
BUN BLD-MCNC: 11 MG/DL (ref 6–20)
BUN/CREAT SERPL: 13.6 (ref 7–25)
CALCIUM SPEC-SCNC: 8.8 MG/DL (ref 8.6–10.5)
CHLORIDE SERPL-SCNC: 99 MMOL/L (ref 98–107)
CO2 SERPL-SCNC: 27.7 MMOL/L (ref 22–29)
CREAT BLD-MCNC: 0.81 MG/DL (ref 0.76–1.27)
CRP SERPL-MCNC: 0.65 MG/DL (ref 0–0.5)
DEPRECATED RDW RBC AUTO: 43 FL (ref 37–54)
EOSINOPHIL # BLD AUTO: 0.15 10*3/MM3 (ref 0–0.4)
EOSINOPHIL NFR BLD AUTO: 2.4 % (ref 0.3–6.2)
ERYTHROCYTE [DISTWIDTH] IN BLOOD BY AUTOMATED COUNT: 13.9 % (ref 12.3–15.4)
ERYTHROCYTE [SEDIMENTATION RATE] IN BLOOD: 36 MM/HR (ref 0–15)
GFR SERPL CREATININE-BSD FRML MDRD: 124 ML/MIN/1.73
GLUCOSE BLD-MCNC: 86 MG/DL (ref 65–99)
HCT VFR BLD AUTO: 26.2 % (ref 37.5–51)
HGB BLD-MCNC: 8.7 G/DL (ref 13–17.7)
IMM GRANULOCYTES # BLD AUTO: 0.02 10*3/MM3 (ref 0–0.05)
IMM GRANULOCYTES NFR BLD AUTO: 0.3 % (ref 0–0.5)
LYMPHOCYTES # BLD AUTO: 2.41 10*3/MM3 (ref 0.7–3.1)
LYMPHOCYTES NFR BLD AUTO: 38.9 % (ref 19.6–45.3)
MCH RBC QN AUTO: 28.8 PG (ref 26.6–33)
MCHC RBC AUTO-ENTMCNC: 33.2 G/DL (ref 31.5–35.7)
MCV RBC AUTO: 86.8 FL (ref 79–97)
MONOCYTES # BLD AUTO: 0.74 10*3/MM3 (ref 0.1–0.9)
MONOCYTES NFR BLD AUTO: 12 % (ref 5–12)
NEUTROPHILS # BLD AUTO: 2.84 10*3/MM3 (ref 1.7–7)
NEUTROPHILS NFR BLD AUTO: 45.9 % (ref 42.7–76)
NRBC BLD AUTO-RTO: 0 /100 WBC (ref 0–0.2)
PHENYTOIN SERPL-MCNC: 10.8 MCG/ML (ref 10–20)
PLATELET # BLD AUTO: 212 10*3/MM3 (ref 140–450)
PMV BLD AUTO: 9.7 FL (ref 6–12)
POTASSIUM BLD-SCNC: 3.8 MMOL/L (ref 3.5–5.2)
RBC # BLD AUTO: 3.02 10*6/MM3 (ref 4.14–5.8)
SODIUM BLD-SCNC: 138 MMOL/L (ref 136–145)
WBC NRBC COR # BLD: 6.19 10*3/MM3 (ref 3.4–10.8)

## 2020-01-23 PROCEDURE — 97110 THERAPEUTIC EXERCISES: CPT

## 2020-01-23 PROCEDURE — 80185 ASSAY OF PHENYTOIN TOTAL: CPT | Performed by: PHYSICAL MEDICINE & REHABILITATION

## 2020-01-23 PROCEDURE — 97129 THER IVNTJ 1ST 15 MIN: CPT

## 2020-01-23 PROCEDURE — 85652 RBC SED RATE AUTOMATED: CPT | Performed by: PHYSICAL MEDICINE & REHABILITATION

## 2020-01-23 PROCEDURE — 86140 C-REACTIVE PROTEIN: CPT | Performed by: PHYSICAL MEDICINE & REHABILITATION

## 2020-01-23 PROCEDURE — 97530 THERAPEUTIC ACTIVITIES: CPT

## 2020-01-23 PROCEDURE — 97535 SELF CARE MNGMENT TRAINING: CPT

## 2020-01-23 PROCEDURE — 80048 BASIC METABOLIC PNL TOTAL CA: CPT | Performed by: PHYSICAL MEDICINE & REHABILITATION

## 2020-01-23 PROCEDURE — 85025 COMPLETE CBC W/AUTO DIFF WBC: CPT | Performed by: PHYSICAL MEDICINE & REHABILITATION

## 2020-01-23 PROCEDURE — 97116 GAIT TRAINING THERAPY: CPT

## 2020-01-23 PROCEDURE — 97130 THER IVNTJ EA ADDL 15 MIN: CPT

## 2020-01-23 PROCEDURE — 99232 SBSQ HOSP IP/OBS MODERATE 35: CPT | Performed by: INTERNAL MEDICINE

## 2020-01-23 RX ORDER — PANTOPRAZOLE SODIUM 40 MG/1
40 TABLET, DELAYED RELEASE ORAL DAILY
Qty: 30 TABLET | Refills: 0 | Status: SHIPPED | OUTPATIENT
Start: 2020-01-23 | End: 2020-02-07 | Stop reason: SDUPTHER

## 2020-01-23 RX ORDER — POTASSIUM CHLORIDE 750 MG/1
10 CAPSULE, EXTENDED RELEASE ORAL DAILY
Status: DISCONTINUED | OUTPATIENT
Start: 2020-01-24 | End: 2020-01-24 | Stop reason: HOSPADM

## 2020-01-23 RX ORDER — HYDROXYZINE PAMOATE 50 MG/1
50 CAPSULE ORAL NIGHTLY
Qty: 15 CAPSULE | Refills: 0 | Status: SHIPPED | OUTPATIENT
Start: 2020-01-23 | End: 2020-02-06 | Stop reason: SDUPTHER

## 2020-01-23 RX ORDER — POTASSIUM CHLORIDE 750 MG/1
10 CAPSULE, EXTENDED RELEASE ORAL DAILY
Qty: 30 CAPSULE | Refills: 0 | Status: SHIPPED | OUTPATIENT
Start: 2020-01-24 | End: 2020-02-06 | Stop reason: SDUPTHER

## 2020-01-23 RX ORDER — NYSTATIN 100000 [USP'U]/G
1 POWDER TOPICAL EVERY 12 HOURS SCHEDULED
Start: 2020-01-23 | End: 2020-02-06 | Stop reason: SDUPTHER

## 2020-01-23 RX ORDER — LEVETIRACETAM 500 MG/1
500 TABLET ORAL EVERY 12 HOURS SCHEDULED
Qty: 60 TABLET | Refills: 2 | Status: SHIPPED | OUTPATIENT
Start: 2020-01-23

## 2020-01-23 RX ORDER — ACETAMINOPHEN 325 MG/1
650 TABLET ORAL EVERY 6 HOURS PRN
Start: 2020-01-23

## 2020-01-23 RX ORDER — CLONAZEPAM 1 MG/1
TABLET ORAL
Qty: 90 TABLET | Refills: 1 | Status: SHIPPED | OUTPATIENT
Start: 2020-01-23

## 2020-01-23 RX ORDER — POTASSIUM CHLORIDE 750 MG/1
10 CAPSULE, EXTENDED RELEASE ORAL DAILY
Qty: 30 CAPSULE | Refills: 0 | Status: CANCELLED | OUTPATIENT
Start: 2020-01-24

## 2020-01-23 RX ORDER — FOLIC ACID 1 MG/1
1 TABLET ORAL DAILY
Qty: 30 TABLET | Refills: 2 | Status: SHIPPED | OUTPATIENT
Start: 2020-01-24

## 2020-01-23 RX ORDER — METOPROLOL TARTRATE 50 MG/1
50 TABLET, FILM COATED ORAL EVERY 12 HOURS SCHEDULED
Qty: 60 TABLET | Refills: 1 | Status: SHIPPED | OUTPATIENT
Start: 2020-01-23 | End: 2020-03-26 | Stop reason: SDUPTHER

## 2020-01-23 RX ORDER — PHENYTOIN SODIUM 100 MG/1
100 CAPSULE, EXTENDED RELEASE ORAL 3 TIMES DAILY
Qty: 180 CAPSULE | Refills: 0 | Status: SHIPPED | OUTPATIENT
Start: 2020-01-23

## 2020-01-23 RX ORDER — LEVETIRACETAM 500 MG/1
500 TABLET ORAL EVERY 12 HOURS SCHEDULED
Status: DISCONTINUED | OUTPATIENT
Start: 2020-01-23 | End: 2020-01-24 | Stop reason: HOSPADM

## 2020-01-23 RX ORDER — POLYETHYLENE GLYCOL 3350 17 G/17G
17 POWDER, FOR SOLUTION ORAL DAILY
Start: 2020-01-24 | End: 2021-04-14

## 2020-01-23 RX ADMIN — METOPROLOL TARTRATE 50 MG: 50 TABLET, FILM COATED ORAL at 21:06

## 2020-01-23 RX ADMIN — DEXTROAMPHETAMINE SACCHARATE, AMPHETAMINE ASPARTATE MONOHYDRATE, DEXTROAMPHETAMINE SULFATE, AND AMPHETAMINE SULFATE 30 MG: 2.5; 2.5; 2.5; 2.5 CAPSULE, EXTENDED RELEASE ORAL at 08:52

## 2020-01-23 RX ADMIN — METOPROLOL TARTRATE 50 MG: 50 TABLET, FILM COATED ORAL at 08:48

## 2020-01-23 RX ADMIN — TOPIRAMATE 25 MG: 25 TABLET, FILM COATED ORAL at 08:47

## 2020-01-23 RX ADMIN — LACOSAMIDE 200 MG: 100 TABLET, FILM COATED ORAL at 21:06

## 2020-01-23 RX ADMIN — CLONAZEPAM 1 MG: 0.5 TABLET ORAL at 06:08

## 2020-01-23 RX ADMIN — POLYETHYLENE GLYCOL 3350 17 G: 17 POWDER, FOR SOLUTION ORAL at 08:50

## 2020-01-23 RX ADMIN — POTASSIUM CHLORIDE 20 MEQ: 750 CAPSULE, EXTENDED RELEASE ORAL at 08:47

## 2020-01-23 RX ADMIN — CLONAZEPAM 1 MG: 0.5 TABLET ORAL at 23:23

## 2020-01-23 RX ADMIN — PANTOPRAZOLE SODIUM 40 MG: 40 TABLET, DELAYED RELEASE ORAL at 06:08

## 2020-01-23 RX ADMIN — Medication 1000 MCG: at 08:48

## 2020-01-23 RX ADMIN — CLONAZEPAM 1 MG: 0.5 TABLET ORAL at 15:04

## 2020-01-23 RX ADMIN — HYDROXYZINE PAMOATE 50 MG: 50 CAPSULE ORAL at 21:06

## 2020-01-23 RX ADMIN — PHENYTOIN SODIUM 100 MG: 100 CAPSULE, EXTENDED RELEASE ORAL at 21:06

## 2020-01-23 RX ADMIN — LEVETIRACETAM 500 MG: 500 TABLET, FILM COATED ORAL at 08:45

## 2020-01-23 RX ADMIN — NYSTATIN 1 APPLICATION: 100000 POWDER TOPICAL at 08:47

## 2020-01-23 RX ADMIN — FOLIC ACID 1 MG: 1 TABLET ORAL at 08:50

## 2020-01-23 RX ADMIN — ATORVASTATIN CALCIUM 10 MG: 10 TABLET, FILM COATED ORAL at 08:49

## 2020-01-23 RX ADMIN — NYSTATIN 1 APPLICATION: 100000 POWDER TOPICAL at 21:10

## 2020-01-23 RX ADMIN — PHENYTOIN SODIUM 100 MG: 100 CAPSULE, EXTENDED RELEASE ORAL at 15:05

## 2020-01-23 RX ADMIN — LACOSAMIDE 200 MG: 100 TABLET, FILM COATED ORAL at 08:52

## 2020-01-23 RX ADMIN — PHENYTOIN SODIUM 100 MG: 100 CAPSULE, EXTENDED RELEASE ORAL at 06:08

## 2020-01-23 RX ADMIN — CHOLECALCIFEROL TAB 10 MCG (400 UNIT) 400 UNITS: 10 TAB at 08:48

## 2020-01-23 NOTE — PROGRESS NOTES
Inpatient Rehabilitation Plan of Care Note    Plan of Care  Care Plan Reviewed - No updates at this time.    Safety    Performed Intervention(s)  Safety rounds/bed alarm/ chair alarm on      Psychosocial    Performed Intervention(s)  Offer support/Encourage patient to verbalize any concerns      Body Systems    Performed Intervention(s)  Skin care q shift and PRN    Signed by: Mayito Sanders RN

## 2020-01-23 NOTE — THERAPY TREATMENT NOTE
Inpatient Rehabilitation - Occupational Therapy Treatment Note    [facility]     Patient Name: Tye JONES Junior  : 1975  MRN: [id number]    Today's Date: 2020                 Admit Date: 2020      Visit Dx:    ICD-10-CM ICD-9-CM   1. Impaired mobility Z74.09 799.89   2. Seizure (CMS/HCC) R56.9 780.39       Patient Active Problem List   Diagnosis   • Mixed hyperlipidemia   • Essential hypertension   • Traumatic brain injury (CMS/HCC)   • Acute allergic rhinitis   • Seizure (CMS/HCC)   • Screen for colon cancer   • H/O traumatic brain injury   • Anemia   • Serum gamma globulin increased         Therapy Treatment    IRF Treatment Summary     Row Name 20 1440 20 1100 20 0850       Evaluation/Treatment Time and Intent    Subjective Information  no complaints  -CC  no complaints  -SL  no complaints  (Pended)   -NM    Existing Precautions/Restrictions  fall  -CC  fall  -SL  fall  (Pended)   -NM    Document Type  therapy note (daily note)  -CC  therapy note (daily note)  -SL  discharge treatment  (Pended)   -NM    Mode of Treatment  occupational therapy  -CC  individual therapy;speech-language pathology  -SL  physical therapy  (Pended)   -NM    Patient/Family Observations  upin w/c  -CC  cooperative  -SL  in w/c follwoing SLP; pleasant   (Pended)   -NM    Recorded by [CC] Neris Morales OTR [SL] Jayna Coker, MS CCC-SLP [NM] Anmol Joseph, PT Student    Row Name 20 0839             Evaluation/Treatment Time and Intent    Subjective Information  no complaints  -SL      Existing Precautions/Restrictions  fall  -SL      Document Type  therapy note (daily note)  -SL      Mode of Treatment  individual therapy;speech-language pathology  -SL      Patient/Family Observations  cooperative; slow processing  -SL      Recorded by [SL] Jayna Coker, MS CCC-SLP      Row Name 20 0850             Home Use of Assistive/Adaptive Equipment    Equipment Currently Used at Home  walker, rolling   (Pended)  walking aide/cane; rwx was delivered today and fit for pt   -NM      Recorded by [NM] Anmol Joseph, PT Student      Row Name 01/23/20 0850             Caregiver Training    Caregiver(s) to be Trained  parent(s)  (Pended)  mother   -NM      Recorded by [NM] Anmol Joseph, PT Student      Row Name 01/23/20 1440 01/23/20 0850          Cognition/Psychosocial- PT/OT    Orientation Status (Cognition)  --  oriented to;person;situation  (Pended)   -NM     Follows Commands (Cognition)  follows one step commands;75-90% accuracy;repetition of directions required;physical/tactile prompts required;initiation impaired;increased processing time needed  -CC  follows one step commands;75-90% accuracy;verbal cues/prompting required;repetition of directions required  (Pended)   -NM     Personal Safety Interventions  fall prevention program maintained;gait belt;nonskid shoes/slippers when out of bed  -CC  supervised activity;gait belt;fall prevention program maintained;nonskid shoes/slippers when out of bed  (Pended)   -NM     Cognitive Function (Cognitive)  --  safety deficit  (Pended)   -NM     Safety Deficit (Cognitive)  --  safety precautions awareness  (Pended)   -NM     Recorded by [CC] Neris Morales OTR [NM] Anmol Joseph, PT Student     Row Name 01/23/20 1440 01/23/20 0850          Bed Mobility Assessment/Treatment    Rolling Left [place] (Bed Mobility)  --  supervision  (Pended)   -NM     Rolling Right [place] (Bed Mobility)  --  supervision  (Pended)   -NM     Supine-Sit [place] (Bed Mobility)  --  supervision  (Pended)   -NM     Sit-Supine [place] (Bed Mobility)  --  supervision  (Pended)   -NM     Bed Mobility, Safety Issues  --  impaired trunk control for bed mobility  (Pended)   -NM     Comment (Bed Mobility)  in w/c  -CC  --     Recorded by [CC] Neris Morales OTR [NM] Anmol Joseph, PT Student     Row Name 01/23/20 1440             Functional Mobility    Functional  Mobility- Ind. Level  contact guard assist;verbal cues required  -CC      Functional Mobility- Device  rolling walker  -CC      Functional Mobility-Distance (Feet)  -- to bR  -CC      Recorded by [CC] Neris Morales OTR      Row Name 01/23/20 0850             Bed-Chair Transfer    Bed-Chair [place] (Transfers)  contact guard;verbal cues;nonverbal cues (demo/gesture)  (Pended)   -NM      Assistive Device (Bed-Chair Transfers)  walker, front-wheeled  (Pended)   -NM      Recorded by [NM] Anmol Joseph, PT Student      Row Name 01/23/20 0850             Chair-Bed Transfer    Chair-Bed [place] (Transfers)  contact guard;verbal cues;nonverbal cues (demo/gesture)  (Pended)   -NM      Assistive Device (Chair-Bed Transfers)  walker, front-wheeled  (Pended)   -NM      Recorded by [NM] Anmol Joseph, PT Student      Row Name 01/23/20 1440 01/23/20 0850          Sit-Stand Transfer    Sit-Stand [place] (Transfers)  contact guard;verbal cues;nonverbal cues (demo/gesture)  -CC  contact guard  (Pended)   -NM     Assistive Device (Sit-Stand Transfers)  walker, front-wheeled;wheelchair  -CC  --  (Pended)  able to balance self initially w/out support   -NM     Recorded by [CC] Neris Morales OTR [NM] Anmol Joseph, PT Student     Row Name 01/23/20 1440 01/23/20 0850          Stand-Sit Transfer    Stand-Sit [place] (Transfers)  contact guard;verbal cues;nonverbal cues (demo/gesture)  -CC  contact guard  (Pended)   -NM     Assistive Device (Stand-Sit Transfers)  walker, front-wheeled;wheelchair  -CC  --     Recorded by [CC] Neris Morales OTR [NM] Anmol Joseph, PT Student     Row Name 01/23/20 1440             Toilet Transfer    Type (Toilet Transfer)  sit-stand;stand-sit  -CC      [place] Level (Toilet Transfer)  contact guard;verbal cues;nonverbal cues (demo/gesture)  -CC      Assistive Device (Toilet Transfer)  walker, front-wheeled;grab bars/safety frame  -CC      Recorded by [CC]  Neris Morales OTR      Row Name 01/23/20 1440             Shower Transfer    Type (Shower Transfer)  stand pivot/stand step;sit-stand;stand-sit  -CC      [place] Level (Shower Transfer)  contact guard  -CC      Assistive Device (Shower Transfer)  grab bars/tub rail;shower chair  -CC      Recorded by [CC] Neris Morales OTR      Row Name 01/23/20 0850             Gait/Stairs Assessment/Training    [place] Level (Gait)  contact guard;verbal cues;nonverbal cues (demo/gesture)  (Pended)  cues for walker placement, direction due to vision deficits   -NM      Assistive Device (Gait)  walker, front-wheeled  (Pended)   -NM      Distance in Feet (Gait)  240  (Pended)   -NM      Pattern (Gait)  step-through  (Pended)   -NM      Deviations/Abnormal Patterns (Gait)  base of support, narrow;bilateral deviations;gait speed decreased  (Pended)   -NM      Bilateral Gait Deviations  weight shift ability decreased  (Pended)   -NM      Comment (Gait/Stairs)  CGA and manual cues on walker and vc for turns  (Pended)   -NM      Recorded by [NM] Anmol Joseph, PT Student      Row Name 01/23/20 0850             Safety Issues, Functional Mobility    Impairments Affecting Function (Mobility)  visual/perceptual  (Pended)   -NM      Recorded by [NM] Anmol Joseph, PT Student      Row Name 01/23/20 1440             Bathing Assessment/Treatment    Bathing [place] Level  bathing skills;lower body;upper body;verbal cues;contact guard assist  -CC      Assistive Device (Bathing)  grab bar/tub rail;hand held shower spray hose;shower chair  -CC      Bathing Position  supported sitting;supported standing  -CC      Bathing Setup Assistance  adjust water temperature;obtain supplies  -CC      Recorded by [CC] Neris Morales OTR      Row Name 01/23/20 1440             Upper Body Dressing Assessment/Treatment    Upper Body Dressing Task  upper body dressing skills;doff;don;pull over garment;supervision;verbal cues;minimum  assist (75% or more patient effort)  -CC      Upper Body Dressing Position  supported sitting  -CC      Set-up Assistance (Upper Body Dressing)  obtain clothing  -CC      Comment (Upper Body Dressing)  min to naresh  -CC      Recorded by [CC] Neris Morales OTR      Row Name 01/23/20 1440             Lower Body Dressing Assessment/Treatment    Lower Body Dressing [place] Level  doff;don;pants/bottoms;shoes/slippers;socks;shoelaces;verbal cues;nonverbal cues (demo/gesture);minimum assist (75% patient effort)  -CC      Lower Body Dressing Position  supported sitting;supported standing  -CC      Lower Body Dressing Setup Assistance  obtain clothing  -CC      Recorded by [CC] Neris Morales OTR      Row Name 01/23/20 1440             Grooming Assessment/Treatment    Grooming [place] Level  grooming skills;deodorant application;oral care regimen;wash face, hands;set up;supervision;verbal cues  -CC      Grooming Position  sink side;supported sitting  -CC      Grooming Setup Assistance  obtain supplies  -CC      Recorded by [CC] Neris Morales OTR      Row Name 01/23/20 1440             Toileting Assessment/Treatment    Toileting [place] Level  toileting skills;adjust/manage clothing;perform perineal hygiene;verbal cues;minimum assist (75% patient effort)  -CC      Assistive Device Use (Toileting)  raised toilet seat  -CC      Toileting Position  supported sitting;supported standing  -CC      Comment (Toileting)  assist w brief  -CC      Recorded by [CC] Neris Morales OTR      Row Name 01/23/20 0850             Vision Assessment/Intervention    Visual Impairment/Limitations  legally blind  (Pended)   -NM      Recorded by [NM] Anmol Joseph, PT Student      Row Name 01/23/20 1440 01/23/20 0850          Pain Scale: Numbers Pre/Post-Treatment    Pain Scale: Numbers, Pretreatment  0/10 - no pain  -CC  0/10 - no pain  (Pended)   -NM     Pain Scale: Numbers, Post-Treatment  0/10 - no pain  -CC  0/10  - no pain  (Pended)   -NM     Recorded by [CC] Neris Morales OTR [NM] Anmol Joseph, PT Student     Row Name 01/23/20 1440             Upper Extremity Seated Therapeutic Exercise    Performed, Seated Upper Extremity (Therapeutic Exercise)  shoulder flexion/extension;elbow flexion/extension;wrist flexion/extension  -CC      Device, Seated Upper Extremity (Therapeutic Exercise)  free weights, barbell;free weights, cuff;restorator/arm bike  -CC      Exercise Type, Seated Upper Extremity (Therapeutic Exercise)  resistive exercise  -CC      Expected Outcomes, Seated Upper Extremity (Therapeutic Exercise)  improve functional tolerance, self-care activity  -CC      Sets/Reps Detail, Seated Upper Extremity (Therapeutic Exercise)  20x3 2# hand wt 2.5# dowel; arm bike 5 mion x 2  -CC      Recorded by [CC] Neris Morales OTR      Row Name 01/23/20 1440 01/23/20 0850          Positioning and Restraints    Pre-Treatment Position  sitting in chair/recliner  -CC  sitting in chair/recliner  (Pended)   -NM     Post Treatment Position  wheelchair  -CC  wheelchair  (Pended)   -NM     In Wheelchair  sitting;with family/caregiver mom  -CC  encouraged to call for assist;exit alarm on;with family/caregiver;call light within reach  (Pended)  mother present   -NM     Recorded by [CC] Neris Morales OTR [NM] Anmol Joseph, PT Student       User Key  (r) = Recorded By, (t) = Taken By, (c) = Cosigned By    Initials Name Effective Dates    CC Neris Morales OTR 06/08/18 -     Jayna Barnes MS CCC-SLP 06/08/18 -     NM Anmol Joseph, PT Student 01/03/20 -           Wound 01/06/20 2200 head Incision (Active)   Dressing Appearance open to air 1/23/2020  8:50 AM   Closure Approximated 1/23/2020  8:50 AM   Base clean;dry 1/22/2020  7:46 PM   Periwound intact 1/22/2020  7:46 PM   Drainage Amount none 1/23/2020  8:50 AM   Dressing Care, Wound open to air 1/23/2020  8:50 AM         OT Recommendation and Plan                 OT  IRF GOALS     Row Name 01/22/20 1400 01/15/20 1541          Transfer Goal 1 (OT-IRF)    Activity/Assistive Device (Transfer Goal 1, OT-IRF)  --  toilet;commode, bedside without drop arms  -CC     [place] Level (Transfer Goal 1, OT-IRF)  --  maximum assist (25-49% patient effort)  -CC     Time Frame (Transfer Goal 1, OT-IRF)  --  short term goal (STG);1 week  -CC     Progress/Outcomes (Transfer Goal 1, OT-IRF)  --  goal met  -CC        Transfer Goal 2 (OT-IRF)    Activity/Assistive Device (Transfer Goal 2, OT-IRF)  toilet  -CC  toilet  -CC     [place] Level (Transfer Goal 2, OT-IRF)  contact guard assist  -CC  minimum assist (75% or more patient effort);contact guard assist  -CC     Time Frame (Transfer Goal 2, OT-IRF)  long term goal (LTG);by discharge  -CC  long term goal (LTG);by discharge  -CC     Progress/Outcomes (Transfer Goal 2, OT-IRF)  goal met;goal revised this date  -CC  goal revised this date  -CC        Transfer Goal 3 (OT-IRF)    Activity/Assistive Device (Transfer Goal 3, OT-IRF)  --  shower chair  -CC     [place] Level (Transfer Goal 3, OT-IRF)  --  maximum assist (25-49% patient effort)  -CC     Time Frame (Transfer Goal 3, OT-IRF)  --  short term goal (STG);1 week  -CC     Progress/Outcomes (Transfer Goal 3, OT-IRF)  --  goal met  -CC        Transfer Goal 4 (OT-IRF)    Activity/Assistive Device (Transfer Goal 4, OT-IRF)  shower chair  -CC  shower chair  -CC     [place] Level (Transfer Goal 4, OT-IRF)  minimum assist (75% or more patient effort);contact guard assist  -CC  minimum assist (75% or more patient effort);moderate assist (50-74% patient effort)  -CC     Time Frame (Transfer Goal 4, OT-IRF)  long term goal (LTG);by discharge  -CC  long term goal (LTG);by discharge  -CC     Progress/Outcomes (Transfer Goal 4, OT-IRF)  goal met;goal revised this date  -CC  goal ongoing  -CC        Bathing Goal 1 (OT-IRF)    Activity/Device (Bathing Goal 1, OT-IRF)  --  bathing skills, all   -CC     [place] Level (Bathing Goal 1, OT-IRF)  --  moderate assist (50-74% patient effort)  -CC     Time Frame (Bathing Goal 1, OT-IRF)  --  short term goal (STG);1 week  -CC     Progress/Outcomes (Bathing Goal 1, OT-IRF)  --  goal met  -CC        Bathing Goal 2 (OT-IRF)    Activity/Device (Bathing Goal 2, OT-IRF)  bathing skills, all  -CC  bathing skills, all  -CC     [place] Level (Bathing Goal 2, OT-IRF)  minimum assist (75% or more patient effort)  -CC  minimum assist (75% or more patient effort)  -CC     Time Frame (Bathing Goal 2, OT-IRF)  long term goal (LTG);by discharge  -CC  long term goal (LTG);by discharge  -CC     Progress/Outcomes (Bathing Goal 2, OT-IRF)  goal met  -CC  goal ongoing  -CC        UB Dressing Goal 1 (OT-IRF)    Activity/Device (UB Dressing Goal 1, OT-IRF)  upper body dressing  -CC  upper body dressing  -CC     [place] (UB Dress Goal 1, OT-IRF)  moderate assist (50-74% patient effort)  -CC  moderate assist (50-74% patient effort)  -CC     Time Frame (UB Dressing Goal 1, OT-IRF)  short term goal (STG);1 week  -CC  short term goal (STG);1 week  -CC     Progress/Outcomes (UB Dressing Goal 1, OT-IRF)  goal met  -CC  goal met  -CC        UB Dressing Goal 2 (OT-IRF)    Activity/Device (UB Dressing Goal 2, OT-IRF)  upper body dressing  -CC  upper body dressing  -CC     [place] (UB Dress Goal 2, OT-IRF)  supervision required  -CC  supervision required  -CC     Time Frame (UB Dressing Goal 2, OT-IRF)  long term goal (LTG);by discharge  -CC  long term goal (LTG);by discharge  -CC     Progress/Outcomes (UB Dressing Goal 2, OT-IRF)  continuing progress toward goal  -CC  goal revised this date  -CC        LB Dressing Goal 1 (OT-IRF)    Activity/Device (LB Dressing Goal 1, OT-IRF)  --  lower body dressing  -CC     [place] (LB Dressing Goal 1, OT-IRF)  --  maximum assist (25-49% patient effort)  -CC     Time Frame (LB Dressing Goal 1, OT-IRF)  --  short term goal (STG);1 week   -CC     Progress/Outcomes (LB Dressing Goal 1, OT-IRF)  --  goal met  -CC        LB Dressing Goal 2 (OT-IRF)    Activity/Device (LB Dressing Goal 2, OT-IRF)  lower body dressing  -CC  lower body dressing  -CC     [place] (LB Dressing Goal 2, OT-IRF)  minimum assist (75% or more patient effort);contact guard assist  -CC  minimum assist (75% or more patient effort);contact guard assist  -CC     Time Frame (LB Dressing Goal 2, OT-IRF)  long term goal (LTG);by discharge  -CC  long term goal (LTG);by discharge  -CC     Progress/Outcomes (LB Dressing Goal 2, OT-IRF)  continuing progress toward goal  -CC  goal revised this date  -CC        Toileting Goal 1 (OT-IRF)    Activity/Device (Toileting Goal 1, OT-IRF)  toileting skills, all  -CC  toileting skills, all  -CC     [place] Level (Toileting Goal 1, OT-IRF)  moderate assist (50-74% patient effort)  -CC  moderate assist (50-74% patient effort)  -CC     Time Frame (Toileting Goal 1, OT-IRF)  long term goal (LTG);by discharge  -CC  long term goal (LTG);by discharge  -CC     Progress/Outcomes (Toileting Goal 1, OT-IRF)  goal met  -CC  goal ongoing  -CC        Strength Goal 1 (OT-IRF)    Strength Goal 1 (OT-IRF)  Pt to tolerate UE strengthing to improve overall ROM and functional use of UE.  -CC  Pt to tolerate UE strengthing to improve overall ROM and functional use of UE.  -CC     Time Frame (Strength Goal 1, OT-IRF)  long term goal (LTG);by discharge  -CC  long term goal (LTG);by discharge  -CC     Progress/Outcomes (Strength Goal 1, OT-IRF)  goal ongoing  -CC  goal ongoing  -CC        Balance Goal 1 (OT)    Activity/Assistive Device (Balance Goal 1, OT)  sitting, static  -CC  sitting, static  -CC     [place] Level/Cues Needed (Balance Goal 1, OT)  contact guard assist  -CC  contact guard assist  -CC     Time Frame (Balance Goal 1, OT)  long term goal (LTG);by discharge  -CC  long term goal (LTG);by discharge  -CC     Progress/Outcomes (Balance Goal 1, OT)   goal met  -CC  goal met  -CC        Caregiver Training Goal 1 (OT-IRF)    Caregiver Training Goal 1 (OT-IRF)  Pt and family to be indep with ADLs, functional transfers, AD/AE, and HEP for safe d/c home.  -CC  Pt and family to be indep with ADLs, functional transfers, AD/AE, and HEP for safe d/c home.  -CC     Time Frame (Caregiver Training Goal 1, OT-IRF)  long term goal (LTG);by discharge  -CC  long term goal (LTG);by discharge  -CC     Progress/Outcomes (Caregiver Training Goal 1, OT-IRF)  goal ongoing  -CC  goal ongoing  -CC       User Key  (r) = Recorded By, (t) = Taken By, (c) = Cosigned By    Initials Name Provider Type    Neris Linn OTR Occupational Therapist                     Time Calculation:     Time Calculation- OT     Row Name 01/23/20 1330 01/23/20 1030          Time Calculation- OT    OT Start Time  1330  -CC  1030  -CC     OT Stop Time  1415  -CC  1100  -CC     OT Time Calculation (min)  45 min  -CC  30 min  -CC       User Key  (r) = Recorded By, (t) = Taken By, (c) = Cosigned By    Initials Name Provider Type    Neris Linn OTR Occupational Therapist          Therapy Charges for Today     Code Description Service Date Service Provider Modifiers Qty    64370614840 HC OT SELF CARE/MGMT/TRAIN EA 15 MIN 1/22/2020 Neris Morales OTR GO 2    28922370877 HC OT THER PROC EA 15 MIN 1/22/2020 Neris Morales OTR GO 2    37030300953 HC OT SELF CARE/MGMT/TRAIN EA 15 MIN 1/23/2020 Neris Morales OTR GO 2    85534620256 HC OT THER PROC EA 15 MIN 1/23/2020 Neris Morales OTR GO 3                   AVA Dodd  1/23/2020
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
## 2020-01-23 NOTE — THERAPY DISCHARGE NOTE
Inpatient Rehabilitation - Physical Therapy Treatment Note/Discharge  [facility]     Patient Name: Tye JONES Junior  : 1975  MRN: [id number]  Today's Date: 2020             Admit Date: 2020    Visit Dx:    ICD-10-CM ICD-9-CM   1. Impaired mobility Z74.09 799.89   2. Seizure (CMS/HCC) R56.9 780.39     Patient Active Problem List   Diagnosis   • Mixed hyperlipidemia   • Essential hypertension   • Traumatic brain injury (CMS/HCC)   • Acute allergic rhinitis   • Seizure (CMS/HCC)   • Screen for colon cancer   • H/O traumatic brain injury   • Anemia   • Serum gamma globulin increased         PT IRF GOALS     Row Name 20 1100             Bed Mobility Goal 2 (PT-IRF)    Progress/Outcomes (Bed Mobility Goal 2, PT-IRF)  goal met  (Pended)   -NM         Transfer Goal 2 (PT-IRF)    Progress/Outcomes (Transfer Goal 2, PT-IRF)  goal met  (Pended)   -NM         Transfer Goal 3 (PT-IRF)    Progress/Outcomes (Transfer Goal 3, PT-IRF)  goal not met  (Pended)   -NM         Gait/Walking Locomotion Goal 2 (PT-IRF)    Progress/Outcomes (Gait/Walking Locomotion Goal 2, PT-IRF)  goal met  (Pended)   -NM        User Key  (r) = Recorded By, (t) = Taken By, (c) = Cosigned By    Initials Name Provider Type    NM Anmol Joseph, PT Student PT Student          Therapy Treatment    IRF Treatment Summary     Row Name 20 1440 20 1100 20 0850       Evaluation/Treatment Time and Intent    Subjective Information  no complaints  -CC  no complaints  -SL  no complaints  (Pended)   -NM    Existing Precautions/Restrictions  fall  -CC  fall  -SL  fall  (Pended)   -NM    Document Type  therapy note (daily note)  -CC  therapy note (daily note)  -SL  discharge treatment  (Pended)   -NM    Mode of Treatment  occupational therapy  -CC  individual therapy;speech-language pathology  -SL  physical therapy  (Pended)   -NM    Patient/Family Observations  upin w/c  -CC  cooperative  -SL  in w/c follwoing SLP; lynn    (Pended)   -NM    Recorded by [CC] Nreis Morales OTR [SL] Jayna Coker, MS CCC-SLP [NM] Anmol Joseph, PT Student    Row Name 01/23/20 0839             Evaluation/Treatment Time and Intent    Subjective Information  no complaints  -SL      Existing Precautions/Restrictions  fall  -SL      Document Type  therapy note (daily note)  -SL      Mode of Treatment  individual therapy;speech-language pathology  -SL      Patient/Family Observations  cooperative; slow processing  -SL      Recorded by [SL] Jayna Coker, MS CCC-SLP      Row Name 01/23/20 0850             Home Use of Assistive/Adaptive Equipment    Equipment Currently Used at Home  walker, rolling  (Pended)  walking aide/cane; rwx was delivered today and fit for pt   -NM      Recorded by [NM] Anmol Joseph, PT Student      Row Name 01/23/20 0850             Coping/Community Reintegration    Additional Documentation  Caregiver Training (Group)  (Pended)   -NM      Recorded by [NM] Anmol Joseph, PT Student      Row Name 01/23/20 0850             Caregiver Training    Caregiver(s) to be Trained  parent(s)  (Pended)  mother   -NM      Caregiver Training Plan  ambulation using assistive device  (Pended)   -NM      Comment, Caregiver Training Plan  mother present in the PM and trained to asisst pt c ambulation using a rwx, as well as sit<>stands and car transfers. Pt's mother actively participated, providing CGA and verbal instruction to pt for direction. Training done to allow pt to return home safely.   (Pended)   -NM      Recorded by [NM] Anmol Joseph, PT Student      Row Name 01/23/20 1440 01/23/20 0850          Cognition/Psychosocial- PT/OT    Orientation Status (Cognition)  --  oriented to;person;situation  (Pended)   -NM     Follows Commands (Cognition)  follows one step commands;75-90% accuracy;repetition of directions required;physical/tactile prompts required;initiation impaired;increased processing time needed  -CC  follows one step  commands;75-90% accuracy;verbal cues/prompting required;repetition of directions required  (Pended)   -NM     Personal Safety Interventions  fall prevention program maintained;gait belt;nonskid shoes/slippers when out of bed  -CC  supervised activity;gait belt;fall prevention program maintained;nonskid shoes/slippers when out of bed  (Pended)   -NM     Cognitive Function (Cognitive)  --  safety deficit  (Pended)   -NM     Safety Deficit (Cognitive)  --  safety precautions awareness  (Pended)   -NM     Recorded by [CC] Neris Morales OTR [NM] Anmol Joseph, PT Student     Row Name 01/23/20 1440 01/23/20 0850          Bed Mobility Assessment/Treatment    Rolling Left [place] (Bed Mobility)  --  supervision  (Pended)   -NM     Rolling Right [place] (Bed Mobility)  --  supervision  (Pended)   -NM     Supine-Sit [place] (Bed Mobility)  --  supervision  (Pended)   -NM     Sit-Supine [place] (Bed Mobility)  --  supervision  (Pended)   -NM     Bed Mobility, Safety Issues  --  impaired trunk control for bed mobility  (Pended)   -NM     Comment (Bed Mobility)  in w/c  -CC  --     Recorded by [CC] Neris Morales OTR [NM] Anmol Joseph, PT Student     Row Name 01/23/20 1440 01/23/20 0850          Functional Mobility    Functional Mobility- Ind. Level  contact guard assist;verbal cues required  -CC  contact guard assist;verbal cues required;nonverbal cues required (demo/gesture)  (Pended)   -NM     Functional Mobility- Device  rolling walker  -CC  rolling walker  (Pended)   -NM     Functional Mobility-Distance (Feet)  -- to bR  -CC  80  (Pended)   -NM     Functional Mobility- Safety Issues  --  --  (Pended)  caregiver training c pt's mother assisting appropriately   -NM     Functional Mobility- Comment  --  pt and pt's mother state they feel comfortable returning to home  (Pended)   -NM     Recorded by [CC] Neris Morales OTR [NM] Anmol Joseph, PT Student     Row Name 01/23/20 0867              Bed-Chair Transfer    Bed-Chair [place] (Transfers)  contact guard;verbal cues;nonverbal cues (demo/gesture)  (Pended)   -NM      Assistive Device (Bed-Chair Transfers)  walker, front-wheeled  (Pended)   -NM      Recorded by [NM] Anmol Joseph, PT Student      Row Name 01/23/20 0850             Chair-Bed Transfer    Chair-Bed [place] (Transfers)  contact guard;verbal cues;nonverbal cues (demo/gesture)  (Pended)   -NM      Assistive Device (Chair-Bed Transfers)  walker, front-wheeled  (Pended)   -NM      Recorded by [NM] Anmol Joseph, PT Student      Row Name 01/23/20 1440 01/23/20 0850          Sit-Stand Transfer    Sit-Stand [place] (Transfers)  contact guard;verbal cues;nonverbal cues (demo/gesture)  -CC  contact guard  (Pended)   -NM     Assistive Device (Sit-Stand Transfers)  walker, front-wheeled;wheelchair  -CC  --  (Pended)  able to balance self initially w/out support   -NM     Recorded by [CC] Neris Morales OTR [NM] Anmol Joseph, PT Student     Row Name 01/23/20 1440 01/23/20 0850          Stand-Sit Transfer    Stand-Sit [place] (Transfers)  contact guard;verbal cues;nonverbal cues (demo/gesture)  -CC  contact guard  (Pended)   -NM     Assistive Device (Stand-Sit Transfers)  walker, front-wheeled;wheelchair  -CC  --     Recorded by [CC] Neris Morales OTR [NM] Anmol Joseph, PT Student     Row Name 01/23/20 1440             Toilet Transfer    Type (Toilet Transfer)  sit-stand;stand-sit  -CC      [place] Level (Toilet Transfer)  contact guard;verbal cues;nonverbal cues (demo/gesture)  -CC      Assistive Device (Toilet Transfer)  walker, front-wheeled;grab bars/safety frame  -CC      Recorded by [CC] Neris Morales OTR      Row Name 01/23/20 1440             Shower Transfer    Type (Shower Transfer)  stand pivot/stand step;sit-stand;stand-sit  -CC      [place] Level (Shower Transfer)  contact guard  -CC      Assistive Device (Shower Transfer)  grab  bars/tub rail;shower chair  -CC      Recorded by [CC] Neris Morales OTR      Row Name 01/23/20 0850             Car Transfer    Type (Car Transfer)  sit-stand;stand-sit;stand pivot/stand step  (Pended)   -NM      [place] Level (Car Transfer)  contact guard;verbal cues;nonverbal cues (demo/gesture)  (Pended)  during training c pt's mother   -NM      Assistive Device (Car Transfer)  walker, front-wheeled  (Pended)   -NM      Recorded by [NM] Anmol Joseph, PT Student      Row Name 01/23/20 0850             Gait/Stairs Assessment/Training    [place] Level (Gait)  contact guard;verbal cues;nonverbal cues (demo/gesture)  (Pended)  cues for walker placement, direction due to vision deficits   -NM      Assistive Device (Gait)  walker, front-wheeled  (Pended)   -NM      Distance in Feet (Gait)  240  (Pended)   -NM      Pattern (Gait)  step-through  (Pended)   -NM      Deviations/Abnormal Patterns (Gait)  base of support, narrow;bilateral deviations;gait speed decreased  (Pended)   -NM      Bilateral Gait Deviations  weight shift ability decreased  (Pended)   -NM      Comment (Gait/Stairs)  CGA and manual cues on walker and vc for turns  (Pended)   -NM      Recorded by [NM] Anmol Joseph, PT Student      Row Name 01/23/20 0850             Safety Issues, Functional Mobility    Impairments Affecting Function (Mobility)  visual/perceptual  (Pended)   -NM      Recorded by [NM] Anmol Joseph, PT Student      Row Name 01/23/20 1440             Bathing Assessment/Treatment    Bathing [place] Level  bathing skills;lower body;upper body;verbal cues;contact guard assist  -CC      Assistive Device (Bathing)  grab bar/tub rail;hand held shower spray hose;shower chair  -CC      Bathing Position  supported sitting;supported standing  -CC      Bathing Setup Assistance  adjust water temperature;obtain supplies  -CC      Recorded by [CC] Neris Morales OTR      Row Name 01/23/20 1440             Upper Body  Dressing Assessment/Treatment    Upper Body Dressing Task  upper body dressing skills;doff;don;pull over garment;supervision;verbal cues;minimum assist (75% or more patient effort)  -CC      Upper Body Dressing Position  supported sitting  -CC      Set-up Assistance (Upper Body Dressing)  obtain clothing  -CC      Comment (Upper Body Dressing)  min to naresh  -CC      Recorded by [CC] Neris Morales OTR      Row Name 01/23/20 1440             Lower Body Dressing Assessment/Treatment    Lower Body Dressing [place] Level  doff;don;pants/bottoms;shoes/slippers;socks;shoelaces;verbal cues;nonverbal cues (demo/gesture);minimum assist (75% patient effort)  -CC      Lower Body Dressing Position  supported sitting;supported standing  -CC      Lower Body Dressing Setup Assistance  obtain clothing  -CC      Recorded by [CC] Neris Morales OTR      Row Name 01/23/20 1440             Grooming Assessment/Treatment    Grooming [place] Level  grooming skills;deodorant application;oral care regimen;wash face, hands;set up;supervision;verbal cues  -CC      Grooming Position  sink side;supported sitting  -CC      Grooming Setup Assistance  obtain supplies  -CC      Recorded by [CC] Neris Morales OTR      Row Name 01/23/20 1440             Toileting Assessment/Treatment    Toileting [place] Level  toileting skills;adjust/manage clothing;perform perineal hygiene;verbal cues;minimum assist (75% patient effort)  -CC      Assistive Device Use (Toileting)  raised toilet seat  -CC      Toileting Position  supported sitting;supported standing  -CC      Comment (Toileting)  assist w brief  -CC      Recorded by [CC] Neris Morales OTR      Row Name 01/23/20 0850             Vision Assessment/Intervention    Visual Impairment/Limitations  legally blind  (Pended)   -NM      Recorded by [NM] Anmol Joseph, PT Student      Row Name 01/23/20 1440 01/23/20 0850          Pain Scale: Numbers Pre/Post-Treatment    Pain  Scale: Numbers, Pretreatment  0/10 - no pain  -CC  0/10 - no pain  (Pended)   -NM     Pain Scale: Numbers, Post-Treatment  0/10 - no pain  -CC  0/10 - no pain  (Pended)   -NM     Recorded by [CC] Neris Morales OTR [NM] Anmol Joseph, PT Student     Row Name 01/23/20 1100             Static Sitting Balance    Comment (Supported Sitting, Static Balance)  able to sit statically unsupported at EOB and reach mod outside RAJI c SBA  (Pended)   -NM      Recorded by [NM] Anmol Joseph, PT Student      Row Name 01/23/20 1100             Static Standing Balance    Comment (Unsupported Standing, Static Balance)  CGA/Manuel due to posterior lean for static standing, unsupported  (Pended)   -NM      Recorded by [NM] Anmol Joseph, PT Student      Row Name 01/23/20 1100             Dynamic Standing Balance    Comment, Resists Mild Perturbations (Sitting, Dynamic Balance)  pt stands c BUE support on a wx c SBA/CGA  (Pended)   -NM      Recorded by [NM] Anmol Joseph, PT Student      Row Name 01/23/20 1440             Upper Extremity Seated Therapeutic Exercise    Performed, Seated Upper Extremity (Therapeutic Exercise)  shoulder flexion/extension;elbow flexion/extension;wrist flexion/extension  -CC      Device, Seated Upper Extremity (Therapeutic Exercise)  free weights, barbell;free weights, cuff;restorator/arm bike  -CC      Exercise Type, Seated Upper Extremity (Therapeutic Exercise)  resistive exercise  -CC      Expected Outcomes, Seated Upper Extremity (Therapeutic Exercise)  improve functional tolerance, self-care activity  -CC      Sets/Reps Detail, Seated Upper Extremity (Therapeutic Exercise)  20x3 2# hand wt 2.5# dowel; arm bike 5 mion x 2  -CC      Recorded by [CC] Neris Morales OTR      Row Name 01/23/20 1440 01/23/20 0850          Positioning and Restraints    Pre-Treatment Position  sitting in chair/recliner  -CC  sitting in chair/recliner  (Pended)   -NM     Post Treatment Position  wheelchair  -CC   wheelchair  (Pended)   -NM     In Wheelchair  sitting;with family/caregiver mom  -CC  sitting;call light within reach;encouraged to call for assist;exit alarm on;with family/caregiver  (Pended)  mother present   -NM     Recorded by [CC] Neris Morales OTR [NM] Anmol Joseph PT Student       User Key  (r) = Recorded By, (t) = Taken By, (c) = Cosigned By    Initials Name Effective Dates    CC Neris Morales, OTR 06/08/18 -     SL Jayna Coker, MS CCC-SLP 06/08/18 -     NM Anmol Joseph, PT Student 01/03/20 -           PT Recommendation and Plan  Planned Therapy Interventions (PT Eval): balance training, bed mobility training, gait training, home exercise program, ROM (range of motion), stretching, strengthening, stair training, transfer training, wheelchair management/propulsion training            Time Calculation:   PT Charges     Row Name 01/23/20 1521 01/23/20 1519          Time Calculation    Start Time  1300  (Pended)   -NM  0830  (Pended)   -NM     Stop Time  1330  (Pended)   -NM  0900  (Pended)   -NM     Time Calculation (min)  30 min  (Pended)   -NM  30 min  (Pended)   -NM     PT Received On  --  01/23/20  (Pended)   -NM     PT - Next Appointment  --  01/24/20  (Pended)   -NM       User Key  (r) = Recorded By, (t) = Taken By, (c) = Cosigned By    Initials Name Provider Type    NM Anmol Joseph, PT Student PT Student          Therapy Charges for Today     Code Description Service Date Service Provider Modifiers Qty    07841783463 HC GAIT TRAINING EA 15 MIN 1/22/2020 Anmol Joseph, PT Student GP 1    30672928009 HC PT THER PROC EA 15 MIN 1/22/2020 Anmol Joseph, PT Student GP 1    90052533947 HC PT THERAPEUTIC ACT EA 15 MIN 1/22/2020 Anmol Joseph, PT Student GP 2    07450267776 HC GAIT TRAINING EA 15 MIN 1/23/2020 Anmol Joseph, PT Student GP 1    90202570583 HC PT THERAPEUTIC ACT EA 15 MIN 1/23/2020 Anmol Joseph, PT Student GP 3               PT Discharge Summary  Reason for  Discharge: (P) Maximum functional potential achieved  Outcomes Achieved: (P) Patient able to partially acheive established goals  Discharge Destination: (P) Home with assist, Home with home health    Anmol Joseph, PT Student  1/23/2020

## 2020-01-23 NOTE — PROGRESS NOTES
Discussed patient's progress/current status and d/c plans for home tomorrow with patient, mother, and sister, by phone. They are agreeable to plan. Sister will plan to pick patient up tomorrow afternoon around 4:00 p.m. Discussed home health PT, OT, ST, NSG to be provided by [facility]. Discussed equipment to be ordered, rolling walker and BSC. Will give sister pictures of tub seat and bed rails to order on line. Discussed equipment with patient's , Joanna, by phone. She stated sister can order items and she will try to get sister reimbursed through waiver program as it would take 1-2 weeks to get the items if she gets them. Plan is home with sister tomorrow, 1/24. Will assist with plans.

## 2020-01-23 NOTE — PROGRESS NOTES
LOS: 17 days   Patient Care Team:  Jolene Laurent MD as PCP - General (Family Medicine)  Efren Martínez MD as PCP - Claims Attributed    Chief Complaint:   Status post infected  shunt-removed-CNS infection/pneumocephalus  Status post 12/23/ 2019 - Removal of ventriculoperitoneal shunt intracranial portion, valve, partial peritoneal down to the cervical region  Status post 12/31/2019 endoscopic repair of paranasal sinus defect  ID-ceftriaxone-antibiotics until January 14, 2020  Seizure disorder-multidrug regimen-phenytoin/Vimpat/Keppra/clonazepam/Topamax  Remote traumatic brain injury  Neuro stimulation-Adderall  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  Bilateral SCDs.  Status post acute renal vcfxqrxgfljwg-JKE-cvkqtr creatinine.  Avoid NSAID/ACE inhibitor's.    Subjective     History of Present Illness  Patient doing well.  No headache.  Continues stronger overall.  Tolerating therapies.  Feels ready for discharge home tomorrow.    History taken from: patient chart, family    Objective     Vital Signs  Temp:  [97.3 °F (36.3 °C)-98.3 °F (36.8 °C)] 98.1 °F (36.7 °C)  Heart Rate:  [84-97] 84  Resp:  [18-20] 20  BP: (112-142)/(70-93) 112/71    Physical Exam  Mental status: awake and alert. NAD  HEENT: craniotomy incision healed  Pulm:CTAB, no wheezing, rales, or rhonchi  Heart: RRR, well perfused    Abdomen: normoactive bowel sounds, soft, NT , non-distended   Extremities: No cyanosis or edema   Neuro: awake   good speed with responses, following commands.   Blindness-chronic  Strength: takes resistance to bilateral upper and lower extremities both distally and proximally      Results Review:    I reviewed the patient's new clinical results.      Results from last 7 days   Lab Units 01/23/20  0541 01/20/20  0607 01/19/20  0630   WBC 10*3/mm3 6.19 7.00 6.67   HEMOGLOBIN g/dL 8.7* 9.3* 9.4*   HEMATOCRIT % 26.2* 27.5* 28.0*   PLATELETS 10*3/mm3 212 282 290     Results from last 7 days   Lab Units 01/23/20  0530 01/20/20  0607 01/19/20  0630   SODIUM mmol/L 138 139 141   POTASSIUM mmol/L 3.8 4.2 4.4   CHLORIDE mmol/L 99 101 102   CO2 mmol/L 27.7 24.9 26.3   BUN mg/dL 11 13 14   CREATININE mg/dL 0.81 0.93 1.03   CALCIUM mg/dL 8.8 9.2 9.4   BILIRUBIN mg/dL  --  0.2 0.2   ALK PHOS U/L  --  103 110   ALT (SGPT) U/L  --  26 27   AST (SGOT) U/L  --  20 21   GLUCOSE mg/dL 86 86 85       Medication Review:   Scheduled Meds:    amphetamine-dextroamphetamine XR 30 mg Oral Daily   atorvastatin 10 mg Oral Daily   cholecalciferol 400 Units Oral Daily   clonazePAM 1 mg Oral Q8H   folic acid 1 mg Oral Daily   hydrOXYzine pamoate 50 mg Oral Nightly   lacosamide 200 mg Oral Q12H   levETIRAcetam 500 mg Oral Q12H   metoprolol tartrate 50 mg Oral Q12H   nystatin 1 application Topical Q12H   pantoprazole 40 mg Oral Q AM   phenytoin 100 mg Oral Q8H   polyethylene glycol 17 g Oral Daily   potassium chloride 20 mEq Oral Daily   topiramate 25 mg Oral Daily   vitamin B-12 1,000 mcg Oral Daily     Continuous Infusions:   PRN Meds:.•  acetaminophen    Assessment/Plan       H/O traumatic brain injury    Anemia    Serum gamma globulin increased  Status post infected  shunt-removed-CNS infection/pneumocephalus  -Status post 12/23/ 2019 - Removal of ventriculoperitoneal shunt intracranial portion, valve, partial peritoneal down to the cervical region  -Status post 12/31/2019 endoscopic repair of paranasal sinus defect  -ID-ceftriaxone-antibiotics until January 14, 2020 January 23-remains afebrile.  White blood cell count normal.  Will check a CRP and sed rate for baseline as he is to follow-up with infectious disease as an outpatient.    Seizure disorder-multidrug  regimen-phenytoin/Vimpat/Keppra/clonazepam/Topamax  -January 9-patient was on phenytoin at home which was increased at the outside hospital, on Topamax but less than antiepileptic dose.  He had Vimpat, Keppra, clonazepam added at the outside hospital.  Consulted neurology for input patient has fatigue felt possibly related to his increased antiepileptic medication.  Reviewed with neurology and Keppra dose being decreased.  -January 10- Keppra decreased from 2000 mg BID to 50 mg BID with improved alertness. Neurology continues to follow.   -January 15-Discussed having neurology see him in follow-up at some point to review possibly decreasing the additional seizure medications further.  -January 17 - neurologist who same him at this hospital out until next week. Pharmacy checked and Vimpat will be covered as outpatient. Discussed with patient and mother possibly taper off some of additional seizure meds - clonazepam or Vimpat as continues without seizure but will await neurology input next week unless hematology recommends discontinue one due to anemia.   - January 20 - reviewed with Neurology - as tolerating current regimen, will continue same for now and have follow up with his [facility]rologist as an outpatient for further adjustment/taper.   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  Bilateral SCDs.    Status post acute renal pkrmcdyzpmndv-GJB-jqbvqe creatinine.  Avoid NSAID/ACE inhibitor's.  Jan 16 - Cr improved to 1.06    Anemia-trend downward.    Placed him back on a protein pump inhibitor  .Hemoccult was negative.   ? If due to recent medical issues vs medication effect.    Patient with progressive anemia.  HGB 13.2 on Dec 22. HGB 10.6 on Jan 6. HGB 8.3 on Jan 16, HGB 7.6 on Jan 18, spontaneously improved to 9.4 on Jan 19 without transfusion  Hemoccult was negative x 2.  Platelets normal.  Retic count increased at 3.56.  Iron 99, iron saturation 137, transferrin 137.  .  Patient's mother thinks his father had sickle cell trait but does remember patient ever being testing.   He has been on Dilantin and low dose Topamax chronically. Keppra is new but dose recently decreased. Vimpat is new but on quick review do not see that associated with anemia. Has been on Heparin subcutaneous for DVT prophylaxis. More mobile now and now ~ 4 weeks out so will discontinue Heparin at this point and continue with SCDs.  PPI Protonix was added Tues Jan 14 after anemia started.    Haptoglobin -157 - WNL.  Jan 20 - HGB 9.3- hematology evaluating.   Per hematology- [Continue to observe blood counts for now.  Hemoglobin seems to be stable and no indication for transfusion at this point.  We suspect that he does have a component of anemia of chronic disease.  Certainly his iron studies and ferritin from earlier in the admission are consistent with this.]  -January 23-final recommendations from hematology-  2. Normocytic anemia  · Elevated ferritin at 673 likely reactive  · Iron studies with iron 99, TIBC 204, 48% iron saturation  · Vitamin B12 543  · Copper normal at 143; zinc normal at 573  · Haptoglobin normal at 153  · Folic acid normal at 6.69  · Serum protein electrophoresis without evidence for monoclonal gammopathy  · Fecal occult blood testing negative  · Reticulocytes above normal at 3.56%  · Hemoglobin stable in the 8-9 range; 8.7 today with a normal MCV at 86.8  · Parvovirus B19 serologies IgM normal at 0.4, IgG elevated at 1.2, difficult to interpret and probably consistent with prior  infection  · Medication could certainly be influencing the anemia   Recommendations:  *He continues oral folic acid 1 mg daily and oral vitamin B12 1000 mcg daily  *Agree with CBCs Mondays and Thursdays  *He is hopeful for discharge tomorrow  *He can follow-up with primary care and if he remains anemic and needs to follow-up with us in the office he can be referred to us at that time         Sleep  -January 13- Family reports restlessness at night. Vistaril added PRN last night without response. Since vistaril has only been tried one night, will continue with Vistaril PRN at bedtime for now and continue to monitor.    -January 21- Vistaril increased to 50mg over the weekend. Will change vistaril to scheduled   -January 22- Improved sleep last night after scheduling Vistaril 50mg. Will continue.       Skin-dry rash to bilateral inner thighs-try Mycostatin powder     Now admit for comprehensive acute inpatient rehabilitation .  This would be an interdisciplinary program with physical therapy 1 hour,  occupational therapy 1 hour, and speech therapy 1 hour, 5 days a week.  Rehabilitation nursing for carryover, monitoring of incision and neurologic   status, bowel and bladder, and skin  Ongoing physician follow-up.  Weekly team conferences.  Goals are indeterminate.   Rehabilitation prognosis indeterminate.  Medical prognosis indeterminate.  Estimated length of stay is indeterminate  The patient's functional status and clinical status is unchanged from preadmission assessment and the patient continues appropriate for acute inpatient rehabilitation.  Goal is for home with outpatient   therapies.  Barrier to discharge: Cognition/mobility- work on trunk control, strength, balance to overcome.     TEAM CONF - JAN 9 - BED MAX 2.  TRANSFERS MAX 2.  GAIT 8 FEET PARALLEL BARS MOD 2. TOILET TRANSFERS MAX 2.  BLIND.  SLOW PROCESSING.  GROOMING MOD. UBD MAX. LBD DEP. DYSPHAGIA - PUREE/THINS.  FATIGUES WITH COGNITIVE   THERAPY.  ELOS :  4 WEEKS    TEAM CONF - JAN 16- BED MOD ASSIST. GAIT 120 FEET MIN ASSIST WITH WALKER.  TRANSFERS MIN ASSIST. WILL START TRAINING WITH HIS WALKER AID FROM FROM. TOILET TRANSFERS MIN ASSIST. BATH MIN ASSIST. UBD SBA-MIN. LBD MOD-MAX. ENDURANCE IMPROVED. SWALLOW - PUREE/THIN LIQUIDS, SLOW RATE, ALTERNATE LIQUID AND SOLIDS. WILL TRY TEST TRAY BUT DOES NOT CHEW WELL OR CONTROL BOLUS. AFTERNOON FATIGUES. MOD-SEVERE COGNITIVE DEFICITS. SLOW PROCESSING. DIFFICULTY WITH THOUGHT ORGANIZATION. STILL NOT ORIENTED TO DAY/PLACE. BNE PENDING.NO SAFETY ISSUES. BLADDER CONTINENT/INCONTINENT. WILL DO TIMED VOIDS.  WILL D/C MIDLINE.   ELOS -  END OF NEXT WEEK. FAMILY CONF PENDING.     BED SBA. TRANSFERS CTG-MIN. CAR MIN ASSIST FOR CUES. 3 STAIRS CTG. GAIT 80 FEET WALKING AIDE MIN ASSIST. ALSO USE RW. BATH CTG. UBD/LBD MIN WITH CUES. GROOMING SBA EXCEPT MIN ASSIST FOR SHAVING. TOILETING MIN ASSIST. PROCESSING SPEED IMPROVED, NEEDS REPETITION. SPEECH PERSEVERATIVE /TANGENTIAL AT TIMES, SOME DIFFICULTY WITH THOUGHT ORGANIZATION.   SWALLOW - REG/THIN LIQUIDS.   BNE (Active)  Att'n. - Min Imp.  Exec. Fx. - Mod-Severely Imp.  Rsng/Jgmnt - Mildly Imp. for abstract reasoning, Mod. Imp. for common sense  jgmnt  Arith - Severely Imp.  Verbal Mem. - Mildly Imp. for immediate recall, Severely Imp. for delayed recall  Emot - Pt denied dep/anx  TIMED VOIDS FOR URINATION. OCCASIONAL BOWEL INCONTINENCE.  ELOS:  TOMORROW. HOME HEALTH PT, OT, SLP, NURSING WITH [facility]    Janusz Solitario MD  01/23/20  7:36 AM         >35 minutes with > 50% face-to-face / floor time / coordination of care

## 2020-01-23 NOTE — THERAPY TREATMENT NOTE
Inpatient Rehabilitation - Speech Language Pathology Treatment Note    [facility]       Patient Name: Tye JONES Junior  : 1975  MRN: [id number]    Today's Date: 2020           Admit Date: 2020      Visit Dx:        ICD-10-CM ICD-9-CM   1. Impaired mobility Z74.09 799.89   2. Seizure (CMS/HCC) R56.9 780.39       Patient Active Problem List   Diagnosis   • Mixed hyperlipidemia   • Essential hypertension   • Traumatic brain injury (CMS/HCC)   • Acute allergic rhinitis   • Seizure (CMS/HCC)   • Screen for colon cancer   • H/O traumatic brain injury   • Anemia   • Serum gamma globulin increased          Therapy Treatment    Evaluation/Coping    Evaluation/Treatment Time and Intent  Subjective Information: no complaints (20 1100 : Jayna Coker MS CCC-SLP)  Existing Precautions/Restrictions: fall (20 1100 : Jayna Coker, MS CCC-SLP)  Document Type: therapy note (daily note) (20 1100 : Jayna Coker, MS CCC-SLP)  Mode of Treatment: individual therapy, speech-language pathology (20 1100 : Jayna Coker, MS CCC-SLP)  Patient/Family Observations: cooperative (20 1100 : Jayna Coker, MS CCC-SLP)    Vitals/Pain/Safety         Cognition/Communication         Oral Motor/Eating         Mobility/Basic Activities/Instrumental Activities/Motor/Modality                   ROM/MMT                   Sensory/Myotome/Dermatome/Edema               Posture/Balance/Special Tests/Exercise/Transportation/Sexual Function                   Orthotics/Residual Limb/Prosthetic Management              Outcome Summary         EDUCATION    The patient has been educated in the following areas:     Cognitive Impairment Communication Impairment Dysphagia (Swallowing Impairment).    SLP Recommendation and Plan                                                          SLP GOALS     Row Name 20 1100 20 0800 20 1400       Oral Nutrition/Hydration Goal 1 (SLP)    Oral Nutrition/Hydration Goal 1, SLP  --   --  Patient will safely swallow regular diet and thin liquids without overt s/s of pen/aspiration.   -ML    Time Frame (Oral Nutrition/Hydration Goal 1, SLP)  --  --  by discharge  -ML       Connected Speech to Express Thoughts Goal 1 (SLP)    Progress/Outcomes (Connected Speech Goal 1, SLP)  goal ongoing  -SL  --  --    Comment (Connected Speech Goal 1, SLP)  speech more fluent for singing songs vs spont prodction of ideas  -SL  --  --       Orientation Goal 1 (SLP)    Progress (Orientation Goal 1, SLP)  --  80%;independently (over 90% accuracy);with minimal cues (75-90%)  -SL  --    Progress/Outcomes (Orientation Goal 1, SLP)  --  goal ongoing  -SL  --       Memory Skills Goal 1 (SLP)    Progress (Memory Skills Goal 1, SLP)  --  50%;independently (over 90% accuracy);with minimal cues (75-90%) recall of short story  -SL  --    Progress/Outcomes (Memory Skills Goal 1, SLP)  --  goal ongoing  -SL  --       Organizational Skills Goal 1 (SLP)    Progress (Thought Organization Skills Goal 1, SLP)  100%;with minimal cues (75-90%) classification by varying features  -SL  60%;70%;independently (over 90% accuracy);90%;100%;with minimal cues (75-90%) category matrix- initial letter restrictions  -SL  --    Progress/Outcomes (Thought Organization Skills Goal 1, SLP)  goal ongoing  -SL  goal ongoing  -SL  --    Comment (Thought Organization Skills Goal 1, SLP)  required extra time for response formulation and min intermittent verbal cues  -SL  --  --       Functional Problem Solving Skills Goal 1 (SLP)    Progress (Problem Solving Goal 1, SLP)  70%;with minimal cues (75-90%) compare/contrast  -SL  --  --    Progress/Outcomes (Problem Solving Goal 1, SLP)  goal ongoing  -SL  --  --    Row Name 01/22/20 1100 01/22/20 0800 01/21/20 1100       Oral Nutrition/Hydration Goal 1 (SLP)    Barriers (Oral Nutrition/Hydration Goal 1, SLP)  --  --  repeat VFSS deferred today due to radiology /acute schedule- will complete tomorrow   -SL     Progress/Outcomes (Oral Nutrition/Hydration Goal 1, SLP)  --  --  goal ongoing;continuing progress toward goal  -SL       Labial Strengthening Goal 1 (SLP)    Activity (Labial Strengthening Goal 1, SLP)  --  --  increase labial tone  -SL    Steele/Accuracy (Labial Strengthening Goal 1, SLP)  --  --  with minimal cues (75-90% accuracy)  -SL    Barriers (Labial Strengthening Goal 1, SLP)  --  --  improved ROM and strength apparent  -SL    Progress/Outcomes (Labial Strengthening Goal 1, SLP)  --  --  goal ongoing  -SL       Lingual Strengthening Goal 1 (SLP)    Increase Tongue Back Strength  --  --  lingual movement exercises;lingual resistance exercises  -SL    Steele/Accuracy (Lingual Strengthening Goal 1, SLP)  --  --  with moderate cues (50-74% accuracy)  -SL    Progress/Outcomes (Lingual Strengthening Goal 1, SLP)  --  --  goal ongoing  -SL       Pharyngeal Strengthening Exercise Goal 1 (SLP)    Increase Superior Movement of the Hyolaryngeal Complex  --  --  effortful pitch glide (falsetto + pharyngeal squeeze);hard effortful swallow;falsetto;breath hold exercises;carmelita  -SL    Increase Anterior Movement of the Hyolaryngeal Complex  --  --  carmelita;effortful pitch glide (falsetto + pharyngeal squeeze);hard effortful swallow;falsetto  -SL    Increase Squeeze/Positive Pressure Generation  --  --  hard effortful swallow;falsetto;effortful pitch glide (falsetto + pharyngeal squeeze)  -SL    Increase Tongue Base Retraction  --  --  carmelita posterior phoneme productions  -SL    Steele/Accuracy (Pharyngeal Strengthening Goal 1, SLP)  --  --  with moderate cues (50-74% accuracy)  -SL    Progress/Outcomes (Pharyngeal Strengthening Goal 1, SLP)  --  --  goal ongoing  -SL       Orientation Goal 1 (SLP)    Progress (Orientation Goal 1, SLP)  --  80%;independently (over 90% accuracy)  -SL  --    Progress/Outcomes (Orientation Goal 1, SLP)  --  goal ongoing  -SL  --       Memory Skills Goal 1 (SLP)    Progress  (Memory Skills Goal 1, SLP)  60%;70%;with minimal cues (75-90%) recall of 2/3 errands after 10 min  -SL  50%;with minimal cues (75-90%);with moderate cues (50-74%) recall- short stories presented auditorilly  -SL  --    Progress/Outcomes (Memory Skills Goal 1, SLP)  goal ongoing  -SL  goal ongoing  -SL  --       Organizational Skills Goal 1 (SLP)    Barriers (Thought Organization Skills Goal 1, SLP)  slow to formulate ideas, but able to name 5 items in simple concrete categories indep  -SL  --  --    Progress (Thought Organization Skills Goal 1, SLP)  100%;independently (over 90% accuracy) divergent naming- concrete categories- 5 items  -SL  --  --    Progress/Outcomes (Thought Organization Skills Goal 1, SLP)  goal ongoing  -SL  --  --       Reasoning Goal 1 (SLP)    Barriers (Reasoning Goal 1, SLP)  --  slow to respond; multipe reps of stimulus required, intermittent cues and prompts  -SL  --    Progress (Reasoning Goal 1, SLP)  --  60%;independently (over 90% accuracy);80%;with minimal cues (75-90%) simple word deduction  -SL  --    Progress/Outcomes (Reasoning Goal 1, SLP)  --  goal ongoing  -SL  --       Functional Problem Solving Skills Goal 1 (SLP)    Progress (Problem Solving Goal 1, SLP)  60%;with moderate cues (50-74%) stating effects  -SL  --  --    Progress/Outcomes (Problem Solving Goal 1, SLP)  goal ongoing  -  --  --    Comment (Problem Solving Goal 1, SLP)  tangential and perseverative responses- difficulty formulating ideas with delays and struggle- word and phrase repetitions and tangential ispeech  -SL  --  --    Row Name 01/21/20 0800             Word Retrieval Skills Goal 1 (SLP)    Progress (Word Retrieval Skills Goal 1, SLP)  80%;independently (over 90% accuracy) synonyms  -SL      Progress/Outcomes (Word Retrieval Goal 1, SLP)  goal ongoing  -         Connected Speech to Express Thoughts Goal 1 (SLP)    Improve Narrative Discourse to Express Thoughts By Goal 1 (SLP)  giving basic  definition of a word;80%;independently (over 90% accuracy) formulating sent with key words  -SL      Time Frame (Connected Speech Goal 1, SLP)  by discharge  -SL      Progress (Connected Speech Goal 1, SLP)  60%;70%;independently (over 90% accuracy)  -SL      Progress/Outcomes (Connected Speech Goal 1, SLP)  goal ongoing  -SL         Orientation Goal 1 (SLP)    Progress (Orientation Goal 1, SLP)  70%;independently (over 90% accuracy);with minimal cues (75-90%)  -SL      Progress/Outcomes (Orientation Goal 1, SLP)  goal ongoing  -SL         Organizational Skills Goal 1 (SLP)    Progress (Thought Organization Skills Goal 1, SLP)  70%;with minimal cues (75-90%);independently (over 90% accuracy) convergent- feature analysis  -SL      Progress/Outcomes (Thought Organization Skills Goal 1, SLP)  goal ongoing  -SL        User Key  (r) = Recorded By, (t) = Taken By, (c) = Cosigned By    Initials Name Provider Type    Jayna Barnes MS CCC-SLP Speech and Language Pathologist    Reanna Tavarez MS CCC-SLP Speech and Language Pathologist             SLP Outcome Measures (last 72 hours)      SLP Outcome Measures     Row Name 01/22/20 1600             SLP Outcome Measures    Outcome Measure Used?  Adult NOMS  -ML         Adult FCM Scores    FCM Chosen  Swallowing  -ML      Swallowing FCM Score  7  -ML        User Key  (r) = Recorded By, (t) = Taken By, (c) = Cosigned By    Initials Name Effective Dates    Reanna Tavarez MS CCC-SLP 10/04/18 -               Time Calculation:       Time Calculation- SLP     Row Name 01/23/20 1134 01/23/20 0855          Time Calculation- SLP    SLP Start Time  1100  -SL  0830  -SL     SLP Stop Time  1130  -SL  0900  -SL     SLP Time Calculation (min)  30 min  -SL  30 min  -SL       User Key  (r) = Recorded By, (t) = Taken By, (c) = Cosigned By    Initials Name Provider Type    Jayna Barnes MS CCC-SLP Speech and Language Pathologist            Therapy Charges for Today     Code  Description Service Date Service Provider Modifiers Qty    62140805297 HC ST DEV OF COGN SKILLS INITIAL 15 MIN 1/22/2020 Sheela Jayna MS CCC-SLP  1    14120371225 HC ST DEV OF COGN SKILLS EACH ADDT'L 15 MIN 1/22/2020 Sheela Jayna MS CCC-SLP  1    93520013580 HC ST DEV OF COGN SKILLS EACH ADDT'L 15 MIN 1/22/2020 Jayna Coker MS CCC-SLP  2    12183138864 HC ST DEV OF COGN SKILLS INITIAL 15 MIN 1/23/2020 Sheela Jayna MS CCC-SLP  1    24044501623 HC ST DEV OF COGN SKILLS EACH ADDT'L 15 MIN 1/23/2020 Sheela Jayna MS CCC-SLP  1    49703965993 HC ST DEV OF COGN SKILLS EACH ADDT'L 15 MIN 1/23/2020 Sheela Jayna MS CCC-SLP  2               ADULT NOMS (last 72 hours)      Adult NOMS     Row Name 01/22/20 1600                   Adult FCM Scores    FCM Chosen  Swallowing  -ML        Swallowing FCM Score  7  -ML          User Key  (r) = Recorded By, (t) = Taken By, (c) = Cosigned By    Initials Name Effective Dates    Reanna Tavarez MS CCC-SLP 10/04/18 -                      Jayna Coker MS CCC-SLP  1/23/2020

## 2020-01-23 NOTE — PLAN OF CARE
Problem: Patient Care Overview  Goal: Plan of Care Review  Outcome: Ongoing (interventions implemented as appropriate)  Flowsheets (Taken 1/23/2020 1805)  Progress, Functional Goals: demonstrating adequate progress  Plan of Care Reviewed With: patient; Oriented to person and cooperative. Mom stays with. Appointments made for DC tomorrow. Blind but uses walker to ambulate with max cues.

## 2020-01-23 NOTE — PLAN OF CARE
Patient is calm and cooperative. Alert. Oriented to person. Denied pain. Continent and incontinent of bladder during the night. Nystatin powder to scrotum applied. 2 person to assist with turning and repositioning. Restless at times. Bed alarm set on zone 2. Mom at bedside.

## 2020-01-23 NOTE — PROGRESS NOTES
Inpatient Rehabilitation Functional Measures Assessment and Plan of Care    Plan of Care  Updated Problems/Interventions  Field    Functional Measures  JENNIFER Eating:  [facility] Grooming: [facility] Bathing:  [facility] Upper Body Dressing:  [facility] Lower Body Dressing:  [facility] Toileting:  [facility] Bladder Management  Level of Assistance:  Fields  Frequency/Number of Accidents this Shift:  [facility] Bowel Management  Level of Assistance: Fields  Frequency/Number of Accidents this Shift: [facility] Bed/Chair/Wheelchair Transfer:  [facility] Toilet Transfer:  [facility] Tub/Shower Transfer:  Fields    Previously Documented Mode of Locomotion at Discharge: Field  JENNIFER Expected Mode of Locomotion at Discharge: [facility] Walk/Wheelchair:  [facility] Stairs:  [facility] Comprehension:  [facility] Expression:  [facility] Social Interaction:  [facility] Problem Solving:  [facility] Memory:  Fields    Therapy Mode Minutes  Occupational Therapy: Individual: 73 minutes.  Physical Therapy: Fields  Speech Language Pathology:  Fields    Signed by: AVA Dodd/ZOË

## 2020-01-23 NOTE — DISCHARGE INSTRUCTIONS
- ENT: Dr. Chamberlain at discharge  - Neurosurgery: Shayla Nelson scheduled 1/13/2020 at 3PM- needs reschedule  - Infectious disease: Dr Ramos - [facility] 1 weeks after discharge (call [phone] for appt),  fax [phone],  - Nephrology: Dr. Matt Hinojosa in 2 weeks.  fax to [phone]  - Neurology: scheduled for 2/13/2020 at 9AM    Anemia-recommendations include continue oral folic acid 1 mg daily and oral vitamin B12 1000 mcg daily  *He can follow-up with primary care and if he remains anemic and needs to follow-up with hematology-Dr. Escalera/Dr. Luther in the office he can be referred to them at that time

## 2020-01-23 NOTE — PROGRESS NOTES
Inpatient Rehabilitation Plan of Care Note    Plan of Care  Care Plan Reviewed - No updates at this time.    Sphincter Control    Performed Intervention(s)  Monitor I&O  check for incontinence, offer toileting q2-3hrs  miralax daily-hold if needed.      Safety    Performed Intervention(s)  Safety rounds/bed alarm/ chair alarm on  Falls precautio/call light within easy reach      Psychosocial    Performed Intervention(s)  Offer support/Encourage patient to verbalize any concerns      Body Systems    Performed Intervention(s)  Skin care q shift and PRN  Assist to turn patient q 2-3hrs.    Signed by: Zaida Panchal RN

## 2020-01-24 VITALS
HEART RATE: 97 BPM | BODY MASS INDEX: 24.84 KG/M2 | HEIGHT: 72 IN | TEMPERATURE: 97.9 F | DIASTOLIC BLOOD PRESSURE: 82 MMHG | WEIGHT: 183.42 LBS | RESPIRATION RATE: 18 BRPM | OXYGEN SATURATION: 100 % | SYSTOLIC BLOOD PRESSURE: 126 MMHG

## 2020-01-24 PROCEDURE — 97130 THER IVNTJ EA ADDL 15 MIN: CPT

## 2020-01-24 PROCEDURE — 97110 THERAPEUTIC EXERCISES: CPT

## 2020-01-24 PROCEDURE — 97530 THERAPEUTIC ACTIVITIES: CPT

## 2020-01-24 PROCEDURE — 97116 GAIT TRAINING THERAPY: CPT

## 2020-01-24 PROCEDURE — 97535 SELF CARE MNGMENT TRAINING: CPT

## 2020-01-24 PROCEDURE — 97129 THER IVNTJ 1ST 15 MIN: CPT

## 2020-01-24 RX ADMIN — CHOLECALCIFEROL TAB 10 MCG (400 UNIT) 400 UNITS: 10 TAB at 09:52

## 2020-01-24 RX ADMIN — DEXTROAMPHETAMINE SACCHARATE, AMPHETAMINE ASPARTATE MONOHYDRATE, DEXTROAMPHETAMINE SULFATE, AND AMPHETAMINE SULFATE 30 MG: 2.5; 2.5; 2.5; 2.5 CAPSULE, EXTENDED RELEASE ORAL at 09:52

## 2020-01-24 RX ADMIN — PHENYTOIN SODIUM 100 MG: 100 CAPSULE, EXTENDED RELEASE ORAL at 06:04

## 2020-01-24 RX ADMIN — LEVETIRACETAM 500 MG: 500 TABLET, FILM COATED ORAL at 00:09

## 2020-01-24 RX ADMIN — POTASSIUM CHLORIDE 10 MEQ: 750 CAPSULE, EXTENDED RELEASE ORAL at 09:52

## 2020-01-24 RX ADMIN — CLONAZEPAM 1 MG: 0.5 TABLET ORAL at 14:17

## 2020-01-24 RX ADMIN — TOPIRAMATE 25 MG: 25 TABLET, FILM COATED ORAL at 09:52

## 2020-01-24 RX ADMIN — LACOSAMIDE 200 MG: 100 TABLET, FILM COATED ORAL at 09:52

## 2020-01-24 RX ADMIN — CLONAZEPAM 1 MG: 0.5 TABLET ORAL at 06:04

## 2020-01-24 RX ADMIN — ATORVASTATIN CALCIUM 10 MG: 10 TABLET, FILM COATED ORAL at 09:52

## 2020-01-24 RX ADMIN — PHENYTOIN SODIUM 100 MG: 100 CAPSULE, EXTENDED RELEASE ORAL at 14:17

## 2020-01-24 RX ADMIN — FOLIC ACID 1 MG: 1 TABLET ORAL at 09:52

## 2020-01-24 RX ADMIN — NYSTATIN 1 APPLICATION: 100000 POWDER TOPICAL at 10:00

## 2020-01-24 RX ADMIN — POLYETHYLENE GLYCOL 3350 17 G: 17 POWDER, FOR SOLUTION ORAL at 09:51

## 2020-01-24 RX ADMIN — Medication 1000 MCG: at 09:52

## 2020-01-24 RX ADMIN — LEVETIRACETAM 500 MG: 500 TABLET, FILM COATED ORAL at 09:52

## 2020-01-24 RX ADMIN — PANTOPRAZOLE SODIUM 40 MG: 40 TABLET, DELAYED RELEASE ORAL at 06:04

## 2020-01-24 RX ADMIN — METOPROLOL TARTRATE 50 MG: 50 TABLET, FILM COATED ORAL at 09:52

## 2020-01-24 NOTE — PROGRESS NOTES
Occupational Therapy: Branch    Physical Therapy: Individual: 30 minutes.    Speech Language Pathology:  Branch    Signed by: Anmol Joseph PT Student     - CoSigned By: Nhung Rock PT 1/24/2020 2:45:23 PM

## 2020-01-24 NOTE — PROGRESS NOTES
Occupational Therapy: Branch    Physical Therapy: Branch    Speech Language Pathology:  Individual: 30 minutes.    Signed by: Jayna Coker SLP

## 2020-01-24 NOTE — THERAPY DISCHARGE NOTE
Inpatient Rehabilitation - Speech Language Pathology Discharge Summary  [facility]       Patient Name: Tye JONES Junior  : 1975  MRN: [id number]    Today's Date: 2020                   Admit Date: 2020      SLP Recommendation and Plan  The patient has made progress in therapy. Processing of information ( simple) has improved, although overall processing is still reduced. He is able to answer simple questions and follow simple commands, with some response delays. Verbal expression has also improved, and pt is now able to flocculate simple thoughts- sent level mainly- however he does display perseveration of thought, tangential speech, and difficulty formulating longer sentences/completing ideas at times. Attention does fluctuate, and he does require frequent redirection back to tasks.  Simple, basic  reasoning for situational everyday  issues /tasks is mildly impaired. It is recommended that therapy continue, and that pt receive 24 hour supervision at home.    Visit Dx:    ICD-10-CM ICD-9-CM   1. Impaired mobility Z74.09 799.89   2. Seizure (CMS/HCC) R56.9 780.39               SLP GOALS     Row Name 20 1100 20 0800 20 1400       Oral Nutrition/Hydration Goal 1 (SLP)    Oral Nutrition/Hydration Goal 1, SLP  --  --  Patient will safely swallow regular diet and thin liquids without overt s/s of pen/aspiration.   -ML    Time Frame (Oral Nutrition/Hydration Goal 1, SLP)  --  --  by discharge  -ML       Connected Speech to Express Thoughts Goal 1 (SLP)    Progress/Outcomes (Connected Speech Goal 1, SLP)  goal ongoing  -SL  --  --    Comment (Connected Speech Goal 1, SLP)  speech more fluent for singing songs vs spont prodction of ideas  -SL  --  --       Orientation Goal 1 (SLP)    Progress (Orientation Goal 1, SLP)  --  80%;independently (over 90% accuracy);with minimal cues (75-90%)  -SL  --    Progress/Outcomes (Orientation Goal 1, SLP)  --  goal ongoing  -SL  --       Memory Skills  Goal 1 (SLP)    Progress (Memory Skills Goal 1, SLP)  --  50%;independently (over 90% accuracy);with minimal cues (75-90%) recall of short story  -SL  --    Progress/Outcomes (Memory Skills Goal 1, SLP)  --  goal ongoing  -SL  --       Organizational Skills Goal 1 (SLP)    Progress (Thought Organization Skills Goal 1, SLP)  100%;with minimal cues (75-90%) classification by varying features  -SL  60%;70%;independently (over 90% accuracy);90%;100%;with minimal cues (75-90%) category matrix- initial letter restrictions  -SL  --    Progress/Outcomes (Thought Organization Skills Goal 1, SLP)  goal ongoing  -SL  goal ongoing  -SL  --    Comment (Thought Organization Skills Goal 1, SLP)  required extra time for response formulation and min intermittent verbal cues  -SL  --  --       Functional Problem Solving Skills Goal 1 (SLP)    Progress (Problem Solving Goal 1, SLP)  70%;with minimal cues (75-90%) compare/contrast  -  --  --    Progress/Outcomes (Problem Solving Goal 1, SLP)  goal ongoing  -  --  --    Row Name 01/22/20 1100 01/22/20 0800 01/21/20 1100       Oral Nutrition/Hydration Goal 1 (SLP)    Barriers (Oral Nutrition/Hydration Goal 1, SLP)  --  --  repeat VFSS deferred today due to radiology /acute schedule- will complete tomorrow   -SL    Progress/Outcomes (Oral Nutrition/Hydration Goal 1, SLP)  --  --  goal ongoing;continuing progress toward goal  -SL       Labial Strengthening Goal 1 (SLP)    Activity (Labial Strengthening Goal 1, SLP)  --  --  increase labial tone  -SL    [place]/Accuracy (Labial Strengthening Goal 1, SLP)  --  --  with minimal cues (75-90% accuracy)  -SL    Barriers (Labial Strengthening Goal 1, SLP)  --  --  improved ROM and strength apparent  -SL    Progress/Outcomes (Labial Strengthening Goal 1, SLP)  --  --  goal ongoing  -       Lingual Strengthening Goal 1 (SLP)    Increase Tongue Back Strength  --  --  lingual movement exercises;lingual resistance exercises  -SL     [place]/Accuracy (Lingual Strengthening Goal 1, SLP)  --  --  with moderate cues (50-74% accuracy)  -SL    Progress/Outcomes (Lingual Strengthening Goal 1, SLP)  --  --  goal ongoing  -SL       Pharyngeal Strengthening Exercise Goal 1 (SLP)    Increase Superior Movement of the Hyolaryngeal Complex  --  --  effortful pitch glide (falsetto + pharyngeal squeeze);hard effortful swallow;falsetto;breath hold exercises;carmelita  -SL    Increase Anterior Movement of the Hyolaryngeal Complex  --  --  carm[place]ita;effortful pitch glide (falsetto + pharyngeal squeeze);hard effortful swallow;falsetto  -SL    Increase Squeeze/Positive Pressure Generation  --  --  hard effortful swallow;falsetto;effortful pitch glide (falsetto + pharyngeal squeeze)  -SL    Increase Tongue Base Retraction  --  --  carmelita posterior phoneme productions  -SL    [place]/Accuracy (Pharyngeal Strengthening Goal 1, SLP)  --  --  with moderate cues (50-74% accuracy)  -SL    Progress/Outcomes (Pharyngeal Strengthening Goal 1, SLP)  --  --  goal ongoing  -SL       Orientation Goal 1 (SLP)    Progress (Orientation Goal 1, SLP)  --  80%;independently (over 90% accuracy)  -SL  --    Progress/Outcomes (Orientation Goal 1, SLP)  --  goal ongoing  -  --       Memory Skills Goal 1 (SLP)    Progress (Memory Skills Goal 1, SLP)  60%;70%;with minimal cues (75-90%) recall of 2/3 errands after 10 min  -SL  50%;with minimal cues (75-90%);with moderate cues (50-74%) recall- short stories presented auditorilly  -SL  --    Progress/Outcomes (Memory Skills Goal 1, SLP)  goal ongoing  -SL  goal ongoing  -SL  --       Organizational Skills Goal 1 (SLP)    Barriers (Thought Organization Skills Goal 1, SLP)  slow to formulate ideas, but able to name 5 items in simple concrete categories indep  -SL  --  --    Progress (Thought Organization Skills Goal 1, SLP)  100%;independently (over 90% accuracy) divergent naming- concrete categories- 5 items  -SL  --  --     Progress/Outcomes (Thought Organization Skills Goal 1, SLP)  goal ongoing  -SL  --  --       Reasoning Goal 1 (SLP)    Barriers (Reasoning Goal 1, SLP)  --  slow to respond; multipe reps of stimulus required, intermittent cues and prompts  -SL  --    Progress (Reasoning Goal 1, SLP)  --  60%;independently (over 90% accuracy);80%;with minimal cues (75-90%) simple word deduction  -SL  --    Progress/Outcomes (Reasoning Goal 1, SLP)  --  goal ongoing  -SL  --       Functional Problem Solving Skills Goal 1 (SLP)    Progress (Problem Solving Goal 1, SLP)  60%;with moderate cues (50-74%) stating effects  -SL  --  --    Progress/Outcomes (Problem Solving Goal 1, SLP)  goal ongoing  -SL  --  --    Comment (Problem Solving Goal 1, SLP)  tangential and perseverative responses- difficulty formulating ideas with delays and struggle- word and phrase repetitions and tangential ispeech  -  --  --    Row Name 01/21/20 0800             Word Retrieval Skills Goal 1 (SLP)    Progress (Word Retrieval Skills Goal 1, SLP)  80%;independently (over 90% accuracy) synonyms  -SL      Progress/Outcomes (Word Retrieval Goal 1, SLP)  goal ongoing  -SL         Connected Speech to Express Thoughts Goal 1 (SLP)    Improve Narrative Discourse to Express Thoughts By Goal 1 (SLP)  giving basic definition of a word;80%;independently (over 90% accuracy) formulating sent with key words  -SL      Time Frame (Connected Speech Goal 1, [facility])  by discharge  -SL      Progress (Connected Speech Goal 1, SLP)  60%;70%;independently (over 90% accuracy)  -SL      Progress/Outcomes (Connected Speech Goal 1, SLP)  goal ongoing  -SL         Orientation Goal 1 (SLP)    Progress (Orientation Goal 1, SLP)  70%;independently (over 90% accuracy);with minimal cues (75-90%)  -SL      Progress/Outcomes (Orientation Goal 1, SLP)  goal ongoing  -SL         Organizational Skills Goal 1 (SLP)    Progress (Thought Organization Skills Goal 1, SLP)  70%;with minimal cues  (75-90%);independently (over 90% accuracy) convergent- feature analysis  -SL      Progress/Outcomes (Thought Organization Skills Goal 1, SLP)  goal ongoing  -SL        User Key  (r) = Recorded By, (t) = Taken By, (c) = Cosigned By    Initials Name Provider Type    Jayna Barnes MS CCC-SLP Speech and Language Pathologist    Reanna Tavarez MS CCC-SLP Speech and Language Pathologist            Therapy Charges for Today     Code Description Service Date Service Provider Modifiers Qty    63471941015 HC ST DEV OF COGN SKILLS INITIAL 15 MIN 1/23/2020 Jayna Coker MS CCC-SLP  1    21297133543 HC ST DEV OF COGN SKILLS EACH ADDT'L 15 MIN 1/23/2020 Jayna Coker MS CCC-SLP  1    78993752945 HC ST DEV OF COGN SKILLS EACH ADDT'L 15 MIN 1/23/2020 Jayna Coker MS CCC-SLP  2            SLP Discharge Summary  Anticipated Dischage Disposition: home with 24/7 care, home with home health  Reason for Discharge: discharge from this facility  Progress Toward Achieving Short/long Term Goals: goals partially met within established timelines  Discharge Destination: home w/ 24/7 care, home w/ home health      Jayna Coker MS CCC-SLP  1/24/2020

## 2020-01-24 NOTE — PROGRESS NOTES
Inpatient Rehabilitation Plan of Care Note    Plan of Care  Care Plan Reviewed - Updates as Follows    Body Systems    [RN] Integumentary(Resolved)  Current Status(01/22/2020): Small rash dry skin breakdown to groin/inner thigh.  Buttocks intact with a small spot of peeling area. Pink areas noted around  penis.No open area noted. Head incision healed/closed.  Weekly Goal(01/26/2020): No further skin breakdown  Discharge Goal: Intact Skin.        Psychosocial    [RN] Coping/Adjustment(Resolved)  Current Status(01/22/2020): Patient with difficult verbalizing and slow process.  Mother and sister present and very supportive.  Weekly Goal(01/26/2020): Patient will demonstrate appropriate coping mechanisms  Discharge Goal: Patient will demonstrate health coping strategies        Safety    [RN] Potential for Injury(Resolved)  Current Status(01/22/2020): Patient with generalized weakness. Very weak all  over, at high risk for fall. Assist of 2 with transfers. Pt rolled out of bed  during night of 1/19/20.  Weekly Goal(01/26/2020): No injuries  Discharge Goal: Pt/family aware of fall/safety in the home setting.        Sphincter Control    [RN] Bladder Management(Resolved)  Current Status(01/22/2020): Incontinent episodes, patient has urgency when  needing to void.  1/22/20 Less incont. at night;uses urinal with assist approx.  q3h T.V.  Weekly Goal(01/26/2020): Continent 50%  Discharge Goal: Continent 100%    [RN] Bowel Management(Resolved)  Current Status(01/22/2020): Continent bm on toilet 1/21  Weekly Goal(01/26/2020): Continent 50%  Discharge Goal: Continent 100%    Signed by: Mago Mancia RN

## 2020-01-24 NOTE — THERAPY DISCHARGE NOTE
Inpatient Rehabilitation - Occupational Therapy Treatment Note/Discharge  [facility]     Patient Name: Tye JONES Junior  : 1975  MRN: [id number]  Today's Date: 2020               Admit Date: 2020    Visit Dx:     ICD-10-CM ICD-9-CM   1. Impaired mobility Z74.09 799.89   2. Seizure (CMS/HCC) R56.9 780.39     Patient Active Problem List   Diagnosis   • Mixed hyperlipidemia   • Essential hypertension   • Traumatic brain injury (CMS/HCC)   • Acute allergic rhinitis   • Seizure (CMS/HCC)   • Screen for colon cancer   • H/O traumatic brain injury   • Anemia   • Serum gamma globulin increased       Therapy Treatment  IRF Treatment Summary     Row Name 20 1100 20 1000 20 0846       Evaluation/Treatment Time and Intent    Subjective Information  no complaints  (Pended)   -NM  no complaints  -RD  no complaints  -SL    Existing Precautions/Restrictions  fall  (Pended)   -NM  fall  -RD  fall  -SL    Document Type  therapy note (daily note)  (Pended)   -NM  discharge treatment  -RD  therapy note (daily note)  -SL    Mode of Treatment  physical therapy;individual therapy  (Pended)   -NM  occupational therapy  -RD  individual therapy;speech-language pathology  -SL    Patient/Family Observations  pt in w/c; no acute distress   (Pended)   -NM  pt seated in w/c w/ no signs of acute distress in both AM and PM; pt sister present  -RD  cooperative  -SL    Recorded by [NM] Anmol Joseph, PT Student [RD] Laurita Wayne, OT [SL] Jayna Coker MS CCC-SLP    Row Name 20 1100             Caregiver Training    Caregiver(s) to be Trained  parent(s)  (Pended)  pt's mother   -NM      Caregiver Training Plan  ambulation using assistive device  (Pended)   -NM      Comment, Caregiver Training Plan  pt and pt's mother educated on use of the fww at home, as well as precuations to increase pt safety c d/c to home later today.   (Pended)   -NM      Recorded by [NM] Anmol Joseph, PT Student      Antonio  Name 01/24/20 1100 01/24/20 1000          Cognition/Psychosocial- PT/OT    Orientation Status (Cognition)  oriented to;person;situation  (Pended)   -NM  oriented to;person;situation  -RD     Follows Commands (Cognition)  follows one step commands  (Pended)   -NM  follows one step commands;verbal cues/prompting required  -RD     Personal Safety Interventions  fall prevention program maintained;gait belt;supervised activity  (Pended)   -NM  fall prevention program maintained;gait belt;nonskid shoes/slippers when out of bed;muscle strengthening facilitated  -RD     Cognitive Function (Cognitive)  safety deficit  (Pended)   -NM  safety deficit  -RD     Safety Deficit (Cognitive)  --  insight into deficits/self awareness;judgment  -RD     Recorded by [NM] Anmol Joseph, PT Student [RD] Laurita Wayne, OT     Row Name 01/24/20 1000             Transfer Assessment/Treatment    Transfer Assessment/Treatment  toilet transfer;shower transfer  -RD      Recorded by [RD] Laurita Wayne OT      Row Name 01/24/20 1000             Toilet Transfer    Type (Toilet Transfer)  sit-stand;stand-sit  -RD      [place] Level (Toilet Transfer)  contact guard;verbal cues;nonverbal cues (demo/gesture)  -RD      Assistive Device (Toilet Transfer)  grab bars/safety frame;wheelchair  -RD      Recorded by [RD] Laurita Wayne, OT      Row Name 01/24/20 1000             Shower Transfer    Type (Shower Transfer)  stand pivot/stand step;sit-stand;stand-sit  -RD      [place] Level (Shower Transfer)  contact guard;verbal cues;nonverbal cues (demo/gesture)  -RD      Assistive Device (Shower Transfer)  grab bars/tub rail;shower chair;wheelchair  -RD      Recorded by [RD] Laurita Wayne, OT      Row Name 01/24/20 1100             Car Transfer    Type (Car Transfer)  stand-sit;sit-stand;stand pivot/stand step  (Pended)   -NM      [place] Level (Car Transfer)  contact guard;verbal cues;nonverbal cues (demo/gesture)   (Pended)   -NM      Assistive Device (Car Transfer)  --  (Pended)  walk aide/stick  -NM      Recorded by [NM] Anmol Joseph, PT Student      Row Name 01/24/20 1100             Gait/Stairs Assessment/Training    [place] Level (Gait)  contact guard;minimum assist (75% patient effort);verbal cues;nonverbal cues (demo/gesture)  (Pended)   -NM      Assistive Device (Gait)  --  (Pended)  walk aide/stick  -NM      Distance in Feet (Gait)  80x2  (Pended)   -NM      Pattern (Gait)  step-through  (Pended)   -NM      Deviations/Abnormal Patterns (Gait)  base of support, narrow;bilateral deviations;gait speed decreased  (Pended)   -NM      Bilateral Gait Deviations  weight shift ability decreased  (Pended)   -NM      Comment (Gait/Stairs)  cues for orientation to surroundings and direction.   (Pended)   -NM      Recorded by [NM] Anmol Joseph, PT Student      Row Name 01/24/20 1000             Basic Activities of Daily Living (BADLs)    Basic Activities of Daily Living  bathing;upper body dressing;lower body dressing;grooming;toileting  -RD      Recorded by [RD] Laurita Wayne, OT      Row Name 01/24/20 1000             Bathing Assessment/Treatment    Bathing [place] Level  bathing skills;upper body;lower body;contact guard assist;verbal cues  -RD      Assistive Device (Bathing)  grab bar/tub rail;hand held shower spray hose;shower chair  -RD      Bathing Position  supported sitting;supported standing  -RD      Bathing Setup Assistance  obtain supplies  -RD      Comment (Bathing)  VCs for safety   -RD      Recorded by [RD] Laurita Wayne, OT      Row Name 01/24/20 1000             Upper Body Dressing Assessment/Treatment    Upper Body Dressing Task  upper body dressing skills;doff;don;pull over garment;minimum assist (75% or more patient effort);verbal cues  -RD      Upper Body Dressing Position  supported sitting  -RD      Set-up Assistance (Upper Body Dressing)  obtain clothing  -RD      Comment  (Upper Body Dressing)  min A to start donning shirt d/t vision  -RD      Recorded by [RD] Laurita Wayne, OT      Row Name 01/24/20 1000             Lower Body Dressing Assessment/Treatment    Lower Body Dressing [place] Level  doff;don;underwear;pants/bottoms;socks;shoes/slippers;shoelaces;contact guard assist;verbal cues  -RD      Lower Body Dressing Position  supported sitting;supported standing  -RD      Lower Body Dressing Setup Assistance  obtain clothing  -RD      Recorded by [RD] Laurita Wayne, OT      Row Name 01/24/20 1000             Grooming Assessment/Treatment    Grooming [place] Level  grooming skills;deodorant application;wash face, hands;set up;supervision;verbal cues  -RD      Grooming Position  sink side;supported sitting  -RD      Grooming Setup Assistance  obtain supplies  -RD      Recorded by [RD] Laurita Wayne, OT      Row Name 01/24/20 1000             Toileting Assessment/Treatment    Toileting [place] Level  toileting skills;adjust/manage clothing;perform perineal hygiene;contact guard assist;verbal cues  -RD      Assistive Device Use (Toileting)  grab bar/safety frame  -RD      Toileting Position  supported sitting;supported standing  -RD      Toileting Setup Assistance  obtain supplies  -RD      Recorded by [RD] Laurita Wayne, OT      Row Name 01/24/20 1100 01/24/20 1000          Pain Scale: Numbers Pre/Post-Treatment    Pain Scale: Numbers, Pretreatment  0/10 - no pain  (Pended)   -NM  0/10 - no pain  -RD     Pain Scale: Numbers, Post-Treatment  0/10 - no pain  (Pended)   -NM  0/10 - no pain  -RD     Recorded by [NM] Anmol Joseph, PT Student [RD] Laurita Wayne, OT     Row Name 01/24/20 1000             Upper Extremity Seated Therapeutic Exercise    Performed, Seated Upper Extremity (Therapeutic Exercise)  shoulder flexion/extension;scapular protraction/retraction;elbow flexion/extension  -RD      Device, Seated Upper Extremity (Therapeutic  Exercise)  restorator/arm bike 2# dowel venkat  -RD      Exercise Type, Seated Upper Extremity (Therapeutic Exercise)  resistive exercise  -RD      Expected Outcomes, Seated Upper Extremity (Therapeutic Exercise)  improve functional tolerance, self-care activity;improve performance, BADLs;improve performance, transfer skills  -RD      Sets/Reps Detail, Seated Upper Extremity (Therapeutic Exercise)  15 reps w/ 2# dowel venkat; 10 min on UBE w/ no rest breaks required  -RD      Recorded by [RD] Laurita Wayne OT      Row Name 01/24/20 1100 01/24/20 1000          Positioning and Restraints    Pre-Treatment Position  sitting in chair/recliner  (Pended)   -NM  sitting in chair/recliner both AM and PM  -RD     Post Treatment Position  wheelchair  (Pended)   -NM  wheelchair both AM and PM  -RD     In Wheelchair  call light within reach;encouraged to call for assist;exit alarm on;with family/caregiver  (Pended)  MD present   -NM  sitting;call light within reach;encouraged to call for assist;exit alarm on;with family/caregiver  -RD     Recorded by [NM] Anmol Joseph, PT Student [RD] Laurita Wayne OT       User Key  (r) = Recorded By, (t) = Taken By, (c) = Cosigned By    Initials Name Effective Dates    SL Jayna Coker, MS CCC-SLP 06/08/18 -     Laurita Lawrence OT 10/14/19 -     Anmol Cordero, PT Student 01/03/20 -           Wound 01/06/20 2200 head Incision (Active)   Dressing Appearance open to air 1/24/2020  9:50 AM   Closure Approximated 1/24/2020  9:50 AM   Base clean;dry 1/24/2020  9:50 AM   Periwound intact 1/23/2020  9:10 PM   Drainage Amount none 1/23/2020  9:10 PM         OT IRF GOALS     Row Name 01/24/20 1400 01/22/20 1400 01/15/20 1541       Transfer Goal 1 (OT-IRF)    Activity/Assistive Device (Transfer Goal 1, OT-IRF)  --  --  toilet;commode, bedside without drop arms  -CC    [place] Level (Transfer Goal 1, OT-IRF)  --  --  maximum assist (25-49% patient effort)  -CC    Time Frame  (Transfer Goal 1, OT-IRF)  --  --  short term goal (STG);1 week  -CC    Progress/Outcomes (Transfer Goal 1, OT-IRF)  --  --  goal met  -CC       Transfer Goal 2 (OT-IRF)    Activity/Assistive Device (Transfer Goal 2, OT-IRF)  toilet  -RD  toilet  -CC  toilet  -CC    [place] Level (Transfer Goal 2, OT-IRF)  contact guard assist  -RD  contact guard assist  -CC  minimum assist (75% or more patient effort);contact guard assist  -CC    Time Frame (Transfer Goal 2, OT-IRF)  long term goal (LTG);by discharge  -RD  long term goal (LTG);by discharge  -CC  long term goal (LTG);by discharge  -CC    Progress/Outcomes (Transfer Goal 2, OT-IRF)  goal met  -RD  goal met;goal revised this date  -CC  goal revised this date  -CC       Transfer Goal 3 (OT-IRF)    Activity/Assistive Device (Transfer Goal 3, OT-IRF)  --  --  shower chair  -CC    [place] Level (Transfer Goal 3, OT-IRF)  --  --  maximum assist (25-49% patient effort)  -CC    Time Frame (Transfer Goal 3, OT-IRF)  --  --  short term goal (STG);1 week  -CC    Progress/Outcomes (Transfer Goal 3, OT-IRF)  --  --  goal met  -CC       Transfer Goal 4 (OT-IRF)    Activity/Assistive Device (Transfer Goal 4, OT-IRF)  shower chair  -RD  shower chair  -CC  shower chair  -CC    [place] Level (Transfer Goal 4, OT-IRF)  minimum assist (75% or more patient effort);contact guard assist  -RD  minimum assist (75% or more patient effort);contact guard assist  -CC  minimum assist (75% or more patient effort);moderate assist (50-74% patient effort)  -CC    Time Frame (Transfer Goal 4, OT-IRF)  long term goal (LTG);by discharge  -RD  long term goal (LTG);by discharge  -CC  long term goal (LTG);by discharge  -CC    Progress/Outcomes (Transfer Goal 4, OT-IRF)  goal met  -RD  goal met;goal revised this date  -CC  goal ongoing  -CC       Bathing Goal 1 (OT-IRF)    Activity/Device (Bathing Goal 1, OT-IRF)  --  --  bathing skills, all  -CC    [place] Level (Bathing Goal 1, OT-IRF)   --  --  moderate assist (50-74% patient effort)  -CC    Time Frame (Bathing Goal 1, OT-IRF)  --  --  short term goal (STG);1 week  -CC    Progress/Outcomes (Bathing Goal 1, OT-IRF)  --  --  goal met  -CC       Bathing Goal 2 (OT-IRF)    Activity/Device (Bathing Goal 2, OT-IRF)  --  bathing skills, all  -CC  bathing skills, all  -CC    [place] Level (Bathing Goal 2, OT-IRF)  --  minimum assist (75% or more patient effort)  -CC  minimum assist (75% or more patient effort)  -CC    Time Frame (Bathing Goal 2, OT-IRF)  --  long term goal (LTG);by discharge  -CC  long term goal (LTG);by discharge  -CC    Progress/Outcomes (Bathing Goal 2, OT-IRF)  --  goal met  -CC  goal ongoing  -CC       UB Dressing Goal 1 (OT-IRF)    Activity/Device (UB Dressing Goal 1, OT-IRF)  --  upper body dressing  -CC  upper body dressing  -CC    [place] (UB Dress Goal 1, OT-IRF)  --  moderate assist (50-74% patient effort)  -CC  moderate assist (50-74% patient effort)  -CC    Time Frame (UB Dressing Goal 1, OT-IRF)  --  short term goal (STG);1 week  -CC  short term goal (STG);1 week  -CC    Progress/Outcomes (UB Dressing Goal 1, OT-IRF)  --  goal met  -CC  goal met  -CC       UB Dressing Goal 2 (OT-IRF)    Activity/Device (UB Dressing Goal 2, OT-IRF)  --  upper body dressing  -CC  upper body dressing  -CC    [place] (UB Dress Goal 2, OT-IRF)  --  supervision required  -CC  supervision required  -CC    Time Frame (UB Dressing Goal 2, OT-IRF)  --  long term goal (LTG);by discharge  -CC  long term goal (LTG);by discharge  -CC    Progress/Outcomes (UB Dressing Goal 2, OT-IRF)  --  continuing progress toward goal  -CC  goal revised this date  -CC       LB Dressing Goal 1 (OT-IRF)    Activity/Device (LB Dressing Goal 1, OT-IRF)  --  --  lower body dressing  -CC    [place] (LB Dressing Goal 1, OT-IRF)  --  --  maximum assist (25-49% patient effort)  -CC    Time Frame (LB Dressing Goal 1, OT-IRF)  --  --  short term goal (STG);1  week  -CC    Progress/Outcomes (LB Dressing Goal 1, OT-IRF)  --  --  goal met  -CC       LB Dressing Goal 2 (OT-IRF)    Activity/Device (LB Dressing Goal 2, OT-IRF)  lower body dressing  -RD  lower body dressing  -CC  lower body dressing  -CC    [place] (LB Dressing Goal 2, OT-IRF)  minimum assist (75% or more patient effort);contact guard assist  -RD  minimum assist (75% or more patient effort);contact guard assist  -CC  minimum assist (75% or more patient effort);contact guard assist  -CC    Time Frame (LB Dressing Goal 2, OT-IRF)  long term goal (LTG);by discharge  -RD  long term goal (LTG);by discharge  -CC  long term goal (LTG);by discharge  -CC    Progress/Outcomes (LB Dressing Goal 2, OT-IRF)  goal met  -RD  continuing progress toward goal  -CC  goal revised this date  -CC       Toileting Goal 1 (OT-IRF)    Activity/Device (Toileting Goal 1, OT-IRF)  --  toileting skills, all  -CC  toileting skills, all  -CC    [place] Level (Toileting Goal 1, OT-IRF)  --  moderate assist (50-74% patient effort)  -CC  moderate assist (50-74% patient effort)  -CC    Time Frame (Toileting Goal 1, OT-IRF)  --  long term goal (LTG);by discharge  -CC  long term goal (LTG);by discharge  -CC    Progress/Outcomes (Toileting Goal 1, OT-IRF)  --  goal met  -CC  goal ongoing  -CC       Strength Goal 1 (OT-IRF)    Strength Goal 1 (OT-IRF)  Pt to tolerate UE strengthing to improve overall ROM and functional use of UE.  -RD  Pt to tolerate UE strengthing to improve overall ROM and functional use of UE.  -CC  Pt to tolerate UE strengthing to improve overall ROM and functional use of UE.  -CC    Time Frame (Strength Goal 1, OT-IRF)  long term goal (LTG);by discharge  -RD  long term goal (LTG);by discharge  -CC  long term goal (LTG);by discharge  -CC    Progress/Outcomes (Strength Goal 1, OT-IRF)  goal met  -RD  goal ongoing  -CC  goal ongoing  -CC       Balance Goal 1 (OT)    Activity/Assistive Device (Balance Goal 1, OT)  --   sitting, static  -CC  sitting, static  -CC    [place] Level/Cues Needed (Balance Goal 1, OT)  --  contact guard assist  -CC  contact guard assist  -CC    Time Frame (Balance Goal 1, OT)  --  long term goal (LTG);by discharge  -CC  long term goal (LTG);by discharge  -CC    Progress/Outcomes (Balance Goal 1, OT)  --  goal met  -CC  goal met  -CC       Caregiver Training Goal 1 (OT-IRF)    Caregiver Training Goal 1 (OT-IRF)  Pt and family to be indep with ADLs, functional transfers, AD/AE, and HEP for safe d/c home.  -RD  Pt and family to be indep with ADLs, functional transfers, AD/AE, and HEP for safe d/c home.  -CC  Pt and family to be indep with ADLs, functional transfers, AD/AE, and HEP for safe d/c home.  -CC    Time Frame (Caregiver Training Goal 1, OT-IRF)  long term goal (LTG);by discharge  -RD  long term goal (LTG);by discharge  -CC  long term goal (LTG);by discharge  -CC    Progress/Outcomes (Caregiver Training Goal 1, OT-IRF)  goal met  -RD  goal ongoing  -CC  goal ongoing  -CC      User Key  (r) = Recorded By, (t) = Taken By, (c) = Cosigned By    Initials Name Provider Type    Neris Linn OTR Occupational Therapist    Laurita Lawrence, OT Occupational Therapist                OT Recommendation and Plan  Anticipated Discharge Disposition (OT): home with 24/7 care, home with home health               Time Calculation:    Time Calculation- OT     Row Name 01/24/20 1416 01/24/20 1415          Time Calculation- OT    OT Start Time  1330  -RD  1000  -RD     OT Stop Time  1400  -RD  1030  -RD     OT Time Calculation (min)  30 min  -RD  30 min  -RD     OT Received On  01/24/20  -RD  01/24/20  -RD       User Key  (r) = Recorded By, (t) = Taken By, (c) = Cosigned By    Initials Name Provider Type    Laurita Lawrence, OT Occupational Therapist          Therapy Charges for Today     Code Description Service Date Service Provider Modifiers Qty    [id number] HC OT SELF CARE/MGMT/TRAIN EA 15  MIN 1/24/2020 Laurita Wayne, OT GO 2    [id number]  OT THER PROC EA 15 MIN 1/24/2020 Laurita Wayne, OT GO 2               OT Discharge Summary  Anticipated Discharge Disposition (OT): home with 24/7 care, home with home health  Reason for Discharge: All goals achieved, Maximum functional potential achieved  Outcomes Achieved: Able to achieve all goals within established timeline  Discharge Destination: Home with assist, Home with home health    Laurita Wayne OT  1/24/2020

## 2020-01-24 NOTE — PLAN OF CARE
Problem: Patient Care Overview  Goal: Coping Plan  Outcome: Ongoing (interventions implemented as appropriate)     Problem: Skin Injury Risk (Adult)  Goal: Skin Health and Integrity  Outcome: Ongoing (interventions implemented as appropriate)     Problem: Fall Risk (Adult)  Goal: Absence of Fall  Outcome: Ongoing (interventions implemented as appropriate)       Pt A/Ox1, calm/cooperative, and follows commands. Patient's mother is at bedside. Meds given whole in applesauce one at a time w/o difficulty. Pt OOB x1 w walker or w/c with verbal cues. Pt slept most of night. Pt continent and incontinent of urine. No acute events overnight.

## 2020-01-24 NOTE — THERAPY TREATMENT NOTE
Inpatient Rehabilitation - Speech Language Pathology    [facility]       Patient Name: Tye JONES Junior  : 1975  MRN: [id number]    Today's Date: 2020           Admit Date: 2020      Visit Dx:        ICD-10-CM ICD-9-CM   1. Impaired mobility Z74.09 799.89   2. Seizure (CMS/HCC) R56.9 780.39       Patient Active Problem List   Diagnosis   • Mixed hyperlipidemia   • Essential hypertension   • Traumatic brain injury (CMS/HCC)   • Acute allergic rhinitis   • Seizure (CMS/HCC)   • Screen for colon cancer   • H/O traumatic brain injury   • Anemia   • Serum gamma globulin increased          Therapy Treatment    Evaluation/Coping    Evaluation/Treatment Time and Intent  Subjective Information: no complaints (20 : Jayna Coker, MS CCC-SLP)  Existing Precautions/Restrictions: fall (20 : Jayna Coker, MS CCC-SLP)  Document Type: therapy note (daily note) (20 : Jayna Coker MS CCC-SLP)  Mode of Treatment: individual therapy, speech-language pathology (2046 : Jayna Coker, MS CCC-SLP)  Patient/Family Observations: cooperative (20 : Jayna Coker, MS CCC-SLP)    Vitals/Pain/Safety         Cognition/Communication         Oral Motor/Eating         Mobility/Basic Activities/Instrumental Activities/Motor/Modality                   ROM/MMT                   Sensory/Myotome/Dermatome/Edema               Posture/Balance/Special Tests/Exercise/Transportation/Sexual Function                   Orthotics/Residual Limb/Prosthetic Management              Outcome Summary         EDUCATION    The patient has been educated in the following areas:     Cognitive Impairment Communication Impairment.    SLP Recommendation and Plan                        Anticipated Dischage Disposition: home with  care, home with home health                                 SLP GOALS     Row Name 20 0800 20 1100 20 08       Comprehend Questions Goal 1 (SLP)    Progress (Ability to  Comprehend Questions Goal 1, SLP)  90%;independently (over 90% accuracy) 2 item comparisons  -SL  --  --    Progress/Outcomes (Comprehend Questions Goal 1, SLP)  goal ongoing  -SL  --  --       Connected Speech to Express Thoughts Goal 1 (SLP)    Progress/Outcomes (Connected Speech Goal 1, SLP)  --  goal ongoing  -SL  --    Comment (Connected Speech Goal 1, SLP)  --  speech more fluent for singing songs vs spont prodction of ideas  -SL  --       Orientation Goal 1 (SLP)    Progress (Orientation Goal 1, SLP)  --  --  80%;independently (over 90% accuracy);with minimal cues (75-90%)  -SL    Progress/Outcomes (Orientation Goal 1, SLP)  --  --  goal ongoing  -SL       Memory Skills Goal 1 (SLP)    Progress (Memory Skills Goal 1, SLP)  --  --  50%;independently (over 90% accuracy);with minimal cues (75-90%) recall of short story  -SL    Progress/Outcomes (Memory Skills Goal 1, SLP)  --  --  goal ongoing  -SL       Organizational Skills Goal 1 (SLP)    Progress (Thought Organization Skills Goal 1, SLP)  100%;independently (over 90% accuracy) convergent categorization and simple word deductions  -SL  100%;with minimal cues (75-90%) classification by varying features  -SL  60%;70%;independently (over 90% accuracy);90%;100%;with minimal cues (75-90%) category matrix- initial letter restrictions  -SL    Progress/Outcomes (Thought Organization Skills Goal 1, SLP)  goal ongoing  -SL  goal ongoing  -SL  goal ongoing  -SL    Comment (Thought Organization Skills Goal 1, SLP)  --  required extra time for response formulation and min intermittent verbal cues  -SL  --       Reasoning Goal 1 (SLP)    Progress (Reasoning Goal 1, SLP)  50%;independently (over 90% accuracy) cause and effect reasoning  -SL  --  --    Progress/Outcomes (Reasoning Goal 1, SLP)  goal ongoing  -SL  --  --       Functional Problem Solving Skills Goal 1 (SLP)    Progress (Problem Solving Goal 1, SLP)  --  70%;with minimal cues (75-90%) compare/contrast  -SL  --     Progress/Outcomes (Problem Solving Goal 1, SLP)  --  goal ongoing  -  --    Row Name 01/22/20 1400 01/22/20 1100 01/22/20 0800       Oral Nutrition/Hydration Goal 1 (SLP)    Oral Nutrition/Hydration Goal 1, [facility]  Patient will safely swallow regular diet and thin liquids without overt s/s of pen/aspiration.   -ML  --  --    Time Frame (Oral Nutrition/Hydration Goal 1, SLP)  by discharge  -ML  --  --       Orientation Goal 1 (SLP)    Progress (Orientation Goal 1, SLP)  --  --  80%;independently (over 90% accuracy)  -SL    Progress/Outcomes (Orientation Goal 1, SLP)  --  --  goal ongoing  -       Memory Skills Goal 1 (SLP)    Progress (Memory Skills Goal 1, SLP)  --  60%;70%;with minimal cues (75-90%) recall of 2/3 errands after 10 min  -SL  50%;with minimal cues (75-90%);with moderate cues (50-74%) recall- short stories presented auditorilly  -    Progress/Outcomes (Memory Skills Goal 1, SLP)  --  goal ongoing  -SL  goal ongoing  -       Organizational Skills Goal 1 (SLP)    Barriers (Thought Organization Skills Goal 1, SLP)  --  slow to formulate ideas, but able to name 5 items in simple concrete categories indep  -  --    Progress (Thought Organization Skills Goal 1, SLP)  --  100%;independently (over 90% accuracy) divergent naming- concrete categories- 5 items  -  --    Progress/Outcomes (Thought Organization Skills Goal 1, SLP)  --  goal ongoing  -  --       Reasoning Goal 1 (SLP)    Barriers (Reasoning Goal 1, SLP)  --  --  slow to respond; multipe reps of stimulus required, intermittent cues and prompts  -SL    Progress (Reasoning Goal 1, SLP)  --  --  60%;independently (over 90% accuracy);80%;with minimal cues (75-90%) simple word deduction  -SL    Progress/Outcomes (Reasoning Goal 1, SLP)  --  --  goal ongoing  -       Functional Problem Solving Skills Goal 1 (SLP)    Progress (Problem Solving Goal 1, SLP)  --  60%;with moderate cues (50-74%) stating effects  -  --    Progress/Outcomes  (Problem Solving Goal 1, SLP)  --  goal ongoing  -SL  --    Comment (Problem Solving Goal 1, SLP)  --  tangential and perseverative responses- difficulty formulating ideas with delays and struggle- word and phrase repetitions and tangential ispeech  -SL  --    Row Name 01/21/20 1100             Oral Nutrition/Hydration Goal 1 (SLP)    Barriers (Oral Nutrition/Hydration Goal 1, SLP)  repeat VFSS deferred today due to radiology /acute schedule- will complete tomorrow   -SL      Progress/Outcomes (Oral Nutrition/Hydration Goal 1, SLP)  goal ongoing;continuing progress toward goal  -SL         Labial Strengthening Goal 1 (SLP)    Activity (Labial Strengthening Goal 1, SLP)  increase labial tone  -SL      [place]/Accuracy (Labial Strengthening Goal 1, SLP)  with minimal cues (75-90% accuracy)  -SL      Barriers (Labial Strengthening Goal 1, SLP)  improved ROM and strength apparent  -SL      Progress/Outcomes (Labial Strengthening Goal 1, SLP)  goal ongoing  -SL         Lingual Strengthening Goal 1 (SLP)    Increase Tongue Back Strength  lingual movement exercises;lingual resistance exercises  -SL      [place]/Accuracy (Lingual Strengthening Goal 1, SLP)  with moderate cues (50-74% accuracy)  -SL      Progress/Outcomes (Lingual Strengthening Goal 1, SLP)  goal ongoing  -SL         Pharyngeal Strengthening Exercise Goal 1 (SLP)    Increase Superior Movement of the Hyolaryngeal Complex  effortful pitch glide (falsetto + pharyngeal squeeze);hard effortful swallow;falsetto;breath hold exercises;carmelita  -SL      Increase Anterior Movement of the Hyolaryngeal Complex  carmelita;effortful pitch glide (falsetto + pharyngeal squeeze);hard effortful swallow;falsetto  -SL      Increase Squeeze/Positive Pressure Generation  hard effortful swallow;falsetto;effortful pitch glide (falsetto + pharyngeal squeeze)  -SL      Increase Tongue Base Retraction  carmelita posterior phoneme productions  -SL      [place]/Accuracy  (Pharyngeal Strengthening Goal 1, SLP)  with moderate cues (50-74% accuracy)  -SL      Progress/Outcomes (Pharyngeal Strengthening Goal 1, SLP)  goal ongoing  -SL        User Key  (r) = Recorded By, (t) = Taken By, (c) = Cosigned By    Initials Name Provider Type    Jayna Barnes MS CCC-SLP Speech and Language Pathologist     Reanna Aleman MS CCC-SLP Speech and Language Pathologist             SLP Outcome Measures (last 72 hours)      SLP Outcome Measures     Row Name 01/24/20 0700 01/22/20 1600          SLP Outcome Measures    Outcome Measure Used?  Adult NOMS  -SL  Adult NOMS  -ML        Adult FCM Scores    FCM Chosen  --  Swallowing  -ML     Swallowing FCM Score  --  7  -ML     Memory FCM Score  4  -SL  --       User Key  (r) = Recorded By, (t) = Taken By, (c) = Cosigned By    Initials Name Effective Dates     Sheela MS Jayna CCC-SLP 06/08/18 -     Reanna Tavarez MS CCC-SLP 10/04/18 -               Time Calculation:       Time Calculation- SLP     Row Name 01/24/20 0900             Time Calculation- SLP    SLP Start Time  0830  -      SLP Stop Time  0900  -      SLP Time Calculation (min)  30 min  -        User Key  (r) = Recorded By, (t) = Taken By, (c) = Cosigned By    Initials Name Provider Type    Jayna Barnes MS CCC-SLP Speech and Language Pathologist            Therapy Charges for Today     Code Description Service Date Service Provider Modifiers Qty    21264642547 HC ST DEV OF COGN SKILLS INITIAL 15 MIN 1/23/2020 Jayna Coker MS CCC-SLP  1    33521182265 HC ST DEV OF COGN SKILLS EACH ADDT'L 15 MIN 1/23/2020 Jyana Coker MS CCC-SLP  1    88942052484 HC ST DEV OF COGN SKILLS EACH ADDT'L 15 MIN 1/23/2020 Jayna Coker MS CCC-SLP  2    52506774060 HC ST DEV OF COGN SKILLS INITIAL 15 MIN 1/24/2020 Jayna Coker MS CCC-SLP  1    45098352165 HC ST DEV OF COGN SKILLS EACH ADDT'L 15 MIN 1/24/2020 Jayna Coker MS CCC-SLP  1               ADULT NOMS (last 72 hours)      Adult NOMS     Row Name  01/24/20 0700 01/22/20 1600                Adult FCM Scores    FCM Chosen  --  Swallowing  -ML       Swallowing FCM Score  --  7  -ML       Memory FCM Score  4  -SL  --         User Key  (r) = Recorded By, (t) = Taken By, (c) = Cosigned By    Initials Name Effective Dates    Jayna Barnes, MS CCC-SLP 06/08/18 -     Reanna Tavarez MS CCC-SLP 10/04/18 -                      Jayna Coker MS CCC-SLP  1/24/2020

## 2020-01-24 NOTE — PROGRESS NOTES
SECTION GG      Mobility Performance Discharge:     Roll Left and Right: [place] provides verbal cues and/or touching/steadying  and/or contact guard assistance as patient completes activity. Assistance may be  provided throughout the activity or intermittently.   Sit to Lying: [place] provides verbal cues and/or touching/steadying and/or  contact guard assistance as patient completes activity. Assistance may be  provided throughout the activity or intermittently.   Lying to Sitting on Side of Bed: [place] provides verbal cues and/or  touching/steadying and/or contact guard assistance as patient completes  activity. Assistance may be provided throughout the activity or intermittently.   Sit to Stand: [place] provides verbal cues and/or touching/steadying and/or  contact guard assistance as patient completes activity. Assistance may be  provided throughout the activity or intermittently.   Chair/Bed to Chair Transfer: [place] provides verbal cues and/or  touching/steadying and/or contact guard assistance as patient completes  activity. Assistance may be provided throughout the activity or intermittently.   Car Transfer: [place] provides verbal cues and/or touching/steadying and/or  contact guard assistance as patient completes activity. Assistance may be  provided throughout the activity or intermittently.   Walk 10 Feet:   [place] does less than half the effort. [place] lifts, holds or  supports trunk or limbs but provides less than half the effort.  Walk 50 Feet with 2 Turns:   [place] does less than half the effort. [place]  lifts, holds or supports trunk or limbs but provides less than half the effort.  Walk 150 Feet:   [place] does less than half the effort. [place] lifts, holds or  supports trunk or limbs but provides less than half the effort.  Walking 10 Feet on Uneven Surfaces:   Not attempted due to medical or safety  concerns.  1 Step Over Curb or Up/Down Stair:   [place] provides verbal cues and/or  touching/steadying  and/or contact guard assistance as patient completes  activity. Assistance may be provided throughout the activity or intermittently.  4 Steps Up and Down, With/Without Rail:   [place] provides verbal cues and/or  touching/steadying and/or contact guard assistance as patient completes  activity. Assistance may be provided throughout the activity or intermittently.  12 Steps Up and Down, With/Without Rail:   Not attempted due to medical or  safety concerns.  Picking up an Object:   Not attempted due to medical or safety concerns. Uses  Wheelchair and/or Scooter: Yes  Wheel 50 Feet with Two Turns: Not applicable.  Type of Wheelchair or Scooter Used: Manual  Wheel 150 Feet: Not applicable.  Type of Wheelchair or Scooter Used: Manual    Signed by: Anmol Joseph PT Student     - CoSigned By: Nhung Rock PT 1/24/2020 8:10:44 AM

## 2020-01-24 NOTE — PLAN OF CARE
Problem: Patient Care Overview  Goal: Plan of Care Review  Outcome: Outcome(s) achieved  Flowsheets  Taken 1/24/2020 1629  Progress, Functional Goals: functional independence achieved  IRF Plan of Care Review: outcomes achieved  Taken 1/24/2020 0950  Plan of Care Reviewed With: patient;mother  Note:   Pt alert, oriented to self, not oriented to time or place. Meds whole in applesauce without difficulty. Denies pain. Cont B&B. Ambulatory to BT with walker and CGA. Pt DC instructions discussed with pt and mother with verbalized understanding and copy of DC instructions given. DC home with mother and sister via WC off of unit with plans for home health care. Answered all questions. DC off unit at 1610.

## 2020-01-24 NOTE — PROGRESS NOTES
SECTION GG      Self Care Performance Discharge:   Oral Hygiene: [place] sets up or cleans up; patient completes activity. [place]  assists only prior to or following the activity.   Toileting Hygiene: : [place] provides verbal cues and/or touching/steadying  and/or contact guard assistance as patient completes activity.   Shower/Bathe Self: [place] provides verbal cues and/or touching/steadying and/or  contact guard assistance as patient completes activity.   Upper Body Dressing: [place] provides verbal cues and/or touching/steadying  and/or contact guard assistance as patient completes activity.   Lower Body Dressing: [place] provides verbal cues and/or touching/steadying  and/or contact guard assistance as patient completes activity.   Putting On/Taking Off Footwear: [place] provides verbal cues and/or  touching/steadying and/or contact guard assistance as patient completes  activity.    Mobility Toilet Transfer Discharge: [place] provides verbal cues or  touching/steadying assistance as patient completes activity.    Signed by: Laurita Wheeler OT

## 2020-01-24 NOTE — PROGRESS NOTES
Inpatient Rehabilitation Plan of Care Note    Plan of Care  Care Plan Reviewed - No updates at this time.    Sphincter Control    Performed Intervention(s)  Monitor I&O  check for incontinence, offer toileting q2-3hrs  miralax daily-hold if needed.      Safety    Performed Intervention(s)  Safety rounds/bed alarm/ chair alarm on  Falls precautio/call light within easy reach      Psychosocial    Performed Intervention(s)  Offer support/Encourage patient to verbalize any concerns      Body Systems    Performed Intervention(s)  Skin care q shift and PRN  Assist to turn patient q 2-3hrs.    Signed by: Joanne Weiner RN

## 2020-01-24 NOTE — PROGRESS NOTES
Occupational Therapy: Individual: 60 minutes.    Physical Therapy: [facility]    Speech Language Pathology:  [facility]    Signed by: Laurtia Wheeler OT
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
## 2020-01-24 NOTE — PROGRESS NOTES
LOS: 18 days   Patient Care Team:  Jolene Luarent MD as PCP - General (Family Medicine)  Efren Martínez MD as PCP - Claims Attributed    Chief Complaint:   Status post infected  shunt-removed-CNS infection/pneumocephalus  Status post 12/23/ 2019 - Removal of ventriculoperitoneal shunt intracranial portion, valve, partial peritoneal down to the cervical region  Status post 12/31/2019 endoscopic repair of paranasal sinus defect  ID-ceftriaxone-antibiotics until January 14, 2020  Seizure disorder-multidrug regimen-phenytoin/Vimpat/Keppra/clonazepam/Topamax  Remote traumatic brain injury  Neuro stimulation-Adderall  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  Bilateral SCDs.  Status post acute renal pdqjbhrmqsrdb-LLP-kbbqnu creatinine.  Avoid NSAID/ACE inhibitor's.    Subjective     History of Present Illness  Patient continues doing well.  Ready for home today.    History taken from: patient chart, family    Objective     Vital Signs  Temp:  [97.4 °F (36.3 °C)-98.6 °F (37 °C)] 98.6 °F (37 °C)  Heart Rate:  [90-99] 90  Resp:  [18] 18  BP: (111-138)/(73-82) 111/73    Physical Exam  Mental status: awake and alert. NAD  HEENT: craniotomy incision healed  Pulm:CTAB, no wheezing, rales, or rhonchi  Heart: RRR, well perfused    Abdomen: normoactive bowel sounds, soft, NT , non-distended   Extremities: No cyanosis or edema   Neuro: awake   good speed with responses, following commands.   Blindness-chronic  Strength: takes resistance to bilateral upper and lower extremities both distally and proximally      Results Review:    I reviewed the patient's new clinical results.     Results from last 7 days   Lab Units 01/23/20  0541 01/20/20  0607  01/19/20  0630   WBC 10*3/mm3 6.19 7.00 6.67   HEMOGLOBIN g/dL 8.7* 9.3* 9.4*   HEMATOCRIT % 26.2* 27.5* 28.0*   PLATELETS 10*3/mm3 212 282 290     Results from last 7 days   Lab Units 01/23/20  0530 01/20/20  0607 01/19/20  0630   SODIUM mmol/L 138 139 141   POTASSIUM mmol/L 3.8 4.2 4.4   CHLORIDE mmol/L 99 101 102   CO2 mmol/L 27.7 24.9 26.3   BUN mg/dL 11 13 14   CREATININE mg/dL 0.81 0.93 1.03   CALCIUM mg/dL 8.8 9.2 9.4   BILIRUBIN mg/dL  --  0.2 0.2   ALK PHOS U/L  --  103 110   ALT (SGPT) U/L  --  26 27   AST (SGOT) U/L  --  20 21   GLUCOSE mg/dL 86 86 85       Medication Review:   Scheduled Meds:    amphetamine-dextroamphetamine XR 30 mg Oral Daily   atorvastatin 10 mg Oral Daily   cholecalciferol 400 Units Oral Daily   clonazePAM 1 mg Oral Q8H   folic acid 1 mg Oral Daily   hydrOXYzine pamoate 50 mg Oral Nightly   lacosamide 200 mg Oral Q12H   levETIRAcetam 500 mg Oral Q12H   metoprolol tartrate 50 mg Oral Q12H   nystatin 1 application Topical Q12H   pantoprazole 40 mg Oral Q AM   phenytoin 100 mg Oral Q8H   polyethylene glycol 17 g Oral Daily   potassium chloride 10 mEq Oral Daily   topiramate 25 mg Oral Daily   vitamin B-12 1,000 mcg Oral Daily     Continuous Infusions:   PRN Meds:.•  acetaminophen    Assessment/Plan       H/O traumatic brain injury    Anemia    Serum gamma globulin increased  Status post infected  shunt-removed-CNS infection/pneumocephalus  -Status post 12/23/ 2019 - Removal of ventriculoperitoneal shunt intracranial portion, valve, partial peritoneal down to the cervical region  -Status post 12/31/2019 endoscopic repair of paranasal sinus defect  -ID-ceftriaxone-antibiotics until January 14, 2020 January 23-remains afebrile.  White blood cell count normal.  Will check a CRP and sed rate for baseline as he is to follow-up with infectious disease as an outpatient.    Seizure disorder-multidrug regimen-phenytoin/Vimpat/Keppra/clonazepam/Topamax  -January 9-patient was on phenytoin  at home which was increased at the outside hospital, on Topamax but less than antiepileptic dose.  He had Vimpat, Keppra, clonazepam added at the outside hospital.  Consulted neurology for input patient has fatigue felt possibly related to his increased antiepileptic medication.  Reviewed with neurology and Keppra dose being decreased.  -January 10- Keppra decreased from 2000 mg BID to 50 mg BID with improved alertness. Neurology continues to follow.   -January 15-Discussed having neurology see him in follow-up at some point to review possibly decreasing the additional seizure medications further.  -January 17 - neurologist who same him at this hospital out until next week. Pharmacy checked and Vimpat will be covered as outpatient. Discussed with patient and mother possibly taper off some of additional seizure meds - clonazepam or Vimpat as continues without seizure but will await neurology input next week unless hematology recommends discontinue one due to anemia.   - January 20 - reviewed with Neurology - as tolerating current regimen, will continue same for now and have follow up with his [place] Neurologist as an outpatient for further adjustment/taper.   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  Bilateral SCDs.    Status post acute renal klnzwpeyhgrzv-JTH-adfphd creatinine.  Avoid NSAID/ACE inhibitor's.  Jan 16 - Cr improved to 1.06    Anemia-trend downward.    Placed him back on a protein pump inhibitor .Hemoccult was negative.   ? If due to recent medical issues vs medication effect.     Patient with progressive anemia.  HGB 13.2 on Dec 22. HGB 10.6 on Jan 6. HGB 8.3 on Jan 16, HGB 7.6 on Jan 18, spontaneously improved to 9.4 on Jan 19 without transfusion  Hemoccult was negative x 2.  Platelets normal.  Retic count increased at 3.56.  Iron 99, iron saturation 137, transferrin 137.  .  Patient's mother thinks his father had sickle cell trait but does remember patient ever being testing.   He has been on Dilantin and low dose Topamax chronically. Keppra is new but dose recently decreased. Vimpat is new but on quick review do not see that associated with anemia. Has been on Heparin subcutaneous for DVT prophylaxis. More mobile now and now ~ 4 weeks out so will discontinue Heparin at this point and continue with SCDs.  PPI Protonix was added Tues Jan 14 after anemia started.    Haptoglobin -157 - WNL.  Jan 20 - HGB 9.3- hematology evaluating.   Per hematology- [Continue to observe blood counts for now.  Hemoglobin seems to be stable and no indication for transfusion at this point.  We suspect that he does have a component of anemia of chronic disease.  Certainly his iron studies and ferritin from earlier in the admission are consistent with this.]  -January 23-final recommendations from hematology-  2. Normocytic anemia  · Elevated ferritin at 673 likely reactive  · Iron studies with iron 99, TIBC 204, 48% iron saturation  · Vitamin B12 543  · Copper normal at 143; zinc normal at 573  · Haptoglobin normal at 153  · Folic acid normal at 6.69  · Serum protein electrophoresis without evidence for monoclonal gammopathy  · Fecal occult blood testing negative  · Reticulocytes above normal at 3.56%  · Hemoglobin stable in the 8-9 range; 8.7 today with a normal MCV at 86.8  · Parvovirus B19 serologies IgM normal at 0.4, IgG elevated at 1.2, difficult to interpret and probably consistent with prior infection  · Medication could certainly be influencing the anemia   Recommendations:  *He continues oral  folic acid 1 mg daily and oral vitamin B12 1000 mcg daily  *Agree with CBCs Mondays and Thursdays  *He is hopeful for discharge tomorrow  *He can follow-up with primary care and if he remains anemic and needs to follow-up with us in the office he can be referred to us at that time         Sleep  -January 13- Family reports restlessness at night. Vistaril added PRN last night without response. Since vistaril has only been tried one night, will continue with Vistaril PRN at bedtime for now and continue to monitor.    -January 21- Vistaril increased to 50mg over the weekend. Will change vistaril to scheduled   -January 22- Improved sleep last night after scheduling Vistaril 50mg. Will continue.       Skin-dry rash to bilateral inner thighs-try Mycostatin powder     Now admit for comprehensive acute inpatient rehabilitation .  This would be an interdisciplinary program with physical therapy 1 hour,  occupational therapy 1 hour, and speech therapy 1 hour, 5 days a week.  Rehabilitation nursing for carryover, monitoring of incision and neurologic   status, bowel and bladder, and skin  Ongoing physician follow-up.  Weekly team conferences.  Goals are indeterminate.   Rehabilitation prognosis indeterminate.  Medical prognosis indeterminate.  Estimated length of stay is indeterminate  The patient's functional status and clinical status is unchanged from preadmission assessment and the patient continues appropriate for acute inpatient rehabilitation.  Goal is for home with outpatient   therapies.  Barrier to discharge: Cognition/mobility- work on trunk control, strength, balance to overcome.     TEAM CONF - JAN 9 - BED MAX 2.  TRANSFERS MAX 2.  GAIT 8 FEET PARALLEL BARS MOD 2. TOILET TRANSFERS MAX 2.  BLIND.  SLOW PROCESSING.  GROOMING MOD. UBD MAX. LBD DEP. DYSPHAGIA - PUREE/THINS.  FATIGUES WITH COGNITIVE  THERAPY.  ELOS :  4 WEEKS    TEAM CONF - JAN 16- BED MOD ASSIST. GAIT 120 FEET MIN ASSIST WITH WALKER.  TRANSFERS MIN  ASSIST. WILL START TRAINING WITH HIS WALKER AID FROM FROM. TOILET TRANSFERS MIN ASSIST. BATH MIN ASSIST. UBD SBA-MIN. LBD MOD-MAX. ENDURANCE IMPROVED. SWALLOW - PUREE/THIN LIQUIDS, SLOW RATE, ALTERNATE LIQUID AND SOLIDS. WILL TRY TEST TRAY BUT DOES NOT CHEW WELL OR CONTROL BOLUS. AFTERNOON FATIGUES. MOD-SEVERE COGNITIVE DEFICITS. SLOW PROCESSING. DIFFICULTY WITH THOUGHT ORGANIZATION. STILL NOT ORIENTED TO DAY/PLACE. BNE PENDING.NO SAFETY ISSUES. BLADDER CONTINENT/INCONTINENT. WILL DO TIMED VOIDS.  WILL D/C MIDLINE.   ELOS -  END OF NEXT WEEK. FAMILY CONF PENDING.     BED SBA. TRANSFERS CTG-MIN. CAR MIN ASSIST FOR CUES. 3 STAIRS CTG. GAIT 80 FEET WALKING AIDE MIN ASSIST. ALSO USE RW. BATH CTG. UBD/LBD MIN WITH CUES. GROOMING SBA EXCEPT MIN ASSIST FOR SHAVING. TOILETING MIN ASSIST. PROCESSING SPEED IMPROVED, NEEDS REPETITION. SPEECH PERSEVERATIVE /TANGENTIAL AT TIMES, SOME DIFFICULTY WITH THOUGHT ORGANIZATION.   SWALLOW - REG/THIN LIQUIDS.   BNE (Active)  Att'n. - Min Imp.  Exec. Fx. - Mod-Severely Imp.  Rsng/Jgmnt - Mildly Imp. for abstract reasoning, Mod. Imp. for common sense  jgmnt  Arith - Severely Imp.  Verbal Mem. - Mildly Imp. for immediate recall, Severely Imp. for delayed recall  Emot - Pt denied dep/anx  TIMED VOIDS FOR URINATION. OCCASIONAL BOWEL INCONTINENCE.  ELOS:  TOMORROW. HOME HEALTH PT, OT, SLP, NURSING [facility]    Home today    Janusz Solitario MD  01/24/20  10:36 AM

## 2020-01-24 NOTE — PROGRESS NOTES
Patient d/c home with sister and family today, 1/24. [facility] to provide home PT, OT,ST and NSG. Patient received rolling walker and BSC to take home. Sister has ordered bed rail and shower chair. Sister here to provide transport home.

## 2020-01-24 NOTE — PROGRESS NOTES
Inpatient Rehabilitation Plan of Care Note    Plan of Care  Care Plan Reviewed - No updates at this time.    Body Systems    [RN] Integumentary(Active)  Current Status(01/22/2020): Small rash dry skin breakdown to groin/inner thigh.  Buttocks intact with a small spot of peeling area. Pink areas noted around  penis.No open area noted. Head incision healed/closed.  Weekly Goal(01/26/2020): No further skin breakdown  Discharge Goal: Intact Skin.    Performed Intervention(s)  Skin care q shift and PRN  Assist to turn patient q 2-3hrs.      Sphincter Control    Performed Intervention(s)  Monitor I&O  check for incontinence, offer toileting q2-3hrs  miralax daily-hold if needed.      Safety    Performed Intervention(s)  Safety rounds/bed alarm/ chair alarm on  Falls precautio/call light within easy reach      Psychosocial    Performed Intervention(s)  Offer support/Encourage patient to verbalize any concerns    Signed by: Sherrie Becker RN

## 2020-01-24 NOTE — PROGRESS NOTES
Occupational Therapy: Branch    Physical Therapy: Individual: 60 minutes.    Speech Language Pathology:  Branch    Signed by: Anmol Joseph PT Student     - CoSigned By: Nhung Rock PT 1/24/2020 8:10:26 AM

## 2020-01-24 NOTE — THERAPY TREATMENT NOTE
Inpatient Rehabilitation - Physical Therapy Treatment Note  [facility]     Patient Name: Tye JONES Junior  : 1975  MRN: [id number]    Today's Date: 2020                 Admit Date: 2020      Visit Dx:      ICD-10-CM ICD-9-CM   1. Impaired mobility Z74.09 799.89   2. Seizure (CMS/HCC) R56.9 780.39       Patient Active Problem List   Diagnosis   • Mixed hyperlipidemia   • Essential hypertension   • Traumatic brain injury (CMS/HCC)   • Acute allergic rhinitis   • Seizure (CMS/HCC)   • Screen for colon cancer   • H/O traumatic brain injury   • Anemia   • Serum gamma globulin increased       Therapy Treatment    IRF Treatment Summary     Row Name 20 1100 20 1000 20 0846       Evaluation/Treatment Time and Intent    Subjective Information  no complaints  (Pended)   -NM  no complaints  -RD  no complaints  -SL    Existing Precautions/Restrictions  fall  (Pended)   -NM  fall  -RD  fall  -SL    Document Type  therapy note (daily note)  (Pended)   -NM  discharge treatment  -RD  therapy note (daily note)  -SL    Mode of Treatment  physical therapy;individual therapy  (Pended)   -NM  occupational therapy  -RD  individual therapy;speech-language pathology  -SL    Patient/Family Observations  pt in w/c; no acute distress   (Pended)   -NM  pt seated in w/c w/ no signs of acute distress in both AM and PM; pt sister present  -RD  cooperative  -SL    Recorded by [NM] Anmol Joseph, PT Student [RD] Laurita Wayne, OT [SL] Jayna Coker MS CCC-SLP    Row Name 20 1100             Caregiver Training    Caregiver(s) to be Trained  parent(s)  (Pended)  pt's mother   -NM      Caregiver Training Plan  ambulation using assistive device  (Pended)   -NM      Comment, Caregiver Training Plan  pt and pt's mother educated on use of the fww at home, as well as precuations to increase pt safety c d/c to home later today.   (Pended)   -NM      Recorded by [NM] Anmol Joseph, PT Student      Row Name  01/24/20 1100 01/24/20 1000          Cognition/Psychosocial- PT/OT    Orientation Status (Cognition)  oriented to;person;situation  (Pended)   -NM  oriented to;person;situation  -RD     Follows Commands (Cognition)  follows one step commands  (Pended)   -NM  follows one step commands;verbal cues/prompting required  -RD     Personal Safety Interventions  fall prevention program maintained;gait belt;supervised activity  (Pended)   -NM  fall prevention program maintained;gait belt;nonskid shoes/slippers when out of bed;muscle strengthening facilitated  -RD     Cognitive Function (Cognitive)  safety deficit  (Pended)   -NM  safety deficit  -RD     Safety Deficit (Cognitive)  --  insight into deficits/self awareness;judgment  -RD     Recorded by [NM] Anmol Joseph, PT Student [RD] Laurita Wayne, OT     Row Name 01/24/20 1000             Transfer Assessment/Treatment    Transfer Assessment/Treatment  toilet transfer;shower transfer  -RD      Recorded by [RD] Laurita Wayne, OT      Row Name 01/24/20 1100             Sit-Stand Transfer    Sit-Stand [place] (Transfers)  contact guard  (Pended)   -NM      Assistive Device (Sit-Stand Transfers)  wheelchair  (Pended)   -NM      Recorded by [NM] Anmol Joseph, PT Student      Row Name 01/24/20 1100             Stand-Sit Transfer    Stand-Sit [place] (Transfers)  contact guard  (Pended)   -NM      Assistive Device (Stand-Sit Transfers)  wheelchair  (Pended)   -NM      Recorded by [NM] Anmol Joseph, PT Student      Row Name 01/24/20 1000             Toilet Transfer    Type (Toilet Transfer)  sit-stand;stand-sit  -RD      [place] Level (Toilet Transfer)  contact guard;verbal cues;nonverbal cues (demo/gesture)  -RD      Assistive Device (Toilet Transfer)  grab bars/safety frame;wheelchair  -RD      Recorded by [RD] Laurita Wayne, OT      Row Name 01/24/20 1000             Shower Transfer    Type (Shower Transfer)  stand pivot/stand  step;sit-stand;stand-sit  -RD      [place] Level (Shower Transfer)  contact guard;verbal cues;nonverbal cues (demo/gesture)  -RD      Assistive Device (Shower Transfer)  grab bars/tub rail;shower chair;wheelchair  -RD      Recorded by [RD] Laurita Wayne, OT      Row Name 01/24/20 1100             Car Transfer    Type (Car Transfer)  stand-sit;sit-stand;stand pivot/stand step  (Pended)   -NM      [place] Level (Car Transfer)  contact guard;verbal cues;nonverbal cues (demo/gesture)  (Pended)   -NM      Assistive Device (Car Transfer)  --  (Pended)  walk aide/stick  -NM      Recorded by [NM] Anmol Joseph, PT Student      Row Name 01/24/20 1100             Gait/Stairs Assessment/Training    [place] Level (Gait)  contact guard;minimum assist (75% patient effort);verbal cues;nonverbal cues (demo/gesture)  (Pended)   -NM      Assistive Device (Gait)  --  (Pended)  walk aide/stick  -NM      Distance in Feet (Gait)  80x2  (Pended)   -NM      Pattern (Gait)  step-through  (Pended)   -NM      Deviations/Abnormal Patterns (Gait)  base of support, narrow;bilateral deviations;gait speed decreased  (Pended)   -NM      Bilateral Gait Deviations  weight shift ability decreased  (Pended)   -NM      Comment (Gait/Stairs)  cues for orientation to surroundings and direction.   (Pended)   -NM      Recorded by [NM] Anmol Joseph, PT Student      Row Name 01/24/20 1000             Basic Activities of Daily Living (BADLs)    Basic Activities of Daily Living  bathing;upper body dressing;lower body dressing;grooming;toileting  -RD      Recorded by [RD] Laurita Wayne, OT      Row Name 01/24/20 1000             Bathing Assessment/Treatment    Bathing [place] Level  bathing skills;upper body;lower body;contact guard assist;verbal cues  -RD      Assistive Device (Bathing)  grab bar/tub rail;hand held shower spray hose;shower chair  -RD      Bathing Position  supported sitting;supported standing  -RD       Bathing Setup Assistance  obtain supplies  -RD      Comment (Bathing)  VCs for safety   -RD      Recorded by [RD] Laurita Wayne, OT      Row Name 01/24/20 1000             Upper Body Dressing Assessment/Treatment    Upper Body Dressing Task  upper body dressing skills;doff;don;pull over garment;minimum assist (75% or more patient effort);verbal cues  -RD      Upper Body Dressing Position  supported sitting  -RD      Set-up Assistance (Upper Body Dressing)  obtain clothing  -RD      Comment (Upper Body Dressing)  min A to start donning shirt d/t vision  -RD      Recorded by [RD] Laurita Wayne, OT      Row Name 01/24/20 1000             Lower Body Dressing Assessment/Treatment    Lower Body Dressing [place] Level  doff;don;underwear;pants/bottoms;socks;shoes/slippers;shoelaces;contact guard assist;verbal cues  -RD      Lower Body Dressing Position  supported sitting;supported standing  -RD      Lower Body Dressing Setup Assistance  obtain clothing  -RD      Recorded by [RD] Laurita Wayne, OT      Row Name 01/24/20 1000             Grooming Assessment/Treatment    Grooming [place] Level  grooming skills;deodorant application;wash face, hands;set up;supervision;verbal cues  -RD      Grooming Position  sink side;supported sitting  -RD      Grooming Setup Assistance  obtain supplies  -RD      Recorded by [RD] Laurita Wayne, OT      Row Name 01/24/20 1000             Toileting Assessment/Treatment    Toileting [place] Level  toileting skills;adjust/manage clothing;perform perineal hygiene;contact guard assist;verbal cues  -RD      Assistive Device Use (Toileting)  grab bar/safety frame  -RD      Toileting Position  supported sitting;supported standing  -RD      Toileting Setup Assistance  obtain supplies  -RD      Recorded by [RD] Laurita Wayne, OT      Row Name 01/24/20 1100 01/24/20 1000          Pain Scale: Numbers Pre/Post-Treatment    Pain Scale: Numbers, Pretreatment  0/10  - no pain  (Pended)   -NM  0/10 - no pain  -RD     Pain Scale: Numbers, Post-Treatment  0/10 - no pain  (Pended)   -NM  0/10 - no pain  -RD     Recorded by [NM] Anmol Joseph, PT Student [RD] Laurita Wayne OT     Row Name 01/24/20 1000             Upper Extremity Seated Therapeutic Exercise    Performed, Seated Upper Extremity (Therapeutic Exercise)  shoulder flexion/extension;scapular protraction/retraction;elbow flexion/extension  -RD      Device, Seated Upper Extremity (Therapeutic Exercise)  restorator/arm bike 2# dowel venkat  -RD      Exercise Type, Seated Upper Extremity (Therapeutic Exercise)  resistive exercise  -RD      Expected Outcomes, Seated Upper Extremity (Therapeutic Exercise)  improve functional tolerance, self-care activity;improve performance, BADLs;improve performance, transfer skills  -RD      Sets/Reps Detail, Seated Upper Extremity (Therapeutic Exercise)  15 reps w/ 2# dowel venkat; 10 min on UBE w/ no rest breaks required  -RD      Recorded by [RD] Laurita Wayne OT      Row Name 01/24/20 1100 01/24/20 1000          Positioning and Restraints    Pre-Treatment Position  sitting in chair/recliner  (Pended)   -NM  sitting in chair/recliner both AM and PM  -RD     Post Treatment Position  wheelchair  (Pended)   -NM  wheelchair both AM and PM  -RD     In Wheelchair  call light within reach;encouraged to call for assist;exit alarm on;with family/caregiver  (Pended)  MD present   -NM  sitting;call light within reach;encouraged to call for assist;exit alarm on;with family/caregiver  -RD     Recorded by [NM] Anmol Joseph, PT Student [RD] Laurita Wayne OT       User Key  (r) = Recorded By, (t) = Taken By, (c) = Cosigned By    Initials Name Effective Dates    Jayna Barnes MS CCC-SLP 06/08/18 -     Laurita Lawrence OT 10/14/19 -     Anmol Cordero, PT Student 01/03/20 -         Wound 01/06/20 2200 head Incision (Active)   Dressing Appearance open to air 1/24/2020  9:50  AM   Closure Approximated 1/24/2020  9:50 AM   Base clean;dry 1/24/2020  9:50 AM   Periwound intact 1/23/2020  9:10 PM   Drainage Amount none 1/23/2020  9:10 PM           PT Recommendation and Plan                        Time Calculation:     PT Charges     Row Name 01/24/20 1120             Time Calculation    Start Time  0900  (Pended)   -NM      Stop Time  0930  (Pended)   -NM      Time Calculation (min)  30 min  (Pended)   -NM      PT Received On  01/24/20  (Pended)   -NM        User Key  (r) = Recorded By, (t) = Taken By, (c) = Cosigned By    Initials Name Provider Type    NM Anmol Joseph, PT Student PT Student          Therapy Charges for Today     Code Description Service Date Service Provider Modifiers Qty    67491753099 HC GAIT TRAINING EA 15 MIN 1/23/2020 Anmol Joseph, PT Student GP 1    38508279720 HC PT THERAPEUTIC ACT EA 15 MIN 1/23/2020 Anmol Joseph, PT Student GP 3    81842905060 HC GAIT TRAINING EA 15 MIN 1/24/2020 Anmol Joseph, PT Student GP 1    36815208224 HC PT THERAPEUTIC ACT EA 15 MIN 1/24/2020 Anmol Joseph, PT Student GP 1                   Anmol Joseph, PT Student  1/24/2020

## 2020-02-06 ENCOUNTER — OFFICE VISIT (OUTPATIENT)
Dept: FAMILY MEDICINE CLINIC | Facility: CLINIC | Age: 45
End: 2020-02-06

## 2020-02-06 VITALS
DIASTOLIC BLOOD PRESSURE: 72 MMHG | OXYGEN SATURATION: 98 % | BODY MASS INDEX: 24.98 KG/M2 | SYSTOLIC BLOOD PRESSURE: 124 MMHG | HEART RATE: 87 BPM | WEIGHT: 184.4 LBS | RESPIRATION RATE: 13 BRPM | HEIGHT: 72 IN | TEMPERATURE: 97.9 F

## 2020-02-06 DIAGNOSIS — Z79.899 MEDICATION MANAGEMENT: ICD-10-CM

## 2020-02-06 DIAGNOSIS — Z87.820 HISTORY OF TRAUMATIC BRAIN INJURY: ICD-10-CM

## 2020-02-06 DIAGNOSIS — R56.9 SEIZURE (HCC): Primary | ICD-10-CM

## 2020-02-06 PROBLEM — G40.901 STATUS EPILEPTICUS: Status: ACTIVE | Noted: 2020-02-06

## 2020-02-06 PROBLEM — G93.89 PNEUMOCEPHALUS: Status: ACTIVE | Noted: 2019-12-22

## 2020-02-06 PROBLEM — K66.8 PNEUMOPERITONEUM: Status: ACTIVE | Noted: 2019-12-22

## 2020-02-06 PROBLEM — G40.919 REFRACTORY SEIZURE: Status: ACTIVE | Noted: 2019-12-22

## 2020-02-06 PROCEDURE — 99214 OFFICE O/P EST MOD 30 MIN: CPT | Performed by: FAMILY MEDICINE

## 2020-02-06 RX ORDER — POTASSIUM CHLORIDE 750 MG/1
10 CAPSULE, EXTENDED RELEASE ORAL DAILY
Qty: 90 CAPSULE | Refills: 0 | Status: SHIPPED | OUTPATIENT
Start: 2020-02-06 | End: 2020-05-11

## 2020-02-06 RX ORDER — LEVETIRACETAM 1000 MG/1
2000 TABLET ORAL
COMMUNITY
Start: 2020-01-06 | End: 2020-02-06

## 2020-02-06 RX ORDER — NYSTATIN 100000 [USP'U]/G
1 POWDER TOPICAL EVERY 12 HOURS SCHEDULED
Qty: 60 G | Refills: 1 | Status: SHIPPED | OUTPATIENT
Start: 2020-02-06 | End: 2021-01-19

## 2020-02-06 RX ORDER — DEXTROAMPHETAMINE SACCHARATE, AMPHETAMINE ASPARTATE, DEXTROAMPHETAMINE SULFATE AND AMPHETAMINE SULFATE 7.5; 7.5; 7.5; 7.5 MG/1; MG/1; MG/1; MG/1
30 TABLET ORAL DAILY
Qty: 30 TABLET | Refills: 0 | Status: SHIPPED | OUTPATIENT
Start: 2020-02-06 | End: 2020-03-09 | Stop reason: SDUPTHER

## 2020-02-06 RX ORDER — HYDROXYZINE PAMOATE 50 MG/1
50 CAPSULE ORAL NIGHTLY
Qty: 90 CAPSULE | Refills: 1 | Status: SHIPPED | OUTPATIENT
Start: 2020-02-06 | End: 2020-04-07 | Stop reason: SDUPTHER

## 2020-02-07 ENCOUNTER — TELEPHONE (OUTPATIENT)
Dept: FAMILY MEDICINE CLINIC | Facility: CLINIC | Age: 45
End: 2020-02-07

## 2020-02-07 RX ORDER — PANTOPRAZOLE SODIUM 40 MG/1
40 TABLET, DELAYED RELEASE ORAL DAILY
Qty: 90 TABLET | Refills: 1 | Status: SHIPPED | OUTPATIENT
Start: 2020-02-07 | End: 2020-08-10

## 2020-02-09 LAB
ALBUMIN SERPL-MCNC: 4.4 G/DL (ref 3.5–5.2)
ALBUMIN/GLOB SERPL: 1.3 G/DL
ALP SERPL-CCNC: 103 U/L (ref 39–117)
ALT SERPL-CCNC: 21 U/L (ref 1–41)
AST SERPL-CCNC: 15 U/L (ref 1–40)
BASOPHILS # BLD AUTO: 0.03 10*3/MM3 (ref 0–0.2)
BASOPHILS NFR BLD AUTO: 0.5 % (ref 0–1.5)
BILIRUB SERPL-MCNC: <0.2 MG/DL (ref 0.2–1.2)
BUN SERPL-MCNC: 9 MG/DL (ref 6–20)
BUN/CREAT SERPL: 9.8 (ref 7–25)
CALCIUM SERPL-MCNC: 9.5 MG/DL (ref 8.6–10.5)
CHLORIDE SERPL-SCNC: 101 MMOL/L (ref 98–107)
CO2 SERPL-SCNC: 27.5 MMOL/L (ref 22–29)
CREAT SERPL-MCNC: 0.92 MG/DL (ref 0.76–1.27)
EOSINOPHIL # BLD AUTO: 0.11 10*3/MM3 (ref 0–0.4)
EOSINOPHIL NFR BLD AUTO: 2 % (ref 0.3–6.2)
ERYTHROCYTE [DISTWIDTH] IN BLOOD BY AUTOMATED COUNT: 15.5 % (ref 12.3–15.4)
GLOBULIN SER CALC-MCNC: 3.4 GM/DL
GLUCOSE SERPL-MCNC: 71 MG/DL (ref 65–99)
HCT VFR BLD AUTO: 34 % (ref 37.5–51)
HGB BLD-MCNC: 11.5 G/DL (ref 13–17.7)
IMM GRANULOCYTES # BLD AUTO: 0.02 10*3/MM3 (ref 0–0.05)
IMM GRANULOCYTES NFR BLD AUTO: 0.4 % (ref 0–0.5)
LEVETIRACETAM SERPL-MCNC: 14.3 UG/ML (ref 10–40)
LYMPHOCYTES # BLD AUTO: 1.84 10*3/MM3 (ref 0.7–3.1)
LYMPHOCYTES NFR BLD AUTO: 32.9 % (ref 19.6–45.3)
MCH RBC QN AUTO: 29.7 PG (ref 26.6–33)
MCHC RBC AUTO-ENTMCNC: 33.8 G/DL (ref 31.5–35.7)
MCV RBC AUTO: 87.9 FL (ref 79–97)
MONOCYTES # BLD AUTO: 0.64 10*3/MM3 (ref 0.1–0.9)
MONOCYTES NFR BLD AUTO: 11.4 % (ref 5–12)
NEUTROPHILS # BLD AUTO: 2.96 10*3/MM3 (ref 1.7–7)
NEUTROPHILS NFR BLD AUTO: 52.8 % (ref 42.7–76)
NRBC BLD AUTO-RTO: 0 /100 WBC (ref 0–0.2)
PHENYTOIN SERPL-MCNC: 17.8 MCG/ML (ref 10–20)
PLATELET # BLD AUTO: 213 10*3/MM3 (ref 140–450)
POTASSIUM SERPL-SCNC: 4.4 MMOL/L (ref 3.5–5.2)
PROT SERPL-MCNC: 7.8 G/DL (ref 6–8.5)
RBC # BLD AUTO: 3.87 10*6/MM3 (ref 4.14–5.8)
SODIUM SERPL-SCNC: 141 MMOL/L (ref 136–145)
WBC # BLD AUTO: 5.6 10*3/MM3 (ref 3.4–10.8)

## 2020-03-05 ENCOUNTER — TELEPHONE (OUTPATIENT)
Dept: FAMILY MEDICINE CLINIC | Facility: CLINIC | Age: 45
End: 2020-03-05

## 2020-03-05 NOTE — TELEPHONE ENCOUNTER
Patients sister called in stating that she is ineed of a letter from  stating that he can be release into his adult day care course, and needs it dated when he can go back ,when  allows him to go back

## 2020-03-06 ENCOUNTER — TELEPHONE (OUTPATIENT)
Dept: FAMILY MEDICINE CLINIC | Facility: CLINIC | Age: 45
End: 2020-03-06

## 2020-03-06 NOTE — TELEPHONE ENCOUNTER
PTS SISTER CALLED AND IS CHECKING ON THE STATUS OF THE PAPERWORK SHE DROPPED OFF YESTERDAY AND A NOTE THAT HE IS OKAY TO RETURN TO ADULT DAY CARE.    PLEASE CALL PATIENT WHEN READY TO      [phone]

## 2020-03-06 NOTE — TELEPHONE ENCOUNTER
Per Anastasiia, when in office was told that they needed to have letters from all the other providers on this patients care before she can sign off on him going back to his center. Had FD contact the sister to notify her.

## 2020-03-09 DIAGNOSIS — Z87.820 HISTORY OF TRAUMATIC BRAIN INJURY: ICD-10-CM

## 2020-03-09 RX ORDER — DEXTROAMPHETAMINE SACCHARATE, AMPHETAMINE ASPARTATE, DEXTROAMPHETAMINE SULFATE AND AMPHETAMINE SULFATE 7.5; 7.5; 7.5; 7.5 MG/1; MG/1; MG/1; MG/1
30 TABLET ORAL DAILY
Qty: 30 TABLET | Refills: 0 | Status: SHIPPED | OUTPATIENT
Start: 2020-03-09 | End: 2020-04-17 | Stop reason: SDUPTHER

## 2020-03-09 NOTE — TELEPHONE ENCOUNTER
Pts sister called on behalf of patient to request refill for amphetamine-dextroamphetamine (ADDERALL) 30 MG tablet be sent to  [facility] [place]     Pt call back   [phone]

## 2020-03-23 ENCOUNTER — TELEPHONE (OUTPATIENT)
Dept: FAMILY MEDICINE CLINIC | Facility: CLINIC | Age: 45
End: 2020-03-23

## 2020-03-23 NOTE — TELEPHONE ENCOUNTER
Pts sister states only gives to pt's once at night and he still seems to get up at night and still antsy. Pt's sister would like to give to him more, but wants to make sure it's not sedate him due to all his seizure medications he is already on. Please advise your thoughts.

## 2020-03-23 NOTE — TELEPHONE ENCOUNTER
PTS SISTER CALLED STATING PT IS STILL ANXIOUS AND IS STILL TAKING THE hydrOXYzine pamoate (VISTARIL) 50 MG capsule. SISTER WANTS TO REQUEST FOR PT TO TAKE SOMETHING DIFFERENT OR SWITCH DOSAGE OF MEDICATION.     PLEASE ADVISE     CALL BACK   [phone]

## 2020-03-24 ENCOUNTER — TELEPHONE (OUTPATIENT)
Dept: FAMILY MEDICINE CLINIC | Facility: CLINIC | Age: 45
End: 2020-03-24

## 2020-03-24 NOTE — TELEPHONE ENCOUNTER
Called both available numbers in the chart. Left VM at one when able. Calling to discuss the nighttime anxiety and sleep. Discuss med change. Will try to get in touch this week.      We can first try just to increase the nighttime hydroxyzine from 50 to 100 mg nightly. Will discuss when we reach her.

## 2020-03-26 RX ORDER — METOPROLOL TARTRATE 50 MG/1
50 TABLET, FILM COATED ORAL EVERY 12 HOURS SCHEDULED
Qty: 180 TABLET | Refills: 1 | Status: SHIPPED | OUTPATIENT
Start: 2020-03-26 | End: 2020-09-15

## 2020-03-27 ENCOUNTER — TELEPHONE (OUTPATIENT)
Dept: FAMILY MEDICINE CLINIC | Facility: CLINIC | Age: 45
End: 2020-03-27

## 2020-03-27 NOTE — TELEPHONE ENCOUNTER
Left message for Ms. Ugalde who is pt's sister and cares for him. I recommended they try to take 100 mg of the hydroxyzine instead of the 50 mg at bedtime to see if we can use it this way to have him sleep through longer and not wake up with the symptoms.   I asked that she please call and let us know she has received this message and how the higher dose is working for the pt.

## 2020-03-30 ENCOUNTER — TELEPHONE (OUTPATIENT)
Dept: FAMILY MEDICINE CLINIC | Facility: CLINIC | Age: 45
End: 2020-03-30

## 2020-04-02 ENCOUNTER — TELEPHONE (OUTPATIENT)
Dept: FAMILY MEDICINE CLINIC | Facility: CLINIC | Age: 45
End: 2020-04-02

## 2020-04-02 NOTE — TELEPHONE ENCOUNTER
PATIENTS SISTER CALLED IN STATING THAT HER BROTHER NEEDS TO STAY IN FOR 14 DAYS STARTING Monday. HE IS HIGH RISK. SHE WOULD NEED A DOCTORS NOTE STATING THAT HER BROTHER NEEDS TO STAY IN FOR 14 DAYS. SHE IS HIS PRIMARY CARE SO SHE WOULD HAVE TO MISS WORK THIS WHY SHE NEEDS THE NOTE.      PATIENT CAN BE REACHED AT:[phone]

## 2020-04-07 RX ORDER — HYDROXYZINE PAMOATE 100 MG/1
100 CAPSULE ORAL NIGHTLY
Qty: 90 CAPSULE | Refills: 1 | Status: SHIPPED | OUTPATIENT
Start: 2020-04-07 | End: 2021-04-14

## 2020-04-08 ENCOUNTER — TELEPHONE (OUTPATIENT)
Dept: FAMILY MEDICINE CLINIC | Facility: CLINIC | Age: 45
End: 2020-04-08

## 2020-04-08 NOTE — TELEPHONE ENCOUNTER
Has been called in for the pt as requested.   ----- Message from Jolene Laurent MD sent at 4/8/2020 12:40 PM EDT -----  We can substitute for the 50 mg capsules and pt can take two every night. That is what he was doing prior to the change in the dose. Please let [facility] know.   Thanks.  Dr. Laurent  ----- Message -----  From: Tuan Cazares MA  Sent: 4/8/2020  12:10 PM EDT  To: Jolene Laurent MD    Received a fax from [facility] pharmacy stating Hydroxyzine Pamoate 100mg capsules is currently on back order, did you want to substitute it or leave it as is in hopes they get in, in enough time for pt to get refilled again. Please advise your thoughts. Right now the patient is ok.

## 2020-04-17 DIAGNOSIS — Z87.820 HISTORY OF TRAUMATIC BRAIN INJURY: ICD-10-CM

## 2020-04-17 RX ORDER — DEXTROAMPHETAMINE SACCHARATE, AMPHETAMINE ASPARTATE, DEXTROAMPHETAMINE SULFATE AND AMPHETAMINE SULFATE 7.5; 7.5; 7.5; 7.5 MG/1; MG/1; MG/1; MG/1
30 TABLET ORAL DAILY
Qty: 30 TABLET | Refills: 0 | Status: SHIPPED | OUTPATIENT
Start: 2020-04-17 | End: 2020-05-14 | Stop reason: SDUPTHER

## 2020-04-17 NOTE — TELEPHONE ENCOUNTER
Patient's sister is calling for a refill of the following:    amphetamine-dextroamphetamine (ADDERALL) 30 MG tablet    [facility] [address] confirmed    Patient call back [phone]

## 2020-05-11 RX ORDER — LOVASTATIN 20 MG/1
TABLET ORAL
Qty: 90 TABLET | Refills: 0 | Status: SHIPPED | OUTPATIENT
Start: 2020-05-11 | End: 2020-08-10

## 2020-05-11 RX ORDER — POTASSIUM CHLORIDE 750 MG/1
CAPSULE, EXTENDED RELEASE ORAL
Qty: 90 CAPSULE | Refills: 0 | Status: SHIPPED | OUTPATIENT
Start: 2020-05-11 | End: 2020-05-13

## 2020-05-13 RX ORDER — POTASSIUM CHLORIDE 750 MG/1
CAPSULE, EXTENDED RELEASE ORAL
Qty: 90 CAPSULE | Refills: 0 | Status: SHIPPED | OUTPATIENT
Start: 2020-05-13 | End: 2020-11-09

## 2020-05-14 ENCOUNTER — TELEPHONE (OUTPATIENT)
Dept: FAMILY MEDICINE CLINIC | Facility: CLINIC | Age: 45
End: 2020-05-14

## 2020-05-14 DIAGNOSIS — Z87.820 HISTORY OF TRAUMATIC BRAIN INJURY: ICD-10-CM

## 2020-05-14 NOTE — TELEPHONE ENCOUNTER
PATIENT SISTER CALLED IN TO REQUEST A REFILL OF amphetamine-dextroamphetamine (ADDERALL) 30 MG tablet SENT TO [address] . PATIENT SISTER CALL BACK [phone].

## 2020-05-15 RX ORDER — DEXTROAMPHETAMINE SACCHARATE, AMPHETAMINE ASPARTATE, DEXTROAMPHETAMINE SULFATE AND AMPHETAMINE SULFATE 7.5; 7.5; 7.5; 7.5 MG/1; MG/1; MG/1; MG/1
30 TABLET ORAL DAILY
Qty: 30 TABLET | Refills: 0 | Status: SHIPPED | OUTPATIENT
Start: 2020-05-15 | End: 2020-06-15 | Stop reason: SDUPTHER

## 2020-06-15 ENCOUNTER — OFFICE VISIT (OUTPATIENT)
Dept: FAMILY MEDICINE CLINIC | Facility: CLINIC | Age: 45
End: 2020-06-15

## 2020-06-15 DIAGNOSIS — M81.8 OTHER OSTEOPOROSIS WITHOUT CURRENT PATHOLOGICAL FRACTURE: Primary | ICD-10-CM

## 2020-06-15 DIAGNOSIS — Z87.820 HISTORY OF TRAUMATIC BRAIN INJURY: ICD-10-CM

## 2020-06-15 PROCEDURE — 99442 PR PHYS/QHP TELEPHONE EVALUATION 11-20 MIN: CPT | Performed by: FAMILY MEDICINE

## 2020-06-15 RX ORDER — ALENDRONATE SODIUM 70 MG/1
70 TABLET ORAL
Qty: 12 TABLET | Refills: 0 | Status: SHIPPED | OUTPATIENT
Start: 2020-06-15 | End: 2020-09-08

## 2020-06-15 RX ORDER — DEXTROAMPHETAMINE SACCHARATE, AMPHETAMINE ASPARTATE, DEXTROAMPHETAMINE SULFATE AND AMPHETAMINE SULFATE 7.5; 7.5; 7.5; 7.5 MG/1; MG/1; MG/1; MG/1
30 TABLET ORAL DAILY
Qty: 30 TABLET | Refills: 0 | Status: SHIPPED | OUTPATIENT
Start: 2020-06-15 | End: 2020-08-04 | Stop reason: SDUPTHER

## 2020-06-15 NOTE — PROGRESS NOTES
Subjective   Tye JONES Junior is a 44 y.o. male.     Chief Complaint   Patient presents with   • ADD   • Med Management   • Insomnia      Pt on phone with sister Jada who helps in his care with medication and appointments.     History of Present Illness  Osteoporosis on recent bone scan with neurology. was to follow up regarding this.   He DXA on 5/22/2020 and reported osteoporosis in the L spine with a T score of -3.4 and high risk of fracture. He has severe osteopenia of the left hip T score -2.2 and osteoporosis of the right hip T score -2.6.     ADD  He is doing well with current medication. Helps him throughout the day with attention and focus and alertness. He has gotten back to self care. 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     Insomnia  Tends to be doing pretty well. Some nights he urinates quite a bit but he is not having accidents.  After sick this last time with some incontinence not an issue anymore.     No seizure activity. Doing well with all the medication additions.       The following portions of the patient's history were reviewed and updated as appropriate: allergies, current medications, past medical history, past social history and problem list.    Review of Systems   Constitutional: Negative for activity change, appetite change and fatigue.   Neurological: Negative for seizures and weakness.   Psychiatric/Behavioral: Negative for sleep disturbance.       Objective   There were no vitals taken for this visit.  Physical Exam  Not available on phone visit.  Assessment/Plan   Tye was seen today for add, med management and insomnia.    Diagnoses and all orders for this visit:    Other osteoporosis without current pathological fracture    History of traumatic brain injury  -     amphetamine-dextroamphetamine (ADDERALL) 30 MG tablet; Take 1 tablet by mouth Daily.    Other orders  -     alendronate (FOSAMAX) 70 MG tablet; Take 1 tablet  by mouth Every 7 (Seven) Days. Take with water 30 min before food, up right 30 min after food      Reviewed the report on his DXA from May. Went over with pt and sister. We are going to start medication for his newly found osteoporosis given high risk of fracture. We will do oral medication. Instructed on administration and precautions with this medication. To supplement vitamin D and calcium.   Refill on the adderall continues to work well.     Patient gave consent today for a telehealth video visit as following recommendations of our governor and CDC during the COVID-19 pandemic.    18 min was spent in discussion with pt and greater than 50% of that time was spent counseling.

## 2020-06-28 PROBLEM — M81.8 OTHER OSTEOPOROSIS WITHOUT CURRENT PATHOLOGICAL FRACTURE: Status: ACTIVE | Noted: 2020-06-28

## 2020-07-02 ENCOUNTER — TELEPHONE (OUTPATIENT)
Dept: FAMILY MEDICINE CLINIC | Facility: CLINIC | Age: 45
End: 2020-07-02

## 2020-07-02 NOTE — TELEPHONE ENCOUNTER
----- Message from Jolene Laurent MD sent at 6/28/2020  8:43 AM EDT -----  Regarding: call caregiver  Please call pt's caregiver/sister Jada.   He was started on fosamax for his osteoporosis at the last visit. Wanted to make sure he was able to get this and is tolerating. Also wanted to make sure while on this that he is taking calcium and vitamin D3 from over the counter. He sound take 600 mg Ca and at least 400 mg vitamin D3 daily. Thanks.

## 2020-07-06 ENCOUNTER — TELEPHONE (OUTPATIENT)
Dept: FAMILY MEDICINE CLINIC | Facility: CLINIC | Age: 45
End: 2020-07-06

## 2020-07-06 NOTE — TELEPHONE ENCOUNTER
PATIENT STATE: that he is having some right foot and ankle problems. He went to the ER and they gave him ultram 50 mg. He wanted to know if that's ok to take with his other meds. ER said that he had arthralgia.   Please advise     PATIENT CAN BE REACHED ON:[phone]    PHARMACY PREFERRED:[facility]/pharmacy #[id number] - [place], [place] - [address] [phone] [facility] [phone] FX (Going back home 7/7/2020).         HOME PHARMACY: [address] - [address] [place] [address] [phone] [facility] [phone] FX

## 2020-07-07 NOTE — TELEPHONE ENCOUNTER
I called Jada, caregiver for pt and she did not fill ultram. She has been giving tylenol and having pt elevate the foot, rubbing the foot. I said this is best. Ultram can lower his seizure threshold and I advised against it. Continue tylenol as needed and ice can help when elevated.

## 2020-07-21 ENCOUNTER — TELEPHONE (OUTPATIENT)
Dept: FAMILY MEDICINE CLINIC | Facility: CLINIC | Age: 45
End: 2020-07-21

## 2020-07-21 NOTE — TELEPHONE ENCOUNTER
Last seen 6/20.  Needs to be seen every 3-6 months to stay compliant with controlled contract. HUB please advise family member of this .

## 2020-07-21 NOTE — TELEPHONE ENCOUNTER
PATIENT'S SISTER ROSY CALLED AND WANTS TO KNOW WHEN HE SHOULD COME IN FOR MED REFILLS OR LABS.    PLEASE CALL ROSY [phone]

## 2020-08-04 DIAGNOSIS — Z87.820 HISTORY OF TRAUMATIC BRAIN INJURY: ICD-10-CM

## 2020-08-04 RX ORDER — DEXTROAMPHETAMINE SACCHARATE, AMPHETAMINE ASPARTATE, DEXTROAMPHETAMINE SULFATE AND AMPHETAMINE SULFATE 7.5; 7.5; 7.5; 7.5 MG/1; MG/1; MG/1; MG/1
30 TABLET ORAL DAILY
Qty: 30 TABLET | Refills: 0 | Status: SHIPPED | OUTPATIENT
Start: 2020-08-04 | End: 2020-08-28 | Stop reason: SDUPTHER

## 2020-08-04 NOTE — TELEPHONE ENCOUNTER
Caller: AftbaJada    Relationship: Emergency Contact    Best call back number: [phone]    Medication needed:   Requested Prescriptions     Pending Prescriptions Disp Refills   • amphetamine-dextroamphetamine (ADDERALL) 30 MG tablet 30 tablet 0     Sig: Take 1 tablet by mouth Daily.       When do you need the refill by: 8/4/20    What details did the patient provide when requesting the medication: took last pill today    Does the patient have less than a 3 day supply:  [x] Yes  [] No    What is the patient's preferred pharmacy:    [address] [phone] [facility] [phone]

## 2020-08-10 RX ORDER — LOVASTATIN 20 MG/1
TABLET ORAL
Qty: 90 TABLET | Refills: 1 | Status: SHIPPED | OUTPATIENT
Start: 2020-08-10 | End: 2021-01-11

## 2020-08-10 RX ORDER — PANTOPRAZOLE SODIUM 40 MG/1
TABLET, DELAYED RELEASE ORAL
Qty: 90 TABLET | Refills: 1 | Status: SHIPPED | OUTPATIENT
Start: 2020-08-10 | End: 2021-01-18

## 2020-08-17 NOTE — PROGRESS NOTES
Inpatient Rehabilitation Functional Measures Assessment and Plan of Care    Plan of Care  Updated Problems/Interventions  Cognition    [ST] Memory(Active)  Current Status(01/07/2020): Suspected cognitive decline from baseline, fatigue  also impacted performance during evaluation. Recommend diagnostic assessment  Weekly Goal(01/14/2020): Patient will be oriented to location and date with  cueing.  Discharge Goal: Follow a schedule and return home with supervision    Functional Measures  JENNIFER Eating:  [place]anch  [facility] Grooming: [facility] Bathing:  [facility] Upper Body Dressing:  [facility] Lower Body Dressing:  [facility] Toileting:  [facility] Bladder Management  Level of Assistance:  [place]  Frequency/Number of Accidents this Shift:  [facility] Bowel Management  Level of Assistance: [place]  Frequency/Number of Accidents this Shift: [facility] Bed/Chair/Wheelchair Transfer:  [facility] Toilet Transfer:  [facility] Tub/Shower Transfer:  [place]    Previously Documented Mode of Locomotion at Discharge: Field  JENNIFER Expected Mode of Locomotion at Discharge: [facility] Walk/Wheelchair:  [facility] Stairs:  [facility] Comprehension:  [facility] Expression:  [facility] Social Interaction:  [facility] Problem Solving:  [facility] Memory:  [place]    Therapy Mode Minutes  Occupational Therapy: Branch  Physical Therapy: Branch  Speech Language Pathology:  Branch    Signed by: Shivani Smalls MS, CCC-SLP     no

## 2020-08-28 DIAGNOSIS — Z87.820 HISTORY OF TRAUMATIC BRAIN INJURY: ICD-10-CM

## 2020-08-28 NOTE — TELEPHONE ENCOUNTER
Caller: Jada Ugalde    Relationship: Emergency Contact    Best call back number: [phone]    Medication needed:   Requested Prescriptions     Pending Prescriptions Disp Refills   • amphetamine-dextroamphetamine (ADDERALL) 30 MG tablet 30 tablet 0     Sig: Take 1 tablet by mouth Daily.       When do you need the refill by:  8/28/2020    What details did the patient provide when requesting the medication: Has 1-2 days left.    Does the patient have less than a 3 day supply:  [x] Yes  [] No    What is the patient's preferred pharmacy: [address] [phone] [facility] [phone]

## 2020-08-29 RX ORDER — DEXTROAMPHETAMINE SACCHARATE, AMPHETAMINE ASPARTATE, DEXTROAMPHETAMINE SULFATE AND AMPHETAMINE SULFATE 7.5; 7.5; 7.5; 7.5 MG/1; MG/1; MG/1; MG/1
30 TABLET ORAL DAILY
Qty: 30 TABLET | Refills: 0 | Status: SHIPPED | OUTPATIENT
Start: 2020-08-29 | End: 2020-09-01 | Stop reason: SDUPTHER

## 2020-09-01 ENCOUNTER — TELEPHONE (OUTPATIENT)
Dept: FAMILY MEDICINE CLINIC | Facility: CLINIC | Age: 45
End: 2020-09-01

## 2020-09-01 DIAGNOSIS — Z87.820 HISTORY OF TRAUMATIC BRAIN INJURY: ICD-10-CM

## 2020-09-01 RX ORDER — DEXTROAMPHETAMINE SACCHARATE, AMPHETAMINE ASPARTATE, DEXTROAMPHETAMINE SULFATE AND AMPHETAMINE SULFATE 7.5; 7.5; 7.5; 7.5 MG/1; MG/1; MG/1; MG/1
30 TABLET ORAL DAILY
Qty: 30 TABLET | Refills: 0 | Status: SHIPPED | OUTPATIENT
Start: 2020-09-01 | End: 2020-10-12 | Stop reason: SDUPTHER

## 2020-09-01 NOTE — TELEPHONE ENCOUNTER
Jada called in to follow up on the request on amphetamine-dextroamphetamine (ADDERALL) 30 MG tablet     And a order for  for his tub .         Sent to [address] [facility] - [phone] [facility] [phone]   [phone]        Jada call back [phone]

## 2020-09-08 RX ORDER — ALENDRONATE SODIUM 70 MG/1
TABLET ORAL
Qty: 12 TABLET | Refills: 1 | Status: SHIPPED | OUTPATIENT
Start: 2020-09-08 | End: 2021-03-01 | Stop reason: SDUPTHER

## 2020-09-15 RX ORDER — METOPROLOL TARTRATE 50 MG/1
TABLET, FILM COATED ORAL
Qty: 180 TABLET | Refills: 0 | Status: SHIPPED | OUTPATIENT
Start: 2020-09-15 | End: 2020-12-14

## 2020-09-17 ENCOUNTER — OFFICE VISIT (OUTPATIENT)
Dept: FAMILY MEDICINE CLINIC | Facility: CLINIC | Age: 45
End: 2020-09-17

## 2020-09-17 VITALS
DIASTOLIC BLOOD PRESSURE: 70 MMHG | BODY MASS INDEX: 29.46 KG/M2 | HEART RATE: 82 BPM | WEIGHT: 217.5 LBS | TEMPERATURE: 96.9 F | RESPIRATION RATE: 15 BRPM | HEIGHT: 72 IN | OXYGEN SATURATION: 96 % | SYSTOLIC BLOOD PRESSURE: 110 MMHG

## 2020-09-17 DIAGNOSIS — R35.0 INCREASED URINARY FREQUENCY: ICD-10-CM

## 2020-09-17 DIAGNOSIS — I10 ESSENTIAL HYPERTENSION: Primary | ICD-10-CM

## 2020-09-17 DIAGNOSIS — R42 LIGHTHEADED: ICD-10-CM

## 2020-09-17 LAB
BILIRUB BLD-MCNC: NEGATIVE MG/DL
CLARITY, POC: CLEAR
COLOR UR: NORMAL
GLUCOSE UR STRIP-MCNC: NEGATIVE MG/DL
KETONES UR QL: NEGATIVE
LEUKOCYTE EST, POC: NEGATIVE
NITRITE UR-MCNC: NEGATIVE MG/ML
PH UR: 5.5 [PH] (ref 5–8)
PROT UR STRIP-MCNC: NEGATIVE MG/DL
RBC # UR STRIP: NEGATIVE /UL
SP GR UR: 1.03 (ref 1–1.03)
UROBILINOGEN UR QL: NORMAL

## 2020-09-17 PROCEDURE — 81003 URINALYSIS AUTO W/O SCOPE: CPT | Performed by: FAMILY MEDICINE

## 2020-09-17 PROCEDURE — 90471 IMMUNIZATION ADMIN: CPT | Performed by: FAMILY MEDICINE

## 2020-09-17 PROCEDURE — 99214 OFFICE O/P EST MOD 30 MIN: CPT | Performed by: FAMILY MEDICINE

## 2020-09-17 PROCEDURE — 90686 IIV4 VACC NO PRSV 0.5 ML IM: CPT | Performed by: FAMILY MEDICINE

## 2020-09-17 RX ORDER — LACOSAMIDE 100 MG/1
TABLET, FILM COATED ORAL
COMMUNITY
Start: 2020-09-08 | End: 2020-09-17 | Stop reason: DRUGHIGH

## 2020-09-18 LAB
BUN SERPL-MCNC: 13 MG/DL (ref 6–20)
BUN/CREAT SERPL: 12.4 (ref 7–25)
CALCIUM SERPL-MCNC: 9.4 MG/DL (ref 8.6–10.5)
CHLORIDE SERPL-SCNC: 95 MMOL/L (ref 98–107)
CO2 SERPL-SCNC: 27.3 MMOL/L (ref 22–29)
CREAT SERPL-MCNC: 1.05 MG/DL (ref 0.76–1.27)
GLUCOSE SERPL-MCNC: 75 MG/DL (ref 65–99)
POTASSIUM SERPL-SCNC: 5.1 MMOL/L (ref 3.5–5.2)
SODIUM SERPL-SCNC: 151 MMOL/L (ref 136–145)

## 2020-09-22 DIAGNOSIS — I10 ESSENTIAL HYPERTENSION: ICD-10-CM

## 2020-09-23 LAB
BUN SERPL-MCNC: 14 MG/DL (ref 6–24)
BUN/CREAT SERPL: 12 (ref 9–20)
CALCIUM SERPL-MCNC: 9.3 MG/DL (ref 8.7–10.2)
CHLORIDE SERPL-SCNC: 102 MMOL/L (ref 96–106)
CO2 SERPL-SCNC: 25 MMOL/L (ref 20–29)
CREAT SERPL-MCNC: 1.15 MG/DL (ref 0.76–1.27)
GLUCOSE SERPL-MCNC: 92 MG/DL (ref 65–99)
POTASSIUM SERPL-SCNC: 4.5 MMOL/L (ref 3.5–5.2)
SODIUM SERPL-SCNC: 138 MMOL/L (ref 134–144)

## 2020-10-12 DIAGNOSIS — Z87.820 HISTORY OF TRAUMATIC BRAIN INJURY: ICD-10-CM

## 2020-10-12 NOTE — TELEPHONE ENCOUNTER
Caller: AftabJada    Relationship: Emergency Contact    Best call back number: [phone]    Medication needed:   Requested Prescriptions     Pending Prescriptions Disp Refills   • amphetamine-dextroamphetamine (ADDERALL) 30 MG tablet 30 tablet 0     Sig: Take 1 tablet by mouth Daily.       When do you need the refill by: ASAP - PT IS ALREADY OUT      Does the patient have less than a 3 day supply:  [x] Yes  [] No    What is the patient's preferred pharmacy: [address] [phone] [facility] [phone]

## 2020-10-13 RX ORDER — DEXTROAMPHETAMINE SACCHARATE, AMPHETAMINE ASPARTATE, DEXTROAMPHETAMINE SULFATE AND AMPHETAMINE SULFATE 7.5; 7.5; 7.5; 7.5 MG/1; MG/1; MG/1; MG/1
30 TABLET ORAL DAILY
Qty: 30 TABLET | Refills: 0 | Status: SHIPPED | OUTPATIENT
Start: 2020-10-13 | End: 2020-11-17 | Stop reason: SDUPTHER

## 2020-10-20 RX ORDER — HYDROXYZINE PAMOATE 50 MG/1
CAPSULE ORAL
Qty: 180 CAPSULE | Refills: 0 | Status: SHIPPED | OUTPATIENT
Start: 2020-10-20 | End: 2021-01-19 | Stop reason: SDUPTHER

## 2020-10-27 ENCOUNTER — TELEPHONE (OUTPATIENT)
Dept: FAMILY MEDICINE CLINIC | Facility: CLINIC | Age: 45
End: 2020-10-27

## 2020-10-27 NOTE — TELEPHONE ENCOUNTER
Spoke with Keyona and advised her that stewart out of the office until Monday. When she returns her orders will be faxed back then. Voiced understanding.

## 2020-11-09 RX ORDER — POTASSIUM CHLORIDE 750 MG/1
CAPSULE, EXTENDED RELEASE ORAL
Qty: 90 CAPSULE | Refills: 0 | Status: SHIPPED | OUTPATIENT
Start: 2020-11-09 | End: 2021-02-08

## 2020-11-17 DIAGNOSIS — Z87.820 HISTORY OF TRAUMATIC BRAIN INJURY: ICD-10-CM

## 2020-11-17 NOTE — TELEPHONE ENCOUNTER
Caller: Jada Ugalde    Relationship: Emergency Contact    Best call back number: [phone]    Medication needed:   Requested Prescriptions     Pending Prescriptions Disp Refills   • amphetamine-dextroamphetamine (ADDERALL) 30 MG tablet 30 tablet 0     Sig: Take 1 tablet by mouth Daily.       When do you need the refill by: ASAP - PT IS COMPLETELY OUT    What details did the patient provide when requesting the medication: N/A    Does the patient have less than a 3 day supply:  [x] Yes  [] No    What is the patient's preferred pharmacy: [address] [phone] [facility] [phone]

## 2020-11-19 RX ORDER — DEXTROAMPHETAMINE SACCHARATE, AMPHETAMINE ASPARTATE, DEXTROAMPHETAMINE SULFATE AND AMPHETAMINE SULFATE 7.5; 7.5; 7.5; 7.5 MG/1; MG/1; MG/1; MG/1
30 TABLET ORAL DAILY
Qty: 30 TABLET | Refills: 0 | Status: SHIPPED | OUTPATIENT
Start: 2020-11-19 | End: 2020-12-14 | Stop reason: SDUPTHER

## 2020-12-14 ENCOUNTER — TELEPHONE (OUTPATIENT)
Dept: FAMILY MEDICINE CLINIC | Facility: CLINIC | Age: 45
End: 2020-12-14

## 2020-12-14 DIAGNOSIS — Z87.820 HISTORY OF TRAUMATIC BRAIN INJURY: ICD-10-CM

## 2020-12-14 RX ORDER — DEXTROAMPHETAMINE SACCHARATE, AMPHETAMINE ASPARTATE, DEXTROAMPHETAMINE SULFATE AND AMPHETAMINE SULFATE 7.5; 7.5; 7.5; 7.5 MG/1; MG/1; MG/1; MG/1
30 TABLET ORAL DAILY
Qty: 30 TABLET | Refills: 0 | Status: SHIPPED | OUTPATIENT
Start: 2020-12-14 | End: 2021-01-18 | Stop reason: SDUPTHER

## 2020-12-14 RX ORDER — METOPROLOL TARTRATE 50 MG/1
TABLET, FILM COATED ORAL
Qty: 180 TABLET | Refills: 0 | Status: SHIPPED | OUTPATIENT
Start: 2020-12-14 | End: 2021-03-22

## 2020-12-14 NOTE — TELEPHONE ENCOUNTER
Caller: Jada Ugalde    Relationship: Brother/Sister    Best call back number: [phone]    Medication needed:   Requested Prescriptions     Pending Prescriptions Disp Refills   • amphetamine-dextroamphetamine (ADDERALL) 30 MG tablet 30 tablet 0     Sig: Take 1 tablet by mouth Daily.       When do you need the refill by: 12/17/20    What details did the patient provide when requesting the medication: PATIENT'S SISTER STATED THAT THE PATIENT HAS ABOUT 2 MORE TABLETS LEFT OF THIS MEDICATION.    Does the patient have less than a 3 day supply:  [x] Yes  [] No    What is the patient's preferred pharmacy: [address] [phone] [facility] [phone]

## 2021-01-11 RX ORDER — LOVASTATIN 20 MG/1
TABLET ORAL
Qty: 90 TABLET | Refills: 0 | Status: SHIPPED | OUTPATIENT
Start: 2021-01-11 | End: 2021-04-07

## 2021-01-15 ENCOUNTER — TELEPHONE (OUTPATIENT)
Dept: FAMILY MEDICINE CLINIC | Facility: CLINIC | Age: 46
End: 2021-01-15

## 2021-01-15 NOTE — TELEPHONE ENCOUNTER
Keyona with OT colitis scope faxed over 4 docs on 1/12 for the patients medical history and and a medical form. Wants to make sure they were received. Verified fax number and is correct. She is going to refax just incase. Please advise at [phone] if any questions.

## 2021-01-18 DIAGNOSIS — Z87.820 HISTORY OF TRAUMATIC BRAIN INJURY: ICD-10-CM

## 2021-01-18 RX ORDER — DEXTROAMPHETAMINE SACCHARATE, AMPHETAMINE ASPARTATE, DEXTROAMPHETAMINE SULFATE AND AMPHETAMINE SULFATE 7.5; 7.5; 7.5; 7.5 MG/1; MG/1; MG/1; MG/1
30 TABLET ORAL DAILY
Qty: 30 TABLET | Refills: 0 | Status: SHIPPED | OUTPATIENT
Start: 2021-01-18 | End: 2021-02-25 | Stop reason: SDUPTHER

## 2021-01-18 RX ORDER — PANTOPRAZOLE SODIUM 40 MG/1
TABLET, DELAYED RELEASE ORAL
Qty: 90 TABLET | Refills: 1 | Status: SHIPPED | OUTPATIENT
Start: 2021-01-18 | End: 2021-07-13

## 2021-01-18 NOTE — TELEPHONE ENCOUNTER
Caller: Jada Ugalde    Relationship: Emergency Contact    Best call back number: [phone]    Medication needed:   Requested Prescriptions     Pending Prescriptions Disp Refills   • amphetamine-dextroamphetamine (ADDERALL) 30 MG tablet 30 tablet 0     Sig: Take 1 tablet by mouth Daily.       When do you need the refill by: 01/18 ASAP    What details did the patient provide when requesting the medication: PATIENT IS COMPLETELY OUT OF MEDICATION.     Does the patient have less than a 3 day supply:  [x] Yes  [] No    What is the patient's preferred pharmacy: [address] [phone] [facility] [phone]

## 2021-01-19 ENCOUNTER — OFFICE VISIT (OUTPATIENT)
Dept: FAMILY MEDICINE CLINIC | Facility: CLINIC | Age: 46
End: 2021-01-19

## 2021-01-19 VITALS
HEART RATE: 97 BPM | WEIGHT: 218.3 LBS | RESPIRATION RATE: 17 BRPM | TEMPERATURE: 96.8 F | DIASTOLIC BLOOD PRESSURE: 82 MMHG | OXYGEN SATURATION: 99 % | HEIGHT: 72 IN | SYSTOLIC BLOOD PRESSURE: 110 MMHG | BODY MASS INDEX: 29.57 KG/M2

## 2021-01-19 DIAGNOSIS — E78.2 MIXED HYPERLIPIDEMIA: ICD-10-CM

## 2021-01-19 DIAGNOSIS — Z79.899 MEDICATION MANAGEMENT: ICD-10-CM

## 2021-01-19 DIAGNOSIS — Z87.820 H/O TRAUMATIC BRAIN INJURY: ICD-10-CM

## 2021-01-19 DIAGNOSIS — I10 ESSENTIAL HYPERTENSION: Primary | ICD-10-CM

## 2021-01-19 PROCEDURE — 99213 OFFICE O/P EST LOW 20 MIN: CPT | Performed by: FAMILY MEDICINE

## 2021-01-19 RX ORDER — LACOSAMIDE 100 MG/1
TABLET, FILM COATED ORAL
COMMUNITY
Start: 2021-01-11 | End: 2021-01-19 | Stop reason: SDUPTHER

## 2021-01-19 NOTE — PROGRESS NOTES
"Chief Complaint  Med Management (4 U needs contract renewed )    Subjective          Tye JONES Junior presents to [facility] for   History of Present Illness  Med management  Working well. He says it is still working well. Takes the adderall daily. Does his day camp three days per week and every day he has a sitter at home. She cooks his meals and exercises with him.      Urination overnight.   Thinks it has been better.   Sleep is good. He still has urination over night. Says the incontinence overnight is better though still some episodes. He has supplies for this. He is a good water drinker.    Doing exercise with his care giver.   Upper body exercises. Does with other clients via zoom  Doing push ups.  She cooks a lot and feeding him a lot. Thinks weight gain is from new caregiver.     Objective   Vital Signs:   /82 (BP Location: Left arm, Patient Position: Sitting, Cuff Size: Adult)   Pulse 97   Temp 96.8 °F (36 °C) (Infrared)   Resp 17   Ht 182.9 cm (72\")   Wt 99 kg (218 lb 4.8 oz)   SpO2 99%   BMI 29.61 kg/m²     Physical Exam  Vitals signs reviewed.   Constitutional:       General: He is not in acute distress.  Eyes:      General: No scleral icterus.        Right eye: No discharge.         Left eye: No discharge.      Conjunctiva/sclera: Conjunctivae normal.   Neck:      Musculoskeletal: Neck supple. No muscular tenderness.      Vascular: No carotid bruit.   Cardiovascular:      Rate and Rhythm: Normal rate and regular rhythm.      Heart sounds: Normal heart sounds. No murmur. No friction rub. No gallop.    Pulmonary:      Effort: Pulmonary effort is normal. No respiratory distress.      Breath sounds: Normal breath sounds. No wheezing.   Abdominal:      General: There is no distension.      Palpations: Abdomen is soft.      Tenderness: There is no abdominal tenderness.   Musculoskeletal:      Right lower leg: No edema.      Left lower leg: No edema. "   Lymphadenopathy:      Cervical: No cervical adenopathy.   Neurological:      Mental Status: He is alert and oriented to person, place, and time.   Psychiatric:         Behavior: Behavior normal.        Result Review :                 Assessment and Plan    Problem List Items Addressed This Visit        Cardiac and Vasculature    Mixed hyperlipidemia    Relevant Orders    Lipid Panel (Completed)    Essential hypertension - Primary    Relevant Orders    CBC & Differential (Completed)    Comprehensive Metabolic Panel (Completed)       Neuro    H/O traumatic brain injury      Other Visit Diagnoses     Medication management        Relevant Orders    CBC & Differential (Completed)    Comprehensive Metabolic Panel (Completed)          Follow Up   No follow-ups on file.  Patient was given instructions and counseling regarding his condition or for health maintenance advice. Please see specific information pulled into the AVS if appropriate.     Labs due to monitor given the medications he is on, need to periodically monitor blood counts and liver enzymes. Had his drug levels checked with neurology.   We had pt sign another contract for controlled substances. We are going to get this scanned in. I reviewed the document with pt and his caregiver sister Jada signed contract with him at the 6/15/20. We will use this date as the need to review date but this contract did not get scanned to the chart.

## 2021-01-20 LAB
ALBUMIN SERPL-MCNC: 4.5 G/DL (ref 3.5–5.2)
ALBUMIN/GLOB SERPL: 1.7 G/DL
ALP SERPL-CCNC: 74 U/L (ref 39–117)
ALT SERPL-CCNC: 35 U/L (ref 1–41)
AST SERPL-CCNC: 27 U/L (ref 1–40)
BASOPHILS # BLD AUTO: 0.02 10*3/MM3 (ref 0–0.2)
BASOPHILS NFR BLD AUTO: 0.4 % (ref 0–1.5)
BILIRUB SERPL-MCNC: <0.2 MG/DL (ref 0–1.2)
BUN SERPL-MCNC: 11 MG/DL (ref 6–20)
BUN/CREAT SERPL: 11.1 (ref 7–25)
CALCIUM SERPL-MCNC: 9.3 MG/DL (ref 8.6–10.5)
CHLORIDE SERPL-SCNC: 105 MMOL/L (ref 98–107)
CHOLEST SERPL-MCNC: 192 MG/DL (ref 0–200)
CO2 SERPL-SCNC: 26.6 MMOL/L (ref 22–29)
CREAT SERPL-MCNC: 0.99 MG/DL (ref 0.76–1.27)
EOSINOPHIL # BLD AUTO: 0.07 10*3/MM3 (ref 0–0.4)
EOSINOPHIL NFR BLD AUTO: 1.4 % (ref 0.3–6.2)
ERYTHROCYTE [DISTWIDTH] IN BLOOD BY AUTOMATED COUNT: 14.3 % (ref 12.3–15.4)
GLOBULIN SER CALC-MCNC: 2.7 GM/DL
GLUCOSE SERPL-MCNC: 99 MG/DL (ref 65–99)
HCT VFR BLD AUTO: 39.7 % (ref 37.5–51)
HDLC SERPL-MCNC: 58 MG/DL (ref 40–60)
HGB BLD-MCNC: 13.4 G/DL (ref 13–17.7)
IMM GRANULOCYTES # BLD AUTO: 0.01 10*3/MM3 (ref 0–0.05)
IMM GRANULOCYTES NFR BLD AUTO: 0.2 % (ref 0–0.5)
LDLC SERPL CALC-MCNC: 106 MG/DL (ref 0–100)
LYMPHOCYTES # BLD AUTO: 1.55 10*3/MM3 (ref 0.7–3.1)
LYMPHOCYTES NFR BLD AUTO: 31.1 % (ref 19.6–45.3)
MCH RBC QN AUTO: 30.2 PG (ref 26.6–33)
MCHC RBC AUTO-ENTMCNC: 33.8 G/DL (ref 31.5–35.7)
MCV RBC AUTO: 89.6 FL (ref 79–97)
MONOCYTES # BLD AUTO: 0.62 10*3/MM3 (ref 0.1–0.9)
MONOCYTES NFR BLD AUTO: 12.4 % (ref 5–12)
NEUTROPHILS # BLD AUTO: 2.72 10*3/MM3 (ref 1.7–7)
NEUTROPHILS NFR BLD AUTO: 54.5 % (ref 42.7–76)
NRBC BLD AUTO-RTO: 0 /100 WBC (ref 0–0.2)
PLATELET # BLD AUTO: 209 10*3/MM3 (ref 140–450)
POTASSIUM SERPL-SCNC: 4.8 MMOL/L (ref 3.5–5.2)
PROT SERPL-MCNC: 7.2 G/DL (ref 6–8.5)
RBC # BLD AUTO: 4.43 10*6/MM3 (ref 4.14–5.8)
SODIUM SERPL-SCNC: 141 MMOL/L (ref 136–145)
TRIGL SERPL-MCNC: 161 MG/DL (ref 0–150)
VLDLC SERPL CALC-MCNC: 28 MG/DL (ref 5–40)
WBC # BLD AUTO: 4.99 10*3/MM3 (ref 3.4–10.8)

## 2021-02-08 RX ORDER — POTASSIUM CHLORIDE 750 MG/1
CAPSULE, EXTENDED RELEASE ORAL
Qty: 90 CAPSULE | Refills: 0 | Status: SHIPPED | OUTPATIENT
Start: 2021-02-08 | End: 2021-05-11

## 2021-02-25 DIAGNOSIS — Z87.820 HISTORY OF TRAUMATIC BRAIN INJURY: ICD-10-CM

## 2021-02-25 RX ORDER — DEXTROAMPHETAMINE SACCHARATE, AMPHETAMINE ASPARTATE, DEXTROAMPHETAMINE SULFATE AND AMPHETAMINE SULFATE 7.5; 7.5; 7.5; 7.5 MG/1; MG/1; MG/1; MG/1
30 TABLET ORAL DAILY
Qty: 30 TABLET | Refills: 0 | Status: SHIPPED | OUTPATIENT
Start: 2021-02-25 | End: 2021-03-26 | Stop reason: SDUPTHER

## 2021-02-25 NOTE — TELEPHONE ENCOUNTER
Caller: AftabJada    Relationship: Emergency Contact    Best call back number: [phone]     Medication needed:   Requested Prescriptions     Pending Prescriptions Disp Refills   • amphetamine-dextroamphetamine (ADDERALL) 30 MG tablet 30 tablet 0     Sig: Take 1 tablet by mouth Daily.       When do you need the refill by: 02/25/21    Does the patient have less than a 3 day supply:  [x] Yes  [] No    What is the patient's preferred pharmacy: [address] [phone] [facility] [phone]

## 2021-03-01 RX ORDER — ALENDRONATE SODIUM 70 MG/1
70 TABLET ORAL
Qty: 12 TABLET | Refills: 1 | Status: SHIPPED | OUTPATIENT
Start: 2021-03-01 | End: 2021-08-05

## 2021-03-22 RX ORDER — METOPROLOL TARTRATE 50 MG/1
TABLET, FILM COATED ORAL
Qty: 180 TABLET | Refills: 1 | Status: SHIPPED | OUTPATIENT
Start: 2021-03-22 | End: 2021-09-14

## 2021-03-26 DIAGNOSIS — Z87.820 HISTORY OF TRAUMATIC BRAIN INJURY: ICD-10-CM

## 2021-03-26 RX ORDER — DEXTROAMPHETAMINE SACCHARATE, AMPHETAMINE ASPARTATE, DEXTROAMPHETAMINE SULFATE AND AMPHETAMINE SULFATE 7.5; 7.5; 7.5; 7.5 MG/1; MG/1; MG/1; MG/1
30 TABLET ORAL DAILY
Qty: 30 TABLET | Refills: 0 | Status: SHIPPED | OUTPATIENT
Start: 2021-03-26 | End: 2021-04-27 | Stop reason: SDUPTHER

## 2021-03-26 NOTE — TELEPHONE ENCOUNTER
Caller: AftabJada    Relationship: Emergency Contact    Best call back number:[phone]     Medication needed:   Requested Prescriptions     Pending Prescriptions Disp Refills   • amphetamine-dextroamphetamine (ADDERALL) 30 MG tablet 30 tablet 0     Sig: Take 1 tablet by mouth Daily.       When do you need the refill by: 3/29/21  What additional details did the patient provide when requesting the medication: GOING OUT OF TOWN ON Tuesday    Does the patient have less than a 3 day supply:  [] Yes  [x] No    What is the patient's preferred pharmacy: [address] - [address] [place] [address] [phone] [facility] [phone]

## 2021-04-07 RX ORDER — LOVASTATIN 20 MG/1
TABLET ORAL
Qty: 90 TABLET | Refills: 1 | Status: SHIPPED | OUTPATIENT
Start: 2021-04-07 | End: 2021-10-07

## 2021-04-14 ENCOUNTER — OFFICE VISIT (OUTPATIENT)
Dept: FAMILY MEDICINE CLINIC | Facility: CLINIC | Age: 46
End: 2021-04-14

## 2021-04-14 VITALS
BODY MASS INDEX: 29.76 KG/M2 | DIASTOLIC BLOOD PRESSURE: 92 MMHG | HEIGHT: 72 IN | RESPIRATION RATE: 16 BRPM | WEIGHT: 219.7 LBS | TEMPERATURE: 95.3 F | SYSTOLIC BLOOD PRESSURE: 112 MMHG | OXYGEN SATURATION: 100 % | HEART RATE: 75 BPM

## 2021-04-14 DIAGNOSIS — M24.574 CONTRACTURE OF JOINT OF RIGHT FOOT: ICD-10-CM

## 2021-04-14 DIAGNOSIS — M79.671 BILATERAL FOOT PAIN: Primary | ICD-10-CM

## 2021-04-14 DIAGNOSIS — M79.672 BILATERAL FOOT PAIN: Primary | ICD-10-CM

## 2021-04-14 PROCEDURE — 99213 OFFICE O/P EST LOW 20 MIN: CPT | Performed by: NURSE PRACTITIONER

## 2021-04-14 NOTE — PROGRESS NOTES
"Chief Complaint  Ankle Pain    Subjective     {Problem List  Visit Diagnosis   Encounters  Notes  Medications  Labs  Result Review Imaging  Media :23}     Tye JONES Junior presents to [facility]  History of Present Illness    Objective   Vital Signs:   Resp 16   Ht 182.9 cm (72\")   BMI 29.61 kg/m²     Physical Exam   Result Review :{Labs  Result Review  Imaging  Med Tab  Media :23}   {The following data was reviewed by (Optional):68838}  {Ambulatory Labs (Optional):76671}  {Data reviewed (Optional):09044:::1}          Assessment and Plan {CC Problem List  Visit Diagnosis  ROS  Review (Popup)  Health Maintenance  Quality  BestPractice  Medications  SmartSets  SnapShot Encounters  Media :23}   There are no diagnoses linked to this encounter.  {Time Spent (Optional):93852}  Follow Up {Instructions Charge Capture  Follow-up Communications :23}  No follow-ups on file.  Patient was given instructions and counseling regarding his condition or for health maintenance advice. Please see specific information pulled into the AVS if appropriate.       "

## 2021-04-21 ENCOUNTER — TELEPHONE (OUTPATIENT)
Dept: ORTHOPEDIC SURGERY | Facility: CLINIC | Age: 46
End: 2021-04-21

## 2021-04-27 DIAGNOSIS — Z87.820 HISTORY OF TRAUMATIC BRAIN INJURY: ICD-10-CM

## 2021-04-27 NOTE — TELEPHONE ENCOUNTER
Caller: AftabJada    Relationship: Emergency Contact    Best call back number: [phone]    Medication needed:   Requested Prescriptions     Pending Prescriptions Disp Refills   • amphetamine-dextroamphetamine (ADDERALL) 30 MG tablet 30 tablet 0     Sig: Take 1 tablet by mouth Daily.       When do you need the refill by: ASAP      Does the patient have less than a 3 day supply:  [x] Yes  [] No    What is the patient's preferred pharmacy: [address] [phone] [facility] [phone]

## 2021-04-29 RX ORDER — DEXTROAMPHETAMINE SACCHARATE, AMPHETAMINE ASPARTATE, DEXTROAMPHETAMINE SULFATE AND AMPHETAMINE SULFATE 7.5; 7.5; 7.5; 7.5 MG/1; MG/1; MG/1; MG/1
30 TABLET ORAL DAILY
Qty: 30 TABLET | Refills: 0 | Status: SHIPPED | OUTPATIENT
Start: 2021-04-29 | End: 2021-06-01 | Stop reason: SDUPTHER

## 2021-05-11 RX ORDER — POTASSIUM CHLORIDE 750 MG/1
CAPSULE, EXTENDED RELEASE ORAL
Qty: 90 CAPSULE | Refills: 1 | Status: SHIPPED | OUTPATIENT
Start: 2021-05-11 | End: 2021-10-25

## 2021-05-14 ENCOUNTER — TELEPHONE (OUTPATIENT)
Dept: FAMILY MEDICINE CLINIC | Facility: CLINIC | Age: 46
End: 2021-05-14

## 2021-05-14 NOTE — TELEPHONE ENCOUNTER
PATIENTS MOTHER NEEDS TO SCHEDULE AN APPOINTMENT WITH ORTHO DOCTOR PLEASE ADVISE    CALL BACK #: [phone]

## 2021-05-20 ENCOUNTER — OFFICE VISIT (OUTPATIENT)
Dept: ORTHOPEDIC SURGERY | Facility: CLINIC | Age: 46
End: 2021-05-20

## 2021-05-20 VITALS — WEIGHT: 217 LBS | HEIGHT: 72 IN | TEMPERATURE: 97.7 F | BODY MASS INDEX: 29.39 KG/M2

## 2021-05-20 DIAGNOSIS — M20.11 VALGUS DEFORMITY OF BOTH GREAT TOES: ICD-10-CM

## 2021-05-20 DIAGNOSIS — M20.5X2 TOE CONTRACTURE, LEFT: ICD-10-CM

## 2021-05-20 DIAGNOSIS — M85.679 BONE CYST OF ANKLE: Primary | ICD-10-CM

## 2021-05-20 DIAGNOSIS — M20.5X1 TOE CONTRACTURE, RIGHT: ICD-10-CM

## 2021-05-20 DIAGNOSIS — R52 PAIN: ICD-10-CM

## 2021-05-20 DIAGNOSIS — M20.12 VALGUS DEFORMITY OF BOTH GREAT TOES: ICD-10-CM

## 2021-05-20 PROCEDURE — 99204 OFFICE O/P NEW MOD 45 MIN: CPT | Performed by: ORTHOPAEDIC SURGERY

## 2021-05-20 PROCEDURE — 73630 X-RAY EXAM OF FOOT: CPT | Performed by: ORTHOPAEDIC SURGERY
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
## 2021-06-01 DIAGNOSIS — Z87.820 HISTORY OF TRAUMATIC BRAIN INJURY: ICD-10-CM

## 2021-06-01 NOTE — TELEPHONE ENCOUNTER
Caller: ROSY MON    Relationship: SIBLING    Best call back number: [phone]    Medication needed:   Requested Prescriptions     Pending Prescriptions Disp Refills   • amphetamine-dextroamphetamine (ADDERALL) 30 MG tablet 30 tablet 0     Sig: Take 1 tablet by mouth Daily.       When do you need the refill by: 06/03/2021    What additional details did the patient provide when requesting the medication: THE PATIENT HAS ABOUT 2 TABLETS OF THIS MEDICATION LEFT.    Does the patient have less than a 3 day supply:  [x] Yes  [] No    What is the patient's preferred pharmacy: [address] [phone] [facility] [phone]

## 2021-06-02 RX ORDER — DEXTROAMPHETAMINE SACCHARATE, AMPHETAMINE ASPARTATE, DEXTROAMPHETAMINE SULFATE AND AMPHETAMINE SULFATE 7.5; 7.5; 7.5; 7.5 MG/1; MG/1; MG/1; MG/1
30 TABLET ORAL DAILY
Qty: 30 TABLET | Refills: 0 | Status: SHIPPED | OUTPATIENT
Start: 2021-06-02 | End: 2021-07-12 | Stop reason: SDUPTHER

## 2021-06-08 ENCOUNTER — HOSPITAL ENCOUNTER (OUTPATIENT)
Dept: MRI IMAGING | Facility: HOSPITAL | Age: 46
Discharge: HOME | End: 2021-06-08
Admitting: ORTHOPAEDIC SURGERY

## 2021-06-08 DIAGNOSIS — M85.679 BONE CYST OF ANKLE: ICD-10-CM

## 2021-06-08 PROCEDURE — 73721 MRI JNT OF LWR EXTRE W/O DYE: CPT

## 2021-06-23 ENCOUNTER — TELEPHONE (OUTPATIENT)
Dept: ORTHOPEDIC SURGERY | Facility: CLINIC | Age: 46
End: 2021-06-23

## 2021-06-23 NOTE — TELEPHONE ENCOUNTER
Caller: ROSY MON    Relationship to patient: SELF    Best call back number: [phone]    Type of visit: F/U TREATMENT PLAN / MRI     If rescheduling, when is the original appointment: 7-19-21    Additional notes: PATIENT'S SISTER (CAREGIVER) WOULD LIKE A CALL BACK TO DISCUSS PATIENT BEING WORKED IN SOONER THAN 7-19-21 APPT THAT IS SCHEDULED.

## 2021-07-12 DIAGNOSIS — Z87.820 HISTORY OF TRAUMATIC BRAIN INJURY: ICD-10-CM

## 2021-07-12 NOTE — TELEPHONE ENCOUNTER
Caller: AftabJada    Relationship: Emergency Contact    Best call back number: [phone]     Medication needed:   Requested Prescriptions     Pending Prescriptions Disp Refills   • amphetamine-dextroamphetamine (ADDERALL) 30 MG tablet 30 tablet 0     Sig: Take 1 tablet by mouth Daily.       When do you need the refill by: ASAP    Does the patient have less than a 3 day supply:  [x] Yes  [] No    What is the patient's preferred pharmacy: [address] [phone] [facility] [phone]

## 2021-07-13 RX ORDER — PANTOPRAZOLE SODIUM 40 MG/1
TABLET, DELAYED RELEASE ORAL
Qty: 90 TABLET | Refills: 1 | Status: SHIPPED | OUTPATIENT
Start: 2021-07-13 | End: 2022-01-17

## 2021-07-13 RX ORDER — DEXTROAMPHETAMINE SACCHARATE, AMPHETAMINE ASPARTATE, DEXTROAMPHETAMINE SULFATE AND AMPHETAMINE SULFATE 7.5; 7.5; 7.5; 7.5 MG/1; MG/1; MG/1; MG/1
30 TABLET ORAL DAILY
Qty: 30 TABLET | Refills: 0 | Status: SHIPPED | OUTPATIENT
Start: 2021-07-13 | End: 2021-08-20 | Stop reason: SDUPTHER

## 2021-07-20 PROBLEM — N19 RENAL FAILURE SYNDROME: Status: ACTIVE | Noted: 2021-07-20

## 2021-08-05 ENCOUNTER — OFFICE VISIT (OUTPATIENT)
Dept: FAMILY MEDICINE CLINIC | Facility: CLINIC | Age: 46
End: 2021-08-05

## 2021-08-05 VITALS
DIASTOLIC BLOOD PRESSURE: 88 MMHG | RESPIRATION RATE: 16 BRPM | BODY MASS INDEX: 28.59 KG/M2 | HEIGHT: 72 IN | OXYGEN SATURATION: 99 % | HEART RATE: 73 BPM | TEMPERATURE: 95.7 F | WEIGHT: 211.1 LBS | SYSTOLIC BLOOD PRESSURE: 128 MMHG

## 2021-08-05 DIAGNOSIS — E78.2 MIXED HYPERLIPIDEMIA: ICD-10-CM

## 2021-08-05 DIAGNOSIS — Z00.00 MEDICARE ANNUAL WELLNESS VISIT, SUBSEQUENT: ICD-10-CM

## 2021-08-05 DIAGNOSIS — Z11.59 ENCOUNTER FOR HEPATITIS C SCREENING TEST FOR LOW RISK PATIENT: Primary | ICD-10-CM

## 2021-08-05 DIAGNOSIS — I10 ESSENTIAL HYPERTENSION: ICD-10-CM

## 2021-08-05 PROCEDURE — 90715 TDAP VACCINE 7 YRS/> IM: CPT | Performed by: NURSE PRACTITIONER

## 2021-08-05 PROCEDURE — G0439 PPPS, SUBSEQ VISIT: HCPCS | Performed by: NURSE PRACTITIONER

## 2021-08-05 PROCEDURE — 90471 IMMUNIZATION ADMIN: CPT | Performed by: NURSE PRACTITIONER

## 2021-08-05 RX ORDER — ALENDRONATE SODIUM 70 MG/1
TABLET ORAL
Qty: 12 TABLET | Refills: 1 | Status: SHIPPED | OUTPATIENT
Start: 2021-08-05 | End: 2021-12-15

## 2021-08-05 NOTE — TELEPHONE ENCOUNTER
Rx Refill Note  Requested Prescriptions     Pending Prescriptions Disp Refills   • alendronate (FOSAMAX) 70 MG tablet [Pharmacy Med Name: Alendronate Sodium 70 MG Oral Tablet] 12 tablet 0     Sig: Take 1 tablet by mouth once a week      Last office visit with prescribing clinician: 1/19/2021      Next office visit with prescribing clinician: Visit date not found            Tuan Cazares MA  08/05/21, 12:43 EDT

## 2021-08-05 NOTE — PROGRESS NOTES
The ABCs of the Annual Wellness Visit  Subsequent Medicare Wellness Visit    Chief Complaint   Patient presents with   • Medicare Wellness-subsequent       Subjective   History of Present Illness:  Tye JONES Junior is a 45 y.o. male who presents for a Subsequent Medicare Wellness Visit.    Working out, on rotations, doing push ups, stretching, sit ups, walking  Follow stretching exercises from OT  Walking in the neighborhood twice daily on Tuesday and Thursday    HEALTH RISK ASSESSMENT    Recent Hospitalizations:  No hospitalization(s) within the last year.    Current Medical Providers:  Patient Care Team:  Jolene Laurent MD as PCP - General (Family Medicine)    Smoking Status:  Social History     Tobacco Use   Smoking Status Never Smoker   Smokeless Tobacco Never Used       Alcohol Consumption:  Social History     Substance and Sexual Activity   Alcohol Use No       Depression Screen:   PHQ-2/PHQ-9 Depression Screening 1/19/2021   Little interest or pleasure in doing things 0   Feeling down, depressed, or hopeless 0   Total Score 0       Fall Risk Screen:  STEADI Fall Risk Assessment has not been completed.    Health Habits and Functional and Cognitive Screening:  Functional & Cognitive Status 8/5/2021   Do you have difficulty preparing food and eating? No   Do you have difficulty bathing yourself, getting dressed or grooming yourself? No   Do you have difficulty using the toilet? No   Do you have difficulty moving around from place to place? No   Do you have trouble with steps or getting out of a bed or a chair? No   Current Diet Well Balanced Diet   Dental Exam Up to date   Eye Exam Other   Exercise (times per week) 3 times per week   Current Exercises Include Walking;Cardiovascular Workout   Do you need help using the phone?  Yes   Are you deaf or do you have serious difficulty hearing?  No   Do you need help with transportation? Yes   Do you need help shopping? Yes   Do you need help preparing meals?  Yes   Do  you need help with housework?  Yes   Do you need help with laundry? Yes   Do you need help taking your medications? Yes   Do you need help managing money? Yes   Do you ever drive or ride in a car without wearing a seat belt? No   Have you felt unusual stress, anger or loneliness in the last month? No   Who do you live with? Sibling   If you need help, do you have trouble finding someone available to you? No   Have you been bothered in the last four weeks by sexual problems? No   Do you have difficulty concentrating, remembering or making decisions? Yes         Does the patient have evidence of cognitive impairment? No    Asprin use counseling:Taking ASA appropriately as indicated    Age-appropriate Screening Schedule:  Refer to the list below for future screening recommendations based on patient's age, sex and/or medical conditions. Orders for these recommended tests are listed in the plan section. The patient has been provided with a written plan.    Health Maintenance   Topic Date Due   • DXA SCAN  Never done   • INFLUENZA VACCINE  10/01/2021   • LIPID PANEL  08/05/2022   • TDAP/TD VACCINES (2 - Td or Tdap) 08/05/2031          The following portions of the patient's history were reviewed and updated as appropriate: allergies, current medications, past family history, past medical history, past social history, past surgical history and problem list.    Outpatient Medications Prior to Visit   Medication Sig Dispense Refill   • acetaminophen (TYLENOL) 325 MG tablet Take 2 tablets by mouth Every 6 (Six) Hours As Needed for Mild Pain .     • amphetamine-dextroamphetamine (ADDERALL) 30 MG tablet Take 1 tablet by mouth Daily. 30 tablet 0   • aspirin 81 MG chewable tablet Chew 81 mg Daily.     • cholecalciferol (VITAMIN D3) 400 units tablet Take 400 Units by mouth Daily.     • clonazePAM (KlonoPIN) 1 MG tablet One tablet by mouth every 8 hours 90 tablet 1   • cyanocobalamin (VITAMIN B-12) 1000 MCG tablet Take 1 tablet by  mouth Daily. 30 tablet 2   • folic acid (FOLVITE) 1 MG tablet Take 1 tablet by mouth Daily. 30 tablet 2   • hydrOXYzine pamoate (VISTARIL) 50 MG capsule Every 6 (Six) Hours.     • lacosamide (Vimpat) 100 MG tablet tablet TAKE 2 TABLETS BY MOUTH TWICE DAILY . DO NOT EXCEED 4 PER 24 HOURS     • lacosamide (VIMPAT) 200 MG tablet Take 1 tablet by mouth Every 12 (Twelve) Hours. 60 tablet 3   • levETIRAcetam (KEPPRA) 500 MG tablet Take 1 tablet by mouth Every 12 (Twelve) Hours. 60 tablet 2   • lovastatin (MEVACOR) 20 MG tablet TAKE 1 TABLET BY MOUTH ONCE DAILY IN THE EVENING 90 tablet 1   • metoprolol tartrate (LOPRESSOR) 50 MG tablet TAKE 1 TABLET BY MOUTH EVERY 12 HOURS 180 tablet 1   • pantoprazole (PROTONIX) 40 MG EC tablet Take 1 tablet by mouth once daily 90 tablet 1   • phenytoin (DILANTIN) 100 MG ER capsule Take 1 capsule by mouth 3 (Three) Times a Day. 180 capsule 0   • potassium chloride (MICRO-K) 10 MEQ CR capsule Take 1 capsule by mouth once daily 90 capsule 1   • topiramate (TOPAMAX) 25 MG tablet TAKE 1 TABLET BY MOUTH ONCE DAILY 90 tablet 1   • alendronate (FOSAMAX) 70 MG tablet Take 1 tablet by mouth Every 7 (Seven) Days. 12 tablet 1     No facility-administered medications prior to visit.       Patient Active Problem List   Diagnosis   • Mixed hyperlipidemia   • Essential hypertension   • Acute allergic rhinitis   • Seizure (CMS/HCC)   • Screen for colon cancer   • H/O traumatic brain injury   • Anemia   • Serum gamma globulin increased   • Pneumocephalus   • Refractory seizure (CMS/HCC)   • Status epilepticus (CMS/HCC)   • Pneumoperitoneum   • Other osteoporosis without current pathological fracture   • Bone cyst of ankle   • Toe contracture, left   • Valgus deformity of both great toes   • Renal failure syndrome       Advanced Care Planning:  ACP discussion was held with the patient during this visit. Patient does not have an advance directive, information provided.    Review of Systems   Constitutional:  "Negative.    HENT: Negative.    Eyes: Negative.    Respiratory: Negative.    Cardiovascular: Negative.    Gastrointestinal: Negative.    Endocrine: Negative.    Genitourinary: Negative.    Musculoskeletal: Negative.    Skin: Negative.    Allergic/Immunologic: Negative.    Neurological: Negative.    Hematological: Negative.    Psychiatric/Behavioral: Negative.        Compared to one year ago, the patient feels his physical health is better.  Compared to one year ago, the patient feels his mental health is better.    Reviewed chart for potential of high risk medication in the elderly: yes  Reviewed chart for potential of harmful drug interactions in the elderly:not applicable    Objective         Vitals:    08/05/21 1311   BP: 128/88   Pulse: 73   Resp: 16   Temp: 95.7 °F (35.4 °C)   TempSrc: Temporal   SpO2: 99%   Weight: 95.8 kg (211 lb 1.6 oz)   Height: 182.9 cm (72\")       Body mass index is 28.63 kg/m².  Discussed the patient's BMI with him. The BMI is in the acceptable range.    Physical Exam  Vitals reviewed.   Constitutional:       Appearance: He is normal weight.   HENT:      Right Ear: Tympanic membrane and ear canal normal.      Left Ear: Tympanic membrane and ear canal normal.      Nose: Nose normal.      Mouth/Throat:      Mouth: Mucous membranes are moist.      Pharynx: Oropharynx is clear.   Cardiovascular:      Rate and Rhythm: Normal rate and regular rhythm.      Heart sounds: No murmur heard.   No friction rub. No gallop.    Pulmonary:      Effort: Pulmonary effort is normal. No respiratory distress.      Breath sounds: Normal breath sounds. No wheezing, rhonchi or rales.   Abdominal:      General: Bowel sounds are normal. There is no distension.      Palpations: Abdomen is soft. There is no mass.      Tenderness: There is no abdominal tenderness.      Hernia: No hernia is present.   Skin:     General: Skin is warm and dry.   Neurological:      Mental Status: He is alert.   Psychiatric:         Mood " and Affect: Mood normal.         Lab Results   Component Value Date    GLU 94 08/05/2021    CHLPL 176 08/05/2021    TRIG 277 (H) 08/05/2021    HDL 47 08/05/2021    LDL 84 08/05/2021    VLDL 45 (H) 08/05/2021        Assessment/Plan   Medicare Risks and Personalized Health Plan  CMS Preventative Services Quick Reference  Advance Directive Discussion    The above risks/problems have been discussed with the patient.  Pertinent information has been shared with the patient in the After Visit Summary.  Follow up plans and orders are seen below in the Assessment/Plan Section.    Diagnoses and all orders for this visit:    1. Encounter for hepatitis C screening test for low risk patient (Primary)  -     Hepatitis C Antibody    2. Essential hypertension  -     Comprehensive metabolic panel    3. Mixed hyperlipidemia  -     Lipid Panel With LDL / HDL Ratio    4. Medicare annual wellness visit, subsequent    Other orders  -     Tdap Vaccine Greater Than or Equal To 8yo IM      Follow Up:  Return in about 6 months (around 2/5/2022).     An After Visit Summary and PPPS were given to the patient.             Patient is doing well overall. He is up to date on covid vaccine.   He shows he is due for dexa scan, will defer to PCP, he is taking fosamax, however no previous screening found  All other health maintenance up to date.

## 2021-08-06 LAB
ALBUMIN SERPL-MCNC: 4.5 G/DL (ref 3.5–5.2)
ALBUMIN/GLOB SERPL: 1.6 G/DL
ALP SERPL-CCNC: 85 U/L (ref 39–117)
ALT SERPL-CCNC: 29 U/L (ref 1–41)
AST SERPL-CCNC: 23 U/L (ref 1–40)
BILIRUB SERPL-MCNC: <0.2 MG/DL (ref 0–1.2)
BUN SERPL-MCNC: 11 MG/DL (ref 6–20)
BUN/CREAT SERPL: 9.6 (ref 7–25)
CALCIUM SERPL-MCNC: 9.2 MG/DL (ref 8.6–10.5)
CHLORIDE SERPL-SCNC: 105 MMOL/L (ref 98–107)
CHOLEST SERPL-MCNC: 176 MG/DL (ref 0–200)
CO2 SERPL-SCNC: 27.3 MMOL/L (ref 22–29)
CREAT SERPL-MCNC: 1.14 MG/DL (ref 0.76–1.27)
GLOBULIN SER CALC-MCNC: 2.8 GM/DL
GLUCOSE SERPL-MCNC: 94 MG/DL (ref 65–99)
HCV AB S/CO SERPL IA: <0.1 S/CO RATIO (ref 0–0.9)
HDLC SERPL-MCNC: 47 MG/DL (ref 40–60)
LDLC SERPL CALC-MCNC: 84 MG/DL (ref 0–100)
LDLC/HDLC SERPL: 1.57 {RATIO}
POTASSIUM SERPL-SCNC: 4.4 MMOL/L (ref 3.5–5.2)
PROT SERPL-MCNC: 7.3 G/DL (ref 6–8.5)
SODIUM SERPL-SCNC: 139 MMOL/L (ref 136–145)
TRIGL SERPL-MCNC: 277 MG/DL (ref 0–150)
VLDLC SERPL CALC-MCNC: 45 MG/DL (ref 5–40)

## 2021-08-20 ENCOUNTER — TELEPHONE (OUTPATIENT)
Dept: FAMILY MEDICINE CLINIC | Facility: CLINIC | Age: 46
End: 2021-08-20

## 2021-08-20 DIAGNOSIS — Z87.820 HISTORY OF TRAUMATIC BRAIN INJURY: ICD-10-CM

## 2021-08-20 RX ORDER — DEXTROAMPHETAMINE SACCHARATE, AMPHETAMINE ASPARTATE, DEXTROAMPHETAMINE SULFATE AND AMPHETAMINE SULFATE 7.5; 7.5; 7.5; 7.5 MG/1; MG/1; MG/1; MG/1
30 TABLET ORAL DAILY
Qty: 30 TABLET | Refills: 0 | Status: SHIPPED | OUTPATIENT
Start: 2021-08-20 | End: 2021-09-14 | Stop reason: SDUPTHER

## 2021-08-20 NOTE — TELEPHONE ENCOUNTER
Rx Refill Note  Requested Prescriptions     Pending Prescriptions Disp Refills   • amphetamine-dextroamphetamine (ADDERALL) 30 MG tablet 30 tablet 0     Sig: Take 1 tablet by mouth Daily.      Last office visit with prescribing clinician: 1/19/2021      Next office visit with prescribing clinician: 2/10/2022            Jessica Hugo LPN  08/20/21, 14:13 EDT

## 2021-08-20 NOTE — TELEPHONE ENCOUNTER
Caller: AftabJada    Relationship: Emergency Contact    Best call back number: [phone]    Medication needed:   Requested Prescriptions     Pending Prescriptions Disp Refills   • amphetamine-dextroamphetamine (ADDERALL) 30 MG tablet 30 tablet 0     Sig: Take 1 tablet by mouth Daily.       When do you need the refill by: ASAP    What additional details did the patient provide when requesting the medication: PATIENT HAS 2 DAYS LEFT   Does the patient have less than a 3 day supply:  [x] Yes  [] No    What is the patient's preferred pharmacy: [address] [phone] [facility] [phone]

## 2021-09-13 NOTE — TELEPHONE ENCOUNTER
Rx Refill Note  Requested Prescriptions     Pending Prescriptions Disp Refills   • metoprolol tartrate (LOPRESSOR) 50 MG tablet [Pharmacy Med Name: Metoprolol Tartrate 50 MG Oral Tablet] 180 tablet 0     Sig: TAKE 1 TABLET BY MOUTH EVERY 12 HOURS      Last office visit with prescribing clinician: 1/19/2021      Next office visit with prescribing clinician: 2/10/2022            Jessica Hugo LPN  09/13/21, 15:54 EDT

## 2021-09-14 DIAGNOSIS — Z87.820 HISTORY OF TRAUMATIC BRAIN INJURY: ICD-10-CM

## 2021-09-14 RX ORDER — METOPROLOL TARTRATE 50 MG/1
TABLET, FILM COATED ORAL
Qty: 180 TABLET | Refills: 1 | Status: SHIPPED | OUTPATIENT
Start: 2021-09-14 | End: 2022-03-22

## 2021-09-14 NOTE — TELEPHONE ENCOUNTER
Caller: AftabJada    Relationship: Emergency Contact    Best call back number: [phone] ([phone])    Medication needed:   Requested Prescriptions     Pending Prescriptions Disp Refills   • amphetamine-dextroamphetamine (ADDERALL) 30 MG tablet 30 tablet 0     Sig: Take 1 tablet by mouth Daily.       When do you need the refill by: ASAP    What additional details did the patient provide when requesting the medication: PATIENT'S SISTER REQUESTED THE PRESCRIPTION REFILL FOR THE PATIENT, PLEASE ADVISE WHEN SENT TO PHARMACY    Does the patient have less than a 3 day supply:  [x] Yes  [] No    What is the patient's preferred pharmacy: [address] [phone] [facility] [phone]

## 2021-09-15 NOTE — TELEPHONE ENCOUNTER
Rx Refill Note  Requested Prescriptions     Pending Prescriptions Disp Refills   • amphetamine-dextroamphetamine (ADDERALL) 30 MG tablet 30 tablet 0     Sig: Take 1 tablet by mouth Daily.      Last office visit with prescribing clinician: 1/19/2021      Next office visit with prescribing clinician: 2/10/2022            Jessica Hugo LPN  09/15/21, 12:56 EDT

## 2021-09-16 RX ORDER — DEXTROAMPHETAMINE SACCHARATE, AMPHETAMINE ASPARTATE, DEXTROAMPHETAMINE SULFATE AND AMPHETAMINE SULFATE 7.5; 7.5; 7.5; 7.5 MG/1; MG/1; MG/1; MG/1
30 TABLET ORAL DAILY
Qty: 30 TABLET | Refills: 0 | Status: SHIPPED | OUTPATIENT
Start: 2021-09-16 | End: 2021-10-27 | Stop reason: SDUPTHER

## 2021-10-07 RX ORDER — LOVASTATIN 20 MG/1
TABLET ORAL
Qty: 90 TABLET | Refills: 2 | Status: SHIPPED | OUTPATIENT
Start: 2021-10-07 | End: 2022-06-19

## 2021-10-07 NOTE — TELEPHONE ENCOUNTER
Rx Refill Note  Requested Prescriptions     Pending Prescriptions Disp Refills   • lovastatin (MEVACOR) 20 MG tablet [Pharmacy Med Name: Lovastatin 20 MG Oral Tablet] 90 tablet 0     Sig: TAKE 1 TABLET BY MOUTH ONCE DAILY IN THE EVENING      Last office visit with prescribing clinician: 1/19/2021      Next office visit with prescribing clinician: 2/10/2022            Jessica Hugo LPN  10/07/21, 13:25 EDT

## 2021-10-11 DIAGNOSIS — Z87.820 HISTORY OF TRAUMATIC BRAIN INJURY: ICD-10-CM

## 2021-10-11 RX ORDER — DEXTROAMPHETAMINE SACCHARATE, AMPHETAMINE ASPARTATE, DEXTROAMPHETAMINE SULFATE AND AMPHETAMINE SULFATE 7.5; 7.5; 7.5; 7.5 MG/1; MG/1; MG/1; MG/1
30 TABLET ORAL DAILY
Qty: 30 TABLET | Refills: 0 | Status: CANCELLED | OUTPATIENT
Start: 2021-10-11

## 2021-10-11 NOTE — TELEPHONE ENCOUNTER
Caller: Jada Ugalde    Relationship: Emergency Contact      Medication requested (name and dosage):    amphetamine-dextroamphetamine (ADDERALL) 30 MG tablet       Pharmacy where request should be sent: [address] [phone] [facility] [phone]    Best call back number: [phone]    Does the patient have less than a 3 day supply:  [x] Yes  [] No    Bry Holland Rep   10/11/21 16:20 EDT

## 2021-10-11 NOTE — TELEPHONE ENCOUNTER
Caller: Jada Ugalde    Relationship to patient: Emergency Contact    Best call back number: [phone]    Patient is needing: PATIENT IS NOT NEED A REFILL ON ADDERALL. PLEASE CANCEL REFILL REQUEST

## 2021-10-25 RX ORDER — POTASSIUM CHLORIDE 750 MG/1
CAPSULE, EXTENDED RELEASE ORAL
Qty: 90 CAPSULE | Refills: 1 | Status: SHIPPED | OUTPATIENT
Start: 2021-10-25 | End: 2022-04-22

## 2021-10-25 NOTE — TELEPHONE ENCOUNTER
Rx Refill Note  Requested Prescriptions     Pending Prescriptions Disp Refills   • potassium chloride (MICRO-K) 10 MEQ CR capsule [Pharmacy Med Name: Potassium Chloride ER 10 MEQ Oral Capsule Extended Release] 90 capsule 0     Sig: Take 1 capsule by mouth once daily      Last office visit with prescribing clinician: 1/19/2021      Next office visit with prescribing clinician: 2/10/2022            Jessica Hugo LPN  10/25/21, 13:47 EDT

## 2021-10-26 ENCOUNTER — OFFICE VISIT (OUTPATIENT)
Dept: FAMILY MEDICINE CLINIC | Facility: CLINIC | Age: 46
End: 2021-10-26

## 2021-10-26 ENCOUNTER — TELEPHONE (OUTPATIENT)
Dept: FAMILY MEDICINE CLINIC | Facility: CLINIC | Age: 46
End: 2021-10-26

## 2021-10-26 VITALS
BODY MASS INDEX: 27.4 KG/M2 | OXYGEN SATURATION: 99 % | HEIGHT: 72 IN | HEART RATE: 64 BPM | WEIGHT: 202.3 LBS | DIASTOLIC BLOOD PRESSURE: 76 MMHG | RESPIRATION RATE: 16 BRPM | TEMPERATURE: 94.6 F | SYSTOLIC BLOOD PRESSURE: 100 MMHG
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
## 2021-10-26 DIAGNOSIS — R56.9 SEIZURE (HCC): ICD-10-CM

## 2021-10-26 DIAGNOSIS — R53.1 GENERALIZED WEAKNESS: ICD-10-CM

## 2021-10-26 DIAGNOSIS — Z87.820 HISTORY OF TRAUMATIC BRAIN INJURY: ICD-10-CM

## 2021-10-26 PROCEDURE — 99213 OFFICE O/P EST LOW 20 MIN: CPT | Performed by: NURSE PRACTITIONER

## 2021-10-26 NOTE — TELEPHONE ENCOUNTER
LM for Sister (Jada) to have her give the office a call to get more information before visit to get the reason for visit.

## 2021-10-26 NOTE — PROGRESS NOTES
"Chief Complaint  Extremity Weakness (in the legs x2 months), Memory Loss (has gotten worse in the last few months), and Anxiety    Subjective          Tye JONES Junior presents to [facility]  Muscle tone decreased, gait abnormality, difficult to stand, increased falls. Trouble finding words, memory impairment, agitated. He recently stayed in [place] with his mom for two months. Seeing Dr. Lynn, neurology, follow up four months ago. No recent medication changes.     Extremity Weakness   This is a new problem. The current episode started more than 1 month ago. The problem occurs daily. The problem has been gradually worsening. The pain is at a severity of 0/10. The patient is experiencing no pain. Pertinent negatives include no inability to bear weight, itching, joint locking, joint swelling, limited range of motion, stiffness or tingling. He has tried nothing for the symptoms. The treatment provided no relief.       Objective   Vital Signs:   /76   Pulse 64   Temp 94.6 °F (34.8 °C) (Infrared)   Resp 16   Ht 182.9 cm (72\")   Wt 91.8 kg (202 lb 4.8 oz)   SpO2 99%   BMI 27.44 kg/m²     Physical Exam  Constitutional:       Appearance: Normal appearance.   Cardiovascular:      Rate and Rhythm: Normal rate and regular rhythm.      Heart sounds: No murmur heard.  No friction rub. No gallop.    Pulmonary:      Effort: Pulmonary effort is normal. No respiratory distress.      Breath sounds: Normal breath sounds. No wheezing, rhonchi or rales.   Skin:     General: Skin is warm and dry.   Neurological:      Mental Status: He is alert.   Psychiatric:         Mood and Affect: Mood normal.        Result Review :                 Assessment and Plan    There are no diagnoses linked to this encounter.    Follow Up   No follow-ups on file.  Patient was given instructions and counseling regarding his condition or for health maintenance advice. Please see specific information pulled into the " AVS if appropriate.     Increased weakness since being out of town for the past two months with frequent falls. Discussed recommendation for physical therapy and agree with recommendation. He has been out of his routine the past few months and this has likely contributed to his symptoms recently. He is now back in his day program. He is ok to continue his outpatient day program. Recommend follow up with PCP for continued weakness and memory impairment. He has a follow up with neurology next month, doubtful he will get in sooner. Will await their recommendations.

## 2021-10-27 DIAGNOSIS — Z87.820 HISTORY OF TRAUMATIC BRAIN INJURY: ICD-10-CM

## 2021-10-27 NOTE — TELEPHONE ENCOUNTER
Caller: Jada Ugalde    Relationship: Emergency Contact    Requested Prescriptions     Pending Prescriptions Disp Refills   • amphetamine-dextroamphetamine (ADDERALL) 30 MG tablet 30 tablet 0     Sig: Take 1 tablet by mouth Daily.      Pharmacy where request should be sent: [address] [phone] [facility] [phone]   [phone]    Best call back number: [phone] (M)    Does the patient have less than a 3 day supply:  [x] Yes  [] No    Bry Falcon Rep   10/27/21 14:57 EDT

## 2021-10-28 NOTE — TELEPHONE ENCOUNTER
Rx Refill Note  Requested Prescriptions     Pending Prescriptions Disp Refills   • amphetamine-dextroamphetamine (ADDERALL) 30 MG tablet 30 tablet 0     Sig: Take 1 tablet by mouth Daily.      Last office visit with prescribing clinician: 1/19/2021      Next office visit with prescribing clinician: 2/10/2022            Jessica Hugo LPN  10/28/21, 08:00 EDT

## 2021-10-29 ENCOUNTER — TELEPHONE (OUTPATIENT)
Dept: FAMILY MEDICINE CLINIC | Facility: CLINIC | Age: 46
End: 2021-10-29

## 2021-10-29 NOTE — TELEPHONE ENCOUNTER
ROSY CALLED STATING THE PT LEGS ARE VERY WEAK TO THE POINT OF NOT BEING ABLE TO WALK. NO OTHER SYMPTOMS SHE  IS REQUESTING A CALL BACK TO SEE IF LABS OR SCAN CAN BE ORDERED.

## 2021-10-30 RX ORDER — DEXTROAMPHETAMINE SACCHARATE, AMPHETAMINE ASPARTATE, DEXTROAMPHETAMINE SULFATE AND AMPHETAMINE SULFATE 7.5; 7.5; 7.5; 7.5 MG/1; MG/1; MG/1; MG/1
30 TABLET ORAL DAILY
Qty: 30 TABLET | Refills: 0 | Status: SHIPPED | OUTPATIENT
Start: 2021-10-30 | End: 2021-12-16 | Stop reason: SDUPTHER

## 2021-10-30 NOTE — TELEPHONE ENCOUNTER
Thanks for your help with this pt.   Sounds like weakness in the absence of pain.  How rapid was this deconditioning?  Would include a TSH and maybe a uric acid if having pain. CMP, CBC (fatigue?)  Not sure on inflammatory markers?  Thanks.

## 2021-11-02 DIAGNOSIS — Z87.820 HISTORY OF TRAUMATIC BRAIN INJURY: ICD-10-CM
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
## 2021-11-02 DIAGNOSIS — R53.1 GENERALIZED WEAKNESS: ICD-10-CM

## 2021-12-15 RX ORDER — ALENDRONATE SODIUM 70 MG/1
TABLET ORAL
Qty: 12 TABLET | Refills: 1 | Status: SHIPPED | OUTPATIENT
Start: 2021-12-15 | End: 2022-10-18

## 2021-12-15 NOTE — TELEPHONE ENCOUNTER
Rx Refill Note  Requested Prescriptions     Pending Prescriptions Disp Refills   • alendronate (FOSAMAX) 70 MG tablet [Pharmacy Med Name: Alendronate Sodium 70 MG Oral Tablet] 12 tablet 0     Sig: Take 1 tablet by mouth once a week      Last office visit with prescribing clinician: 1/19/2021      Next office visit with prescribing clinician: 2/10/2022            Jessica Hugo LPN  12/14/21, 19:07 EST

## 2021-12-16 ENCOUNTER — PATIENT MESSAGE (OUTPATIENT)
Dept: FAMILY MEDICINE CLINIC | Facility: CLINIC | Age: 46
End: 2021-12-16

## 2021-12-16 DIAGNOSIS — Z87.820 HISTORY OF TRAUMATIC BRAIN INJURY: ICD-10-CM

## 2021-12-16 RX ORDER — DEXTROAMPHETAMINE SACCHARATE, AMPHETAMINE ASPARTATE, DEXTROAMPHETAMINE SULFATE AND AMPHETAMINE SULFATE 7.5; 7.5; 7.5; 7.5 MG/1; MG/1; MG/1; MG/1
30 TABLET ORAL DAILY
Qty: 30 TABLET | Refills: 0 | Status: SHIPPED | OUTPATIENT
Start: 2021-12-16 | End: 2022-02-17 | Stop reason: SDUPTHER

## 2021-12-16 NOTE — TELEPHONE ENCOUNTER
From: Tye JONES Junior  To: Jolene Laurent MD  Sent: 12/16/2021 7:07 AM EST  Subject: Refill    Would you please call in [facility] adderall?

## 2022-01-10 DIAGNOSIS — M79.676 PAIN OF GREAT TOE, UNSPECIFIED LATERALITY: Primary | ICD-10-CM

## 2022-01-17 RX ORDER — PANTOPRAZOLE SODIUM 40 MG/1
TABLET, DELAYED RELEASE ORAL
Qty: 90 TABLET | Refills: 1 | Status: SHIPPED | OUTPATIENT
Start: 2022-01-17 | End: 2022-04-15

## 2022-02-10 ENCOUNTER — OFFICE VISIT (OUTPATIENT)
Dept: FAMILY MEDICINE CLINIC | Facility: CLINIC | Age: 47
End: 2022-02-10

## 2022-02-10 VITALS
TEMPERATURE: 96.5 F | SYSTOLIC BLOOD PRESSURE: 120 MMHG | DIASTOLIC BLOOD PRESSURE: 72 MMHG | BODY MASS INDEX: 28.32 KG/M2 | RESPIRATION RATE: 17 BRPM | OXYGEN SATURATION: 99 % | HEART RATE: 73 BPM | HEIGHT: 72 IN | WEIGHT: 209.1 LBS

## 2022-02-10 DIAGNOSIS — I10 ESSENTIAL HYPERTENSION: ICD-10-CM

## 2022-02-10 DIAGNOSIS — Z79.899 MEDICATION MANAGEMENT: ICD-10-CM

## 2022-02-10 DIAGNOSIS — Z87.820 H/O TRAUMATIC BRAIN INJURY: Primary | ICD-10-CM

## 2022-02-10 PROCEDURE — 99213 OFFICE O/P EST LOW 20 MIN: CPT | Performed by: FAMILY MEDICINE

## 2022-02-11 LAB
ALBUMIN SERPL-MCNC: 4.4 G/DL (ref 4–5)
ALBUMIN/GLOB SERPL: 1.6 {RATIO} (ref 1.2–2.2)
ALP SERPL-CCNC: 76 IU/L (ref 44–121)
ALT SERPL-CCNC: 31 IU/L (ref 0–44)
AST SERPL-CCNC: 25 IU/L (ref 0–40)
BASOPHILS # BLD AUTO: 0 X10E3/UL (ref 0–0.2)
BASOPHILS NFR BLD AUTO: 1 %
BILIRUB SERPL-MCNC: <0.2 MG/DL (ref 0–1.2)
BUN SERPL-MCNC: 14 MG/DL (ref 6–24)
BUN/CREAT SERPL: 14 (ref 9–20)
CALCIUM SERPL-MCNC: 9.4 MG/DL (ref 8.7–10.2)
CHLORIDE SERPL-SCNC: 105 MMOL/L (ref 96–106)
CO2 SERPL-SCNC: 23 MMOL/L (ref 20–29)
CREAT SERPL-MCNC: 0.97 MG/DL (ref 0.76–1.27)
EOSINOPHIL # BLD AUTO: 0.1 X10E3/UL (ref 0–0.4)
EOSINOPHIL NFR BLD AUTO: 2 %
ERYTHROCYTE [DISTWIDTH] IN BLOOD BY AUTOMATED COUNT: 13.7 % (ref 11.6–15.4)
GLOBULIN SER CALC-MCNC: 2.8 G/DL (ref 1.5–4.5)
GLUCOSE SERPL-MCNC: 67 MG/DL (ref 65–99)
HCT VFR BLD AUTO: 42.4 % (ref 37.5–51)
HGB BLD-MCNC: 14.4 G/DL (ref 13–17.7)
IMM GRANULOCYTES # BLD AUTO: 0 X10E3/UL (ref 0–0.1)
IMM GRANULOCYTES NFR BLD AUTO: 0 %
LYMPHOCYTES # BLD AUTO: 2.3 X10E3/UL (ref 0.7–3.1)
LYMPHOCYTES NFR BLD AUTO: 40 %
MCH RBC QN AUTO: 29.8 PG (ref 26.6–33)
MCHC RBC AUTO-ENTMCNC: 34 G/DL (ref 31.5–35.7)
MCV RBC AUTO: 88 FL (ref 79–97)
MONOCYTES # BLD AUTO: 0.6 X10E3/UL (ref 0.1–0.9)
MONOCYTES NFR BLD AUTO: 11 %
NEUTROPHILS # BLD AUTO: 2.7 X10E3/UL (ref 1.4–7)
NEUTROPHILS NFR BLD AUTO: 46 %
PLATELET # BLD AUTO: 191 X10E3/UL (ref 150–450)
POTASSIUM SERPL-SCNC: 4.5 MMOL/L (ref 3.5–5.2)
PROT SERPL-MCNC: 7.2 G/DL (ref 6–8.5)
RBC # BLD AUTO: 4.83 X10E6/UL (ref 4.14–5.8)
SODIUM SERPL-SCNC: 142 MMOL/L (ref 134–144)
WBC # BLD AUTO: 5.8 X10E3/UL (ref 3.4–10.8)

## 2022-02-17 DIAGNOSIS — Z87.820 HISTORY OF TRAUMATIC BRAIN INJURY: ICD-10-CM

## 2022-02-17 NOTE — TELEPHONE ENCOUNTER
Rx Refill Note  Requested Prescriptions      No prescriptions requested or ordered in this encounter      Last office visit with prescribing clinician: 2/10/2022      Next office visit with prescribing clinician: 8/16/2022            Jessica Hugo LPN  02/17/22, 09:18 EST

## 2022-02-18 RX ORDER — DEXTROAMPHETAMINE SACCHARATE, AMPHETAMINE ASPARTATE, DEXTROAMPHETAMINE SULFATE AND AMPHETAMINE SULFATE 7.5; 7.5; 7.5; 7.5 MG/1; MG/1; MG/1; MG/1
30 TABLET ORAL DAILY
Qty: 30 TABLET | Refills: 0 | Status: SHIPPED | OUTPATIENT
Start: 2022-02-18 | End: 2022-03-24 | Stop reason: SDUPTHER

## 2022-03-21 NOTE — TELEPHONE ENCOUNTER
Rx Refill Note  Requested Prescriptions     Pending Prescriptions Disp Refills   • metoprolol tartrate (LOPRESSOR) 50 MG tablet [Pharmacy Med Name: Metoprolol Tartrate 50 MG Oral Tablet] 180 tablet 0     Sig: TAKE 1 TABLET BY MOUTH EVERY 12 HOURS      Last office visit with prescribing clinician: 2/10/2022      Next office visit with prescribing clinician: 8/16/2022            Jessica Hugo LPN  03/21/22, 13:34 EDT

## 2022-03-22 RX ORDER — METOPROLOL TARTRATE 50 MG/1
TABLET, FILM COATED ORAL
Qty: 180 TABLET | Refills: 1 | Status: SHIPPED | OUTPATIENT
Start: 2022-03-22 | End: 2022-09-09

## 2022-03-24 DIAGNOSIS — Z87.820 HISTORY OF TRAUMATIC BRAIN INJURY: ICD-10-CM

## 2022-03-24 RX ORDER — DEXTROAMPHETAMINE SACCHARATE, AMPHETAMINE ASPARTATE, DEXTROAMPHETAMINE SULFATE AND AMPHETAMINE SULFATE 7.5; 7.5; 7.5; 7.5 MG/1; MG/1; MG/1; MG/1
30 TABLET ORAL DAILY
Qty: 30 TABLET | Refills: 0 | Status: SHIPPED | OUTPATIENT
Start: 2022-03-24 | End: 2022-05-09 | Stop reason: SDUPTHER

## 2022-03-24 NOTE — TELEPHONE ENCOUNTER
Caller: AftabJada    Relationship: Emergency Contact    Best call back number:    Requested Prescriptions:   Requested Prescriptions     Pending Prescriptions Disp Refills   • amphetamine-dextroamphetamine (ADDERALL) 30 MG tablet 30 tablet 0     Sig: Take 1 tablet by mouth Daily.        Pharmacy where request should be sent:   [facility]  [address] [place], [place] [address]      Additional details provided by patient:     Does the patient have less than a 3 day supply:  [x] Yes  [] No    Bry Bartholomew Rep   03/24/22 16:00 EDT

## 2022-03-24 NOTE — TELEPHONE ENCOUNTER
Rx Refill Note  Requested Prescriptions     Pending Prescriptions Disp Refills   • amphetamine-dextroamphetamine (ADDERALL) 30 MG tablet 30 tablet 0     Sig: Take 1 tablet by mouth Daily.      Last office visit with prescribing clinician: 2/10/2022      Next office visit with prescribing clinician: 8/16/2022            Jessica Hugo LPN  03/24/22, 16:08 EDT

## 2022-04-01 ENCOUNTER — TELEPHONE (OUTPATIENT)
Dept: FAMILY MEDICINE CLINIC | Facility: CLINIC | Age: 47
End: 2022-04-01

## 2022-04-01 NOTE — TELEPHONE ENCOUNTER
NEHAL FROM [facility] CALLED WITH A QUESTION ABOUT THE FAX SENT BACK AND SAID THERE WAS A  LINE THROUGH SOMETHING AND NO SIGNATURE. WANTS AN ASAP CALL BACK AT  [phone]

## 2022-04-01 NOTE — TELEPHONE ENCOUNTER
Spoke with Jose Eduardo and he questioned the marking on this patient form. This nurse advised him it was not a line through it but an underlining of that line r/t Anastasiia not dispensing this medication to this patient. He stated that she did a year ago. He is sending new paperwork to have her resign with out the klonopin notation in it. Advised him she is on vacation next week and this will be addressed 4-11-22.

## 2022-04-15 RX ORDER — PANTOPRAZOLE SODIUM 40 MG/1
TABLET, DELAYED RELEASE ORAL
Qty: 90 TABLET | Refills: 1 | Status: SHIPPED | OUTPATIENT
Start: 2022-04-15 | End: 2022-12-23

## 2022-04-15 NOTE — TELEPHONE ENCOUNTER
Rx Refill Note  Requested Prescriptions     Pending Prescriptions Disp Refills   • pantoprazole (PROTONIX) 40 MG EC tablet [Pharmacy Med Name: Pantoprazole Sodium 40 MG Oral Tablet Delayed Release] 90 tablet 0     Sig: Take 1 tablet by mouth once daily      Last office visit with prescribing clinician: 2/10/2022      Next office visit with prescribing clinician: 8/16/2022            Jessica Hugo LPN  04/15/22, 08:24 EDT

## 2022-04-22 RX ORDER — POTASSIUM CHLORIDE 750 MG/1
CAPSULE, EXTENDED RELEASE ORAL
Qty: 90 CAPSULE | Refills: 1 | Status: SHIPPED | OUTPATIENT
Start: 2022-04-22 | End: 2022-10-18

## 2022-04-22 NOTE — TELEPHONE ENCOUNTER
Rx Refill Note  Requested Prescriptions     Pending Prescriptions Disp Refills   • potassium chloride (MICRO-K) 10 MEQ CR capsule [Pharmacy Med Name: Potassium Chloride ER 10 MEQ Oral Capsule Extended Release] 90 capsule 0     Sig: Take 1 capsule by mouth once daily      Last office visit with prescribing clinician: 2/10/2022      Next office visit with prescribing clinician: 8/16/2022            Jessica Hugo LPN  04/22/22, 15:49 EDT

## 2022-05-09 DIAGNOSIS — Z87.820 HISTORY OF TRAUMATIC BRAIN INJURY: ICD-10-CM

## 2022-05-09 RX ORDER — DEXTROAMPHETAMINE SACCHARATE, AMPHETAMINE ASPARTATE, DEXTROAMPHETAMINE SULFATE AND AMPHETAMINE SULFATE 7.5; 7.5; 7.5; 7.5 MG/1; MG/1; MG/1; MG/1
30 TABLET ORAL DAILY
Qty: 30 TABLET | Refills: 0 | Status: SHIPPED | OUTPATIENT
Start: 2022-05-09 | End: 2022-06-20 | Stop reason: SDUPTHER

## 2022-05-09 NOTE — TELEPHONE ENCOUNTER
HUB OK TO INFORM:    Please advise patient that the message has been sent on to the provider for review. Non- urgent requests may take up to 72 hours depending on the request or issue. The provider is currently not in clinic and will address this when available.

## 2022-05-09 NOTE — TELEPHONE ENCOUNTER
Caller: Jada Ugalde    Relationship: Emergency Contact    Best call back number: [phone]    Requested Prescriptions:   Requested Prescriptions     Pending Prescriptions Disp Refills   • amphetamine-dextroamphetamine (ADDERALL) 30 MG tablet 30 tablet 0     Sig: Take 1 tablet by mouth Daily.        Pharmacy where request should be sent: [address] [phone] [facility] [phone]      Additional details provided by patient: PATIENT IS OUT OF MEDS    Does the patient have less than a 3 day supply:  [x] Yes  [] No    Bry Holland Rep   05/09/22 10:08 EDT

## 2022-05-09 NOTE — TELEPHONE ENCOUNTER
CS CONTRACT UP TO DATE  DRUG SCREEN Not on file    Rx Refill Note  Requested Prescriptions     Pending Prescriptions Disp Refills   • amphetamine-dextroamphetamine (ADDERALL) 30 MG tablet 30 tablet 0     Sig: Take 1 tablet by mouth Daily.      Last office visit with prescribing clinician: 2/10/2022      Next office visit with prescribing clinician: 8/16/2022            Preeti Gill MA  05/09/22, 10:52 EDT

## 2022-06-17 NOTE — TELEPHONE ENCOUNTER
Rx Refill Note  Requested Prescriptions     Pending Prescriptions Disp Refills   • lovastatin (MEVACOR) 20 MG tablet [Pharmacy Med Name: Lovastatin 20 MG Oral Tablet] 90 tablet 0     Sig: TAKE 1 TABLET BY MOUTH ONCE DAILY IN THE EVENING      Last office visit with prescribing clinician: 2/10/2022      Next office visit with prescribing clinician: 8/16/2022       {TIP  Please add Last Relevant Lab Date if appropriate: 02/10/22    Mago Cary MA  06/17/22, 12:52 EDT

## 2022-06-19 RX ORDER — LOVASTATIN 20 MG/1
TABLET ORAL
Qty: 90 TABLET | Refills: 3 | Status: SHIPPED | OUTPATIENT
Start: 2022-06-19

## 2022-06-20 DIAGNOSIS — Z87.820 HISTORY OF TRAUMATIC BRAIN INJURY: ICD-10-CM

## 2022-06-20 NOTE — TELEPHONE ENCOUNTER
Caller: Jada Uglade    Relationship: Emergency Contact    Best call back number: [phone] (M)    Requested Prescriptions:   amphetamine-dextroamphetamine (ADDERALL) 30 MG tablet     Pharmacy where request should be sent: [address] [phone] [facility] [phone]          Additional details provided by patient: PATIENT'S SISITER CALLED TO REQUEST A MEDICATION REFILL ON PATIENT'S MEDICATION. JADA STATES THAT HE HAS A 3 DAY SUPPLY LEFT.            Does the patient have less than a 3 day supply:  [] Yes  [x] No    Bry Diallo Rep   06/20/22 09:43 EDT         THANKS

## 2022-06-21 RX ORDER — DEXTROAMPHETAMINE SACCHARATE, AMPHETAMINE ASPARTATE, DEXTROAMPHETAMINE SULFATE AND AMPHETAMINE SULFATE 7.5; 7.5; 7.5; 7.5 MG/1; MG/1; MG/1; MG/1
30 TABLET ORAL DAILY
Qty: 30 TABLET | Refills: 0 | Status: SHIPPED | OUTPATIENT
Start: 2022-06-21 | End: 2022-08-08 | Stop reason: SDUPTHER

## 2022-08-08 DIAGNOSIS — Z87.820 HISTORY OF TRAUMATIC BRAIN INJURY: ICD-10-CM

## 2022-08-08 RX ORDER — DEXTROAMPHETAMINE SACCHARATE, AMPHETAMINE ASPARTATE, DEXTROAMPHETAMINE SULFATE AND AMPHETAMINE SULFATE 7.5; 7.5; 7.5; 7.5 MG/1; MG/1; MG/1; MG/1
30 TABLET ORAL DAILY
Qty: 30 TABLET | Refills: 0 | Status: SHIPPED | OUTPATIENT
Start: 2022-08-08 | End: 2022-10-17 | Stop reason: SDUPTHER

## 2022-08-08 NOTE — TELEPHONE ENCOUNTER
Rx Refill Note  Requested Prescriptions     Pending Prescriptions Disp Refills   • amphetamine-dextroamphetamine (ADDERALL) 30 MG tablet 30 tablet 0     Sig: Take 1 tablet by mouth Daily.      Last office visit with prescribing clinician: 2/10/2022      Next office visit with prescribing clinician: 8/16/2022            Jessica Hugo LPN  08/08/22, 16:07 EDT

## 2022-08-08 NOTE — TELEPHONE ENCOUNTER
Caller: AftabJada    Relationship: Emergency Contact    Best call back number: [phone]    Requested Prescriptions:   Requested Prescriptions     Pending Prescriptions Disp Refills   • amphetamine-dextroamphetamine (ADDERALL) 30 MG tablet 30 tablet 0     Sig: Take 1 tablet by mouth Daily.        Pharmacy where request should be sent: [address] [phone] [facility] [phone]      Additional details provided by patient: OUT OF MEDICATION     Does the patient have less than a 3 day supply:  [x] Yes  [] No    Bry Handley Rep   08/08/22 12:27 EDT

## 2022-08-16 ENCOUNTER — OFFICE VISIT (OUTPATIENT)
Dept: FAMILY MEDICINE CLINIC | Facility: CLINIC | Age: 47
End: 2022-08-16

## 2022-08-16 VITALS
HEIGHT: 72 IN | OXYGEN SATURATION: 96 % | HEART RATE: 78 BPM | WEIGHT: 214.6 LBS | DIASTOLIC BLOOD PRESSURE: 68 MMHG | SYSTOLIC BLOOD PRESSURE: 126 MMHG | TEMPERATURE: 97.1 F | BODY MASS INDEX: 29.07 KG/M2 | RESPIRATION RATE: 18 BRPM

## 2022-08-16 DIAGNOSIS — E78.2 MIXED HYPERLIPIDEMIA: ICD-10-CM

## 2022-08-16 DIAGNOSIS — Z87.820 H/O TRAUMATIC BRAIN INJURY: ICD-10-CM

## 2022-08-16 DIAGNOSIS — M81.8 OTHER OSTEOPOROSIS WITHOUT CURRENT PATHOLOGICAL FRACTURE: ICD-10-CM

## 2022-08-16 DIAGNOSIS — I10 ESSENTIAL HYPERTENSION: ICD-10-CM

## 2022-08-16 DIAGNOSIS — Z00.00 MEDICARE ANNUAL WELLNESS VISIT, SUBSEQUENT: Primary | ICD-10-CM

## 2022-08-16 DIAGNOSIS — Z79.899 MEDICATION MANAGEMENT: ICD-10-CM

## 2022-08-16 PROCEDURE — 1170F FXNL STATUS ASSESSED: CPT | Performed by: FAMILY MEDICINE

## 2022-08-16 PROCEDURE — G0439 PPPS, SUBSEQ VISIT: HCPCS | Performed by: FAMILY MEDICINE

## 2022-08-16 PROCEDURE — 1160F RVW MEDS BY RX/DR IN RCRD: CPT | Performed by: FAMILY MEDICINE

## 2022-08-16 NOTE — PROGRESS NOTES
The ABCs of the Annual Wellness Visit  Subsequent Medicare Wellness Visit    Chief Complaint   Patient presents with   • Medicare Wellness-subsequent      Subjective    History of Present Illness:  Tye JONES Junior is a 46 y.o. male who presents for a Subsequent Medicare Wellness Visit.    The following portions of the patient's history were reviewed and   updated as appropriate: allergies, current medications, past family history, past medical history, past social history, past surgical history and problem list.    Compared to one year ago, the patient feels his physical   health is the same.    Compared to one year ago, the patient feels his mental   health is the same.    Recent Hospitalizations:  He was not admitted to the hospital during the last year.       Current Medical Providers:  Patient Care Team:  Jolene Laurent MD as PCP - General (Family Medicine)    Outpatient Medications Prior to Visit   Medication Sig Dispense Refill   • acetaminophen (TYLENOL) 325 MG tablet Take 2 tablets by mouth Every 6 (Six) Hours As Needed for Mild Pain .     • alendronate (FOSAMAX) 70 MG tablet Take 1 tablet by mouth once a week 12 tablet 1   • amphetamine-dextroamphetamine (ADDERALL) 30 MG tablet Take 1 tablet by mouth Daily. 30 tablet 0   • aspirin 81 MG chewable tablet Chew 81 mg Daily.     • cholecalciferol (VITAMIN D3) 400 units tablet Take 400 Units by mouth Daily.     • clonazePAM (KlonoPIN) 1 MG tablet One tablet by mouth every 8 hours 90 tablet 1   • cyanocobalamin (VITAMIN B-12) 1000 MCG tablet Take 1 tablet by mouth Daily. 30 tablet 2   • folic acid (FOLVITE) 1 MG tablet Take 1 tablet by mouth Daily. 30 tablet 2   • hydrOXYzine pamoate (VISTARIL) 50 MG capsule Every 6 (Six) Hours.     • levETIRAcetam (KEPPRA) 500 MG tablet Take 1 tablet by mouth Every 12 (Twelve) Hours. 60 tablet 2   • lovastatin (MEVACOR) 20 MG tablet TAKE 1 TABLET BY MOUTH ONCE DAILY IN THE EVENING 90 tablet 3   • metoprolol tartrate (LOPRESSOR)  50 MG tablet TAKE 1 TABLET BY MOUTH EVERY 12 HOURS 180 tablet 1   • pantoprazole (PROTONIX) 40 MG EC tablet Take 1 tablet by mouth once daily 90 tablet 1   • phenytoin (DILANTIN) 100 MG ER capsule Take 1 capsule by mouth 3 (Three) Times a Day. 180 capsule 0   • potassium chloride (MICRO-K) 10 MEQ CR capsule Take 1 capsule by mouth once daily 90 capsule 1   • topiramate (TOPAMAX) 25 MG tablet TAKE 1 TABLET BY MOUTH ONCE DAILY 90 tablet 1   • lacosamide (VIMPAT) 100 MG tablet tablet TAKE 2 TABLETS BY MOUTH TWICE DAILY . DO NOT EXCEED 4 PER 24 HOURS     • lacosamide (VIMPAT) 200 MG tablet Take 1 tablet by mouth Every 12 (Twelve) Hours. 60 tablet 3     No facility-administered medications prior to visit.       No opioid medication identified on active medication list. I have reviewed chart for other potential  high risk medication/s and harmful drug interactions in the elderly.          Aspirin is on active medication list. Aspirin use is indicated based on review of current medical condition/s. Pros and cons of this therapy have been discussed today. Benefits of this medication outweigh potential harm.  Patient has been encouraged to continue taking this medication.  .      Patient Active Problem List   Diagnosis   • Mixed hyperlipidemia   • Essential hypertension   • Acute allergic rhinitis   • Seizure (HCC)   • Screen for colon cancer   • H/O traumatic brain injury   • Anemia   • Serum gamma globulin increased   • Pneumocephalus   • Refractory seizure (HCC)   • Status epilepticus (HCC)   • Pneumoperitoneum   • Other osteoporosis without current pathological fracture   • Bone cyst of ankle   • Toe contracture, left   • Valgus deformity of both great toes   • Renal failure syndrome     Advance Care Planning  Advance Directive is not on file.  ACP discussion was held with the patient during this visit. not discussed          Objective    Vitals:    08/16/22 1155   BP: 126/68   BP Location: Right arm   Patient Position:  "Sitting   Cuff Size: Adult   Pulse: 78   Resp: 18   Temp: 97.1 °F (36.2 °C)   TempSrc: Infrared   SpO2: 96%   Weight: 97.3 kg (214 lb 9.6 oz)   Height: 182.9 cm (72\")   PainSc: 0-No pain     Estimated body mass index is 29.1 kg/m² as calculated from the following:    Height as of this encounter: 182.9 cm (72\").    Weight as of this encounter: 97.3 kg (214 lb 9.6 oz).    BMI is >= 25 and <30. (Overweight) The following options were offered after discussion;: exercise counseling/recommendations and nutrition counseling/recommendations      Does the patient have evidence of cognitive impairment? Yes    Physical Exam  Vitals reviewed.   Constitutional:       General: He is not in acute distress.     Appearance: Normal appearance. He is not ill-appearing or toxic-appearing.   HENT:      Head: Normocephalic and atraumatic.      Right Ear: Tympanic membrane, ear canal and external ear normal. There is no impacted cerumen.      Left Ear: Tympanic membrane, ear canal and external ear normal. There is no impacted cerumen.      Nose: Nose normal.      Mouth/Throat:      Mouth: Mucous membranes are moist.      Pharynx: Oropharynx is clear. No oropharyngeal exudate.   Eyes:      General: No scleral icterus.        Right eye: No discharge.         Left eye: No discharge.      Conjunctiva/sclera: Conjunctivae normal.   Neck:      Thyroid: No thyromegaly.      Vascular: No carotid bruit.   Cardiovascular:      Rate and Rhythm: Normal rate and regular rhythm.      Heart sounds: Normal heart sounds. No murmur heard.    No friction rub. No gallop.   Pulmonary:      Effort: Pulmonary effort is normal. No respiratory distress.      Breath sounds: Normal breath sounds. No wheezing or rales.   Chest:      Chest wall: No tenderness.   Abdominal:      General: Bowel sounds are normal. There is no distension.      Palpations: Abdomen is soft. There is no mass.      Tenderness: There is no abdominal tenderness. There is no guarding. "   Musculoskeletal:         General: No swelling or deformity.      Cervical back: Neck supple.      Right lower leg: No edema.      Left lower leg: No edema.      Comments: Pt is blind uses minimal assistance up on the exam table for that reason.   Lymphadenopathy:      Cervical: No cervical adenopathy.   Skin:     Coloration: Skin is not jaundiced or pale.   Neurological:      Mental Status: He is alert and oriented to person, place, and time.      Motor: No abnormal muscle tone.      Coordination: Coordination normal.   Psychiatric:         Mood and Affect: Mood normal.         Behavior: Behavior normal.       Lab Results   Component Value Date    CHLPL 179 08/16/2022    TRIG 308 (H) 08/16/2022    HDL 48 08/16/2022    LDL 81 08/16/2022    VLDL 50 (H) 08/16/2022            HEALTH RISK ASSESSMENT    Smoking Status:  Social History     Tobacco Use   Smoking Status Never Smoker   Smokeless Tobacco Never Used     Alcohol Consumption:  Social History     Substance and Sexual Activity   Alcohol Use No     Fall Risk Screen:    STEADI Fall Risk Assessment was completed, and patient is at LOW risk for falls.Assessment completed on:8/16/2022    Depression Screening:  PHQ-2/PHQ-9 Depression Screening 8/16/2022   Retired PHQ-9 Total Score -   Retired Total Score -   Little Interest or Pleasure in Doing Things 0-->not at all   Feeling Down, Depressed or Hopeless 0-->not at all   PHQ-9: Brief Depression Severity Measure Score 0       Health Habits and Functional and Cognitive Screening:  Functional & Cognitive Status 8/16/2022   Do you have difficulty preparing food and eating? No   Do you have difficulty bathing yourself, getting dressed or grooming yourself? No   Do you have difficulty using the toilet? No   Do you have difficulty moving around from place to place? No   Do you have trouble with steps or getting out of a bed or a chair? No   Current Diet Well Balanced Diet   Dental Exam Unknown   Eye Exam Unknown   Exercise  (times per week) 2 times per week   Current Exercises Include Walking        Exercise Comment breathing, stretching, push up   Do you need help using the phone?  No   Are you deaf or do you have serious difficulty hearing?  No   Do you need help with transportation? Yes   Do you need help shopping? Yes   Do you need help preparing meals?  Yes   Do you need help with housework?  No   Do you need help with laundry? Yes   Do you need help taking your medications? No   Do you need help managing money? Yes   Do you ever drive or ride in a car without wearing a seat belt? -   Have you felt unusual stress, anger or loneliness in the last month? No   Who do you live with? Sibling   If you need help, do you have trouble finding someone available to you? No   Have you been bothered in the last four weeks by sexual problems? No   Do you have difficulty concentrating, remembering or making decisions? No       Age-appropriate Screening Schedule:  Refer to the list below for future screening recommendations based on patient's age, sex and/or medical conditions. Orders for these recommended tests are listed in the plan section. The patient has been provided with a written plan.    Health Maintenance   Topic Date Due   • DXA SCAN  Never done   • INFLUENZA VACCINE  10/01/2022   • LIPID PANEL  08/16/2023   • TDAP/TD VACCINES (2 - Td or Tdap) 08/05/2031              Assessment & Plan   CMS Preventative Services Quick Reference  Risk Factors Identified During Encounter  Fall Risk-High or Moderate  Obesity/Overweight   Polypharmacy  The above risks/problems have been discussed with the patient.  Follow up actions/plans if indicated are seen below in the Assessment/Plan Section.  Pertinent information has been shared with the patient in the After Visit Summary.    Diagnoses and all orders for this visit:    1. Medicare annual wellness visit, subsequent (Primary)    2. Essential hypertension  -     CBC & Differential  -     Comprehensive  Metabolic Panel    3. Mixed hyperlipidemia  -     Lipid Panel    4. Other osteoporosis without current pathological fracture  -     DEXA Bone Density Axial; Future    5. H/O traumatic brain injury    6. Medication management        Follow Up:   No follow-ups on file.     An After Visit Summary and PPPS were made available to the patient.

## 2022-08-16 NOTE — PROGRESS NOTES
"Chief Complaint  Medicare Wellness-subsequent    Subjective        Tye JONES Junior presents to [facility]  History of Present Illness  TBI  Taking adderall and this is helping. His BP looks good. And his weight is up.   No trouble with sleep.       Running around and going out to eat.   With his caregiver. They do also eat with her father.   His weight is up some from last visit.   She feeds him.     Objective   Vital Signs:  /68 (BP Location: Right arm, Patient Position: Sitting, Cuff Size: Adult)   Pulse 78   Temp 97.1 °F (36.2 °C) (Infrared)   Resp 18   Ht 182.9 cm (72\")   Wt 97.3 kg (214 lb 9.6 oz)   SpO2 96%   BMI 29.10 kg/m²   Estimated body mass index is 29.1 kg/m² as calculated from the following:    Height as of this encounter: 182.9 cm (72\").    Weight as of this encounter: 97.3 kg (214 lb 9.6 oz).          Physical Exam   Result Review :                Assessment and Plan   There are no diagnoses linked to this encounter.         Follow Up   No follow-ups on file.  Patient was given instructions and counseling regarding his condition or for health maintenance advice. Please see specific information pulled into the AVS if appropriate.       "

## 2022-08-17 LAB
ALBUMIN SERPL-MCNC: 4.5 G/DL (ref 4–5)
ALBUMIN/GLOB SERPL: 1.6 {RATIO} (ref 1.2–2.2)
ALP SERPL-CCNC: 67 IU/L (ref 44–121)
ALT SERPL-CCNC: 30 IU/L (ref 0–44)
AST SERPL-CCNC: 28 IU/L (ref 0–40)
BASOPHILS # BLD AUTO: 0 X10E3/UL (ref 0–0.2)
BASOPHILS NFR BLD AUTO: 0 %
BILIRUB SERPL-MCNC: <0.2 MG/DL (ref 0–1.2)
BUN SERPL-MCNC: 15 MG/DL (ref 6–24)
BUN/CREAT SERPL: 13 (ref 9–20)
CALCIUM SERPL-MCNC: 9.3 MG/DL (ref 8.7–10.2)
CHLORIDE SERPL-SCNC: 103 MMOL/L (ref 96–106)
CHOLEST SERPL-MCNC: 179 MG/DL (ref 100–199)
CO2 SERPL-SCNC: 23 MMOL/L (ref 20–29)
CREAT SERPL-MCNC: 1.2 MG/DL (ref 0.76–1.27)
EGFRCR-CYS SERPLBLD CKD-EPI 2021: 75 ML/MIN/1.73
EOSINOPHIL # BLD AUTO: 0.1 X10E3/UL (ref 0–0.4)
EOSINOPHIL NFR BLD AUTO: 2 %
ERYTHROCYTE [DISTWIDTH] IN BLOOD BY AUTOMATED COUNT: 13.7 % (ref 11.6–15.4)
GLOBULIN SER CALC-MCNC: 2.8 G/DL (ref 1.5–4.5)
GLUCOSE SERPL-MCNC: 92 MG/DL (ref 65–99)
HCT VFR BLD AUTO: 43.3 % (ref 37.5–51)
HDLC SERPL-MCNC: 48 MG/DL
HGB BLD-MCNC: 14.1 G/DL (ref 13–17.7)
IMM GRANULOCYTES # BLD AUTO: 0 X10E3/UL (ref 0–0.1)
IMM GRANULOCYTES NFR BLD AUTO: 0 %
LDLC SERPL CALC-MCNC: 81 MG/DL (ref 0–99)
LYMPHOCYTES # BLD AUTO: 2 X10E3/UL (ref 0.7–3.1)
LYMPHOCYTES NFR BLD AUTO: 38 %
MCH RBC QN AUTO: 29.3 PG (ref 26.6–33)
MCHC RBC AUTO-ENTMCNC: 32.6 G/DL (ref 31.5–35.7)
MCV RBC AUTO: 90 FL (ref 79–97)
MONOCYTES # BLD AUTO: 0.7 X10E3/UL (ref 0.1–0.9)
MONOCYTES NFR BLD AUTO: 13 %
NEUTROPHILS # BLD AUTO: 2.5 X10E3/UL (ref 1.4–7)
NEUTROPHILS NFR BLD AUTO: 47 %
PLATELET # BLD AUTO: 193 X10E3/UL (ref 150–450)
POTASSIUM SERPL-SCNC: 4.4 MMOL/L (ref 3.5–5.2)
PROT SERPL-MCNC: 7.3 G/DL (ref 6–8.5)
RBC # BLD AUTO: 4.82 X10E6/UL (ref 4.14–5.8)
SODIUM SERPL-SCNC: 142 MMOL/L (ref 134–144)
TRIGL SERPL-MCNC: 308 MG/DL (ref 0–149)
VLDLC SERPL CALC-MCNC: 50 MG/DL (ref 5–40)
WBC # BLD AUTO: 5.4 X10E3/UL (ref 3.4–10.8)

## 2022-08-18 ENCOUNTER — HOSPITAL ENCOUNTER (OUTPATIENT)
Dept: BONE DENSITY | Facility: HOSPITAL | Age: 47
Discharge: HOME | End: 2022-08-18
Admitting: FAMILY MEDICINE

## 2022-08-18 DIAGNOSIS — M81.8 OTHER OSTEOPOROSIS WITHOUT CURRENT PATHOLOGICAL FRACTURE: ICD-10-CM

## 2022-08-18 PROCEDURE — 77080 DXA BONE DENSITY AXIAL: CPT

## 2022-08-31 ENCOUNTER — TELEPHONE (OUTPATIENT)
Dept: FAMILY MEDICINE CLINIC | Facility: CLINIC | Age: 47
End: 2022-08-31

## 2022-08-31 NOTE — TELEPHONE ENCOUNTER
Caller: Jada Ugalde    Relationship to patient: Emergency Contact    Best call back number: [phone]    Patient is needing: PLEASE CALL PATIENTS SISTER AND GO OVER LAB RESULTS WITH HER.

## 2022-09-08 NOTE — TELEPHONE ENCOUNTER
Rx Refill Note  Requested Prescriptions     Pending Prescriptions Disp Refills   • metoprolol tartrate (LOPRESSOR) 50 MG tablet [Pharmacy Med Name: Metoprolol Tartrate 50 MG Oral Tablet] 180 tablet 0     Sig: TAKE 1 TABLET BY MOUTH EVERY 12 HOURS      Last office visit with prescribing clinician: 8/16/2022      Next office visit with prescribing clinician: 2/16/2023            Jessica Hugo LPN  09/08/22, 09:14 EDT

## 2022-09-09 RX ORDER — METOPROLOL TARTRATE 50 MG/1
TABLET, FILM COATED ORAL
Qty: 180 TABLET | Refills: 1 | Status: SHIPPED | OUTPATIENT
Start: 2022-09-09 | End: 2023-03-03

## 2022-10-17 DIAGNOSIS — Z87.820 HISTORY OF TRAUMATIC BRAIN INJURY: ICD-10-CM

## 2022-10-17 NOTE — TELEPHONE ENCOUNTER
Rx Refill Note  Requested Prescriptions     Pending Prescriptions Disp Refills   • alendronate (FOSAMAX) 70 MG tablet [Pharmacy Med Name: Alendronate Sodium 70 MG Oral Tablet] 12 tablet 0     Sig: Take 1 tablet by mouth once a week      Last office visit with prescribing clinician: 8/16/2022      Next office visit with prescribing clinician: 10/17/2022            Preeti Gill MA  10/17/22, 16:38 EDT

## 2022-10-17 NOTE — TELEPHONE ENCOUNTER
Caller: AftabJada    Relationship: Emergency Contact (ON  [facility])    Best call back number: [phone]    Requested Prescriptions:   Requested Prescriptions     Pending Prescriptions Disp Refills   • amphetamine-dextroamphetamine (ADDERALL) 30 MG tablet 30 tablet 0     Sig: Take 1 tablet by mouth Daily.        Pharmacy where request should be sent: [address] [phone] [facility] [phone] FX     Additional details provided by patient: PATIENT TOOK LAST TABLET TODAY 10/17/22.    Does the patient have less than a 3 day supply:  [x] Yes  [] No    PLEASE ADVISE.    Bry Dao Rep   10/17/22 16:02 EDT

## 2022-10-17 NOTE — TELEPHONE ENCOUNTER
Rx Refill Note  Requested Prescriptions     Pending Prescriptions Disp Refills   • potassium chloride (MICRO-K) 10 MEQ CR capsule [Pharmacy Med Name: Potassium Chloride ER 10 MEQ Oral Capsule Extended Release] 90 capsule 0     Sig: Take 1 capsule by mouth once daily      Last office visit with prescribing clinician: 8/16/2022      Next office visit with prescribing clinician: 10/17/2022            Preeti Gill MA  10/17/22, 16:44 EDT

## 2022-10-17 NOTE — TELEPHONE ENCOUNTER
CS CONTRACT UP TO DATE  DRUG SCREEN NOT ON FILE    Rx Refill Note  Requested Prescriptions     Pending Prescriptions Disp Refills   • amphetamine-dextroamphetamine (ADDERALL) 30 MG tablet 30 tablet 0     Sig: Take 1 tablet by mouth Daily.      Last office visit with prescribing clinician: 8/16/2022      Next office visit with prescribing clinician: 2/16/2023            Preeti Gill MA  10/17/22, 16:51 EDT

## 2022-10-18 RX ORDER — DEXTROAMPHETAMINE SACCHARATE, AMPHETAMINE ASPARTATE, DEXTROAMPHETAMINE SULFATE AND AMPHETAMINE SULFATE 7.5; 7.5; 7.5; 7.5 MG/1; MG/1; MG/1; MG/1
30 TABLET ORAL DAILY
Qty: 30 TABLET | Refills: 0 | Status: SHIPPED | OUTPATIENT
Start: 2022-10-18 | End: 2022-11-23 | Stop reason: SDUPTHER

## 2022-10-18 RX ORDER — POTASSIUM CHLORIDE 750 MG/1
CAPSULE, EXTENDED RELEASE ORAL
Qty: 90 CAPSULE | Refills: 3 | Status: SHIPPED | OUTPATIENT
Start: 2022-10-18

## 2022-10-18 RX ORDER — ALENDRONATE SODIUM 70 MG/1
TABLET ORAL
Qty: 12 TABLET | Refills: 0 | Status: SHIPPED | OUTPATIENT
Start: 2022-10-18 | End: 2023-01-11

## 2022-11-23 DIAGNOSIS — Z87.820 HISTORY OF TRAUMATIC BRAIN INJURY: ICD-10-CM

## 2022-11-28 RX ORDER — DEXTROAMPHETAMINE SACCHARATE, AMPHETAMINE ASPARTATE, DEXTROAMPHETAMINE SULFATE AND AMPHETAMINE SULFATE 7.5; 7.5; 7.5; 7.5 MG/1; MG/1; MG/1; MG/1
30 TABLET ORAL DAILY
Qty: 30 TABLET | Refills: 0 | Status: SHIPPED | OUTPATIENT
Start: 2022-11-28 | End: 2023-01-11 | Stop reason: SDUPTHER

## 2022-11-28 NOTE — TELEPHONE ENCOUNTER
Rx Refill Note  Requested Prescriptions     Pending Prescriptions Disp Refills   • amphetamine-dextroamphetamine (ADDERALL) 30 MG tablet 30 tablet 0     Sig: Take 1 tablet by mouth Daily.      Last office visit with prescribing clinician: 8/16/2022      Next office visit with prescribing clinician: 2/16/2023            Jessica Hugo LPN  11/28/22, 13:12 EST

## 2022-12-23 RX ORDER — PANTOPRAZOLE SODIUM 40 MG/1
TABLET, DELAYED RELEASE ORAL
Qty: 4 TABLET | Refills: 0 | Status: SHIPPED | OUTPATIENT
Start: 2022-12-23 | End: 2023-01-03

## 2023-01-03 RX ORDER — PANTOPRAZOLE SODIUM 40 MG/1
TABLET, DELAYED RELEASE ORAL
Qty: 90 TABLET | Refills: 1 | Status: SHIPPED | OUTPATIENT
Start: 2023-01-03

## 2023-01-11 DIAGNOSIS — Z87.820 HISTORY OF TRAUMATIC BRAIN INJURY: ICD-10-CM

## 2023-01-11 RX ORDER — ALENDRONATE SODIUM 70 MG/1
TABLET ORAL
Qty: 12 TABLET | Refills: 0 | Status: SHIPPED | OUTPATIENT
Start: 2023-01-11

## 2023-01-12 NOTE — TELEPHONE ENCOUNTER
Rx Refill Note  Requested Prescriptions     Pending Prescriptions Disp Refills   • amphetamine-dextroamphetamine (ADDERALL) 30 MG tablet 30 tablet 0     Sig: Take 1 tablet by mouth Daily.      Last office visit with prescribing clinician: 8/16/2022   Last telemedicine visit with prescribing clinician: 2/16/2023   Next office visit with prescribing clinician: 2/16/2023                         Would you like a call back once the refill request has been completed: [] Yes [] No    If the office needs to give you a call back, can they leave a voicemail: [] Yes [] No    Jessica Hugo LPN  01/12/23, 09:56 EST

## 2023-01-13 RX ORDER — DEXTROAMPHETAMINE SACCHARATE, AMPHETAMINE ASPARTATE, DEXTROAMPHETAMINE SULFATE AND AMPHETAMINE SULFATE 7.5; 7.5; 7.5; 7.5 MG/1; MG/1; MG/1; MG/1
30 TABLET ORAL DAILY
Qty: 30 TABLET | Refills: 0 | Status: SHIPPED | OUTPATIENT
Start: 2023-01-13 | End: 2023-02-21 | Stop reason: SDUPTHER

## 2023-02-13 NOTE — TELEPHONE ENCOUNTER
Printing.  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 Azelaic Acid Pregnancy And Lactation Text: This medication is considered safe during pregnancy and breast feeding.

## 2023-02-17 ENCOUNTER — OFFICE VISIT (OUTPATIENT)
Dept: FAMILY MEDICINE CLINIC | Facility: CLINIC | Age: 48
End: 2023-02-17
Payer: MEDICARE

## 2023-02-17 VITALS
SYSTOLIC BLOOD PRESSURE: 110 MMHG | WEIGHT: 216 LBS | OXYGEN SATURATION: 99 % | RESPIRATION RATE: 17 BRPM | DIASTOLIC BLOOD PRESSURE: 70 MMHG | HEART RATE: 57 BPM | BODY MASS INDEX: 29.26 KG/M2 | HEIGHT: 72 IN | TEMPERATURE: 98.4 F

## 2023-02-17 DIAGNOSIS — R41.3 MEMORY LOSS: Primary | ICD-10-CM

## 2023-02-17 DIAGNOSIS — Z79.899 MEDICATION MANAGEMENT: ICD-10-CM

## 2023-02-17 DIAGNOSIS — E53.8 VITAMIN B 12 DEFICIENCY: ICD-10-CM

## 2023-02-17 PROCEDURE — 99214 OFFICE O/P EST MOD 30 MIN: CPT | Performed by: FAMILY MEDICINE
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
## 2023-02-18 LAB
ALBUMIN SERPL-MCNC: 4.4 G/DL (ref 3.5–5.2)
ALBUMIN/GLOB SERPL: 1.5 G/DL
ALP SERPL-CCNC: 65 U/L (ref 39–117)
ALT SERPL-CCNC: 30 U/L (ref 1–41)
AST SERPL-CCNC: 25 U/L (ref 1–40)
BASOPHILS # BLD AUTO: 0.02 10*3/MM3 (ref 0–0.2)
BASOPHILS NFR BLD AUTO: 0.4 % (ref 0–1.5)
BILIRUB SERPL-MCNC: <0.2 MG/DL (ref 0–1.2)
BUN SERPL-MCNC: 14 MG/DL (ref 6–20)
BUN/CREAT SERPL: 14.9 (ref 7–25)
CALCIUM SERPL-MCNC: 9.2 MG/DL (ref 8.6–10.5)
CHLORIDE SERPL-SCNC: 107 MMOL/L (ref 98–107)
CO2 SERPL-SCNC: 28.4 MMOL/L (ref 22–29)
CREAT SERPL-MCNC: 0.94 MG/DL (ref 0.76–1.27)
EGFRCR SERPLBLD CKD-EPI 2021: 99.4 ML/MIN/1.73
EOSINOPHIL # BLD AUTO: 0.1 10*3/MM3 (ref 0–0.4)
EOSINOPHIL NFR BLD AUTO: 2 % (ref 0.3–6.2)
ERYTHROCYTE [DISTWIDTH] IN BLOOD BY AUTOMATED COUNT: 13.5 % (ref 12.3–15.4)
GLOBULIN SER CALC-MCNC: 2.9 GM/DL
GLUCOSE SERPL-MCNC: 87 MG/DL (ref 65–99)
HCT VFR BLD AUTO: 41.7 % (ref 37.5–51)
HGB BLD-MCNC: 13.6 G/DL (ref 13–17.7)
IMM GRANULOCYTES # BLD AUTO: 0 10*3/MM3 (ref 0–0.05)
IMM GRANULOCYTES NFR BLD AUTO: 0 % (ref 0–0.5)
LYMPHOCYTES # BLD AUTO: 1.74 10*3/MM3 (ref 0.7–3.1)
LYMPHOCYTES NFR BLD AUTO: 34.8 % (ref 19.6–45.3)
MCH RBC QN AUTO: 28.8 PG (ref 26.6–33)
MCHC RBC AUTO-ENTMCNC: 32.6 G/DL (ref 31.5–35.7)
MCV RBC AUTO: 88.2 FL (ref 79–97)
MONOCYTES # BLD AUTO: 0.64 10*3/MM3 (ref 0.1–0.9)
MONOCYTES NFR BLD AUTO: 12.8 % (ref 5–12)
NEUTROPHILS # BLD AUTO: 2.5 10*3/MM3 (ref 1.7–7)
NEUTROPHILS NFR BLD AUTO: 50 % (ref 42.7–76)
NRBC BLD AUTO-RTO: 0 /100 WBC (ref 0–0.2)
PLATELET # BLD AUTO: 185 10*3/MM3 (ref 140–450)
POTASSIUM SERPL-SCNC: 4.3 MMOL/L (ref 3.5–5.2)
PROT SERPL-MCNC: 7.3 G/DL (ref 6–8.5)
RBC # BLD AUTO: 4.73 10*6/MM3 (ref 4.14–5.8)
SODIUM SERPL-SCNC: 143 MMOL/L (ref 136–145)
TSH SERPL DL<=0.005 MIU/L-ACNC: 1.13 UIU/ML (ref 0.27–4.2)
VIT B12 SERPL-MCNC: 720 PG/ML (ref 211–946)
WBC # BLD AUTO: 5 10*3/MM3 (ref 3.4–10.8)

## 2023-02-21 DIAGNOSIS — Z87.820 HISTORY OF TRAUMATIC BRAIN INJURY: ICD-10-CM

## 2023-02-21 RX ORDER — DEXTROAMPHETAMINE SACCHARATE, AMPHETAMINE ASPARTATE, DEXTROAMPHETAMINE SULFATE AND AMPHETAMINE SULFATE 7.5; 7.5; 7.5; 7.5 MG/1; MG/1; MG/1; MG/1
30 TABLET ORAL DAILY
Qty: 30 TABLET | Refills: 0 | Status: SHIPPED | OUTPATIENT
Start: 2023-02-21 | End: 2023-03-29 | Stop reason: SDUPTHER

## 2023-02-21 NOTE — TELEPHONE ENCOUNTER
Rx Refill Note  Requested Prescriptions     Pending Prescriptions Disp Refills   • amphetamine-dextroamphetamine (ADDERALL) 30 MG tablet 30 tablet 0     Sig: Take 1 tablet by mouth Daily.      Last office visit with prescribing clinician: 2/17/2023   Last telemedicine visit with prescribing clinician: Visit date not found   Next office visit with prescribing clinician: 8/22/2023                         Would you like a call back once the refill request has been completed: [] Yes [] No    If the office needs to give you a call back, can they leave a voicemail: [] Yes [] No    Jessica Hugo LPN  02/21/23, 16:06 EST

## 2023-02-22 DIAGNOSIS — R29.898 WEAKNESS OF BOTH LOWER EXTREMITIES: ICD-10-CM

## 2023-02-22 DIAGNOSIS — Z87.820 H/O TRAUMATIC BRAIN INJURY: Primary | ICD-10-CM

## 2023-03-03 RX ORDER — METOPROLOL TARTRATE 50 MG/1
TABLET, FILM COATED ORAL
Qty: 180 TABLET | Refills: 1 | Status: SHIPPED | OUTPATIENT
Start: 2023-03-03

## 2023-03-06 DIAGNOSIS — R29.898 WEAKNESS OF BOTH LOWER EXTREMITIES: ICD-10-CM

## 2023-03-06 DIAGNOSIS — Z87.820 HISTORY OF TRAUMATIC BRAIN INJURY: Primary | ICD-10-CM

## 2023-03-29 DIAGNOSIS — Z87.820 HISTORY OF TRAUMATIC BRAIN INJURY: ICD-10-CM

## 2023-03-30 NOTE — TELEPHONE ENCOUNTER
Rx Refill Note  Requested Prescriptions     Pending Prescriptions Disp Refills   • amphetamine-dextroamphetamine (ADDERALL) 30 MG tablet 30 tablet 0     Sig: Take 1 tablet by mouth Daily.      Last office visit with prescribing clinician: 2/17/2023   Last telemedicine visit with prescribing clinician: 5/11/2023   Next office visit with prescribing clinician: Visit date not found                         Would you like a call back once the refill request has been completed: [] Yes [] No    If the office needs to give you a call back, can they leave a voicemail: [] Yes [] No    Jessica Hugo LPN  03/30/23, 14:29 EDT

## 2023-03-31 RX ORDER — DEXTROAMPHETAMINE SACCHARATE, AMPHETAMINE ASPARTATE, DEXTROAMPHETAMINE SULFATE AND AMPHETAMINE SULFATE 7.5; 7.5; 7.5; 7.5 MG/1; MG/1; MG/1; MG/1
30 TABLET ORAL DAILY
Qty: 30 TABLET | Refills: 0 | Status: SHIPPED | OUTPATIENT
Start: 2023-03-31

## 2023-04-04 DIAGNOSIS — S06.9X9S TRAUMATIC BRAIN INJURY WITH LOSS OF CONSCIOUSNESS, SEQUELA: Primary | ICD-10-CM

## 2023-04-04 DIAGNOSIS — R29.898 WEAKNESS OF BOTH LOWER EXTREMITIES: ICD-10-CM

## 2023-05-04 DIAGNOSIS — Z87.820 HISTORY OF TRAUMATIC BRAIN INJURY: ICD-10-CM

## 2023-05-04 RX ORDER — DEXTROAMPHETAMINE SACCHARATE, AMPHETAMINE ASPARTATE, DEXTROAMPHETAMINE SULFATE AND AMPHETAMINE SULFATE 7.5; 7.5; 7.5; 7.5 MG/1; MG/1; MG/1; MG/1
30 TABLET ORAL DAILY
Qty: 30 TABLET | Refills: 0 | Status: SHIPPED | OUTPATIENT
Start: 2023-05-04

## 2023-05-04 RX ORDER — ALENDRONATE SODIUM 70 MG/1
70 TABLET ORAL WEEKLY
Qty: 12 TABLET | Refills: 0 | Status: SHIPPED | OUTPATIENT
Start: 2023-05-04

## 2023-05-04 NOTE — TELEPHONE ENCOUNTER
Rx Refill Note  Requested Prescriptions     Pending Prescriptions Disp Refills   • amphetamine-dextroamphetamine (ADDERALL) 30 MG tablet 30 tablet 0     Sig: Take 1 tablet by mouth Daily.      Last office visit with prescribing clinician: 2/17/2023   Last telemedicine visit with prescribing clinician: 5/4/2023   Next office visit with prescribing clinician: 5/4/2023                         Would you like a call back once the refill request has been completed: [] Yes [] No    If the office needs to give you a call back, can they leave a voicemail: [] Yes [] No    Jessica Hugo LPN  05/04/23, 16:55 EDT

## 2023-05-11 ENCOUNTER — OFFICE VISIT (OUTPATIENT)
Dept: FAMILY MEDICINE CLINIC | Facility: CLINIC | Age: 48
End: 2023-05-11

## 2023-05-11 VITALS
HEART RATE: 54 BPM | DIASTOLIC BLOOD PRESSURE: 78 MMHG | WEIGHT: 208 LBS | RESPIRATION RATE: 17 BRPM | BODY MASS INDEX: 28.17 KG/M2 | SYSTOLIC BLOOD PRESSURE: 110 MMHG | HEIGHT: 72 IN | TEMPERATURE: 97.1 F | OXYGEN SATURATION: 100 %

## 2023-05-11 DIAGNOSIS — Z87.820 H/O TRAUMATIC BRAIN INJURY: Primary | ICD-10-CM

## 2023-05-11 NOTE — PROGRESS NOTES
"Chief Complaint  Chief Complaint   Patient presents with   • Establish Care     New Pt        Subjective    History of Present Illness        Tye JONES Junior presents to [facility] for   History of Present Illness  ADHD:  Symptoms include poor work performance, forgetfulness, avoiding mental tasks and fidgeting. The symptoms occur at home. Onset was Since 2008.. The symptoms are described as Moderate in severity. The symptoms are described as stable. Symptoms are exacerbated by mental effort. Symptoms are relieved by stimulant medication. Associated symptoms include none. Current treatment includes dextroamphetamine/amphetamine (Adderall). By report, Tye is compliant all of the time.       Objective   Vital Signs:   Visit Vitals  /78   Pulse 54   Temp 97.1 °F (36.2 °C)   Resp 17   Ht 182.9 cm (72\")   Wt 94.3 kg (208 lb)   SpO2 100%   BMI 28.21 kg/m²          Physical Exam  Vitals reviewed.   Constitutional:       Appearance: He is well-developed.   HENT:      Head: Normocephalic.      Right Ear: External ear normal.      Left Ear: External ear normal.      Nose: Nose normal.   Eyes:      Conjunctiva/sclera: Conjunctivae normal.   Cardiovascular:      Rate and Rhythm: Normal rate and regular rhythm.   Pulmonary:      Effort: Pulmonary effort is normal.      Breath sounds: Normal breath sounds.   Musculoskeletal:         General: Normal range of motion.      Cervical back: Normal range of motion and neck supple.   Skin:     General: Skin is warm and dry.      Capillary Refill: Capillary refill takes less than 2 seconds.   Neurological:      Mental Status: He is alert and oriented to person, place, and time.            Result Review :                    Assessment and Plan      Diagnoses and all orders for this visit:    1. H/O traumatic brain injury (Primary)  Assessment & Plan:  He is currently taking Adderall to help with his traumatic brain injury.  Patient is stable on his " medication regimen.  Patient will return to the clinic in 3 months for follow-up.             Follow Up   No follow-ups on file.  Patient was given instructions and counseling regarding his condition or for health maintenance advice. Please see specific information pulled into the AVS if appropriate.
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
## 2023-05-29 NOTE — ASSESSMENT & PLAN NOTE
He is currently taking Adderall to help with his traumatic brain injury.  Patient is stable on his medication regimen.  Patient will return to the clinic in 3 months for follow-up.

## 2023-05-31 RX ORDER — LOVASTATIN 20 MG/1
TABLET ORAL
Qty: 90 TABLET | Refills: 0 | Status: SHIPPED | OUTPATIENT
Start: 2023-05-31

## 2023-07-21 ENCOUNTER — TELEPHONE (OUTPATIENT)
Dept: FAMILY MEDICINE CLINIC | Facility: CLINIC | Age: 48
End: 2023-07-21
Payer: MEDICARE

## 2023-07-21 DIAGNOSIS — L60.0 INGROWN TOENAIL: Primary | ICD-10-CM

## 2023-08-11 ENCOUNTER — OFFICE VISIT (OUTPATIENT)
Dept: FAMILY MEDICINE CLINIC | Facility: CLINIC | Age: 48
End: 2023-08-11
Payer: MEDICARE

## 2023-08-11 VITALS
HEART RATE: 70 BPM | OXYGEN SATURATION: 96 % | BODY MASS INDEX: 27.62 KG/M2 | HEIGHT: 72 IN | DIASTOLIC BLOOD PRESSURE: 74 MMHG | SYSTOLIC BLOOD PRESSURE: 112 MMHG | WEIGHT: 203.9 LBS

## 2023-08-11 DIAGNOSIS — Z87.820 HISTORY OF TRAUMATIC BRAIN INJURY: Primary | ICD-10-CM

## 2023-08-11 DIAGNOSIS — B35.6 JOCK ITCH: ICD-10-CM

## 2023-08-11 DIAGNOSIS — K21.9 GASTROESOPHAGEAL REFLUX DISEASE WITHOUT ESOPHAGITIS: ICD-10-CM

## 2023-08-11 RX ORDER — POLYETHYLENE GLYCOL 3350 17 G/17G
POWDER, FOR SOLUTION ORAL EVERY 24 HOURS
COMMUNITY

## 2023-08-11 RX ORDER — PHENYTOIN SODIUM 100 MG/1
1 CAPSULE, EXTENDED RELEASE ORAL EVERY 12 HOURS SCHEDULED
COMMUNITY

## 2023-08-11 RX ORDER — PANTOPRAZOLE SODIUM 40 MG/1
40 TABLET, DELAYED RELEASE ORAL DAILY
Qty: 90 TABLET | Refills: 3 | Status: SHIPPED | OUTPATIENT
Start: 2023-08-11

## 2023-08-11 RX ORDER — PRENATAL VIT 91/IRON/FOLIC/DHA 28-975-200
1 COMBINATION PACKAGE (EA) ORAL 2 TIMES DAILY
Qty: 28.4 G | Refills: 1 | Status: SHIPPED | OUTPATIENT
Start: 2023-08-11

## 2023-08-11 RX ORDER — FOLIC ACID 1 MG/1
TABLET ORAL EVERY 24 HOURS
COMMUNITY

## 2023-08-11 RX ORDER — AMOXICILLIN AND CLAVULANATE POTASSIUM 500; 125 MG/1; MG/1
1 TABLET, FILM COATED ORAL EVERY 12 HOURS SCHEDULED
COMMUNITY
Start: 2023-08-03

## 2023-08-11 RX ORDER — DEXTROAMPHETAMINE SACCHARATE, AMPHETAMINE ASPARTATE, DEXTROAMPHETAMINE SULFATE AND AMPHETAMINE SULFATE 7.5; 7.5; 7.5; 7.5 MG/1; MG/1; MG/1; MG/1
30 TABLET ORAL DAILY
Qty: 30 TABLET | Refills: 0 | Status: SHIPPED | OUTPATIENT
Start: 2023-08-11

## 2023-08-11 NOTE — PROGRESS NOTES
"Chief Complaint  Chief Complaint   Patient presents with    Follow-up     Burning between legs after going to bathroom       Subjective    History of Present Illness        Tye JONES Junior presents to [facility] for   History of Present Illness  Patient-year-old male is being seen in pulmonary problems.  He is having a stinging after he starts having a bowel movement.  He reports that it hasn't done it in a while.  He reports having irritation in his groin area.  This is been gradually worsening  ADHD:  Symptoms include disruptive behavior, impulsivity, inability to follow directions, forgetfulness, and avoiding mental tasks. The symptoms occur at home.  The symptoms are described as Moderate in severity. The symptoms are described as stable. Symptoms are exacerbated by fatigue, distracting activities, reading, conversation, and mental effort. Symptoms are relieved by stimulant medication. Associated symptoms include siezure. Current treatment includes dextroamphetamine/amphetamine (Adderall). By report, Tye is compliant most of the time         Objective   Vital Signs:   Visit Vitals  /74   Pulse 70   Ht 182.9 cm (72\")   Wt 92.5 kg (203 lb 14.4 oz)   SpO2 96%   BMI 27.65 kg/mý             Physical Exam  Vitals reviewed.   Constitutional:       Appearance: He is well-developed.   HENT:      Head: Normocephalic.      Right Ear: External ear normal.      Left Ear: External ear normal.      Nose: Nose normal.   Eyes:      Conjunctiva/sclera: Conjunctivae normal.   Cardiovascular:      Rate and Rhythm: Normal rate and regular rhythm.   Pulmonary:      Effort: Pulmonary effort is normal.      Breath sounds: Normal breath sounds.   Genitourinary:     Comments: Appears to be like jock itch.  Musculoskeletal:         General: Normal range of motion.      Cervical back: Normal range of motion and neck supple.   Skin:     General: Skin is warm and dry.      Capillary Refill: Capillary " refill takes less than 2 seconds.   Neurological:      Mental Status: He is alert and oriented to person, place, and time.          Result Review :                    Assessment and Plan      Diagnoses and all orders for this visit:    1. History of traumatic brain injury (Primary)  Assessment & Plan:  Patient can refill his medication.  Patient will need to return to the clinic in 3 months for follow-up.    Orders:  -     amphetamine-dextroamphetamine (ADDERALL) 30 MG tablet; Take 1 tablet by mouth Daily.  Dispense: 30 tablet; Refill: 0    2. Jock itch  Assessment & Plan:  Patient was given prescription of Lamisil to help treat his symptoms.  Patient is encouraged return to clinic if symptoms do not improve.    Orders:  -     terbinafine (lamISIL) 1 % cream; Apply 1 application  topically to the appropriate area as directed 2 (Two) Times a Day.  Dispense: 28.4 g; Refill: 1    3. Gastroesophageal reflux disease without esophagitis  -     pantoprazole (PROTONIX) 40 MG EC tablet; Take 1 tablet by mouth Daily.  Dispense: 90 tablet; Refill: 3             Follow Up   No follow-ups on file.  Patient was given instructions and counseling regarding his condition or for health maintenance advice. Please see specific information pulled into the AVS if appropriate.

## 2023-08-24 ENCOUNTER — TELEPHONE (OUTPATIENT)
Dept: FAMILY MEDICINE CLINIC | Facility: CLINIC | Age: 48
End: 2023-08-24

## 2023-08-24 PROBLEM — B35.6 JOCK ITCH: Status: ACTIVE | Noted: 2023-08-24

## 2023-08-24 PROBLEM — K21.9 GASTROESOPHAGEAL REFLUX DISEASE WITHOUT ESOPHAGITIS: Status: ACTIVE | Noted: 2023-08-24

## 2023-08-24 NOTE — ASSESSMENT & PLAN NOTE
Patient can refill his medication.  Patient will need to return to the clinic in 3 months for follow-up.

## 2023-08-24 NOTE — ASSESSMENT & PLAN NOTE
Patient was given prescription of Lamisil to help treat his symptoms.  Patient is encouraged return to clinic if symptoms do not improve.

## 2023-08-24 NOTE — TELEPHONE ENCOUNTER
Caller: Jada Ugalde    Relationship to patient: Emergency Contact    Best call back number: [phone]     Chief complaint: PAIN IN LEGS    Type of visit: OFFICE    Requested date: ASAP     Additional notes: NO OPENINGS UNTIL NEXT WEDNESDAY WITH DR FATMIA OR AN APRN, PLEASE ADVISE IF PATIENT CAN BE SEEN SOONER.

## 2023-09-11 DIAGNOSIS — R56.9 SEIZURE: ICD-10-CM

## 2023-09-11 DIAGNOSIS — Z87.820 HISTORY OF TRAUMATIC BRAIN INJURY: ICD-10-CM

## 2023-09-11 RX ORDER — CLONAZEPAM 1 MG/1
TABLET ORAL
Qty: 90 TABLET | Refills: 1 | Status: SHIPPED | OUTPATIENT
Start: 2023-09-11

## 2023-09-11 RX ORDER — DEXTROAMPHETAMINE SACCHARATE, AMPHETAMINE ASPARTATE, DEXTROAMPHETAMINE SULFATE AND AMPHETAMINE SULFATE 7.5; 7.5; 7.5; 7.5 MG/1; MG/1; MG/1; MG/1
30 TABLET ORAL DAILY
Qty: 30 TABLET | Refills: 0 | Status: SHIPPED | OUTPATIENT
Start: 2023-09-11

## 2023-09-11 RX ORDER — METOPROLOL TARTRATE 50 MG/1
50 TABLET, FILM COATED ORAL EVERY 12 HOURS
Qty: 180 TABLET | Refills: 3 | Status: SHIPPED | OUTPATIENT
Start: 2023-09-11

## 2023-09-11 RX ORDER — LACOSAMIDE 200 MG/1
200 TABLET ORAL EVERY 12 HOURS SCHEDULED
Qty: 60 TABLET | Refills: 3 | Status: SHIPPED | OUTPATIENT
Start: 2023-09-11

## 2023-09-11 NOTE — TELEPHONE ENCOUNTER
Caller: Jada Ugalde    Relationship: Emergency Contact    Best call back number: [phone]     Requested Prescriptions:   Requested Prescriptions     Pending Prescriptions Disp Refills    amphetamine-dextroamphetamine (ADDERALL) 30 MG tablet 30 tablet 0     Sig: Take 1 tablet by mouth Daily.    lacosamide (VIMPAT) 200 MG tablet 60 tablet 3     Sig: Take 1 tablet by mouth Every 12 (Twelve) Hours.    metoprolol tartrate (LOPRESSOR) 50 MG tablet 180 tablet 1     Sig: Take 1 tablet by mouth Every 12 (Twelve) Hours.    clonazePAM (KlonoPIN) 1 MG tablet 90 tablet 1     Sig: One tablet by mouth every 8 hours        Pharmacy where request should be sent: [address] [phone] [facility] [phone]      Last office visit with prescribing clinician: 8/11/2023   Last telemedicine visit with prescribing clinician: Visit date not found   Next office visit with prescribing clinician: Visit date not found     Additional details provided by patient: PATIENT IS OUT OF THESE MEDICATIONS     Does the patient have less than a 3 day supply:  [x] Yes  [] No    Would you like a call back once the refill request has been completed: [] Yes [x] No    If the office needs to give you a call back, can they leave a voicemail: [x] Yes [] No    Bry Falcon   09/11/23 09:24 EDT

## 2023-09-11 NOTE — TELEPHONE ENCOUNTER
Rx Refill Note  Requested Prescriptions     Pending Prescriptions Disp Refills    amphetamine-dextroamphetamine (ADDERALL) 30 MG tablet 30 tablet 0     Sig: Take 1 tablet by mouth Daily.    lacosamide (VIMPAT) 200 MG tablet 60 tablet 3     Sig: Take 1 tablet by mouth Every 12 (Twelve) Hours.    metoprolol tartrate (LOPRESSOR) 50 MG tablet 180 tablet 1     Sig: Take 1 tablet by mouth Every 12 (Twelve) Hours.    clonazePAM (KlonoPIN) 1 MG tablet 90 tablet 1     Sig: One tablet by mouth every 8 hours      Last office visit with prescribing clinician: 8/11/2023   Last telemedicine visit with prescribing clinician: Visit date not found   Next office visit with prescribing clinician: Visit date not found                         Would you like a call back once the refill request has been completed: [] Yes [] No    If the office needs to give you a call back, can they leave a voicemail: [] Yes [] No    Filipe Miranda MA  09/11/23, 10:30 EDT

## 2023-09-22 ENCOUNTER — TELEPHONE (OUTPATIENT)
Dept: FAMILY MEDICINE CLINIC | Facility: CLINIC | Age: 48
End: 2023-09-22

## 2023-09-22 ENCOUNTER — OFFICE VISIT (OUTPATIENT)
Dept: FAMILY MEDICINE CLINIC | Facility: CLINIC | Age: 48
End: 2023-09-22
Payer: MEDICARE

## 2023-09-22 VITALS
HEART RATE: 96 BPM | RESPIRATION RATE: 18 BRPM | HEIGHT: 72 IN | BODY MASS INDEX: 26.82 KG/M2 | WEIGHT: 198 LBS | TEMPERATURE: 97.6 F | OXYGEN SATURATION: 99 %

## 2023-09-22 DIAGNOSIS — I10 ESSENTIAL HYPERTENSION: ICD-10-CM

## 2023-09-22 DIAGNOSIS — Z12.5 SCREENING FOR PROSTATE CANCER: ICD-10-CM

## 2023-09-22 DIAGNOSIS — E55.9 VITAMIN D DEFICIENCY: ICD-10-CM

## 2023-09-22 DIAGNOSIS — E53.8 VITAMIN B 12 DEFICIENCY: ICD-10-CM

## 2023-09-22 DIAGNOSIS — Z23 NEED FOR INFLUENZA VACCINATION: Primary | ICD-10-CM

## 2023-09-22 NOTE — PROGRESS NOTES
"Chief Complaint  Chief Complaint   Patient presents with    Leg Pain     Pt c/o leg pain x 2 weeks     Fatigue     Pt c/o feeling tired x 3 weeks        Subjective    History of Present Illness        Tye JONES Junior presents to [facility] for   History of Present Illness  Patient is a 48-year-old male that is being seen in the clinic for multiple medical problems.  Patient is being seen for leg pain and fatigue.  The symptoms of been going on for the last 2 to 3 weeks.  He has not had any injuries to his leg.  He was previously diagnosed with jock itch that has not completely resolved.  Fatigue  This is a new problem. The current episode started 1 to 4 weeks ago. The problem has been unchanged. Associated symptoms include fatigue. Pertinent negatives include no arthralgias, change in bowel habit, chills, congestion, headaches, nausea, neck pain, numbness, urinary symptoms, vertigo or visual change. Nothing aggravates the symptoms. He has tried nothing for the symptoms. The treatment provided no relief.   Hypertension  This is a chronic problem. The current episode started more than 1 year ago. The problem is unchanged. Pertinent negatives include no headaches, neck pain, orthopnea, palpitations, PND, shortness of breath or sweats. Past treatments include beta blockers. Current antihypertension treatment includes beta blockers. There are no compliance problems.  There is no history of coarctation of the aorta, hyperaldosteronism, hypercortisolism, hyperparathyroidism or pheochromocytoma.        Objective   Vital Signs:   Visit Vitals  Pulse 96   Temp 97.6 °F (36.4 °C)   Resp 18   Ht 182.9 cm (72\")   Wt 89.8 kg (198 lb)   SpO2 99%   BMI 26.85 kg/m²             Physical Exam  Vitals reviewed.   Constitutional:       Appearance: He is well-developed.   HENT:      Head: Normocephalic.      Right Ear: External ear normal.      Left Ear: External ear normal.      Nose: Nose normal.   Eyes: "      Conjunctiva/sclera: Conjunctivae normal.   Cardiovascular:      Rate and Rhythm: Normal rate and regular rhythm.   Pulmonary:      Effort: Pulmonary effort is normal.      Breath sounds: Normal breath sounds.   Musculoskeletal:         General: Normal range of motion.      Cervical back: Normal range of motion and neck supple.   Skin:     General: Skin is warm and dry.      Capillary Refill: Capillary refill takes less than 2 seconds.   Neurological:      Mental Status: He is alert and oriented to person, place, and time.            Result Review :                    Assessment and Plan      Diagnoses and all orders for this visit:    1. Need for influenza vaccination (Primary)  -     Fluzone (or Fluarix & Flulaval for VFC) >6mos    2. Essential hypertension  Assessment & Plan:  Hypertension is unchanged.  Continue current treatment regimen.  Dietary sodium restriction.  Weight loss.  Regular aerobic exercise.  Continue current medications.  Blood pressure will be reassessed at the next regular appointment.    Orders:  -     CBC & Differential  -     Comprehensive Metabolic Panel  -     Lipid Panel With / Chol / HDL Ratio  -     TSH    3. Vitamin D deficiency  Assessment & Plan:  This may be a cause of his fatigue.  Labs will be checked.    Orders:  -     Vitamin D,25-Hydroxy    4. Vitamin B 12 deficiency  -     Vitamin B12    5. Screening for prostate cancer  -     PSA Screen             Follow Up   No follow-ups on file.  Patient was given instructions and counseling regarding his condition or for health maintenance advice. Please see specific information pulled into the AVS if appropriate.

## 2023-09-23 LAB
25(OH)D3+25(OH)D2 SERPL-MCNC: 34.4 NG/ML (ref 30–100)
ALBUMIN SERPL-MCNC: 4.8 G/DL (ref 3.5–5.2)
ALBUMIN/GLOB SERPL: 1.7 G/DL
ALP SERPL-CCNC: 76 U/L (ref 39–117)
ALT SERPL-CCNC: 42 U/L (ref 1–41)
AST SERPL-CCNC: 31 U/L (ref 1–40)
BASOPHILS # BLD AUTO: 0.03 10*3/MM3 (ref 0–0.2)
BASOPHILS NFR BLD AUTO: 0.5 % (ref 0–1.5)
BILIRUB SERPL-MCNC: <0.2 MG/DL (ref 0–1.2)
BUN SERPL-MCNC: 15 MG/DL (ref 6–20)
BUN/CREAT SERPL: 12.8 (ref 7–25)
CALCIUM SERPL-MCNC: 10 MG/DL (ref 8.6–10.5)
CHLORIDE SERPL-SCNC: 107 MMOL/L (ref 98–107)
CHOLEST SERPL-MCNC: 179 MG/DL (ref 0–200)
CHOLEST/HDLC SERPL: 3.65 {RATIO}
CO2 SERPL-SCNC: 29.6 MMOL/L (ref 22–29)
CREAT SERPL-MCNC: 1.17 MG/DL (ref 0.76–1.27)
EGFRCR SERPLBLD CKD-EPI 2021: 75.9 ML/MIN/1.73
EOSINOPHIL # BLD AUTO: 0.16 10*3/MM3 (ref 0–0.4)
EOSINOPHIL NFR BLD AUTO: 2.7 % (ref 0.3–6.2)
ERYTHROCYTE [DISTWIDTH] IN BLOOD BY AUTOMATED COUNT: 13.6 % (ref 12.3–15.4)
GLOBULIN SER CALC-MCNC: 2.8 GM/DL
GLUCOSE SERPL-MCNC: 85 MG/DL (ref 65–99)
HCT VFR BLD AUTO: 44.1 % (ref 37.5–51)
HDLC SERPL-MCNC: 49 MG/DL (ref 40–60)
HGB BLD-MCNC: 14.8 G/DL (ref 13–17.7)
IMM GRANULOCYTES # BLD AUTO: 0.01 10*3/MM3 (ref 0–0.05)
IMM GRANULOCYTES NFR BLD AUTO: 0.2 % (ref 0–0.5)
LDLC SERPL CALC-MCNC: 90 MG/DL (ref 0–100)
LYMPHOCYTES # BLD AUTO: 1.54 10*3/MM3 (ref 0.7–3.1)
LYMPHOCYTES NFR BLD AUTO: 26.3 % (ref 19.6–45.3)
MCH RBC QN AUTO: 29.8 PG (ref 26.6–33)
MCHC RBC AUTO-ENTMCNC: 33.6 G/DL (ref 31.5–35.7)
MCV RBC AUTO: 88.9 FL (ref 79–97)
MONOCYTES # BLD AUTO: 0.68 10*3/MM3 (ref 0.1–0.9)
MONOCYTES NFR BLD AUTO: 11.6 % (ref 5–12)
NEUTROPHILS # BLD AUTO: 3.44 10*3/MM3 (ref 1.7–7)
NEUTROPHILS NFR BLD AUTO: 58.7 % (ref 42.7–76)
NRBC BLD AUTO-RTO: 0 /100 WBC (ref 0–0.2)
PLATELET # BLD AUTO: 201 10*3/MM3 (ref 140–450)
POTASSIUM SERPL-SCNC: 4.9 MMOL/L (ref 3.5–5.2)
PROT SERPL-MCNC: 7.6 G/DL (ref 6–8.5)
PSA SERPL-MCNC: 0.29 NG/ML (ref 0–4)
RBC # BLD AUTO: 4.96 10*6/MM3 (ref 4.14–5.8)
SODIUM SERPL-SCNC: 143 MMOL/L (ref 136–145)
TRIGL SERPL-MCNC: 237 MG/DL (ref 0–150)
TSH SERPL DL<=0.005 MIU/L-ACNC: 0.9 UIU/ML (ref 0.27–4.2)
VIT B12 SERPL-MCNC: 1326 PG/ML (ref 211–946)
VLDLC SERPL CALC-MCNC: 40 MG/DL (ref 5–40)
WBC # BLD AUTO: 5.86 10*3/MM3 (ref 3.4–10.8)

## 2023-10-06 ENCOUNTER — TELEPHONE (OUTPATIENT)
Dept: FAMILY MEDICINE CLINIC | Facility: CLINIC | Age: 48
End: 2023-10-06

## 2023-10-13 ENCOUNTER — TELEPHONE (OUTPATIENT)
Dept: FAMILY MEDICINE CLINIC | Facility: CLINIC | Age: 48
End: 2023-10-13
Payer: MEDICARE

## 2023-10-13 NOTE — TELEPHONE ENCOUNTER
----- Message from Mayito Moreno Sr., MD sent at 10/6/2023 12:14 PM EDT -----  After reviewing labs.  His chemistry panel shows a mildly elevated ALT and CO2.  No treatment needed we will recheck at his next visit.  His vitamin B12 is elevated.  No need to change treatment.  We will continue to monitor.  His triglyceride levels are elevated at 237 which is improved from his last visit.  The remaining of his cholesterol panel is normal.  His remaining labs are all within normal limits.

## 2023-10-16 PROBLEM — E55.9 VITAMIN D DEFICIENCY: Status: ACTIVE | Noted: 2023-10-16

## 2023-10-23 RX ORDER — LOVASTATIN 20 MG/1
TABLET ORAL
Qty: 90 TABLET | Refills: 0 | Status: SHIPPED | OUTPATIENT
Start: 2023-10-23

## 2023-10-23 RX ORDER — ALENDRONATE SODIUM 70 MG/1
70 TABLET ORAL WEEKLY
Qty: 12 TABLET | Refills: 0 | Status: SHIPPED | OUTPATIENT
Start: 2023-10-23

## 2023-11-06 ENCOUNTER — TELEPHONE (OUTPATIENT)
Dept: FAMILY MEDICINE CLINIC | Facility: CLINIC | Age: 48
End: 2023-11-06

## 2023-11-06 NOTE — TELEPHONE ENCOUNTER
Hub staff attempted to follow warm transfer process and was unsuccessful     Caller: Jada Ugalde    Relationship to patient: Emergency Contact    Best call back number: [phone]    Patient is needing: PATIENT SISTER CALLING FOR A HOSPITAL FOLLOW UP FOR PATIENT. WOULD LIKE TO SCHEDULE FRIDAY 11/10 IF POSSIBLE. WRIST/HAND JOAQUIM, MONICA ELY, DISCHARGE DATE 10/28. HUB AGENT UNABLE TO SCHEDULE OUTSIDE OF 7 DAY WINDOW.

## 2023-11-10 ENCOUNTER — OFFICE VISIT (OUTPATIENT)
Dept: FAMILY MEDICINE CLINIC | Facility: CLINIC | Age: 48
End: 2023-11-10
Payer: MEDICARE

## 2023-11-10 VITALS
HEIGHT: 72 IN | BODY MASS INDEX: 26.82 KG/M2 | OXYGEN SATURATION: 98 % | RESPIRATION RATE: 18 BRPM | TEMPERATURE: 96.9 F | WEIGHT: 198 LBS | SYSTOLIC BLOOD PRESSURE: 100 MMHG | DIASTOLIC BLOOD PRESSURE: 70 MMHG | HEART RATE: 77 BPM

## 2023-11-10 DIAGNOSIS — Z87.820 HISTORY OF TRAUMATIC BRAIN INJURY: ICD-10-CM

## 2023-11-10 DIAGNOSIS — R29.898 RIGHT HAND WEAKNESS: Primary | ICD-10-CM

## 2023-11-10 NOTE — PROGRESS NOTES
Chief Complaint  Chief Complaint   Patient presents with    Hospital Follow Up Visit     Pt here for f/u of muscle weakness in upper extremity      Hospital follow-up     Transitional Care Progress Note        Subjective    History of Present Illness        Tye JONES Junior is a 48 y.o. male who presents for a transitional care management visit.    Within 48 business hours after discharge our office contacted him via telephone to coordinate his care and needs.      I reviewed and discussed the details of that call along with the discharge summary, hospital problems, inpatient lab results, inpatient diagnostic studies, and consultation reports with Tye.     Current outpatient and discharge medications have been reconciled for the patient.  Reviewed by: Mayito Moreno Sr., MD           No data to display              Risk for Readmission (LACE) No data recorded    Tye JONES Junior presents to [facility] for   History of Present Illness  CC  Hospital follow-up    HPI  The patient is a 48-year-old male who presents for a hospital follow-up. An adult male accompanies the patient.    Hospital follow-up  On 11/08/2023, he began to experience right hand weakness. He was seen at [facility] in [place], [place]. He is unable to straighten out his right hand, and his wrist bends instead of straightening. He adds that he is using his hands a lot by putting individual pieces together. He has felt off-balanced. His right-hand has improved. He has difficulty falling and staying asleep. The adult male states that the patient's right wrist is curling up. He gave the patient a brace, which seemed to help minimally. The adult male denies any injury to the patient's right hand or any pain. The adult male's wife told him that the patient's fingers were curling up.     Hospital report   The hospital report reads that the patient has been wearing a brace on his right hand, but it is not  "helping. He is not able to eat with the right hand. He denies any right lower extremity weakness. His right hand does not show any signs of swelling, and he did not have any deformities of the forearm, wrist, or fingers. There was minimal pain around the right wrist itself. The patient's right hand has limited active range of motion. He has palpable clicks, and he does not feel like anything is fractured. When the wrist is held up, the patient has the finger slightly fixed or slightly flexed. The patient does not have any issues with opposition on command, and his strength remains relatively intact.     Sore throat  The patient complains of a sore throat.         Objective   Vital Signs:   Visit Vitals  /70   Pulse 77   Temp 96.9 °F (36.1 °C)   Resp 18   Ht 182.9 cm (72\")   Wt 89.8 kg (198 lb)   SpO2 98%   BMI 26.85 kg/m²             Physical Exam  Vitals reviewed.   Constitutional:       Appearance: He is well-developed.   HENT:      Head: Normocephalic.      Right Ear: External ear normal.      Left Ear: External ear normal.      Nose: Nose normal.   Eyes:      Conjunctiva/sclera: Conjunctivae normal.   Cardiovascular:      Rate and Rhythm: Normal rate and regular rhythm.   Pulmonary:      Effort: Pulmonary effort is normal.      Breath sounds: Normal breath sounds.   Musculoskeletal:         General: Normal range of motion.      Right hand: Decreased range of motion. Decreased strength.      Cervical back: Normal range of motion and neck supple.      Comments: He was unable to straighten out his hand.   Skin:     General: Skin is warm and dry.      Capillary Refill: Capillary refill takes less than 2 seconds.   Neurological:      Mental Status: He is alert and oriented to person, place, and time.              Result Review :           He had a CT scan of his head and spine that showed no problems with the spine, and stable bilateral cephalomedullary.           Assessment and Plan      Diagnoses and all " orders for this visit:    1. Right hand weakness (Primary)  Assessment & Plan:  - Referral to neurology and a hand specialist.  - Nerve conduction study ordered.  - He is advised to wear a brace.    Orders:  -     Nerve Conduction Test; Future  -     Ambulatory Referral to Neurology              Follow Up   No follow-ups on file.  Patient was given instructions and counseling regarding his condition or for health maintenance advice. Please see specific information pulled into the AVS if appropriate.     Transcribed from ambient dictation for Mayito Moreno Sr, MD by Magy Lester.  11/10/23   16:11 EST    Patient or patient representative verbalized consent to the visit recording.  I have personally performed the services described in this document as transcribed by the above individual, and it is both accurate and complete.

## 2023-11-13 RX ORDER — DEXTROAMPHETAMINE SACCHARATE, AMPHETAMINE ASPARTATE, DEXTROAMPHETAMINE SULFATE AND AMPHETAMINE SULFATE 7.5; 7.5; 7.5; 7.5 MG/1; MG/1; MG/1; MG/1
30 TABLET ORAL DAILY
Qty: 30 TABLET | Refills: 0 | Status: SHIPPED | OUTPATIENT
Start: 2023-11-13

## 2023-11-13 NOTE — TELEPHONE ENCOUNTER
Rx Refill Note  Requested Prescriptions     Pending Prescriptions Disp Refills    amphetamine-dextroamphetamine (ADDERALL) 30 MG tablet 30 tablet 0     Sig: Take 1 tablet by mouth Daily.      Last office visit with prescribing clinician: 9/22/2023   Last telemedicine visit with prescribing clinician: Visit date not found   Next office visit with prescribing clinician: 11/10/2023                         Would you like a call back once the refill request has been completed: [] Yes [] No    If the office needs to give you a call back, can they leave a voicemail: [] Yes [] No    Filipe Miranda MA  11/13/23, 09:02 EST

## 2023-11-16 ENCOUNTER — TELEPHONE (OUTPATIENT)
Dept: FAMILY MEDICINE CLINIC | Facility: CLINIC | Age: 48
End: 2023-11-16
Payer: MEDICARE

## 2023-11-16 DIAGNOSIS — R29.898 RIGHT HAND WEAKNESS: Primary | ICD-10-CM

## 2023-11-16 NOTE — TELEPHONE ENCOUNTER
Caller: Jada Ugalde    Relationship to patient: Emergency Contact    Best call back number: [phone]     Patient is needing: PATIENT SISTER CALLING IN TO LET DR FATIMA KNOW THAT THEY HAVE NOT HEARD ANYTHING BACK FROM SPECIALTY REFERRALS, AND WOULD ALSO LIKE TO REQUEST A BRACE FOR PATIENT'S HAND/WRIST

## 2023-11-16 NOTE — ASSESSMENT & PLAN NOTE
- Referral to neurology and a hand specialist.  - Nerve conduction study ordered.  - He is advised to wear a brace.

## 2023-11-22 ENCOUNTER — TELEPHONE (OUTPATIENT)
Dept: FAMILY MEDICINE CLINIC | Facility: CLINIC | Age: 48
End: 2023-11-22

## 2023-11-22 NOTE — TELEPHONE ENCOUNTER
Caller: Jada Ugalde    Relationship: Emergency Contact    Best call back number: [phone]    What was the call regarding: PATIENT SISTER STATED SHE WOULD LIKE FOR THE REFERRAL FOR PHYSICAL THERAPY TO GO TO [facility] ON [address].      SHE ALSO STATED PATIENT HAS A NERVE TEST SCHEDULED, BUT THEY CANNOT GET HIM IN UNTIL MARCH.    SHE WOULD LIKE TO KNOW IF THIS CAN BE RESCHEDULED ANY SOONER.    PLEASE CALL.

## 2023-12-04 RX ORDER — ALENDRONATE SODIUM 70 MG/1
70 TABLET ORAL WEEKLY
Qty: 12 TABLET | Refills: 0 | Status: SHIPPED | OUTPATIENT
Start: 2023-12-04

## 2023-12-14 DIAGNOSIS — Z87.820 HISTORY OF TRAUMATIC BRAIN INJURY: ICD-10-CM

## 2023-12-14 NOTE — TELEPHONE ENCOUNTER
Kory Gutierrez is a 1 year old female, c/o patient here with mom. Per mom believes patient was bitten by a possible spider. Yesterday the lower skin under the right eye was a small bump then started swelling.   Patient was given benadryl for the swelling.  Mom also put ice to the area to help with the swelling.     Visit Vitals  Pulse 101   Temp 97.8 °F (36.6 °C) (Temporal)   Resp 25   Wt 15.5 kg   SpO2 100%        Patient would like communication of their results via:       Cell Phone:   Telephone Information:   Mobile [phone]     Okay to leave a message containing results? Yes       Denies known Latex allergy or symptoms of Latex sensitivity.   Medications verified and reviewed.   Allergies verified and reviewed.   Tobacco history verified 8/1/2021.   PCP verified or updated.      Rx Refill Note  Requested Prescriptions     Pending Prescriptions Disp Refills    amphetamine-dextroamphetamine (ADDERALL) 30 MG tablet 30 tablet 0     Sig: Take 1 tablet by mouth Daily.      Last office visit with prescribing clinician: 11/10/2023   Last telemedicine visit with prescribing clinician: Visit date not found   Next office visit with prescribing clinician: 1/12/2024                         Would you like a call back once the refill request has been completed: [] Yes [] No    If the office needs to give you a call back, can they leave a voicemail: [] Yes [] No    Filipe Miranda MA  12/14/23, 09:24 EST

## 2023-12-15 RX ORDER — DEXTROAMPHETAMINE SACCHARATE, AMPHETAMINE ASPARTATE, DEXTROAMPHETAMINE SULFATE AND AMPHETAMINE SULFATE 7.5; 7.5; 7.5; 7.5 MG/1; MG/1; MG/1; MG/1
30 TABLET ORAL DAILY
Qty: 30 TABLET | Refills: 0 | Status: SHIPPED | OUTPATIENT
Start: 2023-12-15

## 2024-01-04 ENCOUNTER — TELEPHONE (OUTPATIENT)
Dept: FAMILY MEDICINE CLINIC | Facility: CLINIC | Age: 49
End: 2024-01-04

## 2024-01-04 NOTE — TELEPHONE ENCOUNTER
Caller: Jada Ugalde    Relationship to patient: Emergency Contact    Best call back number:[phone]     Chief complaint: URGENCY, POSSIBLE UTI    Type of visit: SAME DAY/OFFICE     Requested date: LATER THIS EVENING OR TOMORROW

## 2024-01-05 ENCOUNTER — OFFICE VISIT (OUTPATIENT)
Dept: FAMILY MEDICINE CLINIC | Facility: CLINIC | Age: 49
End: 2024-01-05
Payer: MEDICARE

## 2024-01-05 VITALS
BODY MASS INDEX: 26.87 KG/M2 | HEART RATE: 63 BPM | DIASTOLIC BLOOD PRESSURE: 82 MMHG | SYSTOLIC BLOOD PRESSURE: 117 MMHG | WEIGHT: 198.4 LBS | TEMPERATURE: 96.9 F | OXYGEN SATURATION: 98 % | HEIGHT: 72 IN

## 2024-01-05 DIAGNOSIS — R39.9 LOWER URINARY TRACT SYMPTOMS (LUTS): Primary | ICD-10-CM

## 2024-01-05 PROCEDURE — 3079F DIAST BP 80-89 MM HG: CPT | Performed by: FAMILY MEDICINE

## 2024-01-05 PROCEDURE — 3074F SYST BP LT 130 MM HG: CPT | Performed by: FAMILY MEDICINE

## 2024-01-05 PROCEDURE — 99213 OFFICE O/P EST LOW 20 MIN: CPT | Performed by: FAMILY MEDICINE

## 2024-01-05 RX ORDER — TAMSULOSIN HYDROCHLORIDE 0.4 MG/1
1 CAPSULE ORAL NIGHTLY
Qty: 30 CAPSULE | Refills: 0 | Status: SHIPPED | OUTPATIENT
Start: 2024-01-05

## 2024-01-05 NOTE — PROGRESS NOTES
"Chief Complaint  Urinary Tract Infection (Takes a while to urinate when standing up x 1 month )    Subjective        Tye JONES Junior presents to [facility]  History of Present Illness    I am seeing this patient for the first time today.  His primary care provider is not available.  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  He is with his brother-in-law.  The patient states is okay for his brother-in-law to be in the exam room and to hear all answers.    For about a month he states he has had trouble with urination.  Starting his stream is difficult.  However no pain with urination.  No burning with urination.  No fever.  Does not feel ill.  No known blood in the urine.  He had a PSA done which was less than 1 about 3 months ago.  He is unable to give us a urine sample today.  He is on multiple medications, some of which could potentially cause urinary retention.    Objective   Vital Signs:  /82   Pulse 63   Temp 96.9 °F (36.1 °C) (Temporal)   Ht 182.9 cm (72\")   Wt 90 kg (198 lb 6.4 oz)   SpO2 98%   BMI 26.91 kg/m²   Estimated body mass index is 26.91 kg/m² as calculated from the following:    Height as of this encounter: 182.9 cm (72\").    Weight as of this encounter: 90 kg (198 lb 6.4 oz).               Physical Exam  Constitutional:       Appearance: He is not ill-appearing.   Cardiovascular:      Rate and Rhythm: Normal rate.   Pulmonary:      Effort: Pulmonary effort is normal.   Abdominal:      General: There is no distension.      Palpations: Abdomen is soft. There is no mass.      Tenderness: There is no abdominal tenderness. There is no guarding or rebound.      Hernia: No hernia is present.      Comments: Suprapubic point-of-care ultrasound reveals a normal shaped bladder.  Patient was unable to give us a urine sample today.  He has approximate 120 mL of urine in the bladder.  It is not distended.  His prostate is moderate-sized.  " No fluid around the bladder.  No pain with the ultrasound probe palpation.        Result Review :                   Assessment and Plan   Diagnoses and all orders for this visit:    1. Lower urinary tract symptoms (LUTS) (Primary)  -     Ambulatory Referral to Urology    Other orders  -     tamsulosin (FLOMAX) 0.4 MG capsule 24 hr capsule; Take 1 capsule by mouth Every Night.  Dispense: 30 capsule; Refill: 0      Lower urinary tract symptoms.  Slow to urinate.  No evidence of significant urinary retention.  Cause may be multiple including enlarged prostate and or anticholinergic side effects of current psychiatric medication.  I am recommending tamsulosin on a limited basis.  Watch for lightheadedness.  I recommend referral to urology for further investigation.  I held off prostate examination at this time.  Recent PSA normal and patient has no symptoms other than trouble urinating.  Likely not urinary tract or prostate infection.         Follow Up   No follow-ups on file.  Patient was given instructions and counseling regarding his condition or for health maintenance advice. Please see specific information pulled into the AVS if appropriate.

## 2024-01-05 NOTE — LETTER
January 5, 2024     Patient: Tye JONES Junior   YOB: 1975   Date of Visit: 1/5/2024       To Whom It May Concern:    Mr Christine can return to adult day care without restrictions.          Sincerely,        Rupert Razo MD    CC: No Recipients

## 2024-01-11 ENCOUNTER — TELEPHONE (OUTPATIENT)
Dept: FAMILY MEDICINE CLINIC | Facility: CLINIC | Age: 49
End: 2024-01-11

## 2024-01-11 DIAGNOSIS — Z87.820 HISTORY OF TRAUMATIC BRAIN INJURY: ICD-10-CM

## 2024-01-11 DIAGNOSIS — R56.9 SEIZURE: ICD-10-CM

## 2024-01-11 NOTE — TELEPHONE ENCOUNTER
Caller: Jada Ugalde    Relationship: Emergency Contact    Best call back number:     [phone]         What was the call regarding:     PATIENT'S APPOINTMENT FOR TOMORROW WAS CANCELED.  SISTER WANTS TO KNOW WHEN DO THEY NEED TO RESCHEDULE.  IT WAS CANCELED DUE TO SCHEDULE CONFLICT.      PLEASE ADVISE

## 2024-01-11 NOTE — TELEPHONE ENCOUNTER
Called Pt's/Sister/Jada Ugalde LM for a RTNC to schedule Pt an Appt w/Dr. Moreno.  HUB to relay.

## 2024-01-11 NOTE — TELEPHONE ENCOUNTER
Rx Refill Note  Requested Prescriptions     Pending Prescriptions Disp Refills    clonazePAM (KlonoPIN) 1 MG tablet [Pharmacy Med Name: clonazePAM 1 MG Oral Tablet] 90 tablet 0     Sig: TAKE 1 TABLET BY MOUTH EVERY 8 HOURS      Last office visit with prescribing clinician: 11/10/2023   Last telemedicine visit with prescribing clinician: Visit date not found   Next office visit with prescribing clinician: Visit date not found                         Would you like a call back once the refill request has been completed: [] Yes [] No    If the office needs to give you a call back, can they leave a voicemail: [] Yes [] No    Filipe Miranda MA  01/11/24, 10:28 EST

## 2024-01-11 NOTE — TELEPHONE ENCOUNTER
Rx Refill Note  Requested Prescriptions     Pending Prescriptions Disp Refills    amphetamine-dextroamphetamine (ADDERALL) 30 MG tablet 30 tablet 0     Sig: Take 1 tablet by mouth Daily.      Last office visit with prescribing clinician: 11/10/2023   Last telemedicine visit with prescribing clinician: Visit date not found   Next office visit with prescribing clinician: 1/11/2024                         Would you like a call back once the refill request has been completed: [] Yes [] No    If the office needs to give you a call back, can they leave a voicemail: [] Yes [] No    Filipe Miranda MA  01/11/24, 10:26 EST

## 2024-01-11 NOTE — TELEPHONE ENCOUNTER
Caller: Jada Ugalde    Relationship: Emergency Contact    Best call back number:     [phone]           Requested Prescriptions:   Requested Prescriptions     Pending Prescriptions Disp Refills    amphetamine-dextroamphetamine (ADDERALL) 30 MG tablet 30 tablet 0     Sig: Take 1 tablet by mouth Daily.        Pharmacy where request should be sent: [address] [phone] [facility] [phone]      Last office visit with prescribing clinician: 11/10/2023   Last telemedicine visit with prescribing clinician: Visit date not found   Next office visit with prescribing clinician: 1/12/2024     Additional details provided by patient:     Does the patient have less than a 3 day supply:  [x] Yes  [] No    Would you like a call back once the refill request has been completed: [] Yes [] No    If the office needs to give you a call back, can they leave a voicemail: [] Yes [] No    Bry Rose Rep   01/11/24 08:36 EST

## 2024-01-12 RX ORDER — CLONAZEPAM 1 MG/1
TABLET ORAL
Qty: 90 TABLET | Refills: 2 | Status: SHIPPED | OUTPATIENT
Start: 2024-01-12

## 2024-01-12 RX ORDER — DEXTROAMPHETAMINE SACCHARATE, AMPHETAMINE ASPARTATE, DEXTROAMPHETAMINE SULFATE AND AMPHETAMINE SULFATE 7.5; 7.5; 7.5; 7.5 MG/1; MG/1; MG/1; MG/1
30 TABLET ORAL DAILY
Qty: 30 TABLET | Refills: 0 | Status: SHIPPED | OUTPATIENT
Start: 2024-01-12

## 2024-01-19 RX ORDER — LOVASTATIN 20 MG/1
TABLET ORAL
Qty: 90 TABLET | Refills: 1 | Status: SHIPPED | OUTPATIENT
Start: 2024-01-19

## 2024-01-30 RX ORDER — TAMSULOSIN HYDROCHLORIDE 0.4 MG/1
1 CAPSULE ORAL NIGHTLY
Qty: 90 CAPSULE | Refills: 1 | Status: SHIPPED | OUTPATIENT
Start: 2024-01-30

## 2024-01-30 NOTE — TELEPHONE ENCOUNTER
Rx Refill Note  Requested Prescriptions     Pending Prescriptions Disp Refills    tamsulosin (FLOMAX) 0.4 MG capsule 24 hr capsule [Pharmacy Med Name: Tamsulosin HCl 0.4 MG Oral Capsule] 90 capsule 1     Sig: TAKE 1 CAPSULE BY MOUTH ONCE DAILY AT NIGHT      Last office visit with prescribing clinician: 1/5/2024   Last telemedicine visit with prescribing clinician: Visit date not found   Next office visit with prescribing clinician: Visit date not found                         Would you like a call back once the refill request has been completed: [] Yes [] No    If the office needs to give you a call back, can they leave a voicemail: [] Yes [] No    Filipe Miranda MA  01/30/24, 08:14 EST

## 2024-03-15 DIAGNOSIS — Z87.820 HISTORY OF TRAUMATIC BRAIN INJURY: ICD-10-CM

## 2024-03-15 NOTE — TELEPHONE ENCOUNTER
Rx Refill Note  Requested Prescriptions     Pending Prescriptions Disp Refills    amphetamine-dextroamphetamine (ADDERALL) 30 MG tablet 30 tablet 0     Sig: Take 1 tablet by mouth Daily.      Last office visit with prescribing clinician: 11/10/2023   Last telemedicine visit with prescribing clinician: Visit date not found   Next office visit with prescribing clinician: Visit date not found                         Would you like a call back once the refill request has been completed: [] Yes [] No    If the office needs to give you a call back, can they leave a voicemail: [] Yes [] No    Filipe Miranda MA  03/15/24, 11:51 EDT

## 2024-03-16 RX ORDER — DEXTROAMPHETAMINE SACCHARATE, AMPHETAMINE ASPARTATE, DEXTROAMPHETAMINE SULFATE AND AMPHETAMINE SULFATE 7.5; 7.5; 7.5; 7.5 MG/1; MG/1; MG/1; MG/1
30 TABLET ORAL DAILY
Qty: 30 TABLET | Refills: 0 | Status: SHIPPED | OUTPATIENT
Start: 2024-03-16

## 2024-03-19 RX ORDER — ALENDRONATE SODIUM 70 MG/1
70 TABLET ORAL WEEKLY
Qty: 12 TABLET | Refills: 0 | Status: SHIPPED | OUTPATIENT
Start: 2024-03-19

## 2024-03-19 RX ORDER — LOVASTATIN 20 MG/1
TABLET ORAL
Qty: 90 TABLET | Refills: 0 | Status: SHIPPED | OUTPATIENT
Start: 2024-03-19

## 2024-04-13 NOTE — PLAN OF CARE
Problem: Patient Care Overview  Goal: Plan of Care Review  Outcome: Ongoing (interventions implemented as appropriate)  Flowsheets (Taken 1/14/2020 6087)  Outcome Summary: Pt has been up in the  with his mother at . No c/o pain. has attended all therapies.      English

## 2024-04-30 ENCOUNTER — TELEPHONE (OUTPATIENT)
Dept: FAMILY MEDICINE CLINIC | Facility: CLINIC | Age: 49
End: 2024-04-30
Payer: MEDICARE

## 2024-05-30 ENCOUNTER — TELEPHONE (OUTPATIENT)
Dept: FAMILY MEDICINE CLINIC | Facility: CLINIC | Age: 49
End: 2024-05-30
Payer: MEDICARE

## 2024-05-30 NOTE — TELEPHONE ENCOUNTER
[employer] called requesting paperwork for med authorization stated was originally sent in Feb didn't see anything scanned in they will fax again.    #[phone]

## 2024-06-10 DIAGNOSIS — Z87.820 HISTORY OF TRAUMATIC BRAIN INJURY: ICD-10-CM

## 2024-06-10 RX ORDER — DEXTROAMPHETAMINE SACCHARATE, AMPHETAMINE ASPARTATE, DEXTROAMPHETAMINE SULFATE AND AMPHETAMINE SULFATE 7.5; 7.5; 7.5; 7.5 MG/1; MG/1; MG/1; MG/1
30 TABLET ORAL DAILY
Qty: 30 TABLET | Refills: 0 | OUTPATIENT
Start: 2024-06-10

## 2024-06-10 RX ORDER — ALENDRONATE SODIUM 70 MG/1
70 TABLET ORAL WEEKLY
Qty: 12 TABLET | Refills: 0 | OUTPATIENT
Start: 2024-06-10

## 2024-06-10 RX ORDER — LOVASTATIN 20 MG/1
20 TABLET ORAL EVERY EVENING
Qty: 90 TABLET | Refills: 0 | OUTPATIENT
Start: 2024-06-10

## 2024-06-10 RX ORDER — FOLIC ACID 1 MG/1
TABLET ORAL EVERY 24 HOURS
OUTPATIENT
Start: 2024-06-10

## 2024-06-10 NOTE — TELEPHONE ENCOUNTER
Rx Refill Note  Requested Prescriptions     Pending Prescriptions Disp Refills    folic acid (FOLVITE) 1 MG tablet       Sig: Daily.    amphetamine-dextroamphetamine (ADDERALL) 30 MG tablet 30 tablet 0     Sig: Take 1 tablet by mouth Daily.    alendronate (FOSAMAX) 70 MG tablet 12 tablet 0     Sig: Take 1 tablet by mouth 1 (One) Time Per Week.    lovastatin (MEVACOR) 20 MG tablet 90 tablet 0     Sig: Take 1 tablet by mouth Every Evening.      Last office visit with prescribing clinician: 11/10/2023   Last telemedicine visit with prescribing clinician: Visit date not found   Next office visit with prescribing clinician: Visit date not found                         Would you like a call back once the refill request has been completed: [] Yes [] No    If the office needs to give you a call back, can they leave a voicemail: [] Yes [] No    Filipe Miranda MA  06/10/24, 08:04 EDT

## 2024-06-14 ENCOUNTER — OFFICE VISIT (OUTPATIENT)
Dept: FAMILY MEDICINE CLINIC | Facility: CLINIC | Age: 49
End: 2024-06-14
Payer: MEDICARE

## 2024-06-14 VITALS
TEMPERATURE: 97.3 F | WEIGHT: 200 LBS | OXYGEN SATURATION: 98 % | SYSTOLIC BLOOD PRESSURE: 110 MMHG | HEART RATE: 74 BPM | DIASTOLIC BLOOD PRESSURE: 80 MMHG | BODY MASS INDEX: 27.09 KG/M2 | HEIGHT: 72 IN | RESPIRATION RATE: 18 BRPM

## 2024-06-14 DIAGNOSIS — I10 ESSENTIAL HYPERTENSION: ICD-10-CM

## 2024-06-14 DIAGNOSIS — E78.2 MIXED HYPERLIPIDEMIA: ICD-10-CM

## 2024-06-14 DIAGNOSIS — Z00.00 MEDICARE ANNUAL WELLNESS VISIT, SUBSEQUENT: Primary | ICD-10-CM

## 2024-06-14 NOTE — PROGRESS NOTES
The ABCs of the Annual Wellness Visit  Medicare Annual Wellness Visit    Subjective     Tye JONES Junior is a 48 y.o. male who presents for a Medicare Wellness Visit.    The following portions of the patient's history were reviewed and   updated as appropriate: allergies, current medications, past family history, past medical history, past social history, past surgical history, and problem list.     Compared to one year ago, the patient feels his physical   health is better.    Compared to one year ago, the patient feels his mental   health is better.    Recent Hospitalizations:  He was not admitted to the hospital during the last year.       Current Medical Providers:  Patient Care Team:  Mayito Moreno Sr., MD as PCP - General (Family Medicine)    Outpatient Medications Prior to Visit   Medication Sig Dispense Refill    acetaminophen (TYLENOL) 325 MG tablet Take 2 tablets by mouth Every 6 (Six) Hours As Needed for Mild Pain .      Acetaminophen 325 MG capsule Every 4 (Four) Hours.      alendronate (FOSAMAX) 70 MG tablet Take 1 tablet by mouth once a week 12 tablet 0    amphetamine-dextroamphetamine (ADDERALL) 30 MG tablet Take 1 tablet by mouth Daily. 30 tablet 0    aspirin 81 MG chewable tablet Chew 1 tablet Daily.      cholecalciferol (VITAMIN D3) 400 units tablet Take 1 tablet by mouth Daily.      Cholecalciferol 10 MCG (400 UNIT) capsule Daily.      ciclopirox (LOPROX) 0.77 % cream APPLY TO ALL TOENAILS TWICE DAILY FOR 6-12 MONTHS.      clonazePAM (KlonoPIN) 1 MG tablet TAKE 1 TABLET BY MOUTH EVERY 8 HOURS 90 tablet 2    cyanocobalamin (VITAMIN B-12) 1000 MCG tablet Take 1 tablet by mouth Daily. 30 tablet 2    folic acid (FOLVITE) 1 MG tablet Daily.      hydrOXYzine pamoate (VISTARIL) 50 MG capsule Every 6 (Six) Hours.      lacosamide (VIMPAT) 200 MG tablet Take 1 tablet by mouth Every 12 (Twelve) Hours. 60 tablet 3    levETIRAcetam (KEPPRA) 500 MG tablet Take 1 tablet by mouth Every 12 (Twelve) Hours. 60  tablet 2    lovastatin (MEVACOR) 20 MG tablet TAKE 1 TABLET BY MOUTH ONCE DAILY IN THE EVENING 90 tablet 0    metoprolol tartrate (LOPRESSOR) 50 MG tablet Take 1 tablet by mouth Every 12 (Twelve) Hours. 180 tablet 3    pantoprazole (PROTONIX) 40 MG EC tablet Take 1 tablet by mouth Daily. 90 tablet 3    phenytoin ER (DILANTIN) 100 MG capsule 1 capsule Every 12 (Twelve) Hours.      polyethylene glycol (MIRALAX) 17 g packet Daily.      potassium chloride (MICRO-K) 10 MEQ CR capsule Take 1 capsule by mouth once daily 90 capsule 3    tamsulosin (FLOMAX) 0.4 MG capsule 24 hr capsule TAKE 1 CAPSULE BY MOUTH ONCE DAILY AT NIGHT 90 capsule 1    terbinafine (lamISIL) 1 % cream Apply 1 application  topically to the appropriate area as directed 2 (Two) Times a Day. 28.4 g 1    topiramate (TOPAMAX) 25 MG tablet TAKE 1 TABLET BY MOUTH ONCE DAILY 90 tablet 1     No facility-administered medications prior to visit.       No opioid medication identified on active medication list. I have reviewed chart for other potential  high risk medication/s and harmful drug interactions in the elderly.        Aspirin is on active medication list. Aspirin use is indicated based on review of current medical condition/s. Pros and cons of this therapy have been discussed today. Benefits of this medication outweigh potential harm.  Patient has been encouraged to continue taking this medication.  .      Patient Active Problem List   Diagnosis    Mixed hyperlipidemia    Essential hypertension    Acute allergic rhinitis    Seizure    Screen for colon cancer    History of traumatic brain injury    Anemia    Serum gamma globulin increased    Pneumocephalus    Refractory seizure    Status epilepticus    Pneumoperitoneum    Other osteoporosis without current pathological fracture    Bone cyst of ankle    Toe contracture, left    Valgus deformity of both great toes    Renal failure syndrome    Gastroesophageal reflux disease without esophagitis    Jock itch  "   Vitamin D deficiency    Right hand weakness    Medicare annual wellness visit, subsequent     Advance Care Planning   Advance Care Planning     Advance Directive is not on file.  ACP discussion was held with the patient during this visit. Patient does not have an advance directive, information provided.       Objective    Vitals:    06/14/24 1441   BP: 110/80   Pulse: 74   Resp: 18   Temp: 97.3 °F (36.3 °C)   SpO2: 98%   Weight: 90.7 kg (200 lb)   Height: 182.9 cm (72\")   PainSc: 0-No pain     Estimated body mass index is 27.12 kg/m² as calculated from the following:    Height as of this encounter: 182.9 cm (72\").    Weight as of this encounter: 90.7 kg (200 lb).           Does the patient have evidence of cognitive impairment?   No    Mini-Mental State Examination (MMSE)        Instructions: Ask the questions in the order listed. Score one point for each correct response within each question or activity.      Maximum Score  Patient’s Score  Questions    5  2  “What is the year?  Season?  Date?  Day of the week?  Month?”    5   2 “Where are we now: State?  County?  Town/city?  Hospital?  Floor?”    3  3  The examiner names three unrelated objects clearly and slowly, then asks the patient to name all three of them. The patient’s response is used for scoring. The examiner repeats them until patient learns all of them, if possible. Number of trials: ___________    5  5  “I would like you to count backward from 100 by sevens.” (93, 86, 79, 72, 65, …) Stop after five answers.   Alternative: “Spell WORLD backwards.” ([id number])    3   2 “Earlier I told you the names of three things. Can you tell me what those were?”    2  2  Show the patient two simple objects, such as a wristwatch and a pencil, and ask the patient to name them.    1  1  “Repeat the phrase: ‘No ifs, ands, or buts.’”    3  3 “Take the paper in your right hand, fold it in half, and put it on the floor.”   (The examiner gives the patient a piece of blank " paper.)    0  0  “Please read this and do what it says.” (Written instruction is “Close your eyes.”)    1   1 “Make up and write a sentence about anything.” (This sentence must contain a noun and a verb.)    0  0 “Please copy this picture.” (The examiner gives the patient a blank piece of paper and asks him/her to draw the symbol below. All 10 angles must be present and two must intersect.)             28 21  TOTAL              HEALTH RISK ASSESSMENT    Smoking Status:  Social History     Tobacco Use   Smoking Status Never   Smokeless Tobacco Never     Alcohol Consumption:  Social History     Substance and Sexual Activity   Alcohol Use No     Fall Risk Screen:    RIVERAADI Fall Risk Assessment was completed, and patient is at LOW risk for falls.Assessment completed on:2024    Depression Screen:       2024     2:41 PM   PHQ-2/PHQ-9 Depression Screening   Little Interest or Pleasure in Doing Things 0-->not at all   Feeling Down, Depressed or Hopeless 0-->not at all   PHQ-9: Brief Depression Severity Measure Score 0       Health Habits and Functional and Cognitive Screenin/14/2024     2:00 PM   Functional & Cognitive Status   Do you have difficulty preparing food and eating? No   Do you have difficulty bathing yourself, getting dressed or grooming yourself? No   Do you have difficulty using the toilet? No   Do you have difficulty moving around from place to place? No   Do you have trouble with steps or getting out of a bed or a chair? No   Current Diet Well Balanced Diet   Dental Exam Up to date   Eye Exam Up to date   Exercise (times per week) 3 times per week   Current Exercises Include Home Fitness Gym   Do you need help using the phone?  No   Are you deaf or do you have serious difficulty hearing?  No   Do you need help to go to places out of walking distance? No   Do you need help shopping? No   Do you need help preparing meals?  No   Do you need help with housework?  No   Do you need help with  laundry? No   Do you need help taking your medications? No   Do you need help managing money? No   Do you ever drive or ride in a car without wearing a seat belt? No   Have you felt unusual stress, anger or loneliness in the last month? No   Who do you live with? Sibling   If you need help, do you have trouble finding someone available to you? No   Do you have difficulty concentrating, remembering or making decisions? No       Age-appropriate Screening Schedule:  Refer to the list below for future screening recommendations based on patient's age, sex and/or medical conditions. Orders for these recommended tests are listed in the plan section. The patient has been provided with a written plan.    Health Maintenance   Topic Date Due    ANNUAL WELLNESS VISIT  08/16/2023    COVID-19 Vaccine (7 - 2023-24 season) 09/01/2023    ZOSTER VACCINE (1 of 2) Never done    DXA SCAN  08/18/2024    INFLUENZA VACCINE  08/01/2024    LIPID PANEL  09/22/2024    BMI FOLLOWUP  11/21/2024    COLORECTAL CANCER SCREENING  09/26/2029    TDAP/TD VACCINES (2 - Td or Tdap) 08/05/2031    HEPATITIS C SCREENING  Completed    Pneumococcal Vaccine 0-64  Aged Out          CMS Preventative Services Quick Reference  Risk Factors Identified During Encounter    None Identified    The above risks/problems have been discussed with the patient.  Pertinent information has been shared with the patient in the After Visit Summary.  An After Visit Summary and PPPS were made available to the patient.  Diagnoses and all orders for this visit:    1. Medicare annual wellness visit, subsequent (Primary)  Assessment & Plan:  Medicare wellness completed.  Discussed advanced directives with patient.  Performed the Mini-Mental exam and patient scored 21/28.        2. Mixed hyperlipidemia  -     CBC & Differential  -     Comprehensive Metabolic Panel  -     TSH  -     Lipid Panel With / Chol / HDL Ratio    3. Essential hypertension  -     CBC & Differential  -      "Comprehensive Metabolic Panel  -     TSH  -     Lipid Panel With / Chol / HDL Ratio      Follow Up:  Next Medicare Wellness visit to be scheduled in 1 year.        Additional E&M Note during same encounter follows:  Patient has multiple medical problems which are significant and separately identifiable that require additional work above and beyond the Medicare Wellness Visit.      Chief Complaint  Medicare Wellness-subsequent (AWV)    Subjective        HPI  Tye JONES Junior is also being seen today for   History of Present Illness      Review of Systems   All other systems reviewed and are negative.      Objective   Vital Signs:  /80   Pulse 74   Temp 97.3 °F (36.3 °C)   Resp 18   Ht 182.9 cm (72\")   Wt 90.7 kg (200 lb)   SpO2 98%   BMI 27.12 kg/m²     Physical Exam  Vitals reviewed.   Constitutional:       Appearance: He is well-developed.   HENT:      Head: Normocephalic and atraumatic.      Nose: Nose normal.   Eyes:      General: Lids are normal.      Conjunctiva/sclera: Conjunctivae normal.      Pupils: Pupils are equal, round, and reactive to light.   Cardiovascular:      Rate and Rhythm: Normal rate and regular rhythm.      Pulses: Normal pulses.      Heart sounds: Normal heart sounds.   Pulmonary:      Effort: Pulmonary effort is normal. No respiratory distress.      Breath sounds: Normal breath sounds.   Abdominal:      General: Bowel sounds are normal.      Palpations: Abdomen is soft.   Musculoskeletal:         General: Normal range of motion.      Cervical back: Normal range of motion and neck supple.   Skin:     General: Skin is warm and dry.      Capillary Refill: Capillary refill takes less than 2 seconds.   Neurological:      Mental Status: He is alert and oriented to person, place, and time.   Psychiatric:         Behavior: Behavior normal.         Thought Content: Thought content normal.         Judgment: Judgment normal.        Physical Exam            Results               Assessment " and Plan   Diagnoses and all orders for this visit:    1. Medicare annual wellness visit, subsequent (Primary)  Assessment & Plan:  Medicare wellness completed.  Discussed advanced directives with patient.  Performed the Mini-Mental exam and patient scored 21/28.        2. Mixed hyperlipidemia  -     CBC & Differential  -     Comprehensive Metabolic Panel  -     TSH  -     Lipid Panel With / Chol / HDL Ratio    3. Essential hypertension  -     CBC & Differential  -     Comprehensive Metabolic Panel  -     TSH  -     Lipid Panel With / Chol / HDL Ratio      Assessment & Plan             Follow Up   No follow-ups on file.  Patient was given instructions and counseling regarding his condition or for health maintenance advice. Please see specific information pulled into the AVS if appropriate.

## 2024-06-15 PROBLEM — Z00.00 MEDICARE ANNUAL WELLNESS VISIT, SUBSEQUENT: Status: ACTIVE | Noted: 2024-06-15

## 2024-06-15 NOTE — ASSESSMENT & PLAN NOTE
Medicare wellness completed.  Discussed advanced directives with patient.  Performed the Mini-Mental exam and patient scored 21/28.

## 2024-06-17 ENCOUNTER — PROCEDURE VISIT (OUTPATIENT)
Dept: NEUROLOGY | Facility: CLINIC | Age: 49
End: 2024-06-17
Payer: MEDICARE

## 2024-06-17 DIAGNOSIS — R29.898 RIGHT HAND WEAKNESS: Primary | ICD-10-CM

## 2024-06-17 DIAGNOSIS — Z87.820 HISTORY OF TRAUMATIC BRAIN INJURY: ICD-10-CM
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
## 2024-06-19 RX ORDER — FOLIC ACID 1 MG/1
1 TABLET ORAL EVERY 24 HOURS
Qty: 90 TABLET | Refills: 3 | Status: SHIPPED | OUTPATIENT
Start: 2024-06-19

## 2024-06-19 RX ORDER — DEXTROAMPHETAMINE SACCHARATE, AMPHETAMINE ASPARTATE, DEXTROAMPHETAMINE SULFATE AND AMPHETAMINE SULFATE 7.5; 7.5; 7.5; 7.5 MG/1; MG/1; MG/1; MG/1
30 TABLET ORAL DAILY
Qty: 30 TABLET | Refills: 0 | Status: SHIPPED | OUTPATIENT
Start: 2024-06-19

## 2024-06-22 ENCOUNTER — HOSPITAL ENCOUNTER (EMERGENCY)
Facility: HOSPITAL | Age: 49
Discharge: HOME | End: 2024-06-22
Attending: EMERGENCY MEDICINE
Payer: MEDICARE

## 2024-06-22 VITALS
OXYGEN SATURATION: 98 % | HEIGHT: 72 IN | RESPIRATION RATE: 16 BRPM | BODY MASS INDEX: 27.09 KG/M2 | TEMPERATURE: 97 F | DIASTOLIC BLOOD PRESSURE: 78 MMHG | HEART RATE: 90 BPM | SYSTOLIC BLOOD PRESSURE: 133 MMHG | WEIGHT: 200 LBS

## 2024-06-22 DIAGNOSIS — K60.0 ACUTE ANAL FISSURE: Primary | ICD-10-CM

## 2024-06-22 PROCEDURE — 99282 EMERGENCY DEPT VISIT SF MDM: CPT

## 2024-06-22 RX ORDER — HYDROCORTISONE ACETATE 25 MG/1
25 SUPPOSITORY RECTAL 2 TIMES DAILY
Qty: 12 EACH | Refills: 0 | Status: SHIPPED | OUTPATIENT
Start: 2024-06-22 | End: 2024-06-22 | Stop reason: HOSPADM

## 2024-06-22 RX ORDER — HYDROCORTISONE 25 MG/G
CREAM TOPICAL 2 TIMES DAILY
Qty: 28 G | Refills: 0 | Status: SHIPPED | OUTPATIENT
Start: 2024-06-22
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
## 2024-07-01 RX ORDER — ALENDRONATE SODIUM 70 MG/1
70 TABLET ORAL WEEKLY
Qty: 12 TABLET | Refills: 0 | Status: SHIPPED | OUTPATIENT
Start: 2024-07-01

## 2024-07-01 NOTE — TELEPHONE ENCOUNTER
Rx Refill Note  Requested Prescriptions     Pending Prescriptions Disp Refills    potassium chloride (MICRO-K) 10 MEQ CR capsule 90 capsule 3     Sig: Take 1 capsule by mouth Daily.      Last office visit with prescribing clinician: 6/14/2024   Last telemedicine visit with prescribing clinician: Visit date not found   Next office visit with prescribing clinician: Visit date not found                         Would you like a call back once the refill request has been completed: [] Yes [] No    If the office needs to give you a call back, can they leave a voicemail: [] Yes [] No    Cira Chamberlain MA  07/01/24, 16:05 EDT

## 2024-07-02 RX ORDER — POTASSIUM CHLORIDE 750 MG/1
10 CAPSULE, EXTENDED RELEASE ORAL DAILY
Qty: 90 CAPSULE | Refills: 3 | Status: SHIPPED | OUTPATIENT
Start: 2024-07-02

## 2024-07-08 ENCOUNTER — TELEPHONE (OUTPATIENT)
Dept: FAMILY MEDICINE CLINIC | Facility: CLINIC | Age: 49
End: 2024-07-08
Payer: MEDICARE

## 2024-07-10 DIAGNOSIS — R29.898 RIGHT HAND WEAKNESS: ICD-10-CM

## 2024-07-13 DIAGNOSIS — K21.9 GASTROESOPHAGEAL REFLUX DISEASE WITHOUT ESOPHAGITIS: ICD-10-CM

## 2024-07-16 NOTE — TELEPHONE ENCOUNTER
Rx Refill Note  Requested Prescriptions     Pending Prescriptions Disp Refills    pantoprazole (PROTONIX) 40 MG EC tablet [Pharmacy Med Name: Pantoprazole Sodium 40 MG Oral Tablet Delayed Release] 90 tablet 0     Sig: Take 1 tablet by mouth once daily      Last office visit with prescribing clinician: 6/14/2024   Last telemedicine visit with prescribing clinician: Visit date not found   Next office visit with prescribing clinician: Visit date not found                         Would you like a call back once the refill request has been completed: [] Yes [] No    If the office needs to give you a call back, can they leave a voicemail: [] Yes [] No    Cira Chamberlain MA  07/16/24, 15:04 EDT

## 2024-07-17 RX ORDER — PANTOPRAZOLE SODIUM 40 MG/1
40 TABLET, DELAYED RELEASE ORAL DAILY
Qty: 90 TABLET | Refills: 3 | Status: SHIPPED | OUTPATIENT
Start: 2024-07-17

## 2024-07-31 RX ORDER — TAMSULOSIN HYDROCHLORIDE 0.4 MG/1
1 CAPSULE ORAL NIGHTLY
Qty: 90 CAPSULE | Refills: 1 | Status: SHIPPED | OUTPATIENT
Start: 2024-07-31

## 2024-07-31 NOTE — TELEPHONE ENCOUNTER
Rx Refill Note  Requested Prescriptions     Pending Prescriptions Disp Refills    tamsulosin (FLOMAX) 0.4 MG capsule 24 hr capsule [Pharmacy Med Name: Tamsulosin HCl 0.4 MG Oral Capsule] 90 capsule 1     Sig: TAKE 1 CAPSULE BY MOUTH ONCE DAILY AT NIGHT      Last office visit with prescribing clinician: 6/14/2024   Last telemedicine visit with prescribing clinician: Visit date not found   Next office visit with prescribing clinician: Visit date not found                         Would you like a call back once the refill request has been completed: [] Yes [] No    If the office needs to give you a call back, can they leave a voicemail: [] Yes [] No    Liss Lechuga  07/31/24, 10:39 EDT

## 2024-08-28 ENCOUNTER — OFFICE VISIT (OUTPATIENT)
Dept: FAMILY MEDICINE CLINIC | Facility: CLINIC | Age: 49
End: 2024-08-28
Payer: MEDICARE

## 2024-08-28 VITALS
DIASTOLIC BLOOD PRESSURE: 60 MMHG | WEIGHT: 200 LBS | RESPIRATION RATE: 18 BRPM | BODY MASS INDEX: 27.09 KG/M2 | HEIGHT: 72 IN | SYSTOLIC BLOOD PRESSURE: 118 MMHG

## 2024-08-28 DIAGNOSIS — R19.5 LOOSE STOOLS: Primary | ICD-10-CM

## 2024-08-28 PROCEDURE — 99213 OFFICE O/P EST LOW 20 MIN: CPT | Performed by: FAMILY MEDICINE

## 2024-08-28 PROCEDURE — 3078F DIAST BP <80 MM HG: CPT | Performed by: FAMILY MEDICINE

## 2024-08-28 PROCEDURE — 1126F AMNT PAIN NOTED NONE PRSNT: CPT | Performed by: FAMILY MEDICINE

## 2024-08-28 PROCEDURE — 3074F SYST BP LT 130 MM HG: CPT | Performed by: FAMILY MEDICINE

## 2024-08-28 RX ORDER — DICYCLOMINE HYDROCHLORIDE 10 MG/1
10 CAPSULE ORAL 3 TIMES DAILY PRN
Qty: 60 CAPSULE | Refills: 2 | Status: SHIPPED | OUTPATIENT
Start: 2024-08-28

## 2024-08-28 NOTE — PROGRESS NOTES
"Chief Complaint  Chief Complaint   Patient presents with    Diarrhea     Pt c/o loose stools on /off for years        Subjective    History of Present Illness        Tye JONES Junior presents to [facility] for   Diarrhea   This is a chronic problem. The current episode started more than 1 year ago. The problem has been waxing and waning. The stool consistency is described as Watery. The patient states that diarrhea does not awaken him from sleep. Associated symptoms include abdominal pain. Pertinent negatives include no bloating, chills, fever, headaches, increased  flatus or vomiting.      History of Present Illness      Objective   Vital Signs:   Visit Vitals  /60   Resp 18   Ht 182.9 cm (72\")   Wt 90.7 kg (200 lb)   BMI 27.12 kg/m²                Physical Exam  Vitals reviewed.   Constitutional:       Appearance: He is well-developed.   HENT:      Head: Normocephalic.      Right Ear: External ear normal.      Left Ear: External ear normal.      Nose: Nose normal.   Eyes:      Conjunctiva/sclera: Conjunctivae normal.   Cardiovascular:      Rate and Rhythm: Normal rate and regular rhythm.   Pulmonary:      Effort: Pulmonary effort is normal.      Breath sounds: Normal breath sounds.   Musculoskeletal:         General: Normal range of motion.      Cervical back: Normal range of motion and neck supple.   Skin:     General: Skin is warm and dry.      Capillary Refill: Capillary refill takes less than 2 seconds.   Neurological:      Mental Status: He is alert and oriented to person, place, and time.        Physical Exam       Diabetic Foot Exam Performed    Result Review :  Results                            Assessment and Plan      Diagnoses and all orders for this visit:    1. Loose stools (Primary)  Assessment & Plan:  Due to patient's symptoms he was given prescription for Augmentin to help slow down his GI tract.  Patient encouraged return to clinic if symptoms " worsen.    Orders:  -     dicyclomine (BENTYL) 10 MG capsule; Take 1 capsule by mouth 3 (Three) Times a Day As Needed for Abdominal Cramping.  Dispense: 60 capsule; Refill: 2       Assessment & Plan             Follow Up   No follow-ups on file.  Patient was given instructions and counseling regarding his condition or for health maintenance advice. Please see specific information pulled into the AVS if appropriate.

## 2024-09-03 RX ORDER — METOPROLOL TARTRATE 50 MG
50 TABLET ORAL EVERY 12 HOURS
Qty: 180 TABLET | Refills: 3 | Status: SHIPPED | OUTPATIENT
Start: 2024-09-03

## 2024-09-09 PROBLEM — R19.5 LOOSE STOOLS: Status: ACTIVE | Noted: 2024-09-09

## 2024-09-09 NOTE — ASSESSMENT & PLAN NOTE
Due to patient's symptoms he was given prescription for Augmentin to help slow down his GI tract.  Patient encouraged return to clinic if symptoms worsen.

## 2024-09-11 DIAGNOSIS — Z87.820 HISTORY OF TRAUMATIC BRAIN INJURY: ICD-10-CM

## 2024-09-11 NOTE — TELEPHONE ENCOUNTER
Caller: Jada Ugalde    Relationship: Emergency Contact    Best call back number: [phone]     Requested Prescriptions:   Requested Prescriptions     Pending Prescriptions Disp Refills    amphetamine-dextroamphetamine (ADDERALL) 30 MG tablet 30 tablet 0     Sig: Take 1 tablet by mouth Daily.        Pharmacy where request should be sent: [address] [phone] [facility] [phone]      Last office visit with prescribing clinician: 8/28/2024   Last telemedicine visit with prescribing clinician: Visit date not found   Next office visit with prescribing clinician: Visit date not found     Does the patient have less than a 3 day supply:  [x] Yes  [] No    Would you like a call back once the refill request has been completed: [] Yes [x] No    If the office needs to give you a call back, can they leave a voicemail: [] Yes [x] No    Bry Gauthier Rep   09/11/24 13:14 EDT

## 2024-09-12 NOTE — TELEPHONE ENCOUNTER
Rx Refill Note  Requested Prescriptions     Pending Prescriptions Disp Refills    amphetamine-dextroamphetamine (ADDERALL) 30 MG tablet 30 tablet 0     Sig: Take 1 tablet by mouth Daily.      Last office visit with prescribing clinician: 8/28/2024   Last telemedicine visit with prescribing clinician: Visit date not found   Next office visit with prescribing clinician: Visit date not found                         Would you like a call back once the refill request has been completed: [] Yes [] No    If the office needs to give you a call back, can they leave a voicemail: [] Yes [] No    Cira Chamberlain MA  09/12/24, 11:57 EDT

## 2024-09-13 RX ORDER — DEXTROAMPHETAMINE SACCHARATE, AMPHETAMINE ASPARTATE, DEXTROAMPHETAMINE SULFATE AND AMPHETAMINE SULFATE 7.5; 7.5; 7.5; 7.5 MG/1; MG/1; MG/1; MG/1
30 TABLET ORAL DAILY
Qty: 30 TABLET | Refills: 0 | Status: SHIPPED | OUTPATIENT
Start: 2024-09-13

## 2024-09-24 RX ORDER — ALENDRONATE SODIUM 70 MG/1
70 TABLET ORAL WEEKLY
Qty: 12 TABLET | Refills: 3 | Status: SHIPPED | OUTPATIENT
Start: 2024-09-24

## 2024-10-07 RX ORDER — LOVASTATIN 20 MG
TABLET ORAL
Qty: 90 TABLET | Refills: 3 | Status: SHIPPED | OUTPATIENT
Start: 2024-10-07

## 2024-10-07 NOTE — TELEPHONE ENCOUNTER
Rx Refill Note  Requested Prescriptions     Pending Prescriptions Disp Refills    lovastatin (MEVACOR) 20 MG tablet [Pharmacy Med Name: Lovastatin 20 MG Oral Tablet] 90 tablet 0     Sig: TAKE 1 TABLET BY MOUTH ONCE DAILY IN THE EVENING      Last office visit with prescribing clinician: 8/28/2024   Last telemedicine visit with prescribing clinician: Visit date not found   Next office visit with prescribing clinician: Visit date not found                         Would you like a call back once the refill request has been completed: [] Yes [] No    If the office needs to give you a call back, can they leave a voicemail: [] Yes [] No    Cira Chamberlain MA  10/07/24, 09:58 EDT

## 2024-11-11 DIAGNOSIS — Z87.820 HISTORY OF TRAUMATIC BRAIN INJURY: ICD-10-CM

## 2024-11-11 NOTE — TELEPHONE ENCOUNTER
Caller: Jada Ugalde    Relationship: Emergency Contact    Best call back number: [phone]     Requested Prescriptions:   Requested Prescriptions     Pending Prescriptions Disp Refills    amphetamine-dextroamphetamine (ADDERALL) 30 MG tablet 30 tablet 0     Sig: Take 1 tablet by mouth Daily.        Pharmacy where request should be sent: [address] [phone] [facility] [phone] [facility]     Last office visit with prescribing clinician: 8/28/2024   Last telemedicine visit with prescribing clinician: Visit date not found   Next office visit with prescribing clinician: Visit date not found     Additional details provided by patient: PATIENT NEEDS A REFILL ON THIS MEDICATION    Does the patient have less than a 3 day supply:  [x] Yes  [] No    Would you like a call back once the refill request has been completed: [] Yes [x] No    If the office needs to give you a call back, can they leave a voicemail: [] Yes [x] No    Bry Chao Rep   11/11/24 09:18 EST

## 2024-11-11 NOTE — TELEPHONE ENCOUNTER
Rx Refill Note  Requested Prescriptions     Pending Prescriptions Disp Refills    amphetamine-dextroamphetamine (ADDERALL) 30 MG tablet 30 tablet 3     Sig: Take 1 tablet by mouth Daily.      Last office visit with prescribing clinician: 8/28/2024   Last telemedicine visit with prescribing clinician: Visit date not found   Next office visit with prescribing clinician: Visit date not found                         Would you like a call back once the refill request has been completed: [] Yes [] No    If the office needs to give you a call back, can they leave a voicemail: [] Yes [] No    Cira Chamberlain MA  11/11/24, 11:07 EST

## 2024-11-12 ENCOUNTER — TELEPHONE (OUTPATIENT)
Dept: FAMILY MEDICINE CLINIC | Facility: CLINIC | Age: 49
End: 2024-11-12
Payer: MEDICARE

## 2024-11-12 RX ORDER — DEXTROAMPHETAMINE SACCHARATE, AMPHETAMINE ASPARTATE, DEXTROAMPHETAMINE SULFATE AND AMPHETAMINE SULFATE 7.5; 7.5; 7.5; 7.5 MG/1; MG/1; MG/1; MG/1
30 TABLET ORAL DAILY
Qty: 30 TABLET | Refills: 0 | Status: SHIPPED | OUTPATIENT
Start: 2024-11-12

## 2024-12-02 ENCOUNTER — TELEPHONE (OUTPATIENT)
Dept: FAMILY MEDICINE CLINIC | Facility: CLINIC | Age: 49
End: 2024-12-02
Payer: MEDICARE

## 2024-12-20 ENCOUNTER — TELEPHONE (OUTPATIENT)
Dept: FAMILY MEDICINE CLINIC | Facility: CLINIC | Age: 49
End: 2024-12-20

## 2024-12-20 NOTE — TELEPHONE ENCOUNTER
I called and spoke with TIA, explained that we faxed signed order on 12/4 but they did not received it. They have faxed a new copy.    I explained that  is out until next Thursday and will have NP sign it if that's okay, She stated that that should be fine

## 2024-12-20 NOTE — TELEPHONE ENCOUNTER
Caller: MENA    Relationship: Other//[facility]     Best call back number:     [phone]   [id number]    Equipment requested: [employer] SUPPLIES    Reason for the request: NEEDING TO SEND MEDICAL SUPPLIES TO THE PATIENT    Prescribing Provider: JASON FATIMA    Additional information or concerns:     HAVE SENT SEVERAL FAXES TO GET THE SUPPLY REQUEST SIGNED OFF ON BY DR FATIMA.    WILL SEND A NEW ONE BECAUSE THE OLD ONE IS ABOUT TO .

## 2024-12-26 NOTE — TELEPHONE ENCOUNTER
[facility]erg [facility]   Date ofBirth:  1965    Date of Visit:  3/4/2020    Chief Complaint   Patient presents with    Hypertension    Headache    Mass     back of neck    Hypothyroidism       HPI  Hypertension - Pt takes Metoprolol ER 25mg po q day. Pt decreases salt. Pt walks 1 hour 5 days per week. Hypothyroidism- Pt takes Levothyroxine 25mg po q day. Pt states she always has symptoms of fatigue, constipation, and weight gain. Pt states she has been trying to adjust her diet. Pt states she is just eating fish and vegetables for the past 1 month. Hyperlipidemia- Pt has been trying to decrease fat and cholesterol. Pt walks 1 hour 5 days per week. Vitamin D deficiency- Pt is off vitamin D. Pt c/o headaches since her mother passed away on 12/3/19. Pt states her headaches are located forehead. Headaches dull achy. Pt takes Tylenol or Ibuprofen. Pt has had a headache weekly for the past month. Pt states it almost feels like an allergy or sinus headache. Pt states her nose is stuffy at night. Pt states she had blood on tissue with blowing nose this past weekend. Pt c/o little sinus pressure. Review of Systems   Constitutional: Positive for fatigue and unexpected weight change. Negative for chills and fever. HENT: Positive for congestion and sinus pressure. Negative for ear pain, postnasal drip, rhinorrhea, sinus pain, sneezing, sore throat and trouble swallowing. Eyes: Negative for visual disturbance. Respiratory: Negative for cough, chest tightness, shortness of breath and wheezing. Cardiovascular: Positive for palpitations (sporadically but not frequent). Negative for chest pain and leg swelling. Gastrointestinal: Positive for constipation. Negative for abdominal pain, blood in stool, diarrhea, nausea and vomiting. Endocrine: Negative for cold intolerance and heat intolerance. Genitourinary: Negative for dysuria, frequency and hematuria.    Musculoskeletal: Negative for TIA IS WANTING TO SEE IF YOU RECEIVED THIS? IF NOT IS IT POSSIBLE TO MAIL IT DUE TO SOME ISSUES WITH FAX.    MAILING ADDRESS:[address] [place], [place] [id number]   No gallop. Pulmonary:      Effort: Pulmonary effort is normal. No respiratory distress. Breath sounds: Normal breath sounds. No wheezing, rhonchi or rales. Abdominal:      General: Bowel sounds are normal. There is no distension. Palpations: Abdomen is soft. Tenderness: There is no abdominal tenderness. Musculoskeletal:      Cervical back: She exhibits swelling (small hump posterior inferior neck between shoulders). She exhibits no tenderness. Right lower leg: No edema. Left lower leg: No edema. Lymphadenopathy:      Head:      Right side of head: No submandibular adenopathy. Left side of head: No submandibular adenopathy. Neurological:      Mental Status: She is alert and oriented to person, place, and time. No results found for this visit on 03/04/20. Lab Review   No visits with results within 6 Month(s) from this visit. Latest known visit with results is:   Orders Only on 05/13/2019   Component Date Value    Magnesium 05/13/2019 2.00     T4 Free 05/13/2019 1.1     TSH 05/13/2019 2.28     Sodium 05/13/2019 141     Potassium 05/13/2019 4.2     Chloride 05/13/2019 103     CO2 05/13/2019 26     Anion Gap 05/13/2019 12     Glucose 05/13/2019 89     BUN 05/13/2019 11     CREATININE 05/13/2019 0.8     GFR Non- 05/13/2019 >60     GFR  05/13/2019 >60     Calcium 05/13/2019 9.6     Total Protein 05/13/2019 7.6     Alb 05/13/2019 3.7     Albumin/Globulin Ratio 05/13/2019 0.9*    Total Bilirubin 05/13/2019 0.3     Alkaline Phosphatase 05/13/2019 110     ALT 05/13/2019 12     AST 05/13/2019 15     Globulin 05/13/2019 3.9          Assessment/Plan     1. Essential hypertension  - stable  -continue metoprolol succinate (TOPROL XL) 25 MG extended release tablet; Take 1 tablet by mouth daily  Dispense: 90 tablet; Refill: 1  -Low sodium diet  -Regular aerobic exercise  - Comprehensive Metabolic Panel; Future    2.

## 2025-01-16 ENCOUNTER — OFFICE VISIT (OUTPATIENT)
Dept: ORTHOPEDIC SURGERY | Facility: CLINIC | Age: 50
End: 2025-01-16
Payer: MEDICARE

## 2025-01-16 VITALS — BODY MASS INDEX: 27.6 KG/M2 | TEMPERATURE: 96.8 F | HEIGHT: 72 IN | WEIGHT: 203.8 LBS

## 2025-01-16 DIAGNOSIS — S92.012S CLOSED DISPLACED FRACTURE OF BODY OF LEFT CALCANEUS, SEQUELA: Primary | ICD-10-CM

## 2025-01-16 DIAGNOSIS — S93.331A SUBLUXATION OF PERONEAL TENDON OF RIGHT FOOT: ICD-10-CM

## 2025-01-16 DIAGNOSIS — M20.5X1 CLAW TOE, ACQUIRED, RIGHT: ICD-10-CM

## 2025-01-16 PROBLEM — S92.012A CLOSED DISPLACED FRACTURE OF BODY OF LEFT CALCANEUS: Status: ACTIVE | Noted: 2025-01-16
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
## 2025-01-17 DIAGNOSIS — Z87.820 HISTORY OF TRAUMATIC BRAIN INJURY: ICD-10-CM

## 2025-01-17 RX ORDER — DEXTROAMPHETAMINE SACCHARATE, AMPHETAMINE ASPARTATE, DEXTROAMPHETAMINE SULFATE AND AMPHETAMINE SULFATE 7.5; 7.5; 7.5; 7.5 MG/1; MG/1; MG/1; MG/1
30 TABLET ORAL DAILY
Qty: 30 TABLET | Refills: 0 | Status: CANCELLED | OUTPATIENT
Start: 2025-01-17

## 2025-01-17 NOTE — TELEPHONE ENCOUNTER
Caller: Jada Ugalde    Relationship: Emergency Contact    Requested Prescriptions:   Requested Prescriptions     Pending Prescriptions Disp Refills    amphetamine-dextroamphetamine (ADDERALL) 30 MG tablet 30 tablet 0     Sig: Take 1 tablet by mouth Daily.      Pharmacy where request should be sent: [address] [phone]  - [phone]     Last office visit with prescribing clinician: 8/28/2024   Last telemedicine visit with prescribing clinician: Visit date not found   Next office visit with prescribing clinician: Visit date not found     Bry Green Rep   01/17/25 11:25 EST

## 2025-01-23 ENCOUNTER — OFFICE VISIT (OUTPATIENT)
Dept: FAMILY MEDICINE CLINIC | Facility: CLINIC | Age: 50
End: 2025-01-23
Payer: MEDICARE

## 2025-01-23 VITALS
RESPIRATION RATE: 18 BRPM | BODY MASS INDEX: 27.5 KG/M2 | SYSTOLIC BLOOD PRESSURE: 112 MMHG | WEIGHT: 203 LBS | HEIGHT: 72 IN | OXYGEN SATURATION: 97 % | DIASTOLIC BLOOD PRESSURE: 62 MMHG | HEART RATE: 67 BPM

## 2025-01-23 DIAGNOSIS — F90.2 ATTENTION DEFICIT HYPERACTIVITY DISORDER (ADHD), COMBINED TYPE: Primary | ICD-10-CM

## 2025-01-23 DIAGNOSIS — Z87.820 HISTORY OF TRAUMATIC BRAIN INJURY: ICD-10-CM

## 2025-01-23 DIAGNOSIS — Z02.89 MEDICATION MANAGEMENT CONTRACT AGREEMENT: ICD-10-CM

## 2025-01-23 DIAGNOSIS — Z51.81 ENCOUNTER FOR THERAPEUTIC DRUG LEVEL MONITORING: ICD-10-CM

## 2025-01-23 RX ORDER — DEXTROAMPHETAMINE SACCHARATE, AMPHETAMINE ASPARTATE, DEXTROAMPHETAMINE SULFATE AND AMPHETAMINE SULFATE 7.5; 7.5; 7.5; 7.5 MG/1; MG/1; MG/1; MG/1
30 TABLET ORAL DAILY
Qty: 30 TABLET | Refills: 0 | Status: SHIPPED | OUTPATIENT
Start: 2025-01-23

## 2025-01-23 NOTE — PROGRESS NOTES
"Chief Complaint  Chief Complaint   Patient presents with    ADHD     Pt here for med f/u        Subjective    History of Present Illness        Tye JONES Junior presents to [facility] for   History of Present Illness  ADHD:  Symptoms include disruptive behavior, impulsivity, inability to follow directions, inattention, need for frequent task redirection, careless mistakes, avoiding mental tasks, and fidgeting. The symptoms occur at home.  The symptoms are described as Moderate in severity. The symptoms are described as stable. Symptoms are exacerbated by reading, conversation, and mental effort. Symptoms are relieved by stimulant medication. Associated symptoms include known cognitive impairment and memory disorder. Current treatment includes dextroamphetamine/amphetamine (Adderall). By report, Tye is compliant most of the time.     History of Present Illness      Objective   Vital Signs:   Visit Vitals  /62   Pulse 67   Resp 18   Ht 182.9 cm (72\")   Wt 92.1 kg (203 lb)   SpO2 97%   BMI 27.53 kg/m²          BMI is >= 25 and <30. (Overweight) The following options were offered after discussion;: exercise counseling/recommendations and nutrition counseling/recommendations     Physical Exam  Vitals reviewed.   Constitutional:       Appearance: He is well-developed.   HENT:      Head: Normocephalic.      Right Ear: External ear normal.      Left Ear: External ear normal.      Nose: Nose normal.   Eyes:      Conjunctiva/sclera: Conjunctivae normal.   Cardiovascular:      Rate and Rhythm: Normal rate and regular rhythm.   Pulmonary:      Effort: Pulmonary effort is normal.      Breath sounds: Normal breath sounds.   Musculoskeletal:         General: Normal range of motion.      Cervical back: Normal range of motion and neck supple.   Skin:     General: Skin is warm and dry.      Capillary Refill: Capillary refill takes less than 2 seconds.   Neurological:      Mental Status: He is " alert and oriented to person, place, and time.        Physical Exam           Result Review :  Results                            Assessment and Plan      Diagnoses and all orders for this visit:    1. Attention deficit hyperactivity disorder (ADHD), combined type (Primary)  Assessment & Plan:  Psychological condition is stable.  Continue current treatment regimen.  Psychological condition  will be reassessed in 3 months.    Orders:  -     ToxAssure Flex 15, Ur -  -     amphetamine-dextroamphetamine (ADDERALL) 30 MG tablet; Take 1 tablet by mouth Daily.  Dispense: 30 tablet; Refill: 0    2. History of traumatic brain injury  Assessment & Plan:  Stable.  Patient is currently on Adderall to help with his symptoms.  Patient is return to clinic in 3 months for follow-up.    Orders:  -     amphetamine-dextroamphetamine (ADDERALL) 30 MG tablet; Take 1 tablet by mouth Daily.  Dispense: 30 tablet; Refill: 0    3. Medication management contract agreement  -     ToxAssure Flex 15, Ur -    4. Encounter for therapeutic drug level monitoring  -     ToxAssure Flex 15, Ur -       Assessment & Plan             Follow Up   No follow-ups on file.  Patient was given instructions and counseling regarding his condition or for health maintenance advice. Please see specific information pulled into the AVS if appropriate.

## 2025-01-27 NOTE — TELEPHONE ENCOUNTER
Rx Refill Note  Requested Prescriptions     Pending Prescriptions Disp Refills    tamsulosin (FLOMAX) 0.4 MG capsule 24 hr capsule [Pharmacy Med Name: Tamsulosin HCl 0.4 MG Oral Capsule] 90 capsule 0     Sig: TAKE 1 CAPSULE BY MOUTH ONCE DAILY AT NIGHT      Last office visit with prescribing clinician: Visit date not found   Last telemedicine visit with prescribing clinician: Visit date not found   Next office visit with prescribing clinician: Visit date not found                         Would you like a call back once the refill request has been completed: [] Yes [] No    If the office needs to give you a call back, can they leave a voicemail: [] Yes [] No    Filipe Miranda MA  01/27/25, 07:54 EST

## 2025-01-27 NOTE — ASSESSMENT & PLAN NOTE
Stable.  Patient is currently on Adderall to help with his symptoms.  Patient is return to clinic in 3 months for follow-up.

## 2025-01-29 RX ORDER — TAMSULOSIN HYDROCHLORIDE 0.4 MG/1
1 CAPSULE ORAL NIGHTLY
Qty: 90 CAPSULE | Refills: 3 | Status: SHIPPED | OUTPATIENT
Start: 2025-01-29

## 2025-03-05 LAB
ALBUMIN SERPL-MCNC: 4.2 G/DL (ref 3.5–5.2)
ALBUMIN/GLOB SERPL: 1.2 G/DL
ALP SERPL-CCNC: 77 U/L (ref 39–117)
ALT SERPL-CCNC: 23 U/L (ref 1–41)
AST SERPL-CCNC: 24 U/L (ref 1–40)
BASOPHILS # BLD AUTO: 0.02 10*3/MM3 (ref 0–0.2)
BASOPHILS NFR BLD AUTO: 0.4 % (ref 0–1.5)
BILIRUB SERPL-MCNC: 0.2 MG/DL (ref 0–1.2)
BUN SERPL-MCNC: 9 MG/DL (ref 6–20)
BUN/CREAT SERPL: 9.2 (ref 7–25)
CALCIUM SERPL-MCNC: 9.5 MG/DL (ref 8.6–10.5)
CHLORIDE SERPL-SCNC: 105 MMOL/L (ref 98–107)
CHOLEST SERPL-MCNC: 153 MG/DL (ref 0–200)
CHOLEST/HDLC SERPL: 3.19 {RATIO}
CO2 SERPL-SCNC: 26.5 MMOL/L (ref 22–29)
CREAT SERPL-MCNC: 0.98 MG/DL (ref 0.76–1.27)
EGFRCR SERPLBLD CKD-EPI 2021: 93.4 ML/MIN/1.73
EOSINOPHIL # BLD AUTO: 0.07 10*3/MM3 (ref 0–0.4)
EOSINOPHIL NFR BLD AUTO: 1.3 % (ref 0.3–6.2)
ERYTHROCYTE [DISTWIDTH] IN BLOOD BY AUTOMATED COUNT: 13.3 % (ref 12.3–15.4)
GLOBULIN SER CALC-MCNC: 3.4 GM/DL
GLUCOSE SERPL-MCNC: 86 MG/DL (ref 65–99)
HCT VFR BLD AUTO: 42.2 % (ref 37.5–51)
HDLC SERPL-MCNC: 48 MG/DL (ref 40–60)
HGB BLD-MCNC: 14.1 G/DL (ref 13–17.7)
IMM GRANULOCYTES # BLD AUTO: 0.01 10*3/MM3 (ref 0–0.05)
IMM GRANULOCYTES NFR BLD AUTO: 0.2 % (ref 0–0.5)
LDLC SERPL CALC-MCNC: 85 MG/DL (ref 0–100)
LYMPHOCYTES # BLD AUTO: 1.83 10*3/MM3 (ref 0.7–3.1)
LYMPHOCYTES NFR BLD AUTO: 34 % (ref 19.6–45.3)
MCH RBC QN AUTO: 29.7 PG (ref 26.6–33)
MCHC RBC AUTO-ENTMCNC: 33.4 G/DL (ref 31.5–35.7)
MCV RBC AUTO: 88.8 FL (ref 79–97)
MONOCYTES # BLD AUTO: 0.52 10*3/MM3 (ref 0.1–0.9)
MONOCYTES NFR BLD AUTO: 9.6 % (ref 5–12)
NEUTROPHILS # BLD AUTO: 2.94 10*3/MM3 (ref 1.7–7)
NEUTROPHILS NFR BLD AUTO: 54.5 % (ref 42.7–76)
NRBC BLD AUTO-RTO: 0 /100 WBC (ref 0–0.2)
PLATELET # BLD AUTO: 193 10*3/MM3 (ref 140–450)
POTASSIUM SERPL-SCNC: 4.3 MMOL/L (ref 3.5–5.2)
PROT SERPL-MCNC: 7.6 G/DL (ref 6–8.5)
RBC # BLD AUTO: 4.75 10*6/MM3 (ref 4.14–5.8)
SODIUM SERPL-SCNC: 142 MMOL/L (ref 136–145)
TRIGL SERPL-MCNC: 108 MG/DL (ref 0–150)
TSH SERPL DL<=0.005 MIU/L-ACNC: 1.17 UIU/ML (ref 0.27–4.2)
UNABLE TO VOID: NORMAL
VLDLC SERPL CALC-MCNC: 20 MG/DL (ref 5–40)
WBC # BLD AUTO: 5.39 10*3/MM3 (ref 3.4–10.8)

## 2025-03-17 DIAGNOSIS — R19.5 LOOSE STOOLS: ICD-10-CM

## 2025-03-17 RX ORDER — DICYCLOMINE HYDROCHLORIDE 10 MG/1
CAPSULE ORAL
Qty: 60 CAPSULE | Refills: 3 | Status: SHIPPED | OUTPATIENT
Start: 2025-03-17

## 2025-03-17 NOTE — TELEPHONE ENCOUNTER
Rx Refill Note  Requested Prescriptions     Pending Prescriptions Disp Refills    dicyclomine (BENTYL) 10 MG capsule [Pharmacy Med Name: Dicyclomine HCl 10 MG Oral Capsule] 60 capsule 0     Sig: TAKE 1 CAPSULE BY MOUTH THREE TIMES DAILY AS NEEDED FOR ABDOMINAL PAIN.      Last office visit with prescribing clinician: 1/23/2025   Last telemedicine visit with prescribing clinician: Visit date not found   Next office visit with prescribing clinician: Visit date not found                         Would you like a call back once the refill request has been completed: [] Yes [] No    If the office needs to give you a call back, can they leave a voicemail: [] Yes [] No    Filipe Miranda MA  03/17/25, 11:34 EDT

## 2025-04-03 ENCOUNTER — TELEPHONE (OUTPATIENT)
Dept: FAMILY MEDICINE CLINIC | Facility: CLINIC | Age: 50
End: 2025-04-03
Payer: MEDICARE

## 2025-04-03 NOTE — TELEPHONE ENCOUNTER
Caller: Jada Ugalde    Relationship: Emergency Contact    Best call back number: [phone]     Caller requesting test results: JADA    What test was performed: LABS    When was the test performed: 3/4/25    Where was the test performed: [facility]    Additional notes: SISTER WOULD LIKE A CALL BACK TO GO OVER LAB RESULTS

## 2025-04-11 DIAGNOSIS — Z87.820 HISTORY OF TRAUMATIC BRAIN INJURY: ICD-10-CM

## 2025-04-11 DIAGNOSIS — F90.2 ATTENTION DEFICIT HYPERACTIVITY DISORDER (ADHD), COMBINED TYPE: ICD-10-CM

## 2025-04-11 NOTE — TELEPHONE ENCOUNTER
Caller: Jada Ugalde    Relationship: Emergency Contact    Best call back number:[phone]     Requested Prescriptions:   Requested Prescriptions     Pending Prescriptions Disp Refills    amphetamine-dextroamphetamine (ADDERALL) 30 MG tablet 30 tablet 0     Sig: Take 1 tablet by mouth Daily.        Pharmacy where request should be sent: [address] [phone] [facility] [phone] [facility]     Last office visit with prescribing clinician: 1/23/2025   Last telemedicine visit with prescribing clinician: Visit date not found   Next office visit with prescribing clinician: Visit date not found     Additional details provided by patient:     Does the patient have less than a 3 day supply:  [x] Yes  [] No    Would you like a call back once the refill request has been completed: [] Yes [] No    If the office needs to give you a call back, can they leave a voicemail: [] Yes [] No    Bry Muse Rep   04/11/25 08:10 EDT

## 2025-04-11 NOTE — TELEPHONE ENCOUNTER
Rx Refill Note  Requested Prescriptions     Pending Prescriptions Disp Refills    amphetamine-dextroamphetamine (ADDERALL) 30 MG tablet 30 tablet 0     Sig: Take 1 tablet by mouth Daily.      Last office visit with prescribing clinician: 1/23/2025   Last telemedicine visit with prescribing clinician: Visit date not found   Next office visit with prescribing clinician: Visit date not found                         Would you like a call back once the refill request has been completed: [] Yes [] No    If the office needs to give you a call back, can they leave a voicemail: [] Yes [] No    Cira Chamberlain MA  04/11/25, 16:43 EDT

## 2025-04-12 RX ORDER — DEXTROAMPHETAMINE SACCHARATE, AMPHETAMINE ASPARTATE, DEXTROAMPHETAMINE SULFATE AND AMPHETAMINE SULFATE 7.5; 7.5; 7.5; 7.5 MG/1; MG/1; MG/1; MG/1
30 TABLET ORAL DAILY
Qty: 30 TABLET | Refills: 0 | Status: SHIPPED | OUTPATIENT
Start: 2025-04-12

## 2025-04-24 ENCOUNTER — OFFICE VISIT (OUTPATIENT)
Dept: ORTHOPEDIC SURGERY | Facility: CLINIC | Age: 50
End: 2025-04-24
Payer: MEDICARE

## 2025-04-24 VITALS — BODY MASS INDEX: 27.5 KG/M2 | HEIGHT: 72 IN | WEIGHT: 203 LBS | TEMPERATURE: 98 F

## 2025-04-24 DIAGNOSIS — M20.5X1 CLAW TOE, ACQUIRED, RIGHT: Primary | ICD-10-CM

## 2025-04-24 PROCEDURE — 99214 OFFICE O/P EST MOD 30 MIN: CPT | Performed by: ORTHOPAEDIC SURGERY

## 2025-05-06 ENCOUNTER — PRE-ADMISSION TESTING (OUTPATIENT)
Dept: PREADMISSION TESTING | Facility: HOSPITAL | Age: 50
End: 2025-05-06
Payer: MEDICARE

## 2025-05-06 VITALS
WEIGHT: 198.2 LBS | SYSTOLIC BLOOD PRESSURE: 110 MMHG | HEIGHT: 72 IN | OXYGEN SATURATION: 100 % | BODY MASS INDEX: 26.84 KG/M2 | RESPIRATION RATE: 20 BRPM | HEART RATE: 66 BPM | TEMPERATURE: 98.7 F | DIASTOLIC BLOOD PRESSURE: 74 MMHG

## 2025-05-06 LAB
ANION GAP SERPL CALCULATED.3IONS-SCNC: 8 MMOL/L (ref 5–15)
BUN SERPL-MCNC: 12 MG/DL (ref 6–20)
BUN/CREAT SERPL: 11.5 (ref 7–25)
CALCIUM SPEC-SCNC: 9.5 MG/DL (ref 8.6–10.5)
CHLORIDE SERPL-SCNC: 105 MMOL/L (ref 98–107)
CO2 SERPL-SCNC: 27 MMOL/L (ref 22–29)
CREAT SERPL-MCNC: 1.04 MG/DL (ref 0.76–1.27)
DEPRECATED RDW RBC AUTO: 44.2 FL (ref 37–54)
EGFRCR SERPLBLD CKD-EPI 2021: 86.9 ML/MIN/1.73
ERYTHROCYTE [DISTWIDTH] IN BLOOD BY AUTOMATED COUNT: 13.4 % (ref 12.3–15.4)
GLUCOSE SERPL-MCNC: 82 MG/DL (ref 65–99)
HCT VFR BLD AUTO: 45.7 % (ref 37.5–51)
HGB BLD-MCNC: 15 G/DL (ref 13–17.7)
MCH RBC QN AUTO: 29.6 PG (ref 26.6–33)
MCHC RBC AUTO-ENTMCNC: 32.8 G/DL (ref 31.5–35.7)
MCV RBC AUTO: 90.3 FL (ref 79–97)
PLATELET # BLD AUTO: 192 10*3/MM3 (ref 140–450)
PMV BLD AUTO: 10 FL (ref 6–12)
POTASSIUM SERPL-SCNC: 4.4 MMOL/L (ref 3.5–5.2)
RBC # BLD AUTO: 5.06 10*6/MM3 (ref 4.14–5.8)
SODIUM SERPL-SCNC: 140 MMOL/L (ref 136–145)
WBC NRBC COR # BLD AUTO: 6.53 10*3/MM3 (ref 3.4–10.8)

## 2025-05-06 PROCEDURE — 80048 BASIC METABOLIC PNL TOTAL CA: CPT

## 2025-05-06 PROCEDURE — 36415 COLL VENOUS BLD VENIPUNCTURE: CPT

## 2025-05-06 PROCEDURE — 93005 ELECTROCARDIOGRAM TRACING: CPT

## 2025-05-06 PROCEDURE — 85027 COMPLETE CBC AUTOMATED: CPT

## 2025-05-06 NOTE — DISCHARGE INSTRUCTIONS
Take the following medications the morning of surgery:  LACOSAMIDE, LEVETIRACETAM, METOPROLOL, PANTOPRAZOLE, PHENYTOIN, AND TOPIRAMATE    HOLD ADDERALL 48 HOURS PRIOR TO SURGERY       If you are on prescription narcotic pain medication to control your pain you may also take that medication the morning of surgery.      General Instructions:     Do not eat solid food after midnight the night before surgery.  Clear liquids day of surgery are allowed but must be stopped at least two hours before your hospital arrival time.       Allowed clear liquids      Water, sodas, and tea or coffee with no cream or milk added.       12 to 20 ounces of a clear liquid that contains carbohydrates is recommended.  If non-diabetic, have Gatorade or Powerade.  If diabetic, have G2 or Powerade Zero.     Do not have liquids red in color.  Do not consume chicken, beef, pork or vegetable broth or bouillon cubes of any variety as they are not considered clear liquids and are not allowed.      Infants may have breast milk up to four hours before surgery.  Infants drinking formula may drink formula up to six hours before surgery.   Patients who avoid smoking, chewing tobacco and alcohol for 4 weeks prior to surgery have a reduced risk of post-operative complications.  Quit smoking as many days before surgery as you can.  Do not smoke, use chewing tobacco or drink alcohol the day of surgery.   If applicable bring your C-PAP/ BI-PAP machine in with you to preop day of surgery.  Bring any papers given to you in the doctor’s office.  Wear clean comfortable clothes.  Do not wear contact lenses, false eyelashes or make-up.  Bring a case for your glasses.   Bring crutches or walker if applicable.  Remove all piercings.  Leave jewelry and any other valuables at home.  Hair extensions with metal clips must be removed prior to surgery.  The Pre-Admission Testing nurse will instruct you to bring medications if unable to obtain an accurate list in  Pre-Admission Testing.    Day of surgery you will need to let the preoperative nurse know the last time you took each of your medications.  To ensure a safe environment for patients and staff, we kindly ask that children under the age of 16 not accompany patients.  If you must bring a dependent child or dependent adult please ensure a responsible adult, other than yourself, is present to supervise them.        Preventing a Surgical Site Infection:  For 2 to 3 days before surgery, avoid shaving with a razor because the razor can irritate skin and make it easier to develop an infection.    Any areas of open skin can increase the risk of a post-operative wound infection by allowing bacteria to enter and travel throughout the body.  Notify your surgeon if you have any skin wounds / rashes even if it is not near the expected surgical site.  The area will need assessed to determine if surgery should be delayed until it is healed.  The night prior to surgery shower using a fresh bar of anti-bacterial soap (such as Dial) and clean washcloth.  Sleep in a clean bed with clean clothing.  Do not allow pets to sleep with you.  Shower on the morning of surgery using a fresh bar of anti-bacterial soap (such as Dial) and clean washcloth.  Dry with a clean towel and dress in clean clothing.  Ask your surgeon if you will be receiving antibiotics prior to surgery.  Make sure you, your family, and all healthcare providers clean their hands with soap and water or an alcohol based hand  before caring for you or your wound.    Day of surgery:  Your arrival time is approximately two hours before your scheduled surgery time.  Please note if you have an early arrival time the surgery doors do not open before 5:00 AM.  Upon arrival, a Pre-op nurse and Anesthesiologist will review your health history, obtain vital signs, and answer questions you may have.  The only belongings needed at this time will be a list of your home medications  and if applicable your C-PAP/BI-PAP machine.  A Pre-op nurse will start an IV and you may receive medication in preparation for surgery, including something to help you relax.     Please be aware that surgery does come with discomfort.  We want to make every effort to control your discomfort so please discuss any uncontrolled symptoms with your nurse.   Your doctor will most likely have prescribed pain medications.      If you are going home after surgery you will receive individualized written care instructions before being discharged.  A responsible adult must drive you to and from the hospital on the day of your surgery and ideally stay with you through the night.   .  Discharge prescriptions can be filled by the hospital pharmacy during regular pharmacy hours.  If you are having surgery late in the day/evening your prescription may be e-prescribed to your pharmacy.  Please verify your pharmacy hours or chose a 24 hour pharmacy to avoid not having access to your prescription because your pharmacy has closed for the day.    If you are staying overnight following surgery, you will be transported to your hospital room following the recovery period.  [facility] has all private rooms.    If you have any questions please call Pre-Admission Testing at [phone].  Deductibles and co-payments are collected on the day of service. Please be prepared to pay the required co-pay, deductible or deposit on the day of service as defined by your plan.    Call your surgeon immediately if you experience any of the following symptoms:  Sore Throat  Shortness of Breath or difficulty breathing  Cough  Chills  Body soreness or muscle pain  Headache  Fever  New loss of taste or smell  Do not arrive for your surgery ill.  Your procedure will need to be rescheduled to another time.  You will need to call your physician before the day of surgery to avoid any unnecessary exposure to hospital staff as well as other  patients.

## 2025-05-07 LAB
QT INTERVAL: 391 MS
QTC INTERVAL: 392 MS

## 2025-05-14 ENCOUNTER — PREP FOR SURGERY (OUTPATIENT)
Dept: OTHER | Facility: HOSPITAL | Age: 50
End: 2025-05-14
Payer: MEDICARE
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
## 2025-05-20 ENCOUNTER — ANESTHESIA EVENT (OUTPATIENT)
Dept: PERIOP | Facility: HOSPITAL | Age: 50
End: 2025-05-20
Payer: MEDICARE

## 2025-05-20 ENCOUNTER — HOSPITAL ENCOUNTER (OUTPATIENT)
Facility: HOSPITAL | Age: 50
Setting detail: HOSPITAL OUTPATIENT SURGERY
Discharge: HOME | End: 2025-05-20
Attending: ORTHOPAEDIC SURGERY | Admitting: ORTHOPAEDIC SURGERY
Payer: MEDICARE

## 2025-05-20 ENCOUNTER — APPOINTMENT (OUTPATIENT)
Dept: GENERAL RADIOLOGY | Facility: HOSPITAL | Age: 50
End: 2025-05-20
Payer: MEDICARE

## 2025-05-20 ENCOUNTER — ANESTHESIA (OUTPATIENT)
Dept: PERIOP | Facility: HOSPITAL | Age: 50
End: 2025-05-20
Payer: MEDICARE

## 2025-05-20 VITALS
OXYGEN SATURATION: 100 % | DIASTOLIC BLOOD PRESSURE: 97 MMHG | SYSTOLIC BLOOD PRESSURE: 130 MMHG | HEART RATE: 50 BPM | TEMPERATURE: 97.5 F | RESPIRATION RATE: 16 BRPM

## 2025-05-20 DIAGNOSIS — M20.5X1 CLAW TOE, ACQUIRED, RIGHT: Primary | ICD-10-CM

## 2025-05-20 PROCEDURE — 25010000002 MIDAZOLAM PER 1 MG: Performed by: STUDENT IN AN ORGANIZED HEALTH CARE EDUCATION/TRAINING PROGRAM

## 2025-05-20 PROCEDURE — 25010000002 PROPOFOL 10 MG/ML EMULSION: Performed by: NURSE ANESTHETIST, CERTIFIED REGISTERED

## 2025-05-20 PROCEDURE — C1713 ANCHOR/SCREW BN/BN,TIS/BN: HCPCS | Performed by: ORTHOPAEDIC SURGERY

## 2025-05-20 PROCEDURE — 25010000002 ONDANSETRON PER 1 MG: Performed by: REGISTERED NURSE

## 2025-05-20 PROCEDURE — 28285 REPAIR OF HAMMERTOE: CPT | Performed by: ORTHOPAEDIC SURGERY

## 2025-05-20 PROCEDURE — 25010000002 ROPIVACAINE PER 1 MG: Performed by: STUDENT IN AN ORGANIZED HEALTH CARE EDUCATION/TRAINING PROGRAM

## 2025-05-20 PROCEDURE — 25010000002 DEXAMETHASONE PER 1 MG: Performed by: STUDENT IN AN ORGANIZED HEALTH CARE EDUCATION/TRAINING PROGRAM

## 2025-05-20 PROCEDURE — 25010000002 FENTANYL CITRATE (PF) 50 MCG/ML SOLUTION: Performed by: STUDENT IN AN ORGANIZED HEALTH CARE EDUCATION/TRAINING PROGRAM

## 2025-05-20 PROCEDURE — 25010000002 CEFAZOLIN PER 500 MG: Performed by: ORTHOPAEDIC SURGERY

## 2025-05-20 PROCEDURE — 76000 FLUOROSCOPY <1 HR PHYS/QHP: CPT

## 2025-05-20 PROCEDURE — 25810000003 LACTATED RINGERS PER 1000 ML: Performed by: STUDENT IN AN ORGANIZED HEALTH CARE EDUCATION/TRAINING PROGRAM

## 2025-05-20 PROCEDURE — 25010000002 HYDROMORPHONE 1 MG/ML SOLUTION: Performed by: REGISTERED NURSE

## 2025-05-20 PROCEDURE — 25010000002 LIDOCAINE 2% SOLUTION: Performed by: NURSE ANESTHETIST, CERTIFIED REGISTERED

## 2025-05-20 DEVICE — IMPLANTABLE DEVICE: Type: IMPLANTABLE DEVICE | Site: TOES | Status: FUNCTIONAL

## 2025-05-20 DEVICE — ALLOGRFT FIBR OSTEOAMP SELECT 1CC: Type: IMPLANTABLE DEVICE | Site: TOES | Status: FUNCTIONAL

## 2025-05-20 RX ORDER — HYDROCODONE BITARTRATE AND ACETAMINOPHEN 7.5; 325 MG/1; MG/1
1 TABLET ORAL EVERY 6 HOURS PRN
Qty: 42 TABLET | Refills: 0 | Status: SHIPPED | OUTPATIENT
Start: 2025-05-20

## 2025-05-20 RX ORDER — ONDANSETRON 2 MG/ML
INJECTION INTRAMUSCULAR; INTRAVENOUS AS NEEDED
Status: DISCONTINUED | OUTPATIENT
Start: 2025-05-20 | End: 2025-05-20 | Stop reason: SURG

## 2025-05-20 RX ORDER — NALOXONE HCL 0.4 MG/ML
0.2 VIAL (ML) INJECTION AS NEEDED
Status: DISCONTINUED | OUTPATIENT
Start: 2025-05-20 | End: 2025-05-20 | Stop reason: HOSPADM

## 2025-05-20 RX ORDER — ATROPINE SULFATE 0.4 MG/ML
0.4 INJECTION, SOLUTION INTRAMUSCULAR; INTRAVENOUS; SUBCUTANEOUS ONCE AS NEEDED
Status: DISCONTINUED | OUTPATIENT
Start: 2025-05-20 | End: 2025-05-20 | Stop reason: HOSPADM

## 2025-05-20 RX ORDER — LIDOCAINE HYDROCHLORIDE 20 MG/ML
INJECTION, SOLUTION INFILTRATION; PERINEURAL AS NEEDED
Status: DISCONTINUED | OUTPATIENT
Start: 2025-05-20 | End: 2025-05-20 | Stop reason: SURG

## 2025-05-20 RX ORDER — DROPERIDOL 2.5 MG/ML
0.62 INJECTION, SOLUTION INTRAMUSCULAR; INTRAVENOUS
Status: DISCONTINUED | OUTPATIENT
Start: 2025-05-20 | End: 2025-05-20 | Stop reason: HOSPADM

## 2025-05-20 RX ORDER — FENTANYL CITRATE 50 UG/ML
50 INJECTION, SOLUTION INTRAMUSCULAR; INTRAVENOUS
Status: DISCONTINUED | OUTPATIENT
Start: 2025-05-20 | End: 2025-05-20 | Stop reason: HOSPADM

## 2025-05-20 RX ORDER — DEXAMETHASONE SODIUM PHOSPHATE 4 MG/ML
INJECTION, SOLUTION INTRA-ARTICULAR; INTRALESIONAL; INTRAMUSCULAR; INTRAVENOUS; SOFT TISSUE
Status: COMPLETED | OUTPATIENT
Start: 2025-05-20 | End: 2025-05-20

## 2025-05-20 RX ORDER — EPHEDRINE SULFATE 50 MG/ML
5 INJECTION, SOLUTION INTRAVENOUS ONCE AS NEEDED
Status: DISCONTINUED | OUTPATIENT
Start: 2025-05-20 | End: 2025-05-20 | Stop reason: HOSPADM

## 2025-05-20 RX ORDER — LIDOCAINE HYDROCHLORIDE 10 MG/ML
0.5 INJECTION, SOLUTION INFILTRATION; PERINEURAL ONCE AS NEEDED
Status: DISCONTINUED | OUTPATIENT
Start: 2025-05-20 | End: 2025-05-20 | Stop reason: HOSPADM

## 2025-05-20 RX ORDER — HYDROCODONE BITARTRATE AND ACETAMINOPHEN 7.5; 325 MG/1; MG/1
1 TABLET ORAL ONCE AS NEEDED
Status: DISCONTINUED | OUTPATIENT
Start: 2025-05-20 | End: 2025-05-20 | Stop reason: HOSPADM

## 2025-05-20 RX ORDER — SODIUM CHLORIDE 0.9 % (FLUSH) 0.9 %
3 SYRINGE (ML) INJECTION EVERY 12 HOURS SCHEDULED
Status: DISCONTINUED | OUTPATIENT
Start: 2025-05-20 | End: 2025-05-20 | Stop reason: HOSPADM

## 2025-05-20 RX ORDER — FLUMAZENIL 0.1 MG/ML
0.2 INJECTION INTRAVENOUS AS NEEDED
Status: DISCONTINUED | OUTPATIENT
Start: 2025-05-20 | End: 2025-05-20 | Stop reason: HOSPADM

## 2025-05-20 RX ORDER — HYDRALAZINE HYDROCHLORIDE 20 MG/ML
5 INJECTION INTRAMUSCULAR; INTRAVENOUS
Status: DISCONTINUED | OUTPATIENT
Start: 2025-05-20 | End: 2025-05-20 | Stop reason: HOSPADM

## 2025-05-20 RX ORDER — HYDROMORPHONE HYDROCHLORIDE 1 MG/ML
0.5 INJECTION, SOLUTION INTRAMUSCULAR; INTRAVENOUS; SUBCUTANEOUS
Status: DISCONTINUED | OUTPATIENT
Start: 2025-05-20 | End: 2025-05-20 | Stop reason: HOSPADM

## 2025-05-20 RX ORDER — ONDANSETRON 2 MG/ML
4 INJECTION INTRAMUSCULAR; INTRAVENOUS ONCE AS NEEDED
Status: DISCONTINUED | OUTPATIENT
Start: 2025-05-20 | End: 2025-05-20 | Stop reason: HOSPADM

## 2025-05-20 RX ORDER — DEXAMETHASONE SODIUM PHOSPHATE 4 MG/ML
INJECTION, SOLUTION INTRA-ARTICULAR; INTRALESIONAL; INTRAMUSCULAR; INTRAVENOUS; SOFT TISSUE AS NEEDED
Status: DISCONTINUED | OUTPATIENT
Start: 2025-05-20 | End: 2025-05-20 | Stop reason: SURG

## 2025-05-20 RX ORDER — PROPOFOL 10 MG/ML
VIAL (ML) INTRAVENOUS AS NEEDED
Status: DISCONTINUED | OUTPATIENT
Start: 2025-05-20 | End: 2025-05-20 | Stop reason: SURG

## 2025-05-20 RX ORDER — OXYCODONE AND ACETAMINOPHEN 7.5; 325 MG/1; MG/1
1 TABLET ORAL EVERY 4 HOURS PRN
Status: DISCONTINUED | OUTPATIENT
Start: 2025-05-20 | End: 2025-05-20 | Stop reason: HOSPADM

## 2025-05-20 RX ORDER — HYDROCODONE BITARTRATE AND ACETAMINOPHEN 5; 325 MG/1; MG/1
1 TABLET ORAL ONCE AS NEEDED
Status: DISCONTINUED | OUTPATIENT
Start: 2025-05-20 | End: 2025-05-20 | Stop reason: HOSPADM

## 2025-05-20 RX ORDER — ROPIVACAINE HYDROCHLORIDE 5 MG/ML
INJECTION, SOLUTION EPIDURAL; INFILTRATION; PERINEURAL
Status: COMPLETED | OUTPATIENT
Start: 2025-05-20 | End: 2025-05-20

## 2025-05-20 RX ORDER — SODIUM CHLORIDE, SODIUM LACTATE, POTASSIUM CHLORIDE, CALCIUM CHLORIDE 600; 310; 30; 20 MG/100ML; MG/100ML; MG/100ML; MG/100ML
9 INJECTION, SOLUTION INTRAVENOUS CONTINUOUS
Status: DISCONTINUED | OUTPATIENT
Start: 2025-05-20 | End: 2025-05-20 | Stop reason: HOSPADM

## 2025-05-20 RX ORDER — MIDAZOLAM HYDROCHLORIDE 1 MG/ML
1 INJECTION, SOLUTION INTRAMUSCULAR; INTRAVENOUS
Status: DISCONTINUED | OUTPATIENT
Start: 2025-05-20 | End: 2025-05-20 | Stop reason: HOSPADM

## 2025-05-20 RX ORDER — CEPHALEXIN 500 MG/1
500 CAPSULE ORAL EVERY 6 HOURS
Qty: 8 CAPSULE | Refills: 0 | Status: SHIPPED | OUTPATIENT
Start: 2025-05-20 | End: 2025-05-22

## 2025-05-20 RX ORDER — FENTANYL CITRATE 50 UG/ML
50 INJECTION, SOLUTION INTRAMUSCULAR; INTRAVENOUS ONCE AS NEEDED
Status: COMPLETED | OUTPATIENT
Start: 2025-05-20 | End: 2025-05-20

## 2025-05-20 RX ORDER — SENNOSIDES 8.6 MG
325 CAPSULE ORAL DAILY
Qty: 30 TABLET | Refills: 0 | Status: SHIPPED | OUTPATIENT
Start: 2025-05-21

## 2025-05-20 RX ORDER — PROMETHAZINE HYDROCHLORIDE 25 MG/1
25 SUPPOSITORY RECTAL ONCE AS NEEDED
Status: DISCONTINUED | OUTPATIENT
Start: 2025-05-20 | End: 2025-05-20 | Stop reason: HOSPADM

## 2025-05-20 RX ORDER — LABETALOL HYDROCHLORIDE 5 MG/ML
5 INJECTION, SOLUTION INTRAVENOUS
Status: DISCONTINUED | OUTPATIENT
Start: 2025-05-20 | End: 2025-05-20 | Stop reason: HOSPADM

## 2025-05-20 RX ORDER — DIPHENHYDRAMINE HYDROCHLORIDE 50 MG/ML
12.5 INJECTION, SOLUTION INTRAMUSCULAR; INTRAVENOUS
Status: DISCONTINUED | OUTPATIENT
Start: 2025-05-20 | End: 2025-05-20 | Stop reason: HOSPADM

## 2025-05-20 RX ORDER — SODIUM CHLORIDE 0.9 % (FLUSH) 0.9 %
3-10 SYRINGE (ML) INJECTION AS NEEDED
Status: DISCONTINUED | OUTPATIENT
Start: 2025-05-20 | End: 2025-05-20 | Stop reason: HOSPADM

## 2025-05-20 RX ORDER — PROMETHAZINE HYDROCHLORIDE 25 MG/1
25 TABLET ORAL ONCE AS NEEDED
Status: DISCONTINUED | OUTPATIENT
Start: 2025-05-20 | End: 2025-05-20 | Stop reason: HOSPADM

## 2025-05-20 RX ORDER — METOPROLOL TARTRATE 25 MG/1
50 TABLET, FILM COATED ORAL ONCE
Status: COMPLETED | OUTPATIENT
Start: 2025-05-20 | End: 2025-05-20

## 2025-05-20 RX ORDER — IPRATROPIUM BROMIDE AND ALBUTEROL SULFATE 2.5; .5 MG/3ML; MG/3ML
3 SOLUTION RESPIRATORY (INHALATION) ONCE AS NEEDED
Status: DISCONTINUED | OUTPATIENT
Start: 2025-05-20 | End: 2025-05-20 | Stop reason: HOSPADM

## 2025-05-20 RX ADMIN — HYDROMORPHONE HYDROCHLORIDE 0.5 MG: 1 INJECTION, SOLUTION INTRAMUSCULAR; INTRAVENOUS; SUBCUTANEOUS at 08:47

## 2025-05-20 RX ADMIN — PROPOFOL 200 MG: 10 INJECTION, EMULSION INTRAVENOUS at 07:05

## 2025-05-20 RX ADMIN — ROPIVACAINE HYDROCHLORIDE 30 ML: 5 INJECTION EPIDURAL; INFILTRATION; PERINEURAL at 06:51

## 2025-05-20 RX ADMIN — SODIUM CHLORIDE, POTASSIUM CHLORIDE, SODIUM LACTATE AND CALCIUM CHLORIDE 9 ML/HR: 600; 310; 30; 20 INJECTION, SOLUTION INTRAVENOUS at 06:24

## 2025-05-20 RX ADMIN — METOPROLOL TARTRATE 50 MG: 25 TABLET, FILM COATED ORAL at 06:42

## 2025-05-20 RX ADMIN — ONDANSETRON 4 MG: 2 INJECTION, SOLUTION INTRAMUSCULAR; INTRAVENOUS at 09:06

## 2025-05-20 RX ADMIN — DEXAMETHASONE SODIUM PHOSPHATE 8 MG: 4 INJECTION, SOLUTION INTRA-ARTICULAR; INTRALESIONAL; INTRAMUSCULAR; INTRAVENOUS; SOFT TISSUE at 07:05

## 2025-05-20 RX ADMIN — LIDOCAINE HYDROCHLORIDE 60 MG: 20 INJECTION, SOLUTION INFILTRATION; PERINEURAL at 07:05

## 2025-05-20 RX ADMIN — FENTANYL CITRATE 50 MCG: 50 INJECTION, SOLUTION INTRAMUSCULAR; INTRAVENOUS at 06:46

## 2025-05-20 RX ADMIN — MIDAZOLAM 1 MG: 1 INJECTION INTRAMUSCULAR; INTRAVENOUS at 06:46

## 2025-05-20 RX ADMIN — CEFAZOLIN 2 G: 2 INJECTION, POWDER, FOR SOLUTION INTRAMUSCULAR; INTRAVENOUS at 06:54

## 2025-05-20 RX ADMIN — DEXAMETHASONE SODIUM PHOSPHATE 4 MG: 4 INJECTION, SOLUTION INTRA-ARTICULAR; INTRALESIONAL; INTRAMUSCULAR; INTRAVENOUS; SOFT TISSUE at 06:51

## 2025-05-20 NOTE — ANESTHESIA PROCEDURE NOTES
Peripheral Block    Pre-sedation assessment completed: 5/20/2025 6:46 AM    Patient reassessed immediately prior to procedure    Patient location during procedure: pre-op  Start time: 5/20/2025 6:46 AM  Stop time: 5/20/2025 6:51 AM  Reason for block: at surgeon's request and post-op pain management  Performed by  Anesthesiologist: Srikanth Dahl MD  Preanesthetic Checklist  Completed: patient identified, IV checked, site marked, risks and benefits discussed, surgical consent, monitors and equipment checked, pre-op evaluation and timeout performed  Prep:  Pt Position: supine  Sterile barriers:mask, gloves and cap  Prep: ChloraPrep  Patient monitoring: blood pressure monitoring, continuous pulse oximetry and EKG  Procedure    Sedation: yes  Performed under: local infiltration  Guidance:ultrasound guided    ULTRASOUND INTERPRETATION.  Using ultrasound guidance a 21 G gauge needle was placed in close proximity to the nerve, at which point, under ultrasound guidance anesthetic was injected in the area of the nerve and spread of the anesthesia was seen on ultrasound in close proximity thereto.  There were no abnormalities seen on ultrasound; a digital image was taken; and the patient tolerated the procedure with no complications. Images:still images obtained, printed/placed on chart    Laterality:right  Block Type:popliteal  Injection Technique:single-shot  Needle Type:echogenic and Tuohy  Needle Gauge:21 G  Resistance on Injection: none    Medications Used: dexamethasone (DECADRON) injection - Injection   4 mg - 5/20/2025 6:51:00 AM  ropivacaine (NAROPIN) 0.5 % injection - Injection   30 mL - 5/20/2025 6:51:00 AM      Post Assessment  Injection Assessment: negative aspiration for heme, no paresthesia on injection and incremental injection  Patient Tolerance:comfortable throughout block  Complications:no  Additional Notes  Ultrasound guidance used to visualize nerve anatomy, guide needle placement and verify local  anesthetic disbursement.       Performed by: Srikanth Dahl MD

## 2025-05-20 NOTE — INTERVAL H&P NOTE
H&P reviewed. The patient was examined and there are no changes to the H&P.  Patient confirms she did not want to have anything done with the 2nd or 5th toes and we are only going to do the 3rd and 4th as previously discussed.  He and his sister voiced clear understanding of the operative procedure and postoperative course with associated risk benefits potential outcomes and complications.

## 2025-05-20 NOTE — ANESTHESIA POSTPROCEDURE EVALUATION
Patient: Tye JONES Junior    Procedure Summary       Date: 05/20/25 Room / Location:  [facility] [address] [place] [address] [facility]    Anesthesia Start: 0700 Anesthesia Stop: 0924    Procedure: Right 3rd and 4th toe proximal interphalangeal joint release and fusion with possible cadaver graft (Right: Toes) Diagnosis:       Claw toe, acquired, right      (Claw toe, acquired, right [M20.5X1])    Surgeons: Tomas Donald MD Provider: Srikanth Dahl MD    Anesthesia Type: general ASA Status: 3            Anesthesia Type: general    Vitals  Vitals Value Taken Time   /84 05/20/25 09:45   Temp 36.4 °C (97.5 °F) 05/20/25 09:21   Pulse 49 05/20/25 09:59   Resp 12 05/20/25 09:21   SpO2 99 % 05/20/25 09:59   Vitals shown include unfiled device data.        Post Anesthesia Care and Evaluation    Patient location during evaluation: bedside  Patient participation: complete - patient participated  Level of consciousness: awake and alert  Pain management: adequate    Airway patency: patent  Anesthetic complications: No anesthetic complications  PONV Status: controlled  Cardiovascular status: blood pressure returned to baseline and acceptable  Respiratory status: acceptable  Hydration status: acceptable

## 2025-05-20 NOTE — ANESTHESIA PREPROCEDURE EVALUATION
Anesthesia Evaluation     Patient summary reviewed and Nursing notes reviewed   history of anesthetic complications:  PONV  NPO Solid Status: > 8 hours  NPO Liquid Status: > 2 hours           Airway   Mallampati: II  TM distance: >3 FB  Neck ROM: full  Dental      Pulmonary    Cardiovascular     ECG reviewed    (+) hypertension, hyperlipidemia      Neuro/Psych  (+) seizures    ROS Comment: S/p TBI  GI/Hepatic/Renal/Endo    (+) GERD    Musculoskeletal     Abdominal    Substance History      OB/GYN          Other                          Anesthesia Plan    ASA 3     general     intravenous induction     Anesthetic plan, risks, benefits, and alternatives have been provided, discussed and informed consent has been obtained with: patient.        CODE STATUS:

## 2025-05-20 NOTE — ANESTHESIA PROCEDURE NOTES
Airway  Reason: elective    Date/Time: 5/20/2025 7:07 AM  Airway not difficult    General Information and Staff    Patient location during procedure: OR  Anesthesiologist: Srikanth Dahl MD  CRNA/CAA: Rose Gonzalez CRNA    Indications and Patient Condition  Indications for airway management: airway protection    Preoxygenated: yes    Mask difficulty assessment: 0 - not attempted    Final Airway Details    Final airway type: supraglottic airway      Successful airway: unique  Size: 5   Number of attempts at approach: 1  Assessment: lips, teeth, and gum same as pre-op

## 2025-05-20 NOTE — BRIEF OP NOTE
HAMMER TOE REPAIR  Progress Note    Tye JONES   5/20/2025    Pre-op Diagnosis:   Claw toe, acquired, right [M20.5X1]       Post-Op Diagnosis Codes:     * Claw toe, acquired, right [M20.5X1]    Procedure(s):      Procedure(s):  Right 3rd and 4th toe proximal interphalangeal joint release and fusion with  cadaver graft              Surgeon(s):  Tomas Donald MD    Anesthesia: General with Block    Staff:   Cell Saver : Lorie Johnson  Circulator: Evelyn Baker RN; Dre Perrin RN  Scrub Person: Gigi Gore; Amanda Kruse, JOLEEN  Vendor Representative: Kishore Vega       Estimated Blood Loss: minimal    Urine Voided: * No values recorded between 5/20/2025  7:00 AM and 5/20/2025  9:12 AM *    Specimens:                None      Drains: * No LDAs found *    Findings: see dict      Complications: none    TT 84 min          Tomas Donald MD     Date: 5/20/2025  Time: 09:14 EDT

## 2025-05-21 ENCOUNTER — TELEPHONE (OUTPATIENT)
Dept: ORTHOPEDIC SURGERY | Facility: CLINIC | Age: 50
End: 2025-05-21
Payer: MEDICARE

## 2025-05-21 ENCOUNTER — OFFICE VISIT (OUTPATIENT)
Dept: ORTHOPEDIC SURGERY | Facility: CLINIC | Age: 50
End: 2025-05-21
Payer: MEDICARE

## 2025-05-21 VITALS — TEMPERATURE: 98 F | HEIGHT: 72 IN | BODY MASS INDEX: 26.82 KG/M2 | WEIGHT: 198 LBS

## 2025-05-21 DIAGNOSIS — M20.5X1 CLAW TOE, ACQUIRED, RIGHT: Primary | ICD-10-CM

## 2025-05-21 NOTE — TELEPHONE ENCOUNTER
"Spoke with patient's sister.  After his surgery today he took his dressing off last night.  She said that he told her he did not know why he had done it and had forgotten that he was not supposed to take it off but she said he would like he \"really worked take it off.  She is not ready and back in today for me to see we will check x-rays and put him in a new dressing.  "

## 2025-05-21 NOTE — TELEPHONE ENCOUNTER
Patients sister called and stated that he has taken off the dressing during the night, stitches have been left alone and the toes are exposed. Does patient need to get new dressing? Patient surgery date 5-    Best number [phone].
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
## 2025-05-21 NOTE — OP NOTE
Operative Note      Facility: [facility]  Patient Name: Tye JONES Junior  YOB: 1975  Date: 5/20/25  Medical Record Number: [id number]      Pre-op Diagnosis: Claw toe, acquired, right [M20.5X1]      Post-op Diagnosis:   Post-Op Diagnosis Codes:     * Claw toe, acquired, right [M20.5X1]        Procedure(s):  1.  Right third claw toe correction with PIP fusion and flexor tenotomy  2.  Right fourth claw toe correction with PIP fusion and flexor tenotomy    Surgeon(s):  Tomas Donald MD    Anesthesia: General with Block  Anesthesiologist: Srikanth Dahl MD  CRNA: Rose Gonzalez CRNA; Georges Agustin CRNA    Staff:   Cell Saver : Lorie Johnson  Circulator: Evelyn Baker RN; Dre Perrin RN  Scrub Person: Gigi Gore; Amanda Kruse PCT  Vendor Representative: Kishore Vega  Assistants : none      Tourniquet time: 84 minutes        Estimated Blood Loss:  minimal  Drains: None  Specimens: * No orders in the log *  Findings: See Dictation    Complications: None      Indications for Procedure: Patient has had a history of persistent clawing of the lesser toes with flexion of the PIP joints but not the DIP joints with pain over the tips of the toes without ulceration and without extension deformity of the MTP joints in the simulated standing position.  He has also had some recurvatum and hallux valgus of the first MTP joint that has not been symptomatic.  We discussed treatment and the only was to have the 3rd and 4th toes addressed and voiced clear standing the operative procedure and postoperative course with associated risk benefits potential outcomes and complications.                    Description of Procedure: Preoperative informed consent and anesthesia evaluation were obtained.  IV antibiotics were administered and the surgical site was marked.  Block was administered by anesthesia.  Patient was brought to the operating room placed in supine  position.  Anesthesia was induced and LMA was positioned.    Well-padded tourniquet was placed right proximal thigh.  Right foot and leg were prepped and draped in a sterile fashion and surgical timeout was performed.    Right leg was exsanguinated and pneumatic tourniquet inflated to 250 mmHg.  Bony landmarks were delineated radiographically and clinically.  Skin and capsular ellipses were removed from the third PIP joint.  The collaterals were released in the distal aspect of the proximal phalanx was exposed.  This was then resected below the condylar flare.  There was noted to be arthritic change at the base of the middle phalanx as well this was exposed and resected just below the chondral surface.  I was then able to oppose the surfaces but there was still a little flexion deformity.  The joint was then spread and I exposed the 3 slips of the flexor tendons and visualize these were released and divided under direct visualization.  I was then able to achieve neutral alignment to the toe without tension.  The wound was thoroughly irrigated    Attention was then turned to the fourth toe.Skin and capsular ellipses were removed from the third PIP joint.  The collaterals were released in the distal aspect of the proximal phalanx was exposed.  This was then resected below the condylar flare.  There was noted to be arthritic change at the base of the middle phalanx as well this was exposed and resected just below the chondral surface.  I was then able to oppose the surfaces but there was still a little flexion deformity.  The joint was then spread and I exposed the 3 slips of the flexor tendons and visualize these were released and divided under direct visualization.  I was then able to achieve neutral alignment to the toe without tension.  The wound was thoroughly irrigated    The K wire for the Medline intramedullary toe fusion screw was then passed from the base of the middle phalanx of the third toe through the tip  of the toe.  The PIP joint was then reduced and this was then crossed pinned into an ideal position and had been packed with small amount of osteo amp mixed with PRP to augment fusion.  This was then done in a similar fashion for the fourth toe.  Tendons were found to be in good alignment.    I then created a small stab incision over the tip of the third toe and reamed over this.  Upon removing the reamer the pin came out.  I then spent an extensive amount of time repositioning the pin such that it was in an ideal position.  I then again repacked the fusion site with bone graft and there was good opposition.  A 40 mm screw was then passed there was a headless partially-threaded screw with good compression across the fusion site.  The screw had migrated a little bit anteriorly in the proximal extent of the phalanx away from where the wire had been and this may have been from some soft cancellous bone but still had good purchase and was well-contained.    I then made a small stab incision over the tip of the fourth toe and reamed and made sure to keep the wire intact.  Screw was then passed with good compression across the fusion site and screw was well-contained.  The fusion sites were inspected directly and found to be in good alignment with good compression and adequate bone grafting.  X-rays show good alignment and containment of the screws and clinically the 3rd and 4th toes ran neutral alignment without extension deformity at the PIP joint.    The tourniquet was then released and hemostasis was obtained.  I then lightly debrided the onychomycotic changes of the third toenail with no bleeding.  Wounds were again thoroughly irrigated and sprayed with PRP.  The 2 dorsal toe wounds were closed with 3-0 nylon central roll stitch incorporating extensor tendon and medial and lateral interrupted sutures.  The stab incisions at the tips of the toes were closed with 3-0 nylon as well.  Sterile dressings were applied and  patient was placed on a forefoot and ankle bunion hammertoe spica dressing and Band-Aids were placed over the tips of the toes which maintained good capillary refill.    Patient was awakened transferred to stretcher and taken recovery in stable condition

## 2025-06-05 ENCOUNTER — OFFICE VISIT (OUTPATIENT)
Dept: ORTHOPEDIC SURGERY | Facility: CLINIC | Age: 50
End: 2025-06-05
Payer: MEDICARE

## 2025-06-05 VITALS — HEIGHT: 72 IN | TEMPERATURE: 95.9 F | BODY MASS INDEX: 26.82 KG/M2 | WEIGHT: 198 LBS

## 2025-06-05 DIAGNOSIS — M20.5X1 CLAW TOE, ACQUIRED, RIGHT: Primary | ICD-10-CM
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
## 2025-06-19 ENCOUNTER — OFFICE VISIT (OUTPATIENT)
Dept: ORTHOPEDIC SURGERY | Facility: CLINIC | Age: 50
End: 2025-06-19
Payer: MEDICARE

## 2025-06-19 VITALS — TEMPERATURE: 97.1 F | BODY MASS INDEX: 26.82 KG/M2 | WEIGHT: 198 LBS | HEIGHT: 72 IN

## 2025-06-19 DIAGNOSIS — M20.5X1 CLAW TOE, ACQUIRED, RIGHT: Primary | ICD-10-CM

## 2025-06-19 PROCEDURE — 99024 POSTOP FOLLOW-UP VISIT: CPT | Performed by: ORTHOPAEDIC SURGERY
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
## 2025-06-24 ENCOUNTER — TELEPHONE (OUTPATIENT)
Dept: FAMILY MEDICINE CLINIC | Facility: CLINIC | Age: 50
End: 2025-06-24
Payer: MEDICARE

## 2025-06-24 NOTE — TELEPHONE ENCOUNTER
Caller: [facility]    Relationship:     Best call back number: [phone] EXT. [phone]        Who are you requesting to speak with (clinical staff, provider,  specific staff member): PCP OR CLINICAL        What was the call regarding: MELLISSA HAS FAXED OVER A [facility] FOR INCONTINENT SUPPLIES FOR PATIENT 3 TIMES.  HAVE ANY BEEN RECEIVED.  THEY NEED IT SIGNED AND SENT BACK.  PLEASE CALL TO ADVISE STATUS AND IF SHE NEEDS TO FAX AGAIN.

## 2025-06-26 DIAGNOSIS — K21.9 GASTROESOPHAGEAL REFLUX DISEASE WITHOUT ESOPHAGITIS: ICD-10-CM

## 2025-06-27 ENCOUNTER — TELEPHONE (OUTPATIENT)
Dept: ORTHOPEDIC SURGERY | Facility: CLINIC | Age: 50
End: 2025-06-27
Payer: MEDICARE

## 2025-06-27 RX ORDER — PANTOPRAZOLE SODIUM 40 MG/1
40 TABLET, DELAYED RELEASE ORAL DAILY
Qty: 90 TABLET | Refills: 3 | Status: SHIPPED | OUTPATIENT
Start: 2025-06-27

## 2025-06-27 NOTE — TELEPHONE ENCOUNTER
Rx Refill Note  Requested Prescriptions     Pending Prescriptions Disp Refills    pantoprazole (PROTONIX) 40 MG EC tablet [Pharmacy Med Name: Pantoprazole Sodium 40 MG Oral Tablet Delayed Release] 90 tablet 0     Sig: Take 1 tablet by mouth once daily      Last office visit with prescribing clinician: 1/23/2025   Last telemedicine visit with prescribing clinician: Visit date not found   Next office visit with prescribing clinician: Visit date not found                         Would you like a call back once the refill request has been completed: [] Yes [] No    If the office needs to give you a call back, can they leave a voicemail: [] Yes [] No    Cira Chamberlain MA  06/27/25, 10:25 EDT

## 2025-06-27 NOTE — TELEPHONE ENCOUNTER
I spoke with patient's sister.  She said he has had worsening pain mainly in the arch of his foot not in his toes has not had any redness or drainage.  He has used some Aleve and used some of the remaining pain medication which helped briefly.  There was concern that could be from his postoperative shoe with which I am in agreement.  Thankfully does not Salik any sign of infection.  He may continue with Aleve as needed also counseled on use of ice and some stretching exercises for the foot and can try getting into a shoe with a good cushioned arch support rather than the postoperative shoe and see if that helps.  They will let me know next week how he is doing.  She appreciated the call

## 2025-06-27 NOTE — TELEPHONE ENCOUNTER
Hub staff attempted to follow warm transfer process and was unsuccessful     Caller: Jada Ugalde ( [facility])     Relationship to patient: Emergency Contact    Best call back number: [phone]    Patient is needing: PATIENTS SISTER SAYS HE HAS BEEN HAVING INCREASED PAIN IN HIS RIGHT FOOT (SURGERY 5-20-25) OVER THE LAST WEEK AND THIS MORNING IT IS SEVERE. SHE WOULD LIKE TO SPEAK TO SOMEONE REGARDING THIS TO SEE WHAT CAN BE CAN BE ABOUT THIS. PLEASE CALL TO DISCUSS.

## 2025-07-08 ENCOUNTER — OFFICE VISIT (OUTPATIENT)
Dept: FAMILY MEDICINE CLINIC | Facility: CLINIC | Age: 50
End: 2025-07-08
Payer: MEDICARE

## 2025-07-08 VITALS
HEIGHT: 72 IN | HEART RATE: 71 BPM | SYSTOLIC BLOOD PRESSURE: 132 MMHG | BODY MASS INDEX: 26.18 KG/M2 | WEIGHT: 193.3 LBS | TEMPERATURE: 98 F | OXYGEN SATURATION: 95 % | DIASTOLIC BLOOD PRESSURE: 78 MMHG

## 2025-07-08 DIAGNOSIS — Z79.899 ENCOUNTER FOR MONITORING LONG-TERM ANTICONVULSANT THERAPY: ICD-10-CM

## 2025-07-08 DIAGNOSIS — R45.1 AGITATION: ICD-10-CM

## 2025-07-08 DIAGNOSIS — E55.9 VITAMIN D DEFICIENCY: ICD-10-CM

## 2025-07-08 DIAGNOSIS — F90.2 ATTENTION DEFICIT HYPERACTIVITY DISORDER (ADHD), COMBINED TYPE: ICD-10-CM

## 2025-07-08 DIAGNOSIS — Z51.81 ENCOUNTER FOR MONITORING LONG-TERM ANTICONVULSANT THERAPY: ICD-10-CM

## 2025-07-08 DIAGNOSIS — Z87.820 HISTORY OF TRAUMATIC BRAIN INJURY: ICD-10-CM

## 2025-07-08 DIAGNOSIS — R53.83 OTHER FATIGUE: Primary | ICD-10-CM

## 2025-07-08 RX ORDER — POTASSIUM CHLORIDE 750 MG/1
10 CAPSULE, EXTENDED RELEASE ORAL DAILY
Qty: 90 CAPSULE | Refills: 0 | Status: SHIPPED | OUTPATIENT
Start: 2025-07-08

## 2025-07-08 NOTE — TELEPHONE ENCOUNTER
Caller: Aft[facility]    Relationship: Emergency Contact    Best call back number: [phone]    Requested Prescriptions:   Requested Prescriptions     Pending Prescriptions Disp Refills    amphetamine-dextroamphetamine (ADDERALL) 30 MG tablet 30 tablet 0     Sig: Take 1 tablet by mouth Daily.        Pharmacy where request should be sent: [address] [phone] [facility] [phone] FX     Last office visit with prescribing clinician: 1/23/2025   Last telemedicine visit with prescribing clinician: Visit date not found   Next office visit with prescribing clinician: Visit date not found     Additional details provided by patient:     Does the patient have less than a 3 day supply:  [x] Yes  [] No        Bry Chauhan   07/08/25 16:10 EDT

## 2025-07-08 NOTE — PROGRESS NOTES
[facility]  Primary Care   Visit Note    Chief Complaint  Chief Complaint   Patient presents with    Urinary Frequency     Past few weeks. Denies gross hematuria    Fatigue     Started 2 weeksago       Subjective    History of Present Illness        Tye JONES Junior presents to [facility] PRIMARY CARE for   History of Present Illness   History of Present Illness  The patient presents for urinary frequency and abnormal behavior. He is accompanied by his sister.    He has been experiencing frequent urination, approximately every hour, for the past 2 weeks. He reports no pain during urination or difficulty in initiating the stream. He also reports no new onset of back or abdominal pain. He has not had any fevers. He has no history of urinary tract infections. No changes have been made to this medication. He is not diabetic.    His sister reports that he has lost some weight and his appetite seems to be diminished. He did not eat much yesterday. She also feels that he is acting confused at times. She is mostly concerned that he may have a UTI and would like to have that ruled out while here today.     PAST SURGICAL HISTORY:  Magan placement in two toes.  No current facility-administered medications for this visit.     Review of Systems   Constitutional:  Positive for appetite change and fatigue. Negative for activity change and fever.   Respiratory: Negative.     Cardiovascular: Negative.    Gastrointestinal: Negative.    Genitourinary:  Positive for frequency. Negative for difficulty urinating, dysuria, flank pain, penile discharge, penile pain, testicular pain and urgency.   Musculoskeletal:  Negative for back pain.   Neurological:  Negative for dizziness.   Psychiatric/Behavioral:  Positive for agitation.        Objective   Vital Signs:   Visit Vitals  /78 (BP Location: Left arm, Patient Position: Sitting, Cuff Size: Adult)   Pulse 71   Temp 98 °F (36.7 °C) (Temporal)   Ht 182.9 cm  "(72\")   Wt 87.7 kg (193 lb 4.8 oz)   SpO2 95%   BMI 26.22 kg/m²                Physical Exam  Vitals and nursing note reviewed.   Constitutional:       General: He is not in acute distress.     Appearance: Normal appearance.   Cardiovascular:      Rate and Rhythm: Normal rate and regular rhythm.      Heart sounds: Normal heart sounds.   Pulmonary:      Effort: Pulmonary effort is normal.      Breath sounds: Normal breath sounds.   Skin:     General: Skin is warm and dry.   Neurological:      Mental Status: He is alert and oriented to person, place, and time.   Psychiatric:         Mood and Affect: Mood and affect normal.         Speech: Speech is tangential.         Behavior: Behavior is cooperative.        Physical Exam  Respiratory: Clear to auscultation, no wheezing, rales or rhonchi  Cardiovascular: Regular rate and rhythm, no murmurs, rubs, or gallops  Gastrointestinal: Soft, no tenderness, no distention, no masses         Result Review :  Results                            Assessment and Plan      Diagnoses and all orders for this visit:    1. Other fatigue (Primary)  -     CBC w AUTO Differential  -     Comprehensive metabolic panel    2. Agitation  -     CBC w AUTO Differential  -     Comprehensive metabolic panel    3. Encounter for monitoring long-term anticonvulsant therapy  -     Phenytoin level, total    4. Vitamin D deficiency  -     Vitamin D 25 hydroxy       Assessment & Plan  1. Urinary frequency.  - Reports urinating every hour for the past 2 weeks without pain or difficulty.  - No history of UTIs, diabetes, or recent changes in medication.  - Patient unable to void in clinic today  - Sent patient home with urine specimen cup to return later today or tomorrow if he is able to void. Urine specimens should be returned to clinic within 1-2 hours of voiding for accurate results.     2. Weight loss.  - Has lost 10 pounds since 01/2025, with a current weight of 193 pounds.  - Appetite has been off, and " did not eat much yesterday.  - Advised to supplement meals with protein shakes to address the weight loss.  - Follow-up with usual PCP for further evaluation     3. Agitation  - Check labs today, will also attempt to get UA from patient to r/o UTI            Follow Up   No follow-ups on file.  Patient was given instructions and counseling regarding his condition or for health maintenance advice. Please see specific information pulled into the AVS if appropriate.     Patient or patient representative verbalized consent for the use of Ambient Listening during the visit with  Sudha Davis PA-C for chart documentation. 7/9/2025  08:12 EDT    Sudha Davis PA-C  [address]  [address]  [phone]

## 2025-07-09 DIAGNOSIS — R41.82 ALTERED MENTAL STATUS, UNSPECIFIED ALTERED MENTAL STATUS TYPE: Primary | ICD-10-CM

## 2025-07-09 LAB
25(OH)D3+25(OH)D2 SERPL-MCNC: 32.9 NG/ML (ref 30–100)
ALBUMIN SERPL-MCNC: 4.7 G/DL (ref 3.8–4.9)
ALP SERPL-CCNC: 73 IU/L (ref 44–121)
ALT SERPL-CCNC: 22 IU/L (ref 0–44)
AST SERPL-CCNC: 30 IU/L (ref 0–40)
BASOPHILS # BLD AUTO: 0 X10E3/UL (ref 0–0.2)
BASOPHILS NFR BLD AUTO: 0 %
BILIRUB SERPL-MCNC: 0.4 MG/DL (ref 0–1.2)
BUN SERPL-MCNC: 10 MG/DL (ref 6–24)
BUN/CREAT SERPL: 9 (ref 9–20)
CALCIUM SERPL-MCNC: 9.6 MG/DL (ref 8.7–10.2)
CHLORIDE SERPL-SCNC: 104 MMOL/L (ref 96–106)
CO2 SERPL-SCNC: 23 MMOL/L (ref 20–29)
CREAT SERPL-MCNC: 1.07 MG/DL (ref 0.76–1.27)
EGFRCR SERPLBLD CKD-EPI 2021: 84 ML/MIN/1.73
EOSINOPHIL # BLD AUTO: 0.1 X10E3/UL (ref 0–0.4)
EOSINOPHIL NFR BLD AUTO: 1 %
ERYTHROCYTE [DISTWIDTH] IN BLOOD BY AUTOMATED COUNT: 13.4 % (ref 11.6–15.4)
GLOBULIN SER CALC-MCNC: 2.8 G/DL (ref 1.5–4.5)
GLUCOSE SERPL-MCNC: 87 MG/DL (ref 70–99)
HCT VFR BLD AUTO: 45.8 % (ref 37.5–51)
HGB BLD-MCNC: 15 G/DL (ref 13–17.7)
IMM GRANULOCYTES # BLD AUTO: 0 X10E3/UL (ref 0–0.1)
IMM GRANULOCYTES NFR BLD AUTO: 0 %
LYMPHOCYTES # BLD AUTO: 1.8 X10E3/UL (ref 0.7–3.1)
LYMPHOCYTES NFR BLD AUTO: 31 %
MCH RBC QN AUTO: 30.2 PG (ref 26.6–33)
MCHC RBC AUTO-ENTMCNC: 32.8 G/DL (ref 31.5–35.7)
MCV RBC AUTO: 92 FL (ref 79–97)
MONOCYTES # BLD AUTO: 0.7 X10E3/UL (ref 0.1–0.9)
MONOCYTES NFR BLD AUTO: 12 %
NEUTROPHILS # BLD AUTO: 3.2 X10E3/UL (ref 1.4–7)
NEUTROPHILS NFR BLD AUTO: 56 %
PHENYTOIN SERPL-MCNC: 16.3 UG/ML (ref 10–20)
PLATELET # BLD AUTO: 205 X10E3/UL (ref 150–450)
POTASSIUM SERPL-SCNC: 4.2 MMOL/L (ref 3.5–5.2)
PROT SERPL-MCNC: 7.5 G/DL (ref 6–8.5)
RBC # BLD AUTO: 4.97 X10E6/UL (ref 4.14–5.8)
SODIUM SERPL-SCNC: 140 MMOL/L (ref 134–144)
WBC # BLD AUTO: 5.8 X10E3/UL (ref 3.4–10.8)

## 2025-07-11 NOTE — TELEPHONE ENCOUNTER
Rx Refill Note  Requested Prescriptions     Pending Prescriptions Disp Refills    amphetamine-dextroamphetamine (ADDERALL) 30 MG tablet 30 tablet 0     Sig: Take 1 tablet by mouth Daily.      Last office visit with prescribing clinician: 1/23/2025   Last telemedicine visit with prescribing clinician: Visit date not found   Next office visit with prescribing clinician: Visit date not found                         Would you like a call back once the refill request has been completed: [] Yes [] No    If the office needs to give you a call back, can they leave a voicemail: [] Yes [] No    Cira Chamberlain MA  07/11/25, 09:54 EDT

## 2025-07-14 NOTE — TELEPHONE ENCOUNTER
Rx Refill Note  Requested Prescriptions     Pending Prescriptions Disp Refills    amphetamine-dextroamphetamine (ADDERALL) 30 MG tablet 30 tablet 0     Sig: Take 1 tablet by mouth Daily.      Last office visit with prescribing clinician: 1/23/2025   Last telemedicine visit with prescribing clinician: Visit date not found   Next office visit with prescribing clinician: Visit date not found                         Would you like a call back once the refill request has been completed: [] Yes [] No    If the office needs to give you a call back, can they leave a voicemail: [] Yes [] No    Cira Chamberlain MA  07/14/25, 14:09 EDT

## 2025-07-14 NOTE — TELEPHONE ENCOUNTER
Caller: AftabJada    Relationship: Emergency Contact    Best call back number: [phone]    Requested Prescriptions:   Requested Prescriptions     Pending Prescriptions Disp Refills    amphetamine-dextroamphetamine (ADDERALL) 30 MG tablet 30 tablet 0     Sig: Take 1 tablet by mouth Daily.        Pharmacy where request should be sent: [address] [phone] [facility] [phone]      Last office visit with prescribing clinician: 1/23/2025   Last telemedicine visit with prescribing clinician: Visit date not found   Next office visit with prescribing clinician: Visit date not found     Additional details provided by patient: THIRD TIME, THEY HAVE CALLED. PATIENT IS OUT OF MEDICATION    Does the patient have less than a 3 day supply:  [x] Yes  [] No    Would you like a call back once the refill request has been completed: [] Yes [x] No    If the office needs to give you a call back, can they leave a voicemail: [] Yes [x] No    Grace Su   07/14/25 08:21 EDT

## 2025-07-16 RX ORDER — DEXTROAMPHETAMINE SACCHARATE, AMPHETAMINE ASPARTATE, DEXTROAMPHETAMINE SULFATE AND AMPHETAMINE SULFATE 7.5; 7.5; 7.5; 7.5 MG/1; MG/1; MG/1; MG/1
30 TABLET ORAL DAILY
Qty: 30 TABLET | Refills: 0 | Status: SHIPPED | OUTPATIENT
Start: 2025-07-16

## 2025-07-21 ENCOUNTER — OFFICE VISIT (OUTPATIENT)
Dept: ORTHOPEDIC SURGERY | Facility: CLINIC | Age: 50
End: 2025-07-21
Payer: MEDICARE

## 2025-07-21 VITALS — TEMPERATURE: 97.7 F | BODY MASS INDEX: 25.33 KG/M2 | WEIGHT: 187 LBS | HEIGHT: 72 IN

## 2025-07-21 DIAGNOSIS — M20.5X1 CLAW TOE, ACQUIRED, RIGHT: Primary | ICD-10-CM

## 2025-07-21 PROCEDURE — 73630 X-RAY EXAM OF FOOT: CPT | Performed by: ORTHOPAEDIC SURGERY

## 2025-07-21 PROCEDURE — 99024 POSTOP FOLLOW-UP VISIT: CPT | Performed by: ORTHOPAEDIC SURGERY
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
## 2025-08-26 RX ORDER — ALENDRONATE SODIUM 70 MG/1
70 TABLET ORAL WEEKLY
Qty: 12 TABLET | Refills: 3 | Status: SHIPPED | OUTPATIENT
Start: 2025-08-26

## 2025-08-28 DIAGNOSIS — F90.2 ATTENTION DEFICIT HYPERACTIVITY DISORDER (ADHD), COMBINED TYPE: ICD-10-CM

## 2025-08-28 DIAGNOSIS — Z87.820 HISTORY OF TRAUMATIC BRAIN INJURY: ICD-10-CM

## 2025-08-28 RX ORDER — DEXTROAMPHETAMINE SACCHARATE, AMPHETAMINE ASPARTATE, DEXTROAMPHETAMINE SULFATE AND AMPHETAMINE SULFATE 7.5; 7.5; 7.5; 7.5 MG/1; MG/1; MG/1; MG/1
30 TABLET ORAL DAILY
Qty: 30 TABLET | Refills: 0 | Status: SHIPPED | OUTPATIENT
Start: 2025-08-28

## (undated) DEVICE — BNDG GZ SOF-FORM CONFRM 2X75IN LF STRL

## (undated) DEVICE — ANTIBACTERIAL UNDYED BRAIDED (POLYGLACTIN 910), SYNTHETIC ABSORBABLE SUTURE: Brand: COATED VICRYL

## (undated) DEVICE — COVER,C-ARM,41X74: Brand: MEDLINE

## (undated) DEVICE — ELECTRD NDL EZ CLN MOD 2.75IN

## (undated) DEVICE — Device

## (undated) DEVICE — SYS PERFUS SEP PLATLT W TIPS CUST

## (undated) DEVICE — UNDERCAST PADDING: Brand: DEROYAL

## (undated) DEVICE — BNDG ELAS CO-FLEX SLF ADHR 2IN 5YD LF STRL

## (undated) DEVICE — DRAPE,U/ SHT,SPLIT,PLAS,STERIL: Brand: MEDLINE

## (undated) DEVICE — SUT ETHLN 4/0 PS2 18IN 1667H

## (undated) DEVICE — WEBRIL* CAST PADDING: Brand: DEROYAL

## (undated) DEVICE — PATIENT RETURN ELECTRODE, SINGLE-USE, CONTACT QUALITY MONITORING, ADULT, WITH 9FT CORD, FOR PATIENTS WEIGING OVER 33LBS. (15KG): Brand: MEGADYNE

## (undated) DEVICE — BLANKT WARM UPPR/BDY ARM/OUT 57X196CM

## (undated) DEVICE — PK ORTHO MINOR TOWER 40

## (undated) DEVICE — GLV SURG SIGNATURE ESSENTIAL PF LTX SZ8

## (undated) DEVICE — DRILL BIT, CANNULATED, 1.7MM: Brand: MEDLINE

## (undated) DEVICE — GOWN,SIRUS,NONRNF,SETINSLV,2XL,18/CS: Brand: MEDLINE

## (undated) DEVICE — SKIN PREP TRAY W/CHG: Brand: MEDLINE INDUSTRIES, INC.

## (undated) DEVICE — GLV SURG BIOGEL LTX PF 8

## (undated) DEVICE — DRESSING,GAUZE,XEROFORM,CURAD,1"X8",ST: Brand: CURAD

## (undated) DEVICE — Device: Brand: DEFENDO AIR/WATER/SUCTION AND BIOPSY VALVE

## (undated) DEVICE — PENCL SMOKE/EVAC MEGADYNE TELESCP 10FT

## (undated) DEVICE — APPL CHLORAPREP HI/LITE 26ML ORNG

## (undated) DEVICE — CANN NASL CO2 TRULINK W/O2 A/

## (undated) DEVICE — PREP SOL POVIDONE/IODINE BT 4OZ

## (undated) DEVICE — TRAP FLD MINIVAC MEGADYNE 100ML

## (undated) DEVICE — THE TORRENT IRRIGATION SCOPE CONNECTOR IS USED WITH THE TORRENT IRRIGATION TUBING TO PROVIDE IRRIGATION FLUIDS SUCH AS STERILE WATER DURING GASTROINTESTINAL ENDOSCOPIC PROCEDURES WHEN USED IN CONJUNCTION WITH AN IRRIGATION PUMP (OR ELECTROSURGICAL UNIT).: Brand: TORRENT

## (undated) DEVICE — PRECISION THIN (9.0 X 0.38 X 25.0MM)

## (undated) DEVICE — TUBING, SUCTION, 1/4" X 10', STRAIGHT: Brand: MEDLINE

## (undated) DEVICE — SENSR O2 OXIMAX FNGR A/ 18IN NONSTR